# Patient Record
Sex: FEMALE | Race: WHITE | NOT HISPANIC OR LATINO | Employment: OTHER | URBAN - METROPOLITAN AREA
[De-identification: names, ages, dates, MRNs, and addresses within clinical notes are randomized per-mention and may not be internally consistent; named-entity substitution may affect disease eponyms.]

---

## 2020-01-22 ENCOUNTER — TELEPHONE (OUTPATIENT)
Dept: SCHEDULING | Facility: IMAGING CENTER | Age: 60
End: 2020-01-22

## 2020-01-27 ENCOUNTER — TELEPHONE (OUTPATIENT)
Dept: MEDICAL GROUP | Facility: PHYSICIAN GROUP | Age: 60
End: 2020-01-27

## 2020-01-27 NOTE — TELEPHONE ENCOUNTER
Pt called asking about ostomy supplies being sent to [facility]. Pt has appointment to establish w/ Dr Escobar 2/3/2020...     Please advise

## 2020-02-03 ENCOUNTER — OFFICE VISIT (OUTPATIENT)
Dept: MEDICAL GROUP | Facility: PHYSICIAN GROUP | Age: 60
End: 2020-02-03
Payer: OTHER GOVERNMENT

## 2020-02-03 VITALS
WEIGHT: 89 LBS | DIASTOLIC BLOOD PRESSURE: 50 MMHG | SYSTOLIC BLOOD PRESSURE: 108 MMHG | OXYGEN SATURATION: 100 % | HEIGHT: 65 IN | HEART RATE: 60 BPM | TEMPERATURE: 97.7 F | BODY MASS INDEX: 14.83 KG/M2

## 2020-02-03 DIAGNOSIS — F43.10 PTSD (POST-TRAUMATIC STRESS DISORDER): ICD-10-CM

## 2020-02-03 DIAGNOSIS — G93.32 CFS (CHRONIC FATIGUE SYNDROME): ICD-10-CM

## 2020-02-03 DIAGNOSIS — K94.19 INTESTINAL STOMA PROLAPSE (HCC): ICD-10-CM

## 2020-02-03 DIAGNOSIS — D83.9 CVID (COMMON VARIABLE IMMUNODEFICIENCY) (HCC): ICD-10-CM

## 2020-02-03 DIAGNOSIS — K59.00 CONSTIPATION, UNSPECIFIED CONSTIPATION TYPE: ICD-10-CM

## 2020-02-03 DIAGNOSIS — E03.9 HYPOTHYROIDISM, UNSPECIFIED TYPE: ICD-10-CM

## 2020-02-03 DIAGNOSIS — E46 MALNUTRITION, UNSPECIFIED TYPE (HCC): ICD-10-CM

## 2020-02-03 DIAGNOSIS — Z43.3 COLOSTOMY CARE (HCC): ICD-10-CM

## 2020-02-03 PROBLEM — E16.1 REACTIVE HYPOGLYCEMIA: Status: ACTIVE | Noted: 2020-02-03

## 2020-02-03 PROCEDURE — 99204 OFFICE O/P NEW MOD 45 MIN: CPT | Performed by: FAMILY MEDICINE

## 2020-02-03 RX ORDER — LIOTHYRONINE SODIUM 5 UG/1
10 TABLET ORAL DAILY
COMMUNITY
End: 2020-06-05 | Stop reason: SDUPTHER

## 2020-02-03 RX ORDER — CYANOCOBALAMIN 1000 UG/ML
1000 INJECTION, SOLUTION INTRAMUSCULAR; SUBCUTANEOUS
COMMUNITY
End: 2020-07-10 | Stop reason: CLARIF

## 2020-02-03 ASSESSMENT — PATIENT HEALTH QUESTIONNAIRE - PHQ9: CLINICAL INTERPRETATION OF PHQ2 SCORE: 0

## 2020-02-03 NOTE — PROGRESS NOTES
CC: Colostomy care    HISTORY OF THE PRESENT ILLNESS: Patient is a 59 y.o. female. This pleasant patient is here today to establish care and discuss health problems below.    Patient is here today as a new patient.  She has a variety of very serious health conditions and is on permanent disability.  She wants to get her disability paperwork filled out soon.    She is a very long complicated history of medical illness.  She reports that she had CVID and was on infusions per immunologist.  She was also diagnosed with chronic fatigue syndrome.  Apparently somewhere on the line she developed a rectal prolapse.  She had this repaired and the surgery unfortunately went poorly.  She ended up hospitalized for a very prolonged period of time with sepsis.  She now has a permanent colostomy.  Unfortunately she now has intestinal prolapse with her colostomy as well.  Apparently now following with new surgeon here in [place] who has recommended trying to repair her colostomy.  She reports being very skeptical of this, as does her friend who is present during today's appointment.  She continues to follow with her surgeon in [place] as well.     She reports very significant constipation since getting colostomy.  She does follow with gastroenterology and has established with a gastroenterologist here in [place].  She takes Linzess for this.  She also notes that she takes about 25 laxatives per day.    She is malnourished.  She reports she has absorption problems.  She also reports that when she goes to the bathroom, it all comes out at once as a result of her laxative use.  As a result, she reports that she does not eat hardly anything.  BMI is 14.8.  She has very poor wound healing and easily bruises.    She also has history of hypothyroidism.  She is on Synthroid.  Unknown when she had her last labs checked.    As a result of all her health issues, she reports being on permanent disability in [place], but needing that renewed for the  "[facility].    Allergies: Patient has no known allergies.    Current Outpatient Medications Ordered in Epic   Medication Sig Dispense Refill   • liothyronine (CYTOMEL) 5 MCG Tab Take 10 mcg by mouth every day.     • linaCLOtide (LINZESS) 290 MCG Cap Take 290 mcg by mouth every day.     • cyanocobalamin (VITAMIN B-12) 1000 MCG/ML Solution 1,000 mcg by Intramuscular route every 30 days.       No current [facility]-ordered facility-administered medications on file.        History reviewed. No pertinent past medical history.    History reviewed. No pertinent surgical history.    Social History     Tobacco Use   • Smoking status: Never Smoker   • Smokeless tobacco: Never Used   Substance Use Topics   • Alcohol use: Not Currently   • Drug use: Not on file       Social History     Patient does not qualify to have social determinant information on file (likely too young).   Social History Narrative   • Not on file       History reviewed. No pertinent family history.    ROS:     - Constitutional: Positive for chronic fatigue, and severely low BMI.      - Respiratory: Negative for cough, sputum production, chest congestion, dyspnea, wheezing, and crackles.      - Cardiovascular: Negative for chest pain, palpitations, orthopnea, PND, and bilateral lower extremity edema.     - Gastrointestinal:See HPI       - NOTE: All other systems reviewed and are negative, except as in HPI.    Exam: /50 (BP Location: Left arm, Patient Position: Sitting, BP Cuff Size: Adult)   Pulse 60   Temp 36.5 °C (97.7 °F) (Temporal)   Ht 1.651 m (5' 5\")   Wt 40.4 kg (89 lb)   SpO2 100%  Body mass index is 14.81 kg/m².    General: Chronically ill-appearing, severely cachectic  HEENT: Normocephalic. Conjunctiva clear, lids without ptosis, pupils equal and reactive to light accommodation, ears normal shape and contour, canals are clear bilaterally, tympanic membranes without bulging or erythema and normal cone of light, oropharynx is without " erythema, edema or exudates.   Neck: Supple without JVD. No thyromegaly or nodules  Pulmonary: Clear to ausculation.  Normal effort. No rales, ronchi, or wheezing.  Cardiovascular: Regular rate and rhythm without murmur, rubs or gallop.   Abdomen: Soft, nontender, nondistended. Normal bowel sounds. Liver and spleen are not palpable. No rebound or guarding.  Stoma noted.  Neurologic: normal gait  Lymph: No cervical, supraclavicular lymph nodes are palpable  Skin: Multiple ecchymosis noted on body, multiple poor healing wounds noted as well  Musculoskeletal:  No extremity cyanosis, clubbing, or edema.  Psych: Normal mood and affect. Alert and oriented. Judgment and insight is normal.    Please note that this dictation was created using voice recognition software. I have made every reasonable attempt to correct obvious errors, but I expect that there are errors of grammar and possibly content that I did not discover before finalizing the note.      Assessment/Plan  Pita was seen today for establish care and paperwork.    Diagnoses and all orders for this visit:    CVID (common variable immunodeficiency) (HCC)  Chronic issue for the patient.  She has not yet established with an immunologist here in [place].  Referral has been placed today.  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  And requesting records from her various specialist in [place] and will need to review these prior to her next appointment where she will be asking that I fill out disability paperwork.    CFS (chronic fatigue syndrome)  Chronic issue, again requesting records from previous providers.    Malnutrition, unspecified type ([facility])  Chronic issue, severe with BMI of 14.8.  Will check labs as below.  Will likely recommend medical nutrition therapy at next visit.  She reports that she has been to multiple nutritionist and that nobody has been able to help her.  -      Comp Metabolic Panel; Future  -     CBC WITH DIFFERENTIAL; Future  -     VITAMIN B12; Future  -     VITAMIN D,25 HYDROXY; Future  -     FERRITIN; Future  -     Lipid Profile; Future    Hypothyroidism, unspecified type  Chronic issue, on Cytomel.  Checking labs as below.  -     TSH WITH REFLEX TO FT4; Future    PTSD (post-traumatic stress disorder)  Reports this diagnosis as a result of everything that happened as per HPI.  Requesting records.    Constipation, unspecified constipation type  Chronic issue, following with gastroenterology.  She is definitely overusing stimulant laxatives and having severe diarrhea as a result.  As a result of this diarrhea, she is severely limiting her food intake.  This seems to be quite a bad cycle for her.  She is following with gastroenterology and on Linzess.  We will need to request records.    Follow-up in 1 month for disability paperwork and lab review.    Zuleika Escobar,   [facility]

## 2020-02-03 NOTE — LETTER
[facility]  Zuleika Escobar D.O.  [address]  [place] [address]  Fax: [phone]   Authorization for Release/Disclosure of   Protected Health Information   Name: SUSAN WILCOX : 1960 SSN: [id number]   Address: [facility] [place] [address] Phone:    [phone] (home)    I authorize the entity listed below to release/disclose the PHI below to:   [facility]/Zuleika Escobar D.O. and Zuleika Escobar D.O.   Provider or Entity Name:  [facility]   Address   City, State, [place], [place] Phone:      Fax:  [phone]   Reason for request: continuity of care   Information to be released:    [  ] LAST COLONOSCOPY,  including any PATH REPORT and follow-up  [  ] LAST FIT/COLOGUARD RESULT [  ] LAST DEXA  [  ] LAST MAMMOGRAM  [  ] LAST PAP  [  ] LAST LABS [  ] RETINA EXAM REPORT  [  ] IMMUNIZATION RECORDS  [XX] Release all info      [  ] Check here and initial the line next to each item to release ALL health information INCLUDING  _____ Care and treatment for drug and / or alcohol abuse  _____ HIV testing, infection status, or AIDS  _____ Genetic Testing    DATES OF SERVICE OR TIME PERIOD TO BE DISCLOSED: _____________  I understand and acknowledge that:  * This Authorization may be revoked at any time by you in writing, except if your health information has already been used or disclosed.  * Your health information that will be used or disclosed as a result of you signing this authorization could be re-disclosed by the recipient. If this occurs, your re-disclosed health information may no longer be protected by State or Federal laws.  * You may refuse to sign this Authorization. Your refusal will not affect your ability to obtain treatment.  * This Authorization becomes effective upon signing and will  on (date) __________.      If no date is indicated, this Authorization will  one (1) year from the signature date.    Name: Susan Wilcox    Signature:  Continuity of Care   Date:     2/3/2020       PLEASE FAX REQUESTED RECORDS BACK TO: [phone]

## 2020-02-03 NOTE — LETTER
[facility]  Zuleika Escobar D.O.  [address]  [place] [address]  Fax: [phone]   Authorization for Release/Disclosure of   Protected Health Information   Name: SUSAN WILCOX : 1960 SSN: [id number]   Address: [address] [place], [place] [address]e [facility] [address] Phone:    [phone] (home)    I authorize the entity listed below to release/disclose the PHI below to:   [facility]/Zuleika Escobar D.O. and Zuleika Escobar D.O.   Provider or Entity Name:  [facility]   Address   City, [facility], [place], [place] Phone:      Fax:  [phone]   Reason for request: continuity of care   Information to be released:    [  ] LAST COLONOSCOPY,  including any PATH REPORT and follow-up  [  ] LAST FIT/COLOGUARD RESULT [  ] LAST DEXA  [  ] LAST MAMMOGRAM  [  ] LAST PAP  [  ] LAST LABS [  ] RETINA EXAM REPORT  [  ] IMMUNIZATION RECORDS  [X] Release all info      [  ] Check here and initial the line next to each item to release ALL health information INCLUDING  _____ Care and treatment for drug and / or alcohol abuse  _____ HIV testing, infection status, or AIDS  _____ Genetic Testing    DATES OF SERVICE OR TIME PERIOD TO BE DISCLOSED: _____________  I understand and acknowledge that:  * This Authorization may be revoked at any time by you in writing, except if your health information has already been used or disclosed.  * Your health information that will be used or disclosed as a result of you signing this authorization could be re-disclosed by the recipient. If this occurs, your re-disclosed health information may no longer be protected by State or Federal laws.  * You may refuse to sign this Authorization. Your refusal will not affect your ability to obtain treatment.  * This Authorization becomes effective upon signing and will  on (date) __________.      If no date is indicated, this Authorization will  one (1) year from the signature date.    Name: Susan  Myles    Signature: Continuity of Care   Date:     2/3/2020       PLEASE FAX REQUESTED RECORDS BACK TO: [phone]

## 2020-02-03 NOTE — LETTER
[facility]  Zuleika Escobar D.O.  [address]  [place] [address]  Fax: [phone]   Authorization for Release/Disclosure of   Protected Health Information   Name: SUSAN WILCOX : 1960 SSN: [id number]   Address: [facility] [address] [facility]  [place] [address] Phone:    [phone] (home)    I authorize the entity listed below to release/disclose the PHI below to:   [facility]/Zuleika Escobar D.O. and Zuleika Escobar D.O.   Provider or Entity Name:  Dr Quigley   Address   City, [facility]  Col[facility] Phone:      Fax:  [phone]   Reason for request: continuity of care   Information to be released:    [  ] LAST COLONOSCOPY,  including any PATH REPORT and follow-up  [  ] LAST FIT/COLOGUARD RESULT [  ] LAST DEXA  [  ] LAST MAMMOGRAM  [  ] LAST PAP  [  ] LAST LABS [  ] RETINA EXAM REPORT  [  ] IMMUNIZATION RECORDS  [XX] Release all info      [  ] Check here and initial the line next to each item to release ALL health information INCLUDING  _____ Care and treatment for drug and / or alcohol abuse  _____ HIV testing, infection status, or AIDS  _____ Genetic Testing    DATES OF SERVICE OR TIME PERIOD TO BE DISCLOSED: _____________  I understand and acknowledge that:  * This Authorization may be revoked at any time by you in writing, except if your health information has already been used or disclosed.  * Your health information that will be used or disclosed as a result of you signing this authorization could be re-disclosed by the recipient. If this occurs, your re-disclosed health information may no longer be protected by State or Federal laws.  * You may refuse to sign this Authorization. Your refusal will not affect your ability to obtain treatment.  * This Authorization becomes effective upon signing and will  on (date) __________.      If no date is indicated, this Authorization will  one (1) year from the signature date.    Name: Susan  Myles    Signature: Continuity of Care Date:     2/3/2020       PLEASE FAX REQUESTED RECORDS BACK TO: [phone]

## 2020-02-05 ENCOUNTER — HOSPITAL ENCOUNTER (OUTPATIENT)
Dept: LAB | Facility: MEDICAL CENTER | Age: 60
End: 2020-02-05
Attending: FAMILY MEDICINE
Payer: OTHER GOVERNMENT

## 2020-02-05 DIAGNOSIS — E03.9 HYPOTHYROIDISM, UNSPECIFIED TYPE: ICD-10-CM

## 2020-02-05 DIAGNOSIS — E46 MALNUTRITION, UNSPECIFIED TYPE (HCC): ICD-10-CM

## 2020-02-05 LAB
25(OH)D3 SERPL-MCNC: 8 NG/ML (ref 30–100)
ALBUMIN SERPL BCP-MCNC: 4.3 G/DL (ref 3.2–4.9)
ALBUMIN/GLOB SERPL: 2 G/DL
ALP SERPL-CCNC: 70 U/L (ref 30–99)
ALT SERPL-CCNC: 27 U/L (ref 2–50)
ANION GAP SERPL CALC-SCNC: 7 MMOL/L (ref 0–11.9)
ANISOCYTOSIS BLD QL SMEAR: ABNORMAL
AST SERPL-CCNC: 23 U/L (ref 12–45)
BASOPHILS # BLD AUTO: 0.6 % (ref 0–1.8)
BASOPHILS # BLD: 0.02 K/UL (ref 0–0.12)
BILIRUB SERPL-MCNC: 0.3 MG/DL (ref 0.1–1.5)
BUN SERPL-MCNC: 12 MG/DL (ref 8–22)
CALCIUM SERPL-MCNC: 8.7 MG/DL (ref 8.5–10.5)
CHLORIDE SERPL-SCNC: 109 MMOL/L (ref 96–112)
CHOLEST SERPL-MCNC: 235 MG/DL (ref 100–199)
CO2 SERPL-SCNC: 21 MMOL/L (ref 20–33)
COMMENT 1642: NORMAL
CREAT SERPL-MCNC: 0.62 MG/DL (ref 0.5–1.4)
EOSINOPHIL # BLD AUTO: 0.06 K/UL (ref 0–0.51)
EOSINOPHIL NFR BLD: 1.7 % (ref 0–6.9)
ERYTHROCYTE [DISTWIDTH] IN BLOOD BY AUTOMATED COUNT: 51.7 FL (ref 35.9–50)
FASTING STATUS PATIENT QL REPORTED: NORMAL
GLOBULIN SER CALC-MCNC: 2.1 G/DL (ref 1.9–3.5)
GLUCOSE SERPL-MCNC: 77 MG/DL (ref 65–99)
HCT VFR BLD AUTO: 40.9 % (ref 37–47)
HDLC SERPL-MCNC: 79 MG/DL
HGB BLD-MCNC: 12.2 G/DL (ref 12–16)
IMM GRANULOCYTES # BLD AUTO: 0.01 K/UL (ref 0–0.11)
IMM GRANULOCYTES NFR BLD AUTO: 0.3 % (ref 0–0.9)
LDLC SERPL CALC-MCNC: 131 MG/DL
LYMPHOCYTES # BLD AUTO: 0.84 K/UL (ref 1–4.8)
LYMPHOCYTES NFR BLD: 24.3 % (ref 22–41)
MACROCYTES BLD QL SMEAR: ABNORMAL
MCH RBC QN AUTO: 31 PG (ref 27–33)
MCHC RBC AUTO-ENTMCNC: 29.8 G/DL (ref 33.6–35)
MCV RBC AUTO: 104.1 FL (ref 81.4–97.8)
MONOCYTES # BLD AUTO: 0.39 K/UL (ref 0–0.85)
MONOCYTES NFR BLD AUTO: 11.3 % (ref 0–13.4)
MORPHOLOGY BLD-IMP: NORMAL
NEUTROPHILS # BLD AUTO: 2.14 K/UL (ref 2–7.15)
NEUTROPHILS NFR BLD: 61.8 % (ref 44–72)
NRBC # BLD AUTO: 0 K/UL
NRBC BLD-RTO: 0 /100 WBC
PLATELET # BLD AUTO: 125 K/UL (ref 164–446)
PLATELET BLD QL SMEAR: NORMAL
PMV BLD AUTO: 13 FL (ref 9–12.9)
POTASSIUM SERPL-SCNC: 3.9 MMOL/L (ref 3.6–5.5)
PROT SERPL-MCNC: 6.4 G/DL (ref 6–8.2)
RBC # BLD AUTO: 3.93 M/UL (ref 4.2–5.4)
RBC BLD AUTO: PRESENT
SODIUM SERPL-SCNC: 137 MMOL/L (ref 135–145)
TRIGL SERPL-MCNC: 124 MG/DL (ref 0–149)
WBC # BLD AUTO: 3.5 K/UL (ref 4.8–10.8)

## 2020-02-05 PROCEDURE — 82607 VITAMIN B-12: CPT

## 2020-02-05 PROCEDURE — 80061 LIPID PANEL: CPT

## 2020-02-05 PROCEDURE — 36415 COLL VENOUS BLD VENIPUNCTURE: CPT

## 2020-02-05 PROCEDURE — 82306 VITAMIN D 25 HYDROXY: CPT

## 2020-02-05 PROCEDURE — 84443 ASSAY THYROID STIM HORMONE: CPT

## 2020-02-05 PROCEDURE — 82728 ASSAY OF FERRITIN: CPT

## 2020-02-05 PROCEDURE — 80053 COMPREHEN METABOLIC PANEL: CPT

## 2020-02-05 PROCEDURE — 85025 COMPLETE CBC W/AUTO DIFF WBC: CPT

## 2020-02-06 LAB
FERRITIN SERPL-MCNC: 15.6 NG/ML (ref 10–291)
TSH SERPL DL<=0.005 MIU/L-ACNC: 2.96 UIU/ML (ref 0.38–5.33)
VIT B12 SERPL-MCNC: 451 PG/ML (ref 211–911)

## 2020-02-07 DIAGNOSIS — E78.00 PURE HYPERCHOLESTEROLEMIA: ICD-10-CM

## 2020-02-07 DIAGNOSIS — E55.9 VITAMIN D DEFICIENCY: ICD-10-CM

## 2020-02-28 ENCOUNTER — OFFICE VISIT (OUTPATIENT)
Dept: MEDICAL GROUP | Facility: PHYSICIAN GROUP | Age: 60
End: 2020-02-28
Payer: OTHER GOVERNMENT

## 2020-02-28 VITALS
OXYGEN SATURATION: 98 % | BODY MASS INDEX: 14.99 KG/M2 | HEIGHT: 65 IN | TEMPERATURE: 97.1 F | SYSTOLIC BLOOD PRESSURE: 114 MMHG | WEIGHT: 90 LBS | HEART RATE: 60 BPM | DIASTOLIC BLOOD PRESSURE: 54 MMHG

## 2020-02-28 DIAGNOSIS — G93.32 CFS (CHRONIC FATIGUE SYNDROME): ICD-10-CM

## 2020-02-28 DIAGNOSIS — D83.9 CVID (COMMON VARIABLE IMMUNODEFICIENCY) (HCC): ICD-10-CM

## 2020-02-28 DIAGNOSIS — K94.19 INTESTINAL STOMA PROLAPSE (HCC): ICD-10-CM

## 2020-02-28 DIAGNOSIS — E46 MALNUTRITION, UNSPECIFIED TYPE (HCC): ICD-10-CM

## 2020-02-28 DIAGNOSIS — Z02.89 ENCOUNTER FOR COMPLETION OF FORM WITH PATIENT: Primary | ICD-10-CM

## 2020-02-28 DIAGNOSIS — K59.00 CONSTIPATION, UNSPECIFIED CONSTIPATION TYPE: ICD-10-CM

## 2020-02-28 PROCEDURE — 99214 OFFICE O/P EST MOD 30 MIN: CPT | Performed by: FAMILY MEDICINE

## 2020-02-28 ASSESSMENT — FIBROSIS 4 INDEX: FIB4 SCORE: 2.05

## 2020-02-29 NOTE — PROGRESS NOTES
CC:   Disability paperwork    HISTORY OF THE PRESENT ILLNESS: Patient is a 59 y.o. female. This pleasant patient is here today mostly for completion of disability paperwork today.    Patient is here today for completion of her disability paperwork.  She was previously on disability permanently in [place].  She has a history of CVID, intestinal stoma prolapse with chronic constipation/diarrhea, malnutrition, chronic fatigue.  She recently moved here to [place].    She does note that she recently got in with Dr. Nguyen a colorectal surgeon here in [place].  She reports that she was not happy with the outcome of the visit as he wanted to do surgery, but she feels that she is not ready for that from a health standpoint.    She is going to see gastroenterology Dr. Starr for the second time next week to follow-up on her chronic constipation.  She has somewhat of a difficult/unclear history regarding this.  She apparently reports suffering significant constipation since getting colostomy.  She takes Linzess.  She also notes she takes about 10 stimulant laxatives per day.  She has been told in the past that she needs to get off of these, but she feels that she gets constipated and bloated when she does not take them.  She also reports that she does not keep food down.    She does have severe malnutrition as BMI is 14.9.  She has cracks in her skin and bruising all over her body.  She wants to know if there is any lotions that she can use for her skin cracks.  Nutrition is very poor.    Allergies: Patient has no known allergies.    Current Outpatient Medications Ordered in Epic   Medication Sig Dispense Refill   • Cholecalciferol (VITAMIN D3) 1.25 MG (74301 UT) Tab Take 1 Tab by mouth every 7 days. 12 Tab 2   • liothyronine (CYTOMEL) 5 MCG Tab Take 10 mcg by mouth every day.     • linaCLOtide (LINZESS) 290 MCG Cap Take 290 mcg by mouth every day.     • cyanocobalamin (VITAMIN B-12) 1000 MCG/ML Solution 1,000 mcg by  "Intramuscular route every 30 days.       No current Epic-ordered facility-administered medications on file.        History reviewed. No pertinent past medical history.    History reviewed. No pertinent surgical history.    Social History     Tobacco Use   • Smoking status: Never Smoker   • Smokeless tobacco: Never Used   Substance Use Topics   • Alcohol use: Not Currently   • Drug use: Not on file       Social History     Social History Narrative   • Not on file       History reviewed. No pertinent family history.    ROS:   See HPI    Exam: /54 (BP Location: Left arm, Patient Position: Sitting, BP Cuff Size: Adult)   Pulse 60   Temp 36.2 °C (97.1 °F) (Temporal)   Ht 1.651 m (5' 5\")   Wt 40.8 kg (90 lb)   SpO2 98%  Body mass index is 14.98 kg/m².    General: Chronically ill-appearing, cachectic  Pulmonary: Clear to ausculation.  Normal effort. No rales, ronchi, or wheezing.  Cardiovascular: Regular rate and rhythm without murmur, rubs or gallop.   Skin: Warm and dry.  Cracks and unhealing wounds noted on hands and skin.  Significant ecchymosis noted on arms and legs.  Musculoskeletal:  No extremity cyanosis, clubbing, or edema.  Psych: Normal mood and affect. Alert and oriented. Judgment and insight is normal.    Please note that this dictation was created using voice recognition software. I have made every reasonable attempt to correct obvious errors, but I expect that there are errors of grammar and possibly content that I did not discover before finalizing the note.      Assessment/Plan  Pita was seen today for paperwork.    Diagnoses and all orders for this visit:    Encounter for completion of form with patient  Malnutrition, unspecified type (HCC)  Constipation, unspecified constipation type  Intestinal stoma prolapse (HCC)  CVID   Chronic fatigue syndrome  All chronic issues for the patient.  I am still pending receiving some medical records from her specialist in [place].  She has established " care with colorectal surgery and GI.  She is pending establishing appointment with immunology.  Disability paperwork was filled out today.    Follow up in 3-4 months or sooner if needed.     Counseling: Patient was seen for a total of 25 minutes face-to-face by myself, with more than half of the time spent discussion of recent patient experiences with specialists, coordination of care, and completion of paperwork.    Zuleika Escobar DO  [facility]

## 2020-03-03 ENCOUNTER — TELEPHONE (OUTPATIENT)
Dept: MEDICAL GROUP | Facility: PHYSICIAN GROUP | Age: 60
End: 2020-03-03

## 2020-03-03 DIAGNOSIS — D83.9 CVID (COMMON VARIABLE IMMUNODEFICIENCY) (HCC): ICD-10-CM

## 2020-03-04 NOTE — TELEPHONE ENCOUNTER
Season from [facility] ([phone] option 4) called and stated that Dr Shook wants to speak to you regarding this patient before accepting the referral. I told them tomorrow during lunch would probably work if that works for you. Thank you.

## 2020-05-05 ENCOUNTER — HOSPITAL ENCOUNTER (OUTPATIENT)
Dept: LAB | Facility: MEDICAL CENTER | Age: 60
End: 2020-05-05
Attending: FAMILY MEDICINE
Payer: OTHER GOVERNMENT

## 2020-05-05 DIAGNOSIS — E46 MALNUTRITION, UNSPECIFIED TYPE (HCC): ICD-10-CM

## 2020-05-05 DIAGNOSIS — E53.8 VITAMIN B12 DEFICIENCY: ICD-10-CM

## 2020-05-05 LAB
BASOPHILS # BLD AUTO: 0.8 % (ref 0–1.8)
BASOPHILS # BLD: 0.03 K/UL (ref 0–0.12)
EOSINOPHIL # BLD AUTO: 0.09 K/UL (ref 0–0.51)
EOSINOPHIL NFR BLD: 2.4 % (ref 0–6.9)
ERYTHROCYTE [DISTWIDTH] IN BLOOD BY AUTOMATED COUNT: 52.4 FL (ref 35.9–50)
HCT VFR BLD AUTO: 35.6 % (ref 37–47)
HGB BLD-MCNC: 11.4 G/DL (ref 12–16)
IMM GRANULOCYTES # BLD AUTO: 0 K/UL (ref 0–0.11)
IMM GRANULOCYTES NFR BLD AUTO: 0 % (ref 0–0.9)
LYMPHOCYTES # BLD AUTO: 0.83 K/UL (ref 1–4.8)
LYMPHOCYTES NFR BLD: 22 % (ref 22–41)
MCH RBC QN AUTO: 32.8 PG (ref 27–33)
MCHC RBC AUTO-ENTMCNC: 32 G/DL (ref 33.6–35)
MCV RBC AUTO: 102.3 FL (ref 81.4–97.8)
MONOCYTES # BLD AUTO: 0.43 K/UL (ref 0–0.85)
MONOCYTES NFR BLD AUTO: 11.4 % (ref 0–13.4)
NEUTROPHILS # BLD AUTO: 2.39 K/UL (ref 2–7.15)
NEUTROPHILS NFR BLD: 63.4 % (ref 44–72)
NRBC # BLD AUTO: 0 K/UL
NRBC BLD-RTO: 0 /100 WBC
PLATELET # BLD AUTO: 131 K/UL (ref 164–446)
PMV BLD AUTO: 13 FL (ref 9–12.9)
RBC # BLD AUTO: 3.48 M/UL (ref 4.2–5.4)
VIT B12 SERPL-MCNC: 427 PG/ML (ref 211–911)
WBC # BLD AUTO: 3.8 K/UL (ref 4.8–10.8)

## 2020-05-05 PROCEDURE — 85025 COMPLETE CBC W/AUTO DIFF WBC: CPT

## 2020-05-05 PROCEDURE — 82607 VITAMIN B-12: CPT

## 2020-05-05 PROCEDURE — 36415 COLL VENOUS BLD VENIPUNCTURE: CPT

## 2020-05-20 ENCOUNTER — OFFICE VISIT (OUTPATIENT)
Dept: URGENT CARE | Facility: CLINIC | Age: 60
End: 2020-05-20
Payer: OTHER GOVERNMENT

## 2020-05-20 VITALS
TEMPERATURE: 97.8 F | RESPIRATION RATE: 16 BRPM | HEART RATE: 60 BPM | OXYGEN SATURATION: 98 % | DIASTOLIC BLOOD PRESSURE: 64 MMHG | SYSTOLIC BLOOD PRESSURE: 102 MMHG

## 2020-05-20 DIAGNOSIS — J22 LRTI (LOWER RESPIRATORY TRACT INFECTION): ICD-10-CM

## 2020-05-20 PROCEDURE — 99214 OFFICE O/P EST MOD 30 MIN: CPT | Performed by: NURSE PRACTITIONER

## 2020-05-20 RX ORDER — DOXYCYCLINE HYCLATE 100 MG/1
100 CAPSULE ORAL 2 TIMES DAILY
Qty: 10 CAP | Refills: 0 | Status: SHIPPED | OUTPATIENT
Start: 2020-05-20 | End: 2020-05-25

## 2020-05-20 ASSESSMENT — ENCOUNTER SYMPTOMS
FEVER: 0
HEMOPTYSIS: 0
NAUSEA: 0
DIARRHEA: 0
COUGH: 1
HEADACHES: 1
CHILLS: 0
SHORTNESS OF BREATH: 1
WHEEZING: 0

## 2020-05-20 NOTE — PROGRESS NOTES
Subjective:      Pita Bueno is a 60 y.o. female who presents with Shortness of Breath (hurts to breathe x 1 week and states she has been having a cough and states there has been mulitiple ocasions where she felt like she was drowning in the midle of the night)            Cough   This is a new problem. The current episode started in the past 7 days. The problem has been unchanged. The cough is non-productive. Associated symptoms include headaches, nasal congestion, postnasal drip and shortness of breath. Pertinent negatives include no chills, fever, hemoptysis or wheezing. Treatments tried: pain reliever. she has albuterol but has not taken any.     Sulfa drugs    Current Outpatient Medications on File Prior to Visit   Medication Sig Dispense Refill   • linaCLOtide (LINZESS) 290 MCG Cap Take 290 mcg by mouth every day.     • Cholecalciferol (VITAMIN D3) 1.25 MG (08801 UT) Tab Take 1 Tab by mouth every 7 days. 12 Tab 2   • liothyronine (CYTOMEL) 5 MCG Tab Take 10 mcg by mouth every day.     • cyanocobalamin (VITAMIN B-12) 1000 MCG/ML Solution 1,000 mcg by Intramuscular route every 30 days.       No current facility-administered medications on file prior to visit.      Social History     Socioeconomic History   • Marital status:      Spouse name: Not on file   • Number of children: Not on file   • Years of education: Not on file   • Highest education level: Not on file   Occupational History   • Not on file   Social Needs   • Financial resource strain: Not on file   • Food insecurity     Worry: Not on file     Inability: Not on file   • Transportation needs     Medical: Not on file     Non-medical: Not on file   Tobacco Use   • Smoking status: Never Smoker   • Smokeless tobacco: Never Used   Substance and Sexual Activity   • Alcohol use: Not Currently   • Drug use: Not on file   • Sexual activity: Not on file   Lifestyle   • Physical activity     Days per week: Not on file     Minutes per session: Not on  file   • Stress: Not on file   Relationships   • Social connections     Talks on phone: Not on file     Gets together: Not on file     Attends Mu-ism service: Not on file     Active member of club or organization: Not on file     Attends meetings of clubs or organizations: Not on file     Relationship status: Not on file   • Intimate partner violence     Fear of current or ex partner: Not on file     Emotionally abused: Not on file     Physically abused: Not on file     Forced sexual activity: Not on file   Other Topics Concern   • Not on file   Social History Narrative   • Not on file     Breast Cancer-related family history is not on file.    Review of Systems   Constitutional: Positive for malaise/fatigue. Negative for chills and fever.   HENT: Positive for congestion and postnasal drip.    Respiratory: Positive for cough and shortness of breath. Negative for hemoptysis and wheezing.    Gastrointestinal: Negative for diarrhea and nausea.   Neurological: Positive for headaches.          Objective:     /64 (BP Location: Left arm, Patient Position: Sitting, BP Cuff Size: Adult)   Pulse 60   Temp 36.6 °C (97.8 °F) (Temporal)   Resp 16   SpO2 98%      Physical Exam  Vitals signs and nursing note reviewed.   Constitutional:       General: She is not in acute distress.     Appearance: She is well-developed.   HENT:      Head: Normocephalic.      Right Ear: Tympanic membrane and external ear normal.      Left Ear: Tympanic membrane and external ear normal.      Nose: Mucosal edema and rhinorrhea present.      Mouth/Throat:      Pharynx: No posterior oropharyngeal erythema.   Eyes:      General:         Right eye: No discharge.         Left eye: No discharge.      Conjunctiva/sclera: Conjunctivae normal.   Neck:      Musculoskeletal: Normal range of motion and neck supple.   Cardiovascular:      Rate and Rhythm: Normal rate and regular rhythm.      Heart sounds: Normal heart sounds.   Pulmonary:      Effort:  Pulmonary effort is normal. No respiratory distress.      Breath sounds: Normal breath sounds. No wheezing or rales.   Musculoskeletal: Normal range of motion.   Lymphadenopathy:      Cervical: No cervical adenopathy.   Skin:     General: Skin is warm and dry.   Neurological:      Mental Status: She is alert and oriented to person, place, and time.   Psychiatric:         Behavior: Behavior normal.         Thought Content: Thought content normal.                 Assessment/Plan:       1. LRTI (lower respiratory tract infection)  doxycycline (VIBRAMYCIN) 100 MG Cap     She is instructed to try albuterol for her shortness of breath.  She has an inhaler at home.  Doxy.  Saturation stable at 98%.  Differential diagnosis, natural history, supportive care, and indications for immediate follow-up discussed at length.

## 2020-05-31 ENCOUNTER — OFFICE VISIT (OUTPATIENT)
Dept: URGENT CARE | Facility: PHYSICIAN GROUP | Age: 60
End: 2020-05-31
Payer: OTHER GOVERNMENT

## 2020-05-31 ENCOUNTER — TELEPHONE (OUTPATIENT)
Dept: URGENT CARE | Facility: PHYSICIAN GROUP | Age: 60
End: 2020-05-31

## 2020-05-31 ENCOUNTER — HOSPITAL ENCOUNTER (OUTPATIENT)
Facility: MEDICAL CENTER | Age: 60
End: 2020-05-31
Attending: PHYSICIAN ASSISTANT
Payer: OTHER GOVERNMENT

## 2020-05-31 VITALS
WEIGHT: 82 LBS | BODY MASS INDEX: 13.65 KG/M2 | HEART RATE: 68 BPM | TEMPERATURE: 98.2 F | SYSTOLIC BLOOD PRESSURE: 90 MMHG | RESPIRATION RATE: 14 BRPM | DIASTOLIC BLOOD PRESSURE: 62 MMHG | OXYGEN SATURATION: 100 %

## 2020-05-31 DIAGNOSIS — D83.9 COMMON VARIABLE IMMUNODEFICIENCY (HCC): ICD-10-CM

## 2020-05-31 DIAGNOSIS — R20.2 PARESTHESIA: ICD-10-CM

## 2020-05-31 DIAGNOSIS — M62.831 MUSCLE SPASM OF LEFT CALF: ICD-10-CM

## 2020-05-31 DIAGNOSIS — E46 MALNUTRITION, UNSPECIFIED TYPE (HCC): ICD-10-CM

## 2020-05-31 LAB
ALBUMIN SERPL BCP-MCNC: 4.6 G/DL (ref 3.2–4.9)
ALBUMIN/GLOB SERPL: 2.4 G/DL
ALP SERPL-CCNC: 78 U/L (ref 30–99)
ALT SERPL-CCNC: 29 U/L (ref 2–50)
ANION GAP SERPL CALC-SCNC: 14 MMOL/L (ref 7–16)
APPEARANCE UR: CLEAR
AST SERPL-CCNC: 21 U/L (ref 12–45)
BILIRUB SERPL-MCNC: 0.2 MG/DL (ref 0.1–1.5)
BILIRUB UR STRIP-MCNC: NEGATIVE MG/DL
BUN SERPL-MCNC: 14 MG/DL (ref 8–22)
CALCIUM SERPL-MCNC: 9 MG/DL (ref 8.5–10.5)
CHLORIDE SERPL-SCNC: 106 MMOL/L (ref 96–112)
CK SERPL-CCNC: 65 U/L (ref 0–154)
CO2 SERPL-SCNC: 16 MMOL/L (ref 20–33)
COLOR UR AUTO: YELLOW
CREAT SERPL-MCNC: 0.59 MG/DL (ref 0.5–1.4)
GLOBULIN SER CALC-MCNC: 1.9 G/DL (ref 1.9–3.5)
GLUCOSE SERPL-MCNC: 91 MG/DL (ref 65–99)
GLUCOSE UR STRIP.AUTO-MCNC: NEGATIVE MG/DL
KETONES UR STRIP.AUTO-MCNC: NEGATIVE MG/DL
LEUKOCYTE ESTERASE UR QL STRIP.AUTO: NEGATIVE
NITRITE UR QL STRIP.AUTO: NEGATIVE
PH UR STRIP.AUTO: 6 [PH] (ref 5–8)
POTASSIUM SERPL-SCNC: 4.2 MMOL/L (ref 3.6–5.5)
PROT SERPL-MCNC: 6.5 G/DL (ref 6–8.2)
PROT UR QL STRIP: NEGATIVE MG/DL
RBC UR QL AUTO: NEGATIVE
SODIUM SERPL-SCNC: 136 MMOL/L (ref 135–145)
SP GR UR STRIP.AUTO: 1.02
UROBILINOGEN UR STRIP-MCNC: NORMAL MG/DL

## 2020-05-31 PROCEDURE — 99214 OFFICE O/P EST MOD 30 MIN: CPT | Performed by: PHYSICIAN ASSISTANT

## 2020-05-31 PROCEDURE — 80053 COMPREHEN METABOLIC PANEL: CPT

## 2020-05-31 PROCEDURE — 81002 URINALYSIS NONAUTO W/O SCOPE: CPT | Performed by: PHYSICIAN ASSISTANT

## 2020-05-31 PROCEDURE — 82550 ASSAY OF CK (CPK): CPT

## 2020-05-31 RX ORDER — LUBIPROSTONE 24 UG/1
24 CAPSULE ORAL
COMMUNITY
End: 2020-06-05

## 2020-05-31 ASSESSMENT — ENCOUNTER SYMPTOMS
FEVER: 0
DIARRHEA: 0
SHORTNESS OF BREATH: 0
ABDOMINAL PAIN: 0
COUGH: 0
VOMITING: 0
MYALGIAS: 1
FOCAL WEAKNESS: 0
SENSORY CHANGE: 1
NAUSEA: 1
CHILLS: 0
TINGLING: 1

## 2020-05-31 ASSESSMENT — FIBROSIS 4 INDEX: FIB4 SCORE: 1.99

## 2020-05-31 NOTE — PROGRESS NOTES
Subjective:   Pita Bueno  is a 60 y.o. female who presents for Muscle Spasms (severe cramps in L leg x3 days)  This is a new problem.  Patient presents to urgent care with 3-day history of severe cramping in the left lower extremity along the soleus muscle with persistent pain following spasms.  Patient also reports 3-day history of numbness and tingling of bilateral arms.  Patient has a history of chronic malnutrition and immune compromise.  She is concerned about the possibility of abnormal lab values as a source for her symptoms.  Patient denies any injury    The patient also mentions an increase in nausea. She does have a colostomy in place and does struggle with intermittent issues with nausea and diarrhea.  However, she does admit to an increase in nausea over the past 3 to 4 days as well.  .    HPI  Review of Systems   Constitutional: Negative for chills and fever.   Respiratory: Negative for cough and shortness of breath.    Gastrointestinal: Positive for nausea. Negative for abdominal pain, diarrhea and vomiting.   Musculoskeletal: Positive for myalgias.   Neurological: Positive for tingling and sensory change. Negative for focal weakness.   All other systems reviewed and are negative.    Allergies   Allergen Reactions   • Sulfa Drugs      Reviewed past medical, surgical , social and family history.  Reviewed prescription and over-the-counter medications with patient and electronic health record today.     Objective:   BP (!) 90/62 (BP Location: Right arm, Patient Position: Sitting, BP Cuff Size: Small adult)   Pulse 68   Temp 36.8 °C (98.2 °F) (Temporal)   Resp 14   Wt 37.2 kg (82 lb)   SpO2 100%   BMI 13.65 kg/m²   Physical Exam  Vitals signs reviewed.   Constitutional:       General: She is not in acute distress.     Appearance: She is well-developed. She is not ill-appearing or toxic-appearing.   HENT:      Head: Normocephalic and atraumatic.      Right Ear: External ear normal.      Left  Ear: External ear normal.      Nose: Nose normal.      Mouth/Throat:      Lips: Pink. No lesions.      Mouth: Mucous membranes are moist.      Pharynx: Oropharynx is clear. Uvula midline. No oropharyngeal exudate.   Eyes:      General: Lids are normal.      Extraocular Movements: Extraocular movements intact.      Conjunctiva/sclera: Conjunctivae normal.      Pupils: Pupils are equal, round, and reactive to light.   Neck:      Musculoskeletal: Normal range of motion and neck supple.   Cardiovascular:      Rate and Rhythm: Normal rate and regular rhythm.      Pulses:           Radial pulses are 2+ on the right side and 2+ on the left side.        Dorsalis pedis pulses are 2+ on the right side and 2+ on the left side.        Posterior tibial pulses are 2+ on the right side and 2+ on the left side.      Heart sounds: Normal heart sounds. No murmur. No friction rub. No gallop.    Pulmonary:      Effort: Pulmonary effort is normal. No respiratory distress.      Breath sounds: Normal breath sounds.   Abdominal:      General: Bowel sounds are normal. There is no distension.      Palpations: Abdomen is soft. There is no mass.      Tenderness: There is no abdominal tenderness. There is no guarding or rebound.   Musculoskeletal: Normal range of motion.         General: No deformity.      Right lower leg: No edema.      Left lower leg: She exhibits tenderness. She exhibits no bony tenderness, no swelling and no deformity. No edema.        Legs:    Lymphadenopathy:      Head:      Right side of head: No submental, submandibular or tonsillar adenopathy.      Left side of head: No submental, submandibular or tonsillar adenopathy.      Cervical: No cervical adenopathy.      Upper Body:      Right upper body: No supraclavicular adenopathy.      Left upper body: No supraclavicular adenopathy.   Skin:     General: Skin is warm and dry.      Findings: No rash.   Neurological:      Mental Status: She is alert and oriented to person,  place, and time.      Cranial Nerves: Cranial nerves are intact. No cranial nerve deficit.      Sensory: Sensation is intact. No sensory deficit.      Motor: Motor function is intact.      Coordination: Coordination is intact. Coordination normal.      Gait: Gait is intact.   Psychiatric:         Attention and Perception: Attention normal.         Mood and Affect: Mood and affect normal.         Speech: Speech normal.         Behavior: Behavior normal. Behavior is cooperative.         Thought Content: Thought content normal.         Judgment: Judgment normal.     Negative Chvostek's sign   Negative Trousseau sign        Assessment/Plan:   1. Muscle spasm of left calf  - POCT Urinalysis  - Comp Metabolic Panel; Future  - CREATINE KINASE; Future    2. Paresthesia  - POCT Urinalysis  - Comp Metabolic Panel; Future  - CREATINE KINASE; Future    3. CVID (common variable immunodeficiency) ([facility])    4. Malnutrition, unspecified type ([facility])    Results for orders placed or performed in visit on 05/31/20   POCT Urinalysis   Result Value Ref Range    POC Color yellow Negative    POC Appearance clear Negative    POC Leukocyte Esterase negative Negative    POC Nitrites negative Negative    POC Urobiligen 0.2eu/dl Negative (0.2) mg/dL    POC Protein negative Negative mg/dL    POC Urine PH 6.0 5.0 - 8.0    POC Blood negative Negative    POC Specific Gravity 1.025 <1.005 - >1.030    POC Ketones negative Negative mg/dL    POC Bilirubin negative Negative mg/dL    POC Glucose negative Negative mg/dL       Given the patient's underlying malnutrition and immunodeficiency we will go ahead and check CMP and CK.  I did review with the patient that if these are abnormal she will require follow-up with primary care for further evaluation and management as this is somewhat outside the scope of urgent care.  The patient reports that she does have an appointment with her primary care on Friday which she is encouraged to keep.    Differential  diagnosis, natural history, supportive care, and indications for immediate follow-up discussed.     Red flag warning symptoms and strict ER/follow-up precautions given.  The patient demonstrated a good understanding and agreed with the treatment plan.  Please note that this note was created using voice recognition speech to text software. Every effort has been made to correct obvious errors.  However, I expect there are errors of grammar and possibly context that were not discovered prior to finalizing the note  GEOVANNY Nicholas PA-C

## 2020-05-31 NOTE — TELEPHONE ENCOUNTER
Contacted patient with test results.  Electrolytes within normal limits, CK not elevated.  Calcium is not low.  I do not have a clear explanation for the patient's symptoms.  I suspect she is had a muscle spasm that is going to take some time to feel better.  Recommend follow-up with primary care if persists.  GEOVANNY Nicholas PA-C

## 2020-06-01 ENCOUNTER — TELEPHONE (OUTPATIENT)
Dept: HEALTH INFORMATION MANAGEMENT | Facility: OTHER | Age: 60
End: 2020-06-01

## 2020-06-01 ENCOUNTER — HOSPITAL ENCOUNTER (EMERGENCY)
Facility: MEDICAL CENTER | Age: 60
End: 2020-06-01
Attending: EMERGENCY MEDICINE
Payer: OTHER GOVERNMENT

## 2020-06-01 ENCOUNTER — APPOINTMENT (OUTPATIENT)
Dept: RADIOLOGY | Facility: MEDICAL CENTER | Age: 60
End: 2020-06-01
Attending: EMERGENCY MEDICINE
Payer: OTHER GOVERNMENT

## 2020-06-01 VITALS
DIASTOLIC BLOOD PRESSURE: 48 MMHG | BODY MASS INDEX: 13.59 KG/M2 | SYSTOLIC BLOOD PRESSURE: 82 MMHG | HEART RATE: 49 BPM | WEIGHT: 81.57 LBS | RESPIRATION RATE: 29 BRPM | HEIGHT: 65 IN | OXYGEN SATURATION: 100 % | TEMPERATURE: 97.3 F

## 2020-06-01 DIAGNOSIS — R25.2 MUSCLE CRAMPS: ICD-10-CM

## 2020-06-01 DIAGNOSIS — R06.00 DYSPNEA, UNSPECIFIED TYPE: ICD-10-CM

## 2020-06-01 DIAGNOSIS — R53.1 WEAKNESS: ICD-10-CM

## 2020-06-01 DIAGNOSIS — R07.9 CHEST PAIN, UNSPECIFIED TYPE: ICD-10-CM

## 2020-06-01 DIAGNOSIS — R20.2 PARESTHESIA: ICD-10-CM

## 2020-06-01 LAB
ALBUMIN SERPL BCP-MCNC: 4.3 G/DL (ref 3.2–4.9)
ALBUMIN/GLOB SERPL: 2.4 G/DL
ALP SERPL-CCNC: 69 U/L (ref 30–99)
ALT SERPL-CCNC: 23 U/L (ref 2–50)
ANION GAP SERPL CALC-SCNC: 12 MMOL/L (ref 7–16)
APPEARANCE UR: CLEAR
AST SERPL-CCNC: 21 U/L (ref 12–45)
BASOPHILS # BLD AUTO: 0.8 % (ref 0–1.8)
BASOPHILS # BLD: 0.03 K/UL (ref 0–0.12)
BILIRUB SERPL-MCNC: 0.2 MG/DL (ref 0.1–1.5)
BILIRUB UR QL STRIP.AUTO: NEGATIVE
BUN SERPL-MCNC: 14 MG/DL (ref 8–22)
CALCIUM SERPL-MCNC: 9.1 MG/DL (ref 8.4–10.2)
CHLORIDE SERPL-SCNC: 110 MMOL/L (ref 96–112)
CO2 SERPL-SCNC: 17 MMOL/L (ref 20–33)
COLOR UR: YELLOW
CREAT SERPL-MCNC: 0.64 MG/DL (ref 0.5–1.4)
D DIMER PPP IA.FEU-MCNC: <0.27 UG/ML (FEU) (ref 0–0.5)
EKG IMPRESSION: NORMAL
EOSINOPHIL # BLD AUTO: 0.04 K/UL (ref 0–0.51)
EOSINOPHIL NFR BLD: 1.1 % (ref 0–6.9)
ERYTHROCYTE [DISTWIDTH] IN BLOOD BY AUTOMATED COUNT: 48.9 FL (ref 35.9–50)
GLOBULIN SER CALC-MCNC: 1.8 G/DL (ref 1.9–3.5)
GLUCOSE SERPL-MCNC: 88 MG/DL (ref 65–99)
GLUCOSE UR STRIP.AUTO-MCNC: NEGATIVE MG/DL
HCT VFR BLD AUTO: 38.8 % (ref 37–47)
HGB BLD-MCNC: 12.6 G/DL (ref 12–16)
IMM GRANULOCYTES # BLD AUTO: 0.01 K/UL (ref 0–0.11)
IMM GRANULOCYTES NFR BLD AUTO: 0.3 % (ref 0–0.9)
KETONES UR STRIP.AUTO-MCNC: NEGATIVE MG/DL
LEUKOCYTE ESTERASE UR QL STRIP.AUTO: NEGATIVE
LYMPHOCYTES # BLD AUTO: 0.95 K/UL (ref 1–4.8)
LYMPHOCYTES NFR BLD: 26.7 % (ref 22–41)
MAGNESIUM SERPL-MCNC: 2.2 MG/DL (ref 1.5–2.5)
MCH RBC QN AUTO: 32.3 PG (ref 27–33)
MCHC RBC AUTO-ENTMCNC: 32.5 G/DL (ref 33.6–35)
MCV RBC AUTO: 99.5 FL (ref 81.4–97.8)
MICRO URNS: NORMAL
MONOCYTES # BLD AUTO: 0.42 K/UL (ref 0–0.85)
MONOCYTES NFR BLD AUTO: 11.8 % (ref 0–13.4)
NEUTROPHILS # BLD AUTO: 2.11 K/UL (ref 2–7.15)
NEUTROPHILS NFR BLD: 59.3 % (ref 44–72)
NITRITE UR QL STRIP.AUTO: NEGATIVE
NRBC # BLD AUTO: 0 K/UL
NRBC BLD-RTO: 0 /100 WBC
PH UR STRIP.AUTO: 6 [PH] (ref 5–8)
PLATELET # BLD AUTO: 144 K/UL (ref 164–446)
PMV BLD AUTO: 11.9 FL (ref 9–12.9)
POTASSIUM SERPL-SCNC: 4.2 MMOL/L (ref 3.6–5.5)
PROT SERPL-MCNC: 6.1 G/DL (ref 6–8.2)
PROT UR QL STRIP: NEGATIVE MG/DL
RBC # BLD AUTO: 3.9 M/UL (ref 4.2–5.4)
RBC UR QL AUTO: NEGATIVE
SODIUM SERPL-SCNC: 139 MMOL/L (ref 135–145)
SP GR UR STRIP.AUTO: 1.02
T4 FREE SERPL-MCNC: 0.53 NG/DL (ref 0.93–1.7)
TROPONIN T SERPL-MCNC: 7 NG/L (ref 6–19)
TSH SERPL DL<=0.005 MIU/L-ACNC: 2.6 UIU/ML (ref 0.38–5.33)
WBC # BLD AUTO: 3.6 K/UL (ref 4.8–10.8)

## 2020-06-01 PROCEDURE — 99284 EMERGENCY DEPT VISIT MOD MDM: CPT

## 2020-06-01 PROCEDURE — 80053 COMPREHEN METABOLIC PANEL: CPT

## 2020-06-01 PROCEDURE — 81003 URINALYSIS AUTO W/O SCOPE: CPT

## 2020-06-01 PROCEDURE — 84439 ASSAY OF FREE THYROXINE: CPT

## 2020-06-01 PROCEDURE — 83735 ASSAY OF MAGNESIUM: CPT

## 2020-06-01 PROCEDURE — 84484 ASSAY OF TROPONIN QUANT: CPT

## 2020-06-01 PROCEDURE — 93005 ELECTROCARDIOGRAM TRACING: CPT

## 2020-06-01 PROCEDURE — 700105 HCHG RX REV CODE 258: Performed by: EMERGENCY MEDICINE

## 2020-06-01 PROCEDURE — 85379 FIBRIN DEGRADATION QUANT: CPT

## 2020-06-01 PROCEDURE — 36415 COLL VENOUS BLD VENIPUNCTURE: CPT

## 2020-06-01 PROCEDURE — 71045 X-RAY EXAM CHEST 1 VIEW: CPT

## 2020-06-01 PROCEDURE — 93005 ELECTROCARDIOGRAM TRACING: CPT | Performed by: EMERGENCY MEDICINE

## 2020-06-01 PROCEDURE — 84443 ASSAY THYROID STIM HORMONE: CPT

## 2020-06-01 PROCEDURE — 85025 COMPLETE CBC W/AUTO DIFF WBC: CPT

## 2020-06-01 RX ORDER — SODIUM CHLORIDE 9 MG/ML
1000 INJECTION, SOLUTION INTRAVENOUS ONCE
Status: COMPLETED | OUTPATIENT
Start: 2020-06-01 | End: 2020-06-01

## 2020-06-01 RX ADMIN — SODIUM CHLORIDE 1000 ML: 9 INJECTION, SOLUTION INTRAVENOUS at 14:49

## 2020-06-01 ASSESSMENT — HEART SCORE
RISK FACTORS: NO KNOWN RISK FACTORS
AGE: 45-64
HEART SCORE: 1
TROPONIN: LESS THAN OR EQUAL TO NORMAL LIMIT
ECG: NORMAL
HISTORY: SLIGHTLY SUSPICIOUS

## 2020-06-01 ASSESSMENT — FIBROSIS 4 INDEX: FIB4 SCORE: 1.76

## 2020-06-01 NOTE — ED PROVIDER NOTES
ED Provider Note    CHIEF COMPLAINT  Chief Complaint   Patient presents with   • Shortness of Breath   • Extremity Weakness   • Cramping       HPI  Pita Bueno is a 60 y.o. female who presents with complaint of shortness of breath onset 2 weeks ago.  It is worse with exertion better with rest.  Patient states she works out frequently, has been having difficulty.  Today when attempting to walk developed chest pain lasting approximately 10 minutes, better with rest.  She states is the first time this is happened.  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  No fever, no cough.  She denies leg swelling.  Neurologic complaints include numbness and weakness to right and left arm and left leg with some tingling.  She states her right leg is spared.  No associated neck pain.  She has been having muscular cramping in the left leg, denies this in her arms.  She states she has fatigue.  She was seen in urgent care yesterday, states she was told her electrolytes were normal.  No sore throat, no vomiting.  She has nausea.  No diarrhea.    REVIEW OF SYSTEMS    Constitutional: Fatigue, no fever  Respiratory: Tautness of breath with exertion  Cardiac: Exertional chest pain today  Gastrointestinal: No abdominal pain  Musculoskeletal: Denies acute neck or back pain  Neurologic: Numbness and tingling upper arms and left leg       All other systems are negative.       PAST MEDICAL HISTORY  No past medical history on file.    FAMILY HISTORY  No family history on file.    SOCIAL HISTORY  Social History     Socioeconomic History   • Marital status:      Spouse name: Not on file   • Number of children: Not on file   • Years of education: Not on file   • Highest education level: Not on file   Occupational History   • Not on file   Social Needs   • Financial resource strain: Not on file   • Food insecurity     Worry: Not on file     Inability: Not  "on file   • Transportation needs     Medical: Not on file     Non-medical: Not on file   Tobacco Use   • Smoking status: Never Smoker   • Smokeless tobacco: Never Used   Substance and Sexual Activity   • Alcohol use: Not Currently   • Drug use: Not on file   • Sexual activity: Not on file   Lifestyle   • Physical activity     Days per week: Not on file     Minutes per session: Not on file   • Stress: Not on file   Relationships   • Social connections     Talks on phone: Not on file     Gets together: Not on file     Attends [employer] service: Not on file     Active member of club or organization: Not on file     Attends meetings of clubs or organizations: Not on file     Relationship status: Not on file   • Intimate partner violence     Fear of current or ex partner: Not on file     Emotionally abused: Not on file     Physically abused: Not on file     Forced sexual activity: Not on file   Other Topics Concern   • Not on file   Social History Narrative   • Not on file       SURGICAL HISTORY  No past surgical history on file.    CURRENT MEDICATIONS  Home Medications    **Home medications have not yet been reviewed for this encounter**         ALLERGIES  Allergies   Allergen Reactions   • Sulfa Drugs        PHYSICAL EXAM  VITAL SIGNS: BP (S) (!) 90/52 Comment: \"Normal for me.\"  Pulse 60   Temp 36.3 °C (97.3 °F) (Temporal)   Resp 16   Ht 1.651 m (5' 5\")   Wt 37 kg (81 lb 9.1 oz)   SpO2 100%   BMI 13.57 kg/m²   Constitutional:  Non-toxic appearance.  Thin and cachectic.  HENT: No facial swelling  Eyes: Anicteric, no conjunctivitis.     Cardiovascular: Normal heart rate, Normal rhythm  Pulmonary:  No wheezing, No rales.  No tachypnea during exam or while talking to patient.  Gastrointestinal: Soft, No tenderness, No distention  Skin: Warm, Dry, No cyanosis.   Neurologic: Speech is clear, follows commands, facial expression is symmetrical.  Subjective weakness in her arms and legs.  On exam strength is symmetric " and grossly intact.  Subjective diminished sensation both arms and left leg compared to the right leg.  Psychiatric: Affect normal,  Mood normal.  Patient is calm and cooperative  Musculoskeletal: No chest wall tenderness.  Neck nontender, range of motion without discomfort.  Spine nontender.    EKG/Labs  Results for orders placed or performed during the hospital encounter of 06/01/20   URINALYSIS CULTURE, IF INDICATED    Specimen: Urine   Result Value Ref Range    Color Yellow     Character Clear     Specific Gravity 1.025 <1.035    Ph 6.0 5.0 - 8.0    Glucose Negative Negative mg/dL    Ketones Negative Negative mg/dL    Protein Negative Negative mg/dL    Bilirubin Negative Negative    Nitrite Negative Negative    Leukocyte Esterase Negative Negative    Occult Blood Negative Negative    Micro Urine Req see below    TROPONIN   Result Value Ref Range    Troponin T 7 6 - 19 ng/L   MAGNESIUM   Result Value Ref Range    Magnesium 2.2 1.5 - 2.5 mg/dL   TSH   Result Value Ref Range    TSH 2.600 0.380 - 5.330 uIU/mL   FREE THYROXINE   Result Value Ref Range    Free T-4 0.53 (L) 0.93 - 1.70 ng/dL   D-DIMER   Result Value Ref Range    D-Dimer Screen <0.27 0.00 - 0.50 ug/mL (FEU)   CBC WITH DIFFERENTIAL   Result Value Ref Range    WBC 3.6 (L) 4.8 - 10.8 K/uL    RBC 3.90 (L) 4.20 - 5.40 M/uL    Hemoglobin 12.6 12.0 - 16.0 g/dL    Hematocrit 38.8 37.0 - 47.0 %    MCV 99.5 (H) 81.4 - 97.8 fL    MCH 32.3 27.0 - 33.0 pg    MCHC 32.5 (L) 33.6 - 35.0 g/dL    RDW 48.9 35.9 - 50.0 fL    Platelet Count 144 (L) 164 - 446 K/uL    MPV 11.9 9.0 - 12.9 fL    Neutrophils-Polys 59.30 44.00 - 72.00 %    Lymphocytes 26.70 22.00 - 41.00 %    Monocytes 11.80 0.00 - 13.40 %    Eosinophils 1.10 0.00 - 6.90 %    Basophils 0.80 0.00 - 1.80 %    Immature Granulocytes 0.30 0.00 - 0.90 %    Nucleated RBC 0.00 /100 WBC    Neutrophils (Absolute) 2.11 2.00 - 7.15 K/uL    Lymphs (Absolute) 0.95 (L) 1.00 - 4.80 K/uL    Monos (Absolute) 0.42 0.00 - 0.85 K/uL     Eos (Absolute) 0.04 0.00 - 0.51 K/uL    Baso (Absolute) 0.03 0.00 - 0.12 K/uL    Immature Granulocytes (abs) 0.01 0.00 - 0.11 K/uL    NRBC (Absolute) 0.00 K/uL   Comp Metabolic Panel   Result Value Ref Range    Sodium 139 135 - 145 mmol/L    Potassium 4.2 3.6 - 5.5 mmol/L    Chloride 110 96 - 112 mmol/L    Co2 17 (L) 20 - 33 mmol/L    Anion Gap 12.0 7.0 - 16.0    Glucose 88 65 - 99 mg/dL    Bun 14 8 - 22 mg/dL    Creatinine 0.64 0.50 - 1.40 mg/dL    Calcium 9.1 8.4 - 10.2 mg/dL    AST(SGOT) 21 12 - 45 U/L    ALT(SGPT) 23 2 - 50 U/L    Alkaline Phosphatase 69 30 - 99 U/L    Total Bilirubin 0.2 0.1 - 1.5 mg/dL    Albumin 4.3 3.2 - 4.9 g/dL    Total Protein 6.1 6.0 - 8.2 g/dL    Globulin 1.8 (L) 1.9 - 3.5 g/dL    A-G Ratio 2.4 g/dL   ESTIMATED GFR   Result Value Ref Range    GFR If African American >60 >60 mL/min/1.73 m 2    GFR If Non African American >60 >60 mL/min/1.73 m 2   EKG   Result Value Ref Range    Report       [facility]    Test Date:  2020  Pt Name:    SUSAN WILCOX             Department: [facility]  MRN:        [id number]                      Room:  Gender:     Female                       Technician: HRR  :        [id number]                   Requested By:ER TRIAGE PROTOCOL  Order #:    [id number]                    Reading MD: ETELVINA ISABEL MD    Measurements  Intervals                                Axis  Rate:       57                           P:          73  CA:         153                          QRS:        82  QRSD:       91                           T:          74  QT:         428  QTc:        417    Interpretive Statements  Sinus rhythm  No previous ECG available for comparison  No evidence of acute ischemia or arrhythmia.  Electronically Signed On 2020 21:27:53 PDT by ETELVINA ISABEL MD           RADIOLOGY/PROCEDURES  DX-CHEST-PORTABLE (1 VIEW)   Final Result      Negative single view of the chest.            COURSE & MEDICAL DECISION  MAKING  Pertinent Labs & Imaging studies reviewed. (See chart for details)  Distribution of her numbness to the right arm left arm left leg as well as weakness to these areas is not anatomically consistent with stroke.  Etiology is unknown.  She for some reason has spared her right leg and feels cramping in the left.  Patient had single episode of chest discomfort, 8 minutes in duration, atypical.  She is currently chest pain-free with negative troponin.  She has some bradycardia intermittently here.  Patient was breathing comfortably with me in the room, the reported respiratory rate of 48 and 34 and 29 appear to be spurious.  I discussed this with the nursing staff.  Patient claims blood pressure of 90/52 is normal for her.  Blood pressure of 82/48 was not reported to me by nursing staff prior to the discharge, she however stated she felt well and wished to be discharged.  I have provided referral to neurology, also cardiology.  Patient states her low weight and low blood pressure are due to malabsorption.  Patient's heart score is 1    FINAL IMPRESSION     1. Muscle cramps      Left leg   2. Weakness      Both arms, left leg   3. Dyspnea, unspecified type     4. Chest pain, unspecified type     5. Paresthesia                     Electronically signed by: Patrick Sweet M.D., 6/1/2020 2:41 PM

## 2020-06-01 NOTE — ED TRIAGE NOTES
"Presents complaining of dyspnea at rest, upper extremities weakness, and left leg cramping recurrent for the past 2 weeks transitorily.  She was seen at [facility] yesterday, and evaluated for same symptomatology.  Pt denies any domestic high risk areas, or international travel within the past 14 days.  He has been encouraged to keep the cell phone readily available since a pharmacy tech is likely to call to reconcile home medications.  The pharmacy of choice has been updated.  No fever, nor cough are observed.  Chief Complaint   Patient presents with   • Shortness of Breath   • Extremity Weakness   • Cramping     BP (!) 90/52   Pulse 60   Temp 36.3 °C (97.3 °F) (Temporal)   Resp 16   Ht 1.651 m (5' 5\")   Wt 37 kg (81 lb 9.1 oz)   SpO2 100%   BMI 13.57 kg/m²     "

## 2020-06-01 NOTE — ED NOTES
1450 Iv placed , labs drawn and taken to lab. poc update given to pt, results pending at this time  1500 Pt medicated as directed by ELISSA wang, poc update given to pt. Further orders and dispo pending at this time. No further questions from pt at this time  1655 Urine collected  And taken to lab

## 2020-06-01 NOTE — TELEPHONE ENCOUNTER
"1. Caller Name: Pita Bueno                 Call Back Number: [phone]  [facility] PCP or Specialty Provider: Yes Dr. Escobar        2.  Does patient have any new or worsening symptoms of respiratory illness? Yes, the patient reports the following respiratory symptoms: shortness of breath or difficulty breathing, muscle pain and light headed, dizziness, cramping in left lower leg, Numbness in both arms. Dull chest pain when she is walking or with activity.    3.  Does patient have any comoribidities? Immunosuppressed, colostomy with mal absorption.      4.  Has the patient traveled in the last 14 days OR had any known contact with someone who is suspected or confirmed to have COVID-19?  No.    5. Disposition: Advised to go to ED or call 9-1-1 Chest pain and SOB with dizziness that increases with minimal activity (walking) and \"numbness\" bilateral arms.     Note routed to [facility] Provider: [facility] only.   "

## 2020-06-02 ENCOUNTER — PATIENT MESSAGE (OUTPATIENT)
Dept: HEALTH INFORMATION MANAGEMENT | Facility: OTHER | Age: 60
End: 2020-06-02

## 2020-06-02 NOTE — DISCHARGE INSTRUCTIONS
A message has been left to schedule you outpatient follow-up with both neurology regarding your weakness and numbness in your extremities and with cardiology regarding her shortness of breath and episode of chest pain today.  Please return to the emergency department for worsening symptoms or for any other concerns.

## 2020-06-02 NOTE — ED NOTES
D/c pt home, no rx given . Pt aware of f/u instructions with neuro and cardiology  , aware to return for any changes or concerns. No further questions upon d/c home from ed

## 2020-06-05 ENCOUNTER — OFFICE VISIT (OUTPATIENT)
Dept: MEDICAL GROUP | Facility: PHYSICIAN GROUP | Age: 60
End: 2020-06-05
Payer: OTHER GOVERNMENT

## 2020-06-05 VITALS
DIASTOLIC BLOOD PRESSURE: 54 MMHG | SYSTOLIC BLOOD PRESSURE: 108 MMHG | HEART RATE: 79 BPM | WEIGHT: 77 LBS | BODY MASS INDEX: 12.83 KG/M2 | OXYGEN SATURATION: 98 % | HEIGHT: 65 IN | TEMPERATURE: 99.1 F

## 2020-06-05 DIAGNOSIS — R11.0 NAUSEA: ICD-10-CM

## 2020-06-05 DIAGNOSIS — E46 MALNUTRITION, UNSPECIFIED TYPE (HCC): ICD-10-CM

## 2020-06-05 DIAGNOSIS — R53.83 OTHER FATIGUE: ICD-10-CM

## 2020-06-05 DIAGNOSIS — E03.9 HYPOTHYROIDISM, UNSPECIFIED TYPE: ICD-10-CM

## 2020-06-05 DIAGNOSIS — R07.9 CHEST PAIN, UNSPECIFIED TYPE: ICD-10-CM

## 2020-06-05 DIAGNOSIS — E55.9 VITAMIN D DEFICIENCY: ICD-10-CM

## 2020-06-05 PROCEDURE — 99214 OFFICE O/P EST MOD 30 MIN: CPT | Performed by: FAMILY MEDICINE

## 2020-06-05 RX ORDER — LIOTHYRONINE SODIUM 5 UG/1
15 TABLET ORAL DAILY
Qty: 90 TAB | Refills: 5 | Status: SHIPPED | OUTPATIENT
Start: 2020-06-05 | End: 2020-06-12 | Stop reason: SDUPTHER

## 2020-06-05 RX ORDER — LIOTHYRONINE SODIUM 5 UG/1
10 TABLET ORAL DAILY
Qty: 90 TAB | Refills: 5 | Status: SHIPPED | OUTPATIENT
Start: 2020-06-05 | End: 2020-06-05

## 2020-06-05 ASSESSMENT — FIBROSIS 4 INDEX: FIB4 SCORE: 1.79

## 2020-06-05 NOTE — PROGRESS NOTES
"CC: Chest pain, hospital follow-up    HISTORY OF THE PRESENT ILLNESS: Patient is a 60 y.o. female. This pleasant patient is here today for follow-up.    Patient is here today for follow-up.  She was recently seen in the emergency department for shortness of breath, chest pain, muscle cramps, numbness and weakness of both arms and legs.  Also concerned with fatigue.    Patient obviously deals with a lot of issues.  She has a history of CVI D, intestinal stoma prolapse with chronic constipation/diarrhea, malnutrition, chronic fatigue syndrome.  She is on chronic disability for this.    I do not have access to all of her previous records from [place].  She reports her chronic malnutrition, severely low weight to be due to malabsorption.  She has an unclear history, but apparently at some point developed a rectal prolapse and was in the hospital for a prolonged period of time with sepsis.  She ended up with a permanent colostomy after this.      Unclear cause from a anatomical standpoint as to why she has chronic malnutrition.  Her BMI is 12.8.  She is reporting to me that she has chronically liquid stools in her colostomy bag.  She takes 10 laxatives per day.  States that if she does not take these laxatives she will end up with a \"impaction\".  Previously reports to me that she does not hardly eat anything at all.  At that time her BMI was 12.8.  Today she states that she eats at least 2500 michael/day, but in spite of that she is now down to a BMI of 12.8.  She also reports that she has a history of excessive exercise and that she still trying to exercise on a regular basis in spite of her low weight.    She has a multitude of complaints today including feeling short of breath, fatigue, chronically nauseated, intermittent chest pain.  She is concerned that this is due to \"the acid in her body\".  Also concerned that her labs chronically show low CO2.  Recently when she was in the hospital for chest pain, her troponin " "and EKG were normal.    Allergies: Sulfa drugs    Current Outpatient Medications Ordered in Epic   Medication Sig Dispense Refill   • liothyronine (CYTOMEL) 5 MCG Tab Take 2 Tabs by mouth every day. 90 Tab 5   • Cholecalciferol (VITAMIN D3) 1.25 MG (06866 UT) Tab Take 1 Tab by mouth every 7 days. 12 Tab 2   • linaCLOtide (LINZESS) 290 MCG Cap Take 290 mcg by mouth every day.     • cyanocobalamin (VITAMIN B-12) 1000 MCG/ML Solution 1,000 mcg by Intramuscular route every 30 days.       No current [facility]-ordered facility-administered medications on file.        History reviewed. No pertinent past medical history.    History reviewed. No pertinent surgical history.    Social History     Tobacco Use   • Smoking status: Never Smoker   • Smokeless tobacco: Never Used   Substance Use Topics   • Alcohol use: Not Currently   • Drug use: Not on file       Social History     Social History Narrative   • Not on file       History reviewed. No pertinent family history.    ROS:     - Constitutional: Positive for severe fatigue and weight loss.      - HEENT positive for dizziness.    - Respiratory: Positive for shortness of breath.     - Cardiovascular: Positive for chest pain and palpitations.      - Gastrointestinal: Positive for nausea, diarrhea, constipation, chronic abdominal pain.      - Musculoskeletal: Positive for chronic joint musculoskeletal pain.      - Skin: Positive for poor wound healing and dry cracked skin.    - Neurological: Positive for numbness and tingling, dizziness, headache.     - Endo/Heme/Allergies: Positive for easy bruising.      Labs: Labs reviewed from June 1, 2020.    Exam: /54 (BP Location: Right arm, Patient Position: Sitting, BP Cuff Size: Child)   Pulse 79   Temp 37.3 °C (99.1 °F) (Temporal)   Ht 1.651 m (5' 5\")   Wt 34.9 kg (77 lb)   SpO2 98%  Body mass index is 12.81 kg/m².    General: Emaciated, NAD  Pulmonary: Clear to ausculation.  Normal effort. No rales, ronchi, or " "wheezing.  Cardiovascular: Regular rate and rhythm without murmur, rubs or gallop.   Skin: Warm and dry.  No obvious lesions.  Musculoskeletal:  No extremity cyanosis, clubbing, or edema.  Psych: Normal mood and affect. Alert and oriented.     Please note that this dictation was created using voice recognition software. I have made every reasonable attempt to correct obvious errors, but I expect that there are errors of grammar and possibly content that I did not discover before finalizing the note.      Assessment/Plan  Pita was seen today for nausea, fatigue and referral needed.    Diagnoses and all orders for this visit:    Nausea  Malnutrition, unspecified type (HCC)  Other fatigue  These are ongoing issues for the patient in the setting of severe malnutrition and a BMI of 12.8.  She continues to lose weight.  I do not have records from her previous physicians.  This is all per patient due to malnutrition.  I am somewhat skeptical of this and concern for severe eating disorder.  She reports she still trying to exercise daily and states she has a history of \"obsessive exercise\".  She is very resistant to get off laxatives and is still using at least 10 laxatives per day, down from 25 laxatives per day.  In the past she is told me that she barely eats anything due to nausea, but today she told me that she eats large amounts of food all day long, so her story is fairly inconsistent.  I am not sure what to do for her from this standpoint.  I feel like a lot of her problems are due to her severe malnutrition.  I have of course advised her to again continue to wean off the laxatives.  I have also had a very extended discussion with her regarding my feelings about many of her symptoms related to her malnutrition.  She has follow-up with gastroenterology and the colorectal surgeon for her ostomy and upcoming CT scan to ensure no leakage.    Vitamin D deficiency  Patient requesting that her vitamin D levels get " checked due to her fatigue.  -     VITAMIN D,25 HYDROXY; Future    Hypothyroidism, unspecified type  Chronic issue, free T4 was low with lab work recently.  We will go ahead and increase Cytomel to 15 mcg to see if this helps with her fatigue.  However, as above I feel most of her symptoms related to malnutrition.  -     liothyronine (CYTOMEL) 5 MCG Tab; Take 2 Tabs by mouth every day.  -     TSH+FREE T4    Chest pain, unspecified type  New complaint for the patient.  Stating she is getting chest pressure and shortness of breath.  In the emergency department, EKG was normal and troponins were negative.  This does not necessarily rule out cardiomyopathy, particularly in the setting of severe malnutrition.  She has been referred to cardiology for further evaluation.  -     REFERRAL TO CARDIOLOGY      Follow-up in 3 to 4 months or sooner if needed.    Zuleika Escobar,   [facility]

## 2020-06-10 ENCOUNTER — HOSPITAL ENCOUNTER (OUTPATIENT)
Dept: RADIOLOGY | Facility: MEDICAL CENTER | Age: 60
End: 2020-06-10
Attending: SURGERY
Payer: OTHER GOVERNMENT

## 2020-06-10 DIAGNOSIS — K94.03 COLOSTOMY MALFUNCTION (HCC): ICD-10-CM

## 2020-06-10 PROCEDURE — 74177 CT ABD & PELVIS W/CONTRAST: CPT

## 2020-06-10 PROCEDURE — 700117 HCHG RX CONTRAST REV CODE 255: Performed by: SURGERY

## 2020-06-10 RX ADMIN — IOHEXOL 80 ML: 350 INJECTION, SOLUTION INTRAVENOUS at 16:28

## 2020-06-10 RX ADMIN — IOHEXOL 25 ML: 240 INJECTION, SOLUTION INTRATHECAL; INTRAVASCULAR; INTRAVENOUS; ORAL at 16:29

## 2020-06-10 RX ADMIN — IOHEXOL 50 ML: 240 INJECTION, SOLUTION INTRATHECAL; INTRAVASCULAR; INTRAVENOUS; ORAL at 16:28

## 2020-06-12 DIAGNOSIS — E03.9 HYPOTHYROIDISM, UNSPECIFIED TYPE: ICD-10-CM

## 2020-06-12 RX ORDER — LIOTHYRONINE SODIUM 5 UG/1
15 TABLET ORAL DAILY
Qty: 90 TAB | Refills: 5 | Status: SHIPPED | OUTPATIENT
Start: 2020-06-12 | End: 2020-09-01

## 2020-06-13 NOTE — TELEPHONE ENCOUNTER
----- Message from Pita Bueno sent at 6/12/2020  3:57 PM PDT -----  Regarding: RE: Prescription Question  Contact: [phone]  just waiting for confirmation that we [facility] the [facility] thank you

## 2020-06-30 ENCOUNTER — HOSPITAL ENCOUNTER (OUTPATIENT)
Dept: LAB | Facility: MEDICAL CENTER | Age: 60
End: 2020-06-30
Attending: FAMILY MEDICINE
Payer: OTHER GOVERNMENT

## 2020-06-30 ENCOUNTER — TELEPHONE (OUTPATIENT)
Dept: MEDICAL GROUP | Facility: PHYSICIAN GROUP | Age: 60
End: 2020-06-30

## 2020-06-30 ENCOUNTER — HOSPITAL ENCOUNTER (OUTPATIENT)
Facility: MEDICAL CENTER | Age: 60
End: 2020-06-30
Attending: FAMILY MEDICINE
Payer: OTHER GOVERNMENT

## 2020-06-30 DIAGNOSIS — D83.9 COMMON VARIABLE IMMUNODEFICIENCY (HCC): ICD-10-CM

## 2020-06-30 DIAGNOSIS — E55.9 VITAMIN D DEFICIENCY: ICD-10-CM

## 2020-06-30 LAB
25(OH)D3 SERPL-MCNC: 22 NG/ML (ref 30–100)
T4 FREE SERPL-MCNC: 0.46 NG/DL (ref 0.93–1.7)
TSH SERPL DL<=0.005 MIU/L-ACNC: 3.08 UIU/ML (ref 0.38–5.33)

## 2020-06-30 PROCEDURE — 82306 VITAMIN D 25 HYDROXY: CPT

## 2020-06-30 PROCEDURE — 84439 ASSAY OF FREE THYROXINE: CPT

## 2020-06-30 PROCEDURE — 85025 COMPLETE CBC W/AUTO DIFF WBC: CPT

## 2020-06-30 PROCEDURE — 84443 ASSAY THYROID STIM HORMONE: CPT

## 2020-06-30 PROCEDURE — 36415 COLL VENOUS BLD VENIPUNCTURE: CPT

## 2020-06-30 NOTE — TELEPHONE ENCOUNTER
Nataliia from [facility] called (ex: [phone]) saying that patient was expecting more labwork, including a CBC though she had a CBC at the beginning of the month. They did draw for a CBC just in case though. Please advise. Thank you.

## 2020-06-30 NOTE — TELEPHONE ENCOUNTER
Spoke to patient, she would like a CBC. She states that previously in [place] they were doing a CBC every month. Thank you.

## 2020-07-01 DIAGNOSIS — D83.9 COMMON VARIABLE IMMUNODEFICIENCY (HCC): ICD-10-CM

## 2020-07-01 LAB
BASOPHILS # BLD AUTO: 1.2 % (ref 0–1.8)
BASOPHILS # BLD: 0.04 K/UL (ref 0–0.12)
EOSINOPHIL # BLD AUTO: 0.06 K/UL (ref 0–0.51)
EOSINOPHIL NFR BLD: 1.9 % (ref 0–6.9)
ERYTHROCYTE [DISTWIDTH] IN BLOOD BY AUTOMATED COUNT: 52 FL (ref 35.9–50)
HCT VFR BLD AUTO: 38.1 % (ref 37–47)
HGB BLD-MCNC: 11.9 G/DL (ref 12–16)
IMM GRANULOCYTES # BLD AUTO: 0.01 K/UL (ref 0–0.11)
IMM GRANULOCYTES NFR BLD AUTO: 0.3 % (ref 0–0.9)
LYMPHOCYTES # BLD AUTO: 0.75 K/UL (ref 1–4.8)
LYMPHOCYTES NFR BLD: 23.3 % (ref 22–41)
MCH RBC QN AUTO: 33 PG (ref 27–33)
MCHC RBC AUTO-ENTMCNC: 31.2 G/DL (ref 33.6–35)
MCV RBC AUTO: 105.5 FL (ref 81.4–97.8)
MONOCYTES # BLD AUTO: 0.36 K/UL (ref 0–0.85)
MONOCYTES NFR BLD AUTO: 11.2 % (ref 0–13.4)
NEUTROPHILS # BLD AUTO: 2 K/UL (ref 2–7.15)
NEUTROPHILS NFR BLD: 62.1 % (ref 44–72)
NRBC # BLD AUTO: 0 K/UL
NRBC BLD-RTO: 0 /100 WBC
PLATELET # BLD AUTO: 139 K/UL (ref 164–446)
PMV BLD AUTO: 12.7 FL (ref 9–12.9)
RBC # BLD AUTO: 3.61 M/UL (ref 4.2–5.4)
WBC # BLD AUTO: 3.2 K/UL (ref 4.8–10.8)

## 2020-07-02 DIAGNOSIS — E03.9 HYPOTHYROIDISM, UNSPECIFIED TYPE: ICD-10-CM

## 2020-07-10 ENCOUNTER — OFFICE VISIT (OUTPATIENT)
Dept: MEDICAL GROUP | Facility: PHYSICIAN GROUP | Age: 60
End: 2020-07-10
Payer: OTHER GOVERNMENT

## 2020-07-10 VITALS
HEIGHT: 65 IN | TEMPERATURE: 98 F | DIASTOLIC BLOOD PRESSURE: 62 MMHG | SYSTOLIC BLOOD PRESSURE: 96 MMHG | BODY MASS INDEX: 13.59 KG/M2 | WEIGHT: 81.6 LBS | RESPIRATION RATE: 12 BRPM | OXYGEN SATURATION: 98 % | HEART RATE: 71 BPM

## 2020-07-10 DIAGNOSIS — E03.9 HYPOTHYROIDISM, UNSPECIFIED TYPE: ICD-10-CM

## 2020-07-10 DIAGNOSIS — D61.818 PANCYTOPENIA (HCC): ICD-10-CM

## 2020-07-10 DIAGNOSIS — E55.9 VITAMIN D DEFICIENCY: Chronic | ICD-10-CM

## 2020-07-10 DIAGNOSIS — D83.9 COMMON VARIABLE IMMUNODEFICIENCY (HCC): ICD-10-CM

## 2020-07-10 DIAGNOSIS — E46 MALNUTRITION, UNSPECIFIED TYPE (HCC): ICD-10-CM

## 2020-07-10 DIAGNOSIS — Z43.3 ATTENTION TO COLOSTOMY (HCC): ICD-10-CM

## 2020-07-10 PROBLEM — K94.19 INTESTINAL STOMA PROLAPSE (HCC): Status: RESOLVED | Noted: 2020-02-03 | Resolved: 2020-07-10

## 2020-07-10 PROCEDURE — 99204 OFFICE O/P NEW MOD 45 MIN: CPT | Performed by: INTERNAL MEDICINE

## 2020-07-10 ASSESSMENT — FIBROSIS 4 INDEX: FIB4 SCORE: 1.86

## 2020-07-10 NOTE — PROGRESS NOTES
CC: Establish care  Follow-up vitamin deficiency    HPI: Pt presents today to establish care.   Pt's medical history is notable for:    Colostomy care (HCC)  Patient is new to me.  Patient reported history of rectal prolapse and 2013.  She underwent surgery which was complicated by recurrent infection requiring 2 additional surgeries.  The patient is now with colostomy bag.  She is managing this condition reasonably well on her own.      CVID (common variable immunodeficiency) (HCC)  Has been a chronic condition diagnosed in 2007 at [facility].  The patient was seen previously by hematologist there at the patient request referral to see hematologist here in [place].  Patient reports that she used to get recurrent infection requiring IVIG infusion previously.    Hypothyroidism  This has been a chronic condition.  The patient is currently taking Cytomel 5 mg daily.  Patient had lab work done approximately 10 days ago however the patient stated that she wishes to redo the lab work as she has not had a dose adjustment.    Vitamin D deficiency  Chronic condition.  The patient is presently taking vitamin D once a week.  She is due for lab work.              REVIEW OF SYSTEMS:     Constitutional:  no fever / chills   Neurologic: no headaches, no numbness/tingling  Eyes: no changes in vision  ENT: no sore throat, no hearing loss  CV:  no chest pain, no palpitations  Pulmonary: no SOB, no cough    GI: no nausea / vomiting, no diarrhea, no constipation  Patient has colostomy bag   :  no dysuria, no hematuria   Hematologic: no bleeding      Allergies: Sulfa drugs    Current Outpatient Medications Ordered in Epic   Medication Sig Dispense Refill   • liothyronine (CYTOMEL) 5 MCG Tab Take 3 Tabs by mouth every day. 90 Tab 5   • Cholecalciferol (VITAMIN D3) 1.25 MG (32223 UT) Tab Take 1 Tab by mouth every 7 days. 12 Tab 2   • linaCLOtide (LINZESS) 290 MCG Cap Take 290 mcg by mouth every day.       No current  [facility]pic-ordered facility-administered medications on file.        History reviewed. No pertinent past medical history.     History reviewed. No pertinent surgical history.     Family History   Problem Relation Age of Onset   • Thyroid Mother    • Alcohol abuse Father    • Heart Disease Father    • Hypertension Father    • Stroke Father         Social History     Tobacco Use   Smoking Status Never Smoker   Smokeless Tobacco Never Used          Social History     Substance and Sexual Activity   Alcohol Use Not Currently        ---------------------------------------------------------------------    PHYSICAL EXAM:   Vitals:    07/10/20 1457   BP: (!) 96/62   Pulse: 71   Resp: 12   Temp: 36.7 °C (98 °F)   SpO2: 98%     Body mass index is 13.58 kg/m².     Constitutional: no acute distress  Eyes: PERRL, EOMI  Ears/nose/mouth: OP no exudates  Neck: supple, no JVD  CV: heart RRR  Resp: normal effort, no wheezing or rales.  GI: abdomen soft, no obvious mass, no tenderness  Colostomy bag noted.  Neuro: CN 2-12 grossly intact  Skin: no obvious rash noted  Psych: normal mood and affect  ---------------------------------------------------------------------    ASSESSMENT and PLAN:   1. Colostomy care (HCC)  Chronic stable condition.  The patient will continue with self-care patient reported that her colostomy supply is adequate at home at this time.    2. CVID (common variable immunodeficiency) (HCC)  This is a chronic condition.  Current control is unclear.  Refer the patient to hematology for further evaluation.  - REFERRAL TO HEMATOLOGY ONCOLOGY Referral to? [facility]  - Basic Metabolic Panel; Future    3. Pancytopenia (HCC)  Chronic condition.  Recent lab test result discussed with the patient  No obvious signs of infection noted on exam.  - REFERRAL TO HEMATOLOGY ONCOLOGY Referral to? [facility]  - CBC WITH DIFFERENTIAL; Future    4. Hypothyroidism, unspecified type  Chronic condition.  Lab tests  ordered per patient request.  - TSH; Future  - TRIIDOTHYRONINE; Future  - FREE THYROXINE; Future      5. Vitamin D deficiency  Chronic condition.  Lab tests ordered for follow-up trends.  - VITAMIN B12; Future  - VITAMIN 1,25 DIHYDROXY; Future  ture    6. Malnutrition, unspecified type (HCC)  Recommend the use of Ensure 1 can 3 times a day with meals.  - REFERRAL TO [facility] (HIP) Services Requested: Registered Dietitian for Medical Nutrition Therapy; Reason for Visit: Medical Condition Requiring Nutrition Counseling          Return in about 6 months (around 1/10/2021) for routine followup.     PATIENT EDUCATION:  -If any problems should arise, patient was advised to contact our office or go to ER to be evaluated.  -Advised pt to follow a healthy diet and regular aerobic exercise regimen. Advised pt to avoid alcohol and tobacco use.    Please note that this dictation was created using voice recognition software. I have made every reasonable attempt to correct obvious errors, but it is possible there are errors of grammar and possibly content that I did not discover before finalizing the note.

## 2020-07-10 NOTE — ASSESSMENT & PLAN NOTE
Per patient report this has been a chronic condition noted for several years.  She was seen previously by hematologist at [place].  Recent labs showed  Results for SUSAN WILCOX (MRN [id number]) as of 7/10/2020 15:49   Ref. Range 6/30/2020 15:32   WBC Latest Ref Range: 4.8 - 10.8 K/uL 3.2 (L)   RBC Latest Ref Range: 4.20 - 5.40 M/uL 3.61 (L)   Hemoglobin Latest Ref Range: 12.0 - 16.0 g/dL 11.9 (L)   Hematocrit Latest Ref Range: 37.0 - 47.0 % 38.1   MCV Latest Ref Range: 81.4 - 97.8 fL 105.5 (H)   MCH Latest Ref Range: 27.0 - 33.0 pg 33.0   MCHC Latest Ref Range: 33.6 - 35.0 g/dL 31.2 (L)   RDW Latest Ref Range: 35.9 - 50.0 fL 52.0 (H)   Platelet Count Latest Ref Range: 164 - 446 K/uL 139 (L)   Patient denies fever chills, cough shortness of breath chest pain abdominal pain.  Patient requests referral to establish with hematologist here in [place].  Referral submitted.

## 2020-07-10 NOTE — ASSESSMENT & PLAN NOTE
Has been a chronic condition.  Her weight has remained essentially unchanged at approximately 82 pounds.  Since she had the surgery for the rectal prolapse. /Ostomy     patient stated that no significant change in her appetite.  Strongly recommend for the patient to see dietitian  Recommend the patient to try Ensure 1 can 3 times a day with meals.

## 2020-07-10 NOTE — ASSESSMENT & PLAN NOTE
Patient is new to me.  Patient reported history of rectal prolapse and 2013.  She underwent surgery which was complicated by recurrent infection requiring 2 additional surgeries.  The patient is now with colostomy bag.  She is managing this condition reasonably well on her own.

## 2020-07-10 NOTE — ASSESSMENT & PLAN NOTE
Has been a chronic condition diagnosed in 2007 at [facility].  The patient was seen previously by hematologist there at the patient request referral to see hematologist here in [place].  Patient reports that she used to get recurrent infection requiring IVIG infusion previously.

## 2020-07-10 NOTE — ASSESSMENT & PLAN NOTE
Chronic condition.  The patient is presently taking vitamin D once a week.  She is due for lab work.

## 2020-07-15 ENCOUNTER — NURSE TRIAGE (OUTPATIENT)
Dept: HEALTH INFORMATION MANAGEMENT | Facility: OTHER | Age: 60
End: 2020-07-15

## 2020-07-15 NOTE — TELEPHONE ENCOUNTER
"    Reason for Disposition  • Patient sounds very sick or weak to the triager    Additional Information  • Negative: Breathing stopped and hasn't returned  • Negative: Choking on something  • Negative: SEVERE difficulty breathing (e.g., struggling for each breath, speaks in single words, pulse > 120)  • Negative: Bluish (or gray) lips or face  • Negative: Difficult to awaken or acting confused (e.g., disoriented, slurred speech)  • Negative: Passed out (i.e., fainted, collapsed and was not responding)  • Negative: Wheezing started suddenly after medicine, an allergic food, or bee sting  • Negative: Stridor  • Negative: Slow, shallow and weak breathing  • Negative: Sounds like a life-threatening emergency to the triager  • Negative: Chest pain  • Negative: Wheezing (high pitched whistling sound) and previous asthma attacks or use of asthma medicines  • Negative: Difficulty breathing and only present when coughing  • Negative: Difficulty breathing and only from stuffy or runny nose  • Negative: MODERATE difficulty breathing (e.g., speaks in phrases, SOB even at rest, pulse 100-120) of new onset or worse than normal  • Negative: Wheezing can be heard across the room  • Negative: Drooling or spitting out saliva (because can't swallow)  • Negative: Any history of prior \"blood clot\" in leg or lungs  • Negative: Recent illness requiring prolonged bedrest (i.e., immobilization)  • Negative: Hip or leg fracture in past 2 months (e.g., or had cast on leg or ankle)  • Negative: Major surgery in the past month  • Negative: Recent long-distance travel with prolonged time in car, bus, plane, or train (i.e., within past 2 weeks; 6 or  more hours duration)  • Negative: Extra heart beats OR irregular heart beating   (i.e., \"palpitations\")  • Negative: Fever > 103 F (39.4 C)  • Negative: Fever > 101 F (38.3 C) and over 60 years of age  • Negative: Fever > 100.0 F (37.8 C) and bedridden (e.g., nursing home patient, stroke, chronic " "illness, recovering from surgery)  • Negative: Fever > 100.0 F (37.8 C) and diabetes mellitus or weak immune system (e.g., HIV positive, cancer chemo, splenectomy, organ transplant, chronic steroids)  • Negative: Periods where breathing stops and then resumes normally and bedridden (e.g., nursing home patient, CVA)  • Negative: Pregnant or postpartum (from 0 to 6 weeks after delivery)    Answer Assessment - Initial Assessment Questions  1. RESPIRATORY STATUS: \"Describe your breathing?\" (e.g., wheezing, shortness of breath, unable to speak, severe coughing)       Feels like hard to take a deep breath, yesterday was worse.  Speaking in full sentences but states she feels like previous bronchitis  2. ONSET: \"When did this breathing problem begin?\"       7 days ago  3. PATTERN \"Does the difficult breathing come and go, or has it been constant since it started?\"       Constant but worse on exertion  4. SEVERITY: \"How bad is your breathing?\" (e.g., mild, moderate, severe)     - MILD: No SOB at rest, mild SOB with walking, speaks normally in sentences, can lay down, no retractions, pulse < 100.     - MODERATE: SOB at rest, SOB with minimal exertion and prefers to sit, cannot lie down flat, speaks in phrases, mild retractions, audible wheezing, pulse 100-120.     - SEVERE: Very SOB at rest, speaks in single words, struggling to breathe, sitting hunched forward, retractions, pulse > 120       Mild at this time  5. RECURRENT SYMPTOM: \"Have you had difficulty breathing before?\" If so, ask: \"When was the last time?\" and \"What happened that time?\"       Not exactly like now  6. CARDIAC HISTORY: \"Do you have any history of heart disease?\" (e.g., heart attack, angina, bypass surgery, angioplasty)       no  7. LUNG HISTORY: \"Do you have any history of lung disease?\"  (e.g., pulmonary embolus, asthma, emphysema)      no  8. CAUSE: \"What do you think is causing the breathing problem?\"       Congested and runny nose  9. OTHER " "SYMPTOMS: \"Do you have any other symptoms? (e.g., dizziness, runny nose, cough, chest pain, fever)      Nausea, diarrhea, runny nose, chest burning, body aches, fatigue, chills,, sore throat, muscle aches, loss of taste, HA  10. PREGNANCY: \"Is there any chance you are pregnant?\" \"When was your last menstrual period?\"        no  11. TRAVEL: \"Have you traveled out of the country in the last month?\" (e.g., travel history, exposures)        no    Protocols used: BREATHING DIFFICULTY-A-OH  Patient experiencing SOB (speaking in full sentences), chest discomfort on inspiration, sore throat, fatigue, HA, chills, runny nose, muscle aches, loss of taste x 7 days.  Directed to .     Patient   "

## 2020-07-16 ENCOUNTER — OFFICE VISIT (OUTPATIENT)
Dept: URGENT CARE | Facility: PHYSICIAN GROUP | Age: 60
End: 2020-07-16
Payer: OTHER GOVERNMENT

## 2020-07-16 ENCOUNTER — HOSPITAL ENCOUNTER (OUTPATIENT)
Facility: MEDICAL CENTER | Age: 60
End: 2020-07-16
Attending: NURSE PRACTITIONER
Payer: OTHER GOVERNMENT

## 2020-07-16 ENCOUNTER — HOSPITAL ENCOUNTER (OUTPATIENT)
Dept: RADIOLOGY | Facility: MEDICAL CENTER | Age: 60
End: 2020-07-16
Attending: NURSE PRACTITIONER
Payer: OTHER GOVERNMENT

## 2020-07-16 VITALS
DIASTOLIC BLOOD PRESSURE: 58 MMHG | OXYGEN SATURATION: 99 % | HEART RATE: 78 BPM | SYSTOLIC BLOOD PRESSURE: 88 MMHG | RESPIRATION RATE: 12 BRPM | TEMPERATURE: 97.8 F

## 2020-07-16 DIAGNOSIS — J01.00 ACUTE NON-RECURRENT MAXILLARY SINUSITIS: ICD-10-CM

## 2020-07-16 DIAGNOSIS — R06.02 SHORTNESS OF BREATH: ICD-10-CM

## 2020-07-16 DIAGNOSIS — J98.8 RTI (RESPIRATORY TRACT INFECTION): ICD-10-CM

## 2020-07-16 PROBLEM — K59.01 CONSTIPATION BY DELAYED COLONIC TRANSIT: Status: ACTIVE | Noted: 2020-07-13

## 2020-07-16 PROBLEM — K59.1 FUNCTIONAL DIARRHEA: Status: ACTIVE | Noted: 2020-07-13

## 2020-07-16 PROBLEM — K59.9 BOWEL DYSFUNCTION: Status: ACTIVE | Noted: 2020-07-13

## 2020-07-16 LAB — COVID ORDER STATUS COVID19: NORMAL

## 2020-07-16 PROCEDURE — 99214 OFFICE O/P EST MOD 30 MIN: CPT | Performed by: NURSE PRACTITIONER

## 2020-07-16 PROCEDURE — 71046 X-RAY EXAM CHEST 2 VIEWS: CPT

## 2020-07-16 PROCEDURE — U0003 INFECTIOUS AGENT DETECTION BY NUCLEIC ACID (DNA OR RNA); SEVERE ACUTE RESPIRATORY SYNDROME CORONAVIRUS 2 (SARS-COV-2) (CORONAVIRUS DISEASE [COVID-19]), AMPLIFIED PROBE TECHNIQUE, MAKING USE OF HIGH THROUGHPUT TECHNOLOGIES AS DESCRIBED BY CMS-2020-01-R: HCPCS

## 2020-07-16 RX ORDER — DOXYCYCLINE HYCLATE 100 MG
100 TABLET ORAL 2 TIMES DAILY
Qty: 14 TAB | Refills: 0 | Status: SHIPPED | OUTPATIENT
Start: 2020-07-16 | End: 2020-07-23

## 2020-07-16 NOTE — PROGRESS NOTES
Chief Complaint   Patient presents with   • Cough     cough, sore throat, lungs feel full, fatigue x1 wk       HISTORY OF PRESENT ILLNESS: Patient is a 60 y.o. female with a history of common variable immunodeficiency, hypothyroidism, malnutrition, chronic fatigue, and colostomy who presents today due to symptoms which started 1 week ago. Pt reports nasal congestion, sinus pressure (left-sided), headache, tactile fever, malaise, fatigue, sore throat, mild cough, decreased taste, nausea, and shortness of breath.  Denies any chest pain, ear pain, abdominal pain.  She lives with her  who is without symptoms.  She denies any known exposure to COVID.  She is prone to sinus infections, last treated 2 months ago with doxycycline, reports improvement from those symptoms.      Patient Active Problem List    Diagnosis Date Noted   • Bowel dysfunction 07/13/2020   • Functional diarrhea 07/13/2020   • Constipation by delayed colonic transit 07/13/2020   • Pancytopenia ([facility]) 07/10/2020   • Pure hypercholesterolemia 02/07/2020   • Vitamin D deficiency 02/07/2020   • CVID (common variable immunodeficiency) ([facility]) 02/03/2020   • CFS (chronic fatigue syndrome) 02/03/2020   • Reactive hypoglycemia 02/03/2020   • Malnutrition ([facility]) 02/03/2020   • Hypothyroidism 02/03/2020   • PTSD (post-traumatic stress disorder) 02/03/2020   • Constipation 02/03/2020   • Colostomy care ([facility]) 04/02/2015       Allergies:Sulfa drugs    Current Outpatient Medications Ordered in Epic   Medication Sig Dispense Refill   • liothyronine (CYTOMEL) 5 MCG Tab Take 3 Tabs by mouth every day. 90 Tab 5   • Cholecalciferol (VITAMIN D3) 1.25 MG (10649 UT) Tab Take 1 Tab by mouth every 7 days. 12 Tab 2   • linaCLOtide (LINZESS) 290 MCG Cap Take 290 mcg by mouth every day.       No current [facility]-ordered facility-administered medications on file.        History reviewed. No pertinent past medical history.    Social History     Tobacco Use   • Smoking status:  Never Smoker   • Smokeless tobacco: Never Used   Substance Use Topics   • Alcohol use: Not Currently   • Drug use: Not Currently       Family Status   Relation Name Status   • Mo     • Fa       Family History   Problem Relation Age of Onset   • Thyroid Mother    • Alcohol abuse Father    • Heart Disease Father    • Hypertension Father    • Stroke Father        ROS:  Review of Systems   Constitutional: Positive for subjective fever, chills, fatigue, malaise. Negative for weight loss.  HENT: Positive for congestion, sinus pressure, and sore throat. Negative for ear pain, nosebleeds, and neck pain.    Eyes: Negative for vision changes.   Cardiovascular: Negative for chest pain, palpitations, orthopnea and leg swelling.   Respiratory: Positive for cough and shortness of breath.   Gastrointestinal: Positive for nausea.  Negative for abdominal pain, vomiting or changes in diarrhea.   Skin: Negative for rash, diaphoresis.     Exam:  BP (!) 88/58 (BP Location: Left arm, Patient Position: Sitting, BP Cuff Size: Small adult)   Pulse 78   Temp 36.6 °C (97.8 °F) (Temporal)   Resp 12   SpO2 99%   General: Cachectic female in NAD  Head: normocephalic, atraumatic  Eyes: PERRLA, EOM within normal limits, no conjunctival injection, no scleral icterus, visual fields and acuity grossly intact.  Ears: normal shape and symmetry, no tenderness, no discharge. External canals are without any significant edema or erythema. Tympanic membranes are without any inflammation, no effusion. Gross auditory acuity is intact.  Nose: symmetrical without tenderness, mild discharge, erythema present bilateral nares.  Left maxillary sinus tenderness.  Mouth/Throat: reasonable hygiene, no exudates or tonsillar enlargement. Mild erythema present.   Neck: no masses, range of motion within normal limits, no tracheal deviation.  Lymph: mild cervical adenopathy. No supraclavicular adenopathy.   Neuro: alert and oriented. Cranial nerves 1-12  "grossly intact.   Cardiovascular: regular rate and rhythm without murmurs, rubs, or gallops. No edema.   Pulmonary: no distress. Chest is symmetrical with respiration. No wheezes, crackles, or rhonchi.   Musculoskeletal: appropriate muscle tone, gait is stable.  Skin: warm, dry, intact, no clubbing, no cyanosis.   Psych: appropriate mood, affect, judgement.       DX chest radiology reading \" Unremarkable two view chest.\"      Assessment/Plan:  1. Acute non-recurrent maxillary sinusitis  doxycycline (VIBRAMYCIN) 100 MG Tab   2. RTI (respiratory tract infection)     3. Shortness of breath  DX-CHEST-2 VIEWS    COVID/SARS COV-2 PCR             The patient is a pleasant 60-year-old female presents the clinic today with 1 week of respiratory symptoms.  Chest x-ray is negative for any acute process.  Will treat for suspected sinus infection, doxycycline as directed.  Rest, increase fluids, hand and respiratory hygiene.  COVID testing obtained, home quarantine advised.  All Strict ER precautions advised.  Supportive care, differential diagnoses, and indications for immediate follow-up discussed with patient.   Pathogenesis of diagnosis discussed including typical length and natural progression.  Instructed to return to clinic or nearest emergency department for any change in condition, further concerns, or worsening of symptoms.  Patient states understanding of the plan of care and discharge instructions.  Instructed to make an appointment with her primary care provider in the next 3-5 days if not significantly improving and for further care.         Please note that this dictation was created using voice recognition software. I have made every reasonable attempt to correct obvious errors, but I expect that there are errors of grammar and possibly content that I did not discover before finalizing the note.  N95 and safety glasses worn for the entire visit with the patient.     NIALL Burnette.               "

## 2020-07-17 LAB
SARS-COV-2 RNA RESP QL NAA+PROBE: NOTDETECTED
SPECIMEN SOURCE: NORMAL

## 2020-07-20 ENCOUNTER — HOSPITAL ENCOUNTER (OUTPATIENT)
Dept: LAB | Facility: MEDICAL CENTER | Age: 60
End: 2020-07-20
Attending: INTERNAL MEDICINE
Payer: OTHER GOVERNMENT

## 2020-07-20 DIAGNOSIS — E03.9 HYPOTHYROIDISM, UNSPECIFIED TYPE: ICD-10-CM

## 2020-07-20 DIAGNOSIS — D61.818 PANCYTOPENIA (HCC): ICD-10-CM

## 2020-07-20 DIAGNOSIS — E55.9 VITAMIN D DEFICIENCY: Chronic | ICD-10-CM

## 2020-07-20 DIAGNOSIS — D83.9 COMMON VARIABLE IMMUNODEFICIENCY (HCC): ICD-10-CM

## 2020-07-20 LAB
ANION GAP SERPL CALC-SCNC: 10 MMOL/L (ref 7–16)
BASOPHILS # BLD AUTO: 0.8 % (ref 0–1.8)
BASOPHILS # BLD: 0.03 K/UL (ref 0–0.12)
BUN SERPL-MCNC: 17 MG/DL (ref 8–22)
CALCIUM SERPL-MCNC: 9.3 MG/DL (ref 8.5–10.5)
CHLORIDE SERPL-SCNC: 103 MMOL/L (ref 96–112)
CO2 SERPL-SCNC: 19 MMOL/L (ref 20–33)
CREAT SERPL-MCNC: 0.53 MG/DL (ref 0.5–1.4)
EOSINOPHIL # BLD AUTO: 0.03 K/UL (ref 0–0.51)
EOSINOPHIL NFR BLD: 0.8 % (ref 0–6.9)
ERYTHROCYTE [DISTWIDTH] IN BLOOD BY AUTOMATED COUNT: 51 FL (ref 35.9–50)
FASTING STATUS PATIENT QL REPORTED: NORMAL
GLUCOSE SERPL-MCNC: 96 MG/DL (ref 65–99)
HCT VFR BLD AUTO: 38.2 % (ref 37–47)
HGB BLD-MCNC: 12.2 G/DL (ref 12–16)
IMM GRANULOCYTES # BLD AUTO: 0.01 K/UL (ref 0–0.11)
IMM GRANULOCYTES NFR BLD AUTO: 0.3 % (ref 0–0.9)
LYMPHOCYTES # BLD AUTO: 0.66 K/UL (ref 1–4.8)
LYMPHOCYTES NFR BLD: 17.1 % (ref 22–41)
MCH RBC QN AUTO: 32.8 PG (ref 27–33)
MCHC RBC AUTO-ENTMCNC: 31.9 G/DL (ref 33.6–35)
MCV RBC AUTO: 102.7 FL (ref 81.4–97.8)
MONOCYTES # BLD AUTO: 0.38 K/UL (ref 0–0.85)
MONOCYTES NFR BLD AUTO: 9.8 % (ref 0–13.4)
NEUTROPHILS # BLD AUTO: 2.75 K/UL (ref 2–7.15)
NEUTROPHILS NFR BLD: 71.2 % (ref 44–72)
NRBC # BLD AUTO: 0 K/UL
NRBC BLD-RTO: 0 /100 WBC
PLATELET # BLD AUTO: 159 K/UL (ref 164–446)
PMV BLD AUTO: 12 FL (ref 9–12.9)
POTASSIUM SERPL-SCNC: 3.6 MMOL/L (ref 3.6–5.5)
RBC # BLD AUTO: 3.72 M/UL (ref 4.2–5.4)
SODIUM SERPL-SCNC: 132 MMOL/L (ref 135–145)
T3 SERPL-MCNC: 110 NG/DL (ref 60–181)
T4 FREE SERPL-MCNC: 0.48 NG/DL (ref 0.93–1.7)
TSH SERPL DL<=0.005 MIU/L-ACNC: 1.26 UIU/ML (ref 0.38–5.33)
VIT B12 SERPL-MCNC: 854 PG/ML (ref 211–911)
WBC # BLD AUTO: 3.9 K/UL (ref 4.8–10.8)

## 2020-07-20 PROCEDURE — 84439 ASSAY OF FREE THYROXINE: CPT

## 2020-07-20 PROCEDURE — 36415 COLL VENOUS BLD VENIPUNCTURE: CPT

## 2020-07-20 PROCEDURE — 84480 ASSAY TRIIODOTHYRONINE (T3): CPT

## 2020-07-20 PROCEDURE — 82607 VITAMIN B-12: CPT

## 2020-07-20 PROCEDURE — 85025 COMPLETE CBC W/AUTO DIFF WBC: CPT

## 2020-07-20 PROCEDURE — 80048 BASIC METABOLIC PNL TOTAL CA: CPT

## 2020-07-20 PROCEDURE — 84443 ASSAY THYROID STIM HORMONE: CPT

## 2020-07-20 PROCEDURE — 82652 VIT D 1 25-DIHYDROXY: CPT

## 2020-07-21 ENCOUNTER — TELEPHONE (OUTPATIENT)
Dept: MEDICAL GROUP | Facility: PHYSICIAN GROUP | Age: 60
End: 2020-07-21

## 2020-07-21 DIAGNOSIS — E03.9 HYPOTHYROIDISM, UNSPECIFIED TYPE: Chronic | ICD-10-CM

## 2020-07-21 RX ORDER — LEVOTHYROXINE SODIUM 0.03 MG/1
25 TABLET ORAL
Qty: 90 TAB | Refills: 0 | Status: SHIPPED | OUTPATIENT
Start: 2020-07-21 | End: 2020-08-27

## 2020-07-21 RX ORDER — LEVOTHYROXINE SODIUM 0.03 MG/1
25 TABLET ORAL
Qty: 30 TAB | Refills: 5 | Status: SHIPPED | OUTPATIENT
Start: 2020-07-21 | End: 2020-07-21 | Stop reason: SDUPTHER

## 2020-07-21 NOTE — TELEPHONE ENCOUNTER
----- Message from Clifton Harvey M.D. sent at 7/21/2020  8:55 AM PDT -----    Please call patient : labs back    -one of thyroid tests Free T4 is low. pls start taking levothyroxine 25mcg po qd  T3 level is normal. pls cont to take Cytomel  Pt to repeat Thyroid blood tests again in approx 8-10 wks.

## 2020-07-21 NOTE — TELEPHONE ENCOUNTER
Received request via: Patient    Was the patient seen in the last year in this department? Yes    Does the patient have an active prescription (recently filled or refills available) for medication(s) requested? Yes. Pharmacy change

## 2020-07-23 LAB — 1,25(OH)2D3 SERPL-MCNC: 41.3 PG/ML (ref 19.9–79.3)

## 2020-07-31 ENCOUNTER — PATIENT MESSAGE (OUTPATIENT)
Dept: MEDICAL GROUP | Facility: PHYSICIAN GROUP | Age: 60
End: 2020-07-31

## 2020-08-03 DIAGNOSIS — Z12.11 SCREENING FOR COLON CANCER: ICD-10-CM

## 2020-08-03 DIAGNOSIS — Z43.3 ATTENTION TO COLOSTOMY (HCC): Chronic | ICD-10-CM

## 2020-08-14 ENCOUNTER — OFFICE VISIT (OUTPATIENT)
Dept: URGENT CARE | Facility: PHYSICIAN GROUP | Age: 60
End: 2020-08-14
Payer: OTHER GOVERNMENT

## 2020-08-14 ENCOUNTER — HOSPITAL ENCOUNTER (OUTPATIENT)
Dept: RADIOLOGY | Facility: MEDICAL CENTER | Age: 60
End: 2020-08-14
Attending: NURSE PRACTITIONER
Payer: OTHER GOVERNMENT

## 2020-08-14 VITALS
OXYGEN SATURATION: 97 % | HEART RATE: 74 BPM | RESPIRATION RATE: 16 BRPM | BODY MASS INDEX: 13.66 KG/M2 | HEIGHT: 65 IN | WEIGHT: 82 LBS | TEMPERATURE: 97.4 F

## 2020-08-14 DIAGNOSIS — S81.812A SKIN TEAR OF LEFT LOWER LEG WITHOUT COMPLICATION, INITIAL ENCOUNTER: ICD-10-CM

## 2020-08-14 DIAGNOSIS — M25.572 ACUTE LEFT ANKLE PAIN: ICD-10-CM

## 2020-08-14 DIAGNOSIS — S80.12XA CONTUSION OF LEFT LOWER EXTREMITY, INITIAL ENCOUNTER: ICD-10-CM

## 2020-08-14 PROCEDURE — 99214 OFFICE O/P EST MOD 30 MIN: CPT | Performed by: NURSE PRACTITIONER

## 2020-08-14 ASSESSMENT — ENCOUNTER SYMPTOMS
ORTHOPNEA: 0
HEADACHES: 0
FEVER: 0
PALPITATIONS: 0
COUGH: 0
CHILLS: 0
BRUISES/BLEEDS EASILY: 1
DIZZINESS: 0
TINGLING: 0

## 2020-08-14 ASSESSMENT — FIBROSIS 4 INDEX: FIB4 SCORE: 1.62

## 2020-08-14 ASSESSMENT — LIFESTYLE VARIABLES: SUBSTANCE_ABUSE: 0

## 2020-08-15 NOTE — PROGRESS NOTES
"Subjective:      Pita Bueno is a 60 y.o. female who presents with Leg Injury (skin tear, a carton of lemonade from a shelf fell on left leg. pt stated she had a tetnus inj 2019)    Reviewed past medical, surgical and family history. Reviewed prescription and OTC medications with patient in electronic health record today  Allergies: Sulfa drugs          HPI Pita is a 60-year-old female presents with a leg injury.  She sustained a skin tear to her left lower leg when a carton of lemonade fell off the shelf while she was shopping at [employer].  It bled a lot.  She reported it to [employer].  They put some ointment on it and held pressure.  She filled out \"a lot of forms\" and then came to urgent care.  She reports her last tetanus was in 2018.  Past medical history: \"I have a significant autoimmune disease\".    Review of Systems   Constitutional: Negative for chills and fever.   Respiratory: Negative for cough.    Cardiovascular: Negative for chest pain, palpitations, orthopnea and leg swelling.   Neurological: Negative for dizziness, tingling and headaches.   Endo/Heme/Allergies: Negative for environmental allergies. Bruises/bleeds easily.   Psychiatric/Behavioral: Negative for substance abuse.          Objective:     Vitals:    08/14/20 1918   Pulse: 74   Resp:    Temp:    SpO2:          Physical Exam  Vitals signs and nursing note reviewed.   Constitutional:       Appearance: Normal appearance. She is normal weight.   HENT:      Mouth/Throat:      Mouth: Mucous membranes are moist.   Cardiovascular:      Rate and Rhythm: Normal rate.   Skin:     General: Skin is warm.      Capillary Refill: Capillary refill takes less than 2 seconds.          Neurological:      Mental Status: She is alert and oriented to person, place, and time.   Psychiatric:         Mood and Affect: Mood normal.         Behavior: Behavior normal.         Thought Content: Thought content normal.                 Assessment/Plan:         1. Skin " tear of left lower leg without complication, initial encounter     2. Contusion of left lower extremity, initial encounter          The patient is alerted to watch for any signs of infection (redness, pus, pain, increased swelling or fever) and call if such occurs. Home wound care instructions are provided.     OTC  analgesic of choice. Follow manufactures dosing and safety precautions.     Ice/ elevation     Return to urgent care clinic or PCP 2-3 days if current symptoms are not resolving in a satisfactory manner or sooner if new or worsening symptoms occur.     Differential diagnosis, natural history, supportive care, and indications for immediate follow-up. Advised of signs and symptoms which would warrant further evaluation and /or emergent evaluation in ER.      Verbalized agreement with this treatment plan and seemed to understand without barriers. Questions were encouraged and answered to satisfaction.

## 2020-08-17 ENCOUNTER — OFFICE VISIT (OUTPATIENT)
Dept: URGENT CARE | Facility: PHYSICIAN GROUP | Age: 60
End: 2020-08-17
Payer: OTHER GOVERNMENT

## 2020-08-17 VITALS
HEART RATE: 60 BPM | OXYGEN SATURATION: 100 % | DIASTOLIC BLOOD PRESSURE: 60 MMHG | SYSTOLIC BLOOD PRESSURE: 100 MMHG | RESPIRATION RATE: 14 BRPM | TEMPERATURE: 98.1 F

## 2020-08-17 DIAGNOSIS — S80.12XD CONTUSION OF LEFT LOWER LEG, SUBSEQUENT ENCOUNTER: ICD-10-CM

## 2020-08-17 DIAGNOSIS — S81.812D NONINFECTED SKIN TEAR OF LEFT LOWER EXTREMITY, SUBSEQUENT ENCOUNTER: ICD-10-CM

## 2020-08-17 PROCEDURE — 99214 OFFICE O/P EST MOD 30 MIN: CPT | Performed by: PHYSICIAN ASSISTANT

## 2020-08-17 ASSESSMENT — ENCOUNTER SYMPTOMS
TINGLING: 0
NUMBNESS: 0
INABILITY TO BEAR WEIGHT: 0
LOSS OF MOTION: 0

## 2020-08-17 NOTE — PROGRESS NOTES
Subjective:      Pita Bueno is a 60 y.o. female who presents with Leg Injury (leg pain, still bleeding)            Pt is a 61 y/o female who presents with concern for left lower leg skin tear and bruising after a lemonade carton fell on her leg 3 days ago. She has been keeping the area clean and covered- however she has been bleeding from her bandage. She reports pain radiating down her leg as well. She inquires at the worsening bruising- and slight swelling. Tetanus was UTD.   Denies any blood thinner usage.       Leg Injury   Incident onset: 8/14. Incident location: At [facility]. The injury mechanism was a direct blow. The pain is present in the left leg. The quality of the pain is described as aching. The pain is moderate. Pertinent negatives include no inability to bear weight, loss of motion, numbness or tingling. She reports no foreign bodies present. The symptoms are aggravated by movement, palpation and weight bearing. Treatments tried: Bandage.  The treatment provided mild relief.       Review of Systems   Musculoskeletal:        Left lower leg pain.      Neurological: Negative for tingling and numbness.   All other systems reviewed and are negative.         Objective:     /60 (BP Location: Left arm, Patient Position: Sitting, BP Cuff Size: Small adult)   Pulse 60   Temp 36.7 °C (98.1 °F) (Temporal)   Resp 14   SpO2 100%    PMH:  has no past medical history on file.  MEDS: Reviewed .   ALLERGIES:   Allergies   Allergen Reactions   • Sulfa Drugs      SURGHX: No past surgical history on file.  SOCHX:  reports that she has never smoked. She has never used smokeless tobacco. She reports previous alcohol use. She reports previous drug use.  FH: Family history was reviewed, no pertinent findings to report    Physical Exam  Vitals signs reviewed.   Constitutional:       General: She is not in acute distress.     Appearance: She is well-developed.   HENT:      Head: Normocephalic and atraumatic.      Eyes:      Conjunctiva/sclera: Conjunctivae normal.      Pupils: Pupils are equal, round, and reactive to light.   Neck:      Musculoskeletal: Normal range of motion and neck supple.      Trachea: No tracheal deviation.   Cardiovascular:      Rate and Rhythm: Normal rate.   Pulmonary:      Effort: Pulmonary effort is normal.   Musculoskeletal:         General: Signs of injury present.        Legs:       Comments: 3 cm skin tear- very superficial with serosanguinous drainage- without active bleeding- diffuse ecchymosis to the region with pulling down the leg. Minimal edema. Without discrete bony tenderness noted.   Wound irrigated with sterile saline- without surrounding erythema, edema or noted purulent discharge.  Xeroform, nonstick and Coban were applied to the area.   Skin:     General: Skin is warm.   Neurological:      Mental Status: She is alert and oriented to person, place, and time.   Psychiatric:         Behavior: Behavior normal.         Thought Content: Thought content normal.         Judgment: Judgment normal.                 Assessment/Plan:        1. Contusion of left lower leg, subsequent encounter  2. Noninfected skin tear of left lower extremity, subsequent encounter    Discussed the sequela of the contusion along with the slow healing process of the skin tear.  No evidence of secondary bacterial infection at this time.  She is to keep the area clean and dry, utilize soapy water.  She is to change the dressing once a day.  Patient and/or guardian given precautionary s/sx that mandate immediate follow up and evaluation in the ED. Advised of risks of not doing so.    DDX, Supportive care, and indications for immediate follow-up discussed with patient.    Instructed to return to clinic or nearest emergency department if we are not available for any change in condition, further concerns, or worsening of symptoms.    The patient and/or guardian demonstrated a good understanding and agreed with the  treatment plan.    Please note that this dictation was created using voice recognition software. I have made every reasonable attempt to correct obvious errors, but I expect that there are errors of grammar and possibly content that I did not discover before finalizing the note.

## 2020-08-25 ENCOUNTER — TELEPHONE (OUTPATIENT)
Dept: CARDIOLOGY | Facility: MEDICAL CENTER | Age: 60
End: 2020-08-25

## 2020-08-25 NOTE — TELEPHONE ENCOUNTER
Faxed STAT records request to [facility] in [place], [place] for patient prior cardiac records (F: [phone], P: [phone]). Fax confirmation received.

## 2020-08-26 ENCOUNTER — TELEPHONE (OUTPATIENT)
Dept: MEDICAL GROUP | Facility: PHYSICIAN GROUP | Age: 60
End: 2020-08-26

## 2020-08-26 NOTE — TELEPHONE ENCOUNTER
----- Message from Clifton Harvey M.D. sent at 8/26/2020  8:16 AM PDT -----  Regarding: FW: Non-Urgent Medical Question  Contact: [phone]  Pt needs to be evaluated in person .However I am not in the office today. Pls go to Urgent care today   ----- Message -----  From: Sahil Jang Ass't  Sent: 8/26/2020   7:13 AM PDT  To: Clifton Harvey M.D.  Subject: FW: Non-Urgent Medical Question                    ----- Message -----  From: Pita Bueno  Sent: 8/26/2020   2:17 AM PDT  To: [place] [facility]  Subject: Non-Urgent Medical Question                      I have an infected ingrown toe nail that needs to be treated I need a referral to a podiatrist do I need to see you or can I just get the referral. Thanks

## 2020-08-27 ENCOUNTER — OFFICE VISIT (OUTPATIENT)
Dept: CARDIOLOGY | Facility: MEDICAL CENTER | Age: 60
End: 2020-08-27
Payer: OTHER GOVERNMENT

## 2020-08-27 VITALS
SYSTOLIC BLOOD PRESSURE: 90 MMHG | DIASTOLIC BLOOD PRESSURE: 60 MMHG | WEIGHT: 81 LBS | RESPIRATION RATE: 16 BRPM | OXYGEN SATURATION: 97 % | HEIGHT: 65 IN | BODY MASS INDEX: 13.49 KG/M2 | HEART RATE: 70 BPM

## 2020-08-27 DIAGNOSIS — R06.09 DOE (DYSPNEA ON EXERTION): ICD-10-CM

## 2020-08-27 DIAGNOSIS — R63.6 LOW BODY WEIGHT DUE TO INADEQUATE CALORIC INTAKE: ICD-10-CM

## 2020-08-27 DIAGNOSIS — R00.2 PALPITATIONS: ICD-10-CM

## 2020-08-27 PROCEDURE — 99204 OFFICE O/P NEW MOD 45 MIN: CPT | Performed by: INTERNAL MEDICINE

## 2020-08-27 ASSESSMENT — FIBROSIS 4 INDEX: FIB4 SCORE: 1.62

## 2020-08-27 NOTE — PROGRESS NOTES
CARDIOLOGY NEW PATIENT CONSULTATION    PCP: Clifton Harvey M.D.    1. Palpitations  I suspect non-arrhythmic but given her low body weight she is at risk for heart disease.  -ZIO Patch    2. JOSÉ (dyspnea on exertion)  I suspect due to low availability of energy sources to support the demands of the body.  I recommended eating simple sugars prior to activity.  I also recommended obtaining an echocardiogram and a stress ECG.    3. Low body weight due to inadequate caloric intake  I am concerned about her low body weight as well as food avoidance, and strong drive to exercise which could indicate eating disorder.  This condition may be further aggravated by use of thyroid supplementation.  Fortunately, she states a desire to increase her body weight and is even contemplated TPN.  I am happy that she has been evaluated by GI and may also be seeing an endocrinologist.  I shared with her my concerns about her low body weight and highlighted weight increase as an important health goal      Follow up with myself to be determined after testing is complete      Chief Complaint   Patient presents with   • Shortness of Breath       History: Pita Bueno is a 60 y.o. female with a past medical history of Colostomy, low body weight/malnutrition, common variable immunodeficiency presenting for consultation regarding shortness of breath and fatigue.  For the past year she has experienced these symptoms which have been progressively more severe.  She feels profound shortness of breath when exerting herself and weakness.  Additionally at nighttime when laying flat she feels short of breath and the need to sit upright; which alleviates the symptoms.  She finds it difficult to do any activities whereas throughout much of her life she was able to exercise for several hours daily.  She now finds it difficult to do more than a few minutes of exertion at a time.    Her health is been most marked by complications following a abdominal  "surgery in 2013 which left her with an ostomy.  She now finds it difficult to maintain her nutrition and healthy body weight.  Throughout much of her adult life she relate just over 100 pounds but recently has been weighing around 80 pounds.  She feels that her condition gets worse when the weight drops below 80 pounds.  Despite this she still feels a drive to exercise.  She is also makes effort to avoid eating due to increased ostomy output as well as GI upset.  She has been seeing a GI doctor and is even contemplated TPN.  Additionally, she is borderline hypothyroid and takes low-dose thyroid supplements    ROS:  All other systems reviewed and negative except as per the HPI    PE:  BP (!) 90/60 (BP Location: Right arm, Patient Position: Sitting, BP Cuff Size: Adult)   Pulse 70   Resp 16   Ht 1.651 m (5' 5\")   Wt 36.7 kg (81 lb)   SpO2 97%   BMI 13.48 kg/m²   GEN: Well appearing  HEENT: Symmetric face. Anicteric sclerae. Moist mucus membranes  NECK: No JVD. No lymphadenopathy  CARDIAC: Normal PMI, regular, normal S1, S2.  VASCULATURE: Normal carotid amplitude without bruit.   RESP: Clear to auscultation bilaterally  ABD: Soft, non-tender, non-distended  EXT: No edema, no clubbing or cyanosis  SKIN: Warm and dry  NEURO: No gross deficits  PSYCH: Appropriate affect, participates in conversation    History reviewed. No pertinent past medical history.  History reviewed. No pertinent surgical history.  Allergies   Allergen Reactions   • Sulfa Drugs      Outpatient Encounter Medications as of 8/27/2020   Medication Sig Dispense Refill   • liothyronine (CYTOMEL) 5 MCG Tab Take 3 Tabs by mouth every day. (Patient taking differently: Take 25 mcg by mouth every day.) 90 Tab 5   • linaCLOtide (LINZESS) 290 MCG Cap Take 290 mcg by mouth every day.     • [DISCONTINUED] levothyroxine (SYNTHROID) 25 MCG Tab Take 1 Tab by mouth Every morning on an empty stomach. (Patient not taking: Reported on 8/27/2020) 90 Tab 0   • " Cholecalciferol (VITAMIN D3) 1.25 MG (90069 UT) Tab Take 1 Tab by mouth every 7 days. (Patient not taking: Reported on 8/27/2020) 12 Tab 2     No facility-administered encounter medications on file as of 8/27/2020.      Social History     Socioeconomic History   • Marital status:      Spouse name: Not on file   • Number of children: Not on file   • Years of education: Not on file   • Highest education level: Not on file   Occupational History   • Not on file   Social Needs   • Financial resource strain: Not on file   • Food insecurity     Worry: Not on file     Inability: Not on file   • Transportation needs     Medical: Not on file     Non-medical: Not on file   Tobacco Use   • Smoking status: Never Smoker   • Smokeless tobacco: Never Used   Substance and Sexual Activity   • Alcohol use: Not Currently   • Drug use: Not Currently   • Sexual activity: Not Currently   Lifestyle   • Physical activity     Days per week: Not on file     Minutes per session: Not on file   • Stress: Not on file   Relationships   • Social connections     Talks on phone: Not on file     Gets together: Not on file     Attends Jainism service: Not on file     Active member of club or organization: Not on file     Attends meetings of clubs or organizations: Not on file     Relationship status: Not on file   • Intimate partner violence     Fear of current or ex partner: Not on file     Emotionally abused: Not on file     Physically abused: Not on file     Forced sexual activity: Not on file   Other Topics Concern   • Not on file   Social History Narrative   • Not on file         Family History   Problem Relation Age of Onset   • Thyroid Mother    • Alcohol abuse Father    • Heart Disease Father    • Hypertension Father    • Stroke Father          Studies  Lab Results   Component Value Date/Time    CHOLSTRLTOT 235 (H) 02/05/2020 02:04 PM     (H) 02/05/2020 02:04 PM    HDL 79 02/05/2020 02:04 PM    TRIGLYCERIDE 124 02/05/2020 02:04  PM       Lab Results   Component Value Date/Time    SODIUM 132 (L) 07/20/2020 03:32 PM    POTASSIUM 3.6 07/20/2020 03:32 PM    CHLORIDE 103 07/20/2020 03:32 PM    CO2 19 (L) 07/20/2020 03:32 PM    GLUCOSE 96 07/20/2020 03:32 PM    BUN 17 07/20/2020 03:32 PM    CREATININE 0.53 07/20/2020 03:32 PM     Lab Results   Component Value Date/Time    ALKPHOSPHAT 69 06/01/2020 02:40 PM    ASTSGOT 21 06/01/2020 02:40 PM    ALTSGPT 23 06/01/2020 02:40 PM    TBILIRUBIN 0.2 06/01/2020 02:40 PM        For this encounter I directly reviewed ECG tracings I agree with the interpretations in the electronic health record

## 2020-08-31 ENCOUNTER — OFFICE VISIT (OUTPATIENT)
Dept: URGENT CARE | Facility: PHYSICIAN GROUP | Age: 60
End: 2020-08-31
Payer: OTHER GOVERNMENT

## 2020-08-31 VITALS
WEIGHT: 73 LBS | HEART RATE: 88 BPM | DIASTOLIC BLOOD PRESSURE: 70 MMHG | TEMPERATURE: 98.6 F | OXYGEN SATURATION: 97 % | RESPIRATION RATE: 18 BRPM | BODY MASS INDEX: 12.15 KG/M2 | SYSTOLIC BLOOD PRESSURE: 96 MMHG

## 2020-08-31 DIAGNOSIS — E86.0 DEHYDRATION: ICD-10-CM

## 2020-08-31 DIAGNOSIS — R55 PRE-SYNCOPE: ICD-10-CM

## 2020-08-31 DIAGNOSIS — I95.9 HYPOTENSION, UNSPECIFIED HYPOTENSION TYPE: ICD-10-CM

## 2020-08-31 PROCEDURE — 99213 OFFICE O/P EST LOW 20 MIN: CPT | Performed by: PHYSICIAN ASSISTANT

## 2020-08-31 ASSESSMENT — ENCOUNTER SYMPTOMS
VISUAL CHANGE: 1
COUGH: 1
CHANGE IN BOWEL HABIT: 1
SORE THROAT: 1
SHORTNESS OF BREATH: 1
HEADACHES: 1
ABDOMINAL PAIN: 0
NUMBNESS: 1
BACK PAIN: 1
FEVER: 0
DIZZINESS: 1
EYE REDNESS: 0
FATIGUE: 1
EYE DISCHARGE: 0
VOMITING: 0
NAUSEA: 1
WEAKNESS: 0

## 2020-08-31 ASSESSMENT — FIBROSIS 4 INDEX: FIB4 SCORE: 1.62

## 2020-08-31 NOTE — PROGRESS NOTES
Subjective:      Pita Bueno is a 60 y.o. female who presents with Fatigue (fatigue, dizziness, sore throat, cough, upper back pain)            Fatigue  This is a new problem. Episode onset: x 3 days ago. The problem occurs constantly. The problem has been gradually worsening. Associated symptoms include a change in bowel habit, coughing (The patient states she developed a dry cough yesterday.), fatigue, headaches, nausea, numbness (The patient reports associated intermittent numbness to her bilateral upper extremities, worse with use of the extremities.), a sore throat and a visual change (The patient reports intermittent blurred vision.). Pertinent negatives include no abdominal pain, chest pain, congestion, fever, rash, urinary symptoms, vomiting or weakness. Nothing aggravates the symptoms. She has tried nothing for the symptoms.     The patient presents to clinic complaining of increased fatigue with associated dizziness x3 days.  The patient states that she is experiencing dizziness with intermittent blurred vision.  The patient states her symptoms are worse upon sitting up or standing.  The patient states she feels like she may pass out with standing.  The patient states she is also experiencing upper back pain.  The patient notes intermittent numbness of her bilateral upper extremities that only occurs with use of the upper extremities.  The patient reports no associated fever.  No abdominal pain.  No chest pain.  The patient states she developed a dry cough yesterday.  She reports intermittent shortness of breath.  The patient states she is also experiencing intermittent palpitations.  The patient states she feels like her heart is racing.  The patient also reports associated nausea.  No vomiting.  The patient states her colostomy bag has been filling up with a yellow-like liquid.  The patient states she is having to change her colostomy bag multiple times daily.  The patient reports no associated  urinary symptoms, but notes decreased urinary output.  The patient has not taken any medications for her current symptoms.    PMH:  has no past medical history on file.  MEDS:   Current Outpatient Medications:   •  liothyronine (CYTOMEL) 5 MCG Tab, Take 3 Tabs by mouth every day. (Patient taking differently: Take 25 mcg by mouth every day.), Disp: 90 Tab, Rfl: 5  •  Cholecalciferol (VITAMIN D3) 1.25 MG (91001 UT) Tab, Take 1 Tab by mouth every 7 days. (Patient not taking: Reported on 8/27/2020), Disp: 12 Tab, Rfl: 2  •  linaCLOtide (LINZESS) 290 MCG Cap, Take 290 mcg by mouth every day., Disp: , Rfl:   ALLERGIES:   Allergies   Allergen Reactions   • Sulfa Drugs      SURGHX: No past surgical history on file.  SOCHX:  reports that she has never smoked. She has never used smokeless tobacco. She reports previous alcohol use. She reports previous drug use.  FH: Family history was reviewed, no pertinent findings to report      Review of Systems   Constitutional: Positive for fatigue. Negative for fever.   HENT: Positive for sore throat. Negative for congestion and ear pain.    Eyes: Negative for discharge and redness.   Respiratory: Positive for cough (The patient states she developed a dry cough yesterday.) and shortness of breath.    Cardiovascular: Negative for chest pain and leg swelling.   Gastrointestinal: Positive for change in bowel habit and nausea. Negative for abdominal pain and vomiting.   Musculoskeletal: Positive for back pain.   Skin: Negative for rash.   Neurological: Positive for dizziness, numbness (The patient reports associated intermittent numbness to her bilateral upper extremities, worse with use of the extremities.) and headaches. Negative for weakness.          Objective:     BP (!) 96/70 (BP Location: Left arm, Patient Position: Sitting, BP Cuff Size: Small adult)   Pulse 88   Temp 37 °C (98.6 °F) (Temporal)   Resp 18   Wt 33.1 kg (73 lb)   SpO2 97%   BMI 12.15 kg/m²      Physical  Exam  Constitutional:       Appearance: She is cachectic. She is ill-appearing.   HENT:      Head: Normocephalic and atraumatic.      Right Ear: External ear normal.      Left Ear: External ear normal.      Nose: Nose normal.      Mouth/Throat:      Mouth: Mucous membranes are dry.      Pharynx: Oropharynx is clear. No posterior oropharyngeal erythema.   Eyes:      Extraocular Movements: Extraocular movements intact.      Conjunctiva/sclera: Conjunctivae normal.      Pupils: Pupils are equal, round, and reactive to light.   Neck:      Musculoskeletal: Normal range of motion and neck supple.   Cardiovascular:      Rate and Rhythm: Normal rate and regular rhythm.      Heart sounds: Normal heart sounds.   Pulmonary:      Effort: Pulmonary effort is normal. No respiratory distress.      Breath sounds: Normal breath sounds. No wheezing.   Abdominal:      General: Bowel sounds are normal.      Palpations: Abdomen is soft.      Tenderness: There is no abdominal tenderness.          Comments: Colostomy bag present to the left lower abdomen   Musculoskeletal: Normal range of motion.      Comments:   Upper Back:  Diffuse tenderness to the bilateral paraspinal regions of the upper back.  No midline/bony tenderness.  No palpable step-off.  ROM intact -the patient demonstrates full active range of motion  Neurovascular intact   Skin:     General: Skin is warm and dry.   Neurological:      Mental Status: She is alert and oriented to person, place, and time.                 Assessment/Plan:        1. Pre-syncope    2. Hypotension, unspecified hypotension type    3. Dehydration    The patient's presenting symptoms and physical exam findings are consistent with pre-syncope likely secondary to dehydration and/or hypotension.  On physical exam, the patient is cachectic and ill-appearing.  The patient's mucous membranes are dry.  The patient's abdomen is soft and nontender with a colostomy bag present to the left lower abdomen.  The  patient had diffuse paraspinal tenderness of the upper back without midline/bony tenderness.  The patient's lungs were clear to auscultation without wheezing.  Her heart was regular rate and rhythm without murmurs, gallops, or rubs.  No focal neurological deficits were noted.  The patient's vital signs are stable and within normal limits with the exception of hypotension.  The patient's blood pressure today in clinic is comparable to her previous blood pressures.  Based on the patient's presenting symptoms and physical exam findings, I believe the patient would benefit from a higher level of care.  Therefore, I recommend the patient be transferred to the [facility] ED via EMS for further evaluation and treatment.  The patient agrees to this plan, however the patient declined transfer via EMS at this time.  The patient states a family member will pick her up and take her to the ED.  The patient is alert and oriented, not intoxicated, and demonstrates appropriate decision-making capacity, and despite knowing the associated risks, is declining transfer via EMS at this time.  Advised the patient of the associated risks of not seeking further care for her current symptoms, and she verbalized understanding.    Plan:  Transfer patient to the [facility] ED for further evaluation and treatment.    Please note that this dictation was created using voice recognition software. I have made every reasonable attempt to correct obvious errors, but I expect that there may be errors of grammar and possibly content that I did not discover before finalizing the note.

## 2020-09-01 ENCOUNTER — APPOINTMENT (OUTPATIENT)
Dept: RADIOLOGY | Facility: MEDICAL CENTER | Age: 60
End: 2020-09-01
Attending: EMERGENCY MEDICINE
Payer: OTHER GOVERNMENT

## 2020-09-01 ENCOUNTER — HOSPITAL ENCOUNTER (EMERGENCY)
Facility: MEDICAL CENTER | Age: 60
End: 2020-09-01
Attending: EMERGENCY MEDICINE
Payer: OTHER GOVERNMENT

## 2020-09-01 VITALS
HEIGHT: 65 IN | DIASTOLIC BLOOD PRESSURE: 54 MMHG | TEMPERATURE: 98.3 F | HEART RATE: 67 BPM | OXYGEN SATURATION: 100 % | SYSTOLIC BLOOD PRESSURE: 85 MMHG | WEIGHT: 73.85 LBS | BODY MASS INDEX: 12.3 KG/M2 | RESPIRATION RATE: 26 BRPM

## 2020-09-01 DIAGNOSIS — E86.0 DEHYDRATION: ICD-10-CM

## 2020-09-01 DIAGNOSIS — K94.00 COLOSTOMY COMPLICATION (HCC): ICD-10-CM

## 2020-09-01 LAB
ALBUMIN SERPL BCP-MCNC: 4.6 G/DL (ref 3.2–4.9)
ALBUMIN/GLOB SERPL: 2.1 G/DL
ALP SERPL-CCNC: 83 U/L (ref 30–99)
ALT SERPL-CCNC: 22 U/L (ref 2–50)
ANION GAP SERPL CALC-SCNC: 11 MMOL/L (ref 7–16)
APPEARANCE UR: CLEAR
AST SERPL-CCNC: 19 U/L (ref 12–45)
BASOPHILS # BLD AUTO: 1 % (ref 0–1.8)
BASOPHILS # BLD: 0.04 K/UL (ref 0–0.12)
BILIRUB SERPL-MCNC: 0.4 MG/DL (ref 0.1–1.5)
BILIRUB UR QL STRIP.AUTO: NEGATIVE
BUN SERPL-MCNC: 24 MG/DL (ref 8–22)
CALCIUM SERPL-MCNC: 9.5 MG/DL (ref 8.4–10.2)
CHLORIDE SERPL-SCNC: 111 MMOL/L (ref 96–112)
CO2 SERPL-SCNC: 12 MMOL/L (ref 20–33)
COLOR UR: YELLOW
CREAT SERPL-MCNC: 0.67 MG/DL (ref 0.5–1.4)
EKG IMPRESSION: NORMAL
EOSINOPHIL # BLD AUTO: 0.05 K/UL (ref 0–0.51)
EOSINOPHIL NFR BLD: 1.2 % (ref 0–6.9)
EPI CELLS #/AREA URNS HPF: NORMAL /HPF
ERYTHROCYTE [DISTWIDTH] IN BLOOD BY AUTOMATED COUNT: 49.5 FL (ref 35.9–50)
GLOBULIN SER CALC-MCNC: 2.2 G/DL (ref 1.9–3.5)
GLUCOSE SERPL-MCNC: 91 MG/DL (ref 65–99)
GLUCOSE UR STRIP.AUTO-MCNC: NEGATIVE MG/DL
HCT VFR BLD AUTO: 43.7 % (ref 37–47)
HGB BLD-MCNC: 14.4 G/DL (ref 12–16)
IMM GRANULOCYTES # BLD AUTO: 0.06 K/UL (ref 0–0.11)
IMM GRANULOCYTES NFR BLD AUTO: 1.5 % (ref 0–0.9)
KETONES UR STRIP.AUTO-MCNC: NEGATIVE MG/DL
LACTATE BLD-SCNC: 1.1 MMOL/L (ref 0.5–2)
LEUKOCYTE ESTERASE UR QL STRIP.AUTO: NEGATIVE
LYMPHOCYTES # BLD AUTO: 0.8 K/UL (ref 1–4.8)
LYMPHOCYTES NFR BLD: 19.6 % (ref 22–41)
MCH RBC QN AUTO: 32.7 PG (ref 27–33)
MCHC RBC AUTO-ENTMCNC: 33 G/DL (ref 33.6–35)
MCV RBC AUTO: 99.3 FL (ref 81.4–97.8)
MICRO URNS: ABNORMAL
MONOCYTES # BLD AUTO: 0.42 K/UL (ref 0–0.85)
MONOCYTES NFR BLD AUTO: 10.3 % (ref 0–13.4)
MUCOUS THREADS #/AREA URNS HPF: NORMAL /HPF
NEUTROPHILS # BLD AUTO: 2.72 K/UL (ref 2–7.15)
NEUTROPHILS NFR BLD: 66.4 % (ref 44–72)
NITRITE UR QL STRIP.AUTO: NEGATIVE
NRBC # BLD AUTO: 0 K/UL
NRBC BLD-RTO: 0 /100 WBC
PH UR STRIP.AUTO: 6.5 [PH] (ref 5–8)
PLATELET # BLD AUTO: 191 K/UL (ref 164–446)
PMV BLD AUTO: 11.7 FL (ref 9–12.9)
POTASSIUM SERPL-SCNC: 3.4 MMOL/L (ref 3.6–5.5)
PROT SERPL-MCNC: 6.8 G/DL (ref 6–8.2)
PROT UR QL STRIP: 30 MG/DL
RBC # BLD AUTO: 4.4 M/UL (ref 4.2–5.4)
RBC # URNS HPF: NORMAL /HPF
RBC UR QL AUTO: NEGATIVE
SODIUM SERPL-SCNC: 134 MMOL/L (ref 135–145)
SP GR UR STRIP.AUTO: 1.02
UNIDENT CRYS URNS QL MICRO: NORMAL /HPF
WBC # BLD AUTO: 4.1 K/UL (ref 4.8–10.8)
WBC #/AREA URNS HPF: NORMAL /HPF

## 2020-09-01 PROCEDURE — 99285 EMERGENCY DEPT VISIT HI MDM: CPT

## 2020-09-01 PROCEDURE — 700105 HCHG RX REV CODE 258: Performed by: EMERGENCY MEDICINE

## 2020-09-01 PROCEDURE — 87086 URINE CULTURE/COLONY COUNT: CPT

## 2020-09-01 PROCEDURE — 87040 BLOOD CULTURE FOR BACTERIA: CPT | Mod: 91

## 2020-09-01 PROCEDURE — 83605 ASSAY OF LACTIC ACID: CPT

## 2020-09-01 PROCEDURE — 93005 ELECTROCARDIOGRAM TRACING: CPT | Mod: XE | Performed by: EMERGENCY MEDICINE

## 2020-09-01 PROCEDURE — 85025 COMPLETE CBC W/AUTO DIFF WBC: CPT

## 2020-09-01 PROCEDURE — 71045 X-RAY EXAM CHEST 1 VIEW: CPT

## 2020-09-01 PROCEDURE — 93005 ELECTROCARDIOGRAM TRACING: CPT

## 2020-09-01 PROCEDURE — 81001 URINALYSIS AUTO W/SCOPE: CPT

## 2020-09-01 PROCEDURE — 80053 COMPREHEN METABOLIC PANEL: CPT

## 2020-09-01 RX ORDER — LIOTHYRONINE SODIUM 5 UG/1
15 TABLET ORAL EVERY EVENING
Status: SHIPPED | COMMUNITY
End: 2020-09-10

## 2020-09-01 RX ORDER — LINACLOTIDE 290 UG/1
290 CAPSULE, GELATIN COATED ORAL EVERY EVENING
Status: SHIPPED | COMMUNITY
End: 2020-10-09 | Stop reason: SDUPTHER

## 2020-09-01 RX ORDER — LOPERAMIDE HYDROCHLORIDE 2 MG/1
TABLET ORAL
Qty: 20 TAB | Refills: 0 | Status: SHIPPED | OUTPATIENT
Start: 2020-09-01 | End: 2020-09-07

## 2020-09-01 RX ORDER — SODIUM CHLORIDE 9 MG/ML
1000 INJECTION, SOLUTION INTRAVENOUS ONCE
Status: COMPLETED | OUTPATIENT
Start: 2020-09-01 | End: 2020-09-01

## 2020-09-01 RX ORDER — AMOXICILLIN 250 MG
4 CAPSULE ORAL EVERY EVENING
Status: SHIPPED | COMMUNITY
End: 2020-09-07

## 2020-09-01 RX ADMIN — SODIUM CHLORIDE 1000 ML: 9 INJECTION, SOLUTION INTRAVENOUS at 16:11

## 2020-09-01 RX ADMIN — SODIUM CHLORIDE 1000 ML: 9 INJECTION, SOLUTION INTRAVENOUS at 17:57

## 2020-09-01 ASSESSMENT — FIBROSIS 4 INDEX: FIB4 SCORE: 1.62

## 2020-09-01 NOTE — ED PROVIDER NOTES
ED Provider Note    CHIEF COMPLAINT  Chief Complaint   Patient presents with   • N/V     Since Fri  Can't kristin po Liquid only in ostomy   • Dizziness     when standing Had to crawl back in her house Near syncope   • Shortness of Breath     x 3 mons Seen cardiologist Thrhonda OPO workup pending       HPI  Pita Bueno is a 60 y.o. female who presents with high volume output from her ostomy since Friday associated with nausea no vomiting dizziness with standing and a couple episodes of near syncope.  She was changing her bag 40 times a day until today when it dropped to 4 times a day.  She has had high output issues before.  No leg swelling, DVT or PE, bleeding, fever, dysuria, urgency or frequency.  No sudden severe headache or chest pain.  She has noted an increase in heart rate.  No actual loss of consciousness.  She has an ostomy after complications from a rectal prolapse surgery.  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  Denies asthma, COPD, heart failure, tobacco use.  Does have mild cough.  Has had shortness of breath intermittent even at rest for the last 3 months.  Saw cardiologist last week about this.  She reports some orthopnea.  No ill contacts food poisoning travel    REVIEW OF SYSTEMS  Pertinent positives include: High output ostomy, dizziness, near syncope.  Pertinent negatives include: Headache, focal weakness, chest pain, palpitation.  10+ systems reviewed and negative.      PAST MEDICAL HISTORY  Perforated viscus and sepsis  Ostomy    FAMILY HISTORY  Family History   Problem Relation Age of Onset   • Thyroid Mother    • Alcohol abuse Father    • Heart Disease Father    • Hypertension Father    • Stroke Father        SOCIAL HISTORY  Social History     Tobacco Use   • Smoking status: Never Smoker   • Smokeless tobacco: Never Used   Substance Use Topics   • Alcohol use: Not Currently   • Drug use: Not Currently     Social History     Substance and Sexual Activity   Drug Use Not Currently  "      SURGICAL HISTORY  Past Surgical History:   Procedure Laterality Date   • OTHER ABDOMINAL SURGERY      rectal prolapse repair, anastomy leak/abcess,colostony       CURRENT MEDICATIONS    Current Outpatient Medications   Medication Sig Dispense Refill   • liothyronine (CYTOMEL) 5 MCG Tab Take 3 Tabs by mouth every day. (Patient taking differently: Take 25 mcg by mouth every day.) 90 Tab 5   • Cholecalciferol (VITAMIN D3) 1.25 MG (14890 UT) Tab Take 1 Tab by mouth every 7 days. (Patient not taking: Reported on 8/27/2020) 12 Tab 2   • linaCLOtide (LINZESS) 290 MCG Cap Take 290 mcg by mouth every day.         ALLERGIES  Allergies   Allergen Reactions   • Sulfa Drugs        PHYSICAL EXAM  VITAL SIGNS: BP (!) 75/60   Pulse (!) 106   Temp 36.8 °C (98.3 °F) (Temporal)   Resp 16   Ht 1.651 m (5' 5\")   Wt 33.5 kg (73 lb 13.7 oz)   SpO2 100%   BMI 12.29 kg/m²   Reviewed and hypotensive, tachycardic, afebrile, no hypoxia room air, no tachypnea  Constitutional: Well developed, cachectic otherwise well-appearing  HENT: Normocephalic, atraumatic, bilateral external ears normal, Wearing mask.   Eyes: PERRLA, conjunctiva pink, no scleral icterus.   Cardiovascular: No longer tachycardic, regular S1-S2 without murmur, rub, gallop.  No dependent edema or calf tenderness.  No JVD or bruit.  Respiratory: No rales, rhonchi, wheeze or cough.  Gastrointestinal: Soft, nontender, nondistended, no organomegaly.  Skin: No erythema, no rash.   Genitourinary:  No costovertebral angle tenderness.   Neurologic: Alert & oriented x 3, cranial nerves 2-12 intact by passive exam.  No focal deficit noted.  Psychiatric: Affect normal, Judgment normal, Mood normal.     DIFFERENTIAL DIAGNOSIS:  Dehydration, viral syndrome, food poisoning, sepsis.    EKG  EKG Interpretation 8:20 PM    Interpreted by me.  Indication: Hypotension    Rhythm: normal sinus   Rate: Normal at 67  Axis: 103  Ectopy: none  Conduction: normal  ST/T Waves: no acute " change  Q Waves: none  R Wave progression: normal    Clinical Impression: This rhythm with right axis deviation  .    RADIOLOGY/PROCEDURES  DX-CHEST-PORTABLE (1 VIEW)   Final Result      No radiographic evidence of acute cardiopulmonary process.          LABORATORY:  Results for orders placed or performed during the hospital encounter of 09/01/20   CBC WITH DIFFERENTIAL   Result Value Ref Range    WBC 4.1 (L) 4.8 - 10.8 K/uL    RBC 4.40 4.20 - 5.40 M/uL    Hemoglobin 14.4 12.0 - 16.0 g/dL    Hematocrit 43.7 37.0 - 47.0 %    MCV 99.3 (H) 81.4 - 97.8 fL    MCH 32.7 27.0 - 33.0 pg    MCHC 33.0 (L) 33.6 - 35.0 g/dL    RDW 49.5 35.9 - 50.0 fL    Platelet Count 191 164 - 446 K/uL    MPV 11.7 9.0 - 12.9 fL    Neutrophils-Polys 66.40 44.00 - 72.00 %    Lymphocytes 19.60 (L) 22.00 - 41.00 %    Monocytes 10.30 0.00 - 13.40 %    Eosinophils 1.20 0.00 - 6.90 %    Basophils 1.00 0.00 - 1.80 %    Immature Granulocytes 1.50 (H) 0.00 - 0.90 %    Nucleated RBC 0.00 /100 WBC    Neutrophils (Absolute) 2.72 2.00 - 7.15 K/uL    Lymphs (Absolute) 0.80 (L) 1.00 - 4.80 K/uL    Monos (Absolute) 0.42 0.00 - 0.85 K/uL    Eos (Absolute) 0.05 0.00 - 0.51 K/uL    Baso (Absolute) 0.04 0.00 - 0.12 K/uL    Immature Granulocytes (abs) 0.06 0.00 - 0.11 K/uL    NRBC (Absolute) 0.00 K/uL   COMP METABOLIC PANEL   Result Value Ref Range    Sodium 134 (L) 135 - 145 mmol/L    Potassium 3.4 (L) 3.6 - 5.5 mmol/L    Chloride 111 96 - 112 mmol/L    Co2 12 (L) 20 - 33 mmol/L    Anion Gap 11.0 7.0 - 16.0    Glucose 91 65 - 99 mg/dL    Bun 24 (H) 8 - 22 mg/dL    Creatinine 0.67 0.50 - 1.40 mg/dL    Calcium 9.5 8.4 - 10.2 mg/dL    AST(SGOT) 19 12 - 45 U/L    ALT(SGPT) 22 2 - 50 U/L    Alkaline Phosphatase 83 30 - 99 U/L    Total Bilirubin 0.4 0.1 - 1.5 mg/dL    Albumin 4.6 3.2 - 4.9 g/dL    Total Protein 6.8 6.0 - 8.2 g/dL    Globulin 2.2 1.9 - 3.5 g/dL    A-G Ratio 2.1 g/dL   URINALYSIS    Specimen: Urine, Clean Catch   Result Value Ref Range    Color Yellow      Character Clear     Specific Gravity 1.020 <1.035    Ph 6.5 5.0 - 8.0    Glucose Negative Negative mg/dL    Ketones Negative Negative mg/dL    Protein 30 (A) Negative mg/dL    Bilirubin Negative Negative    Nitrite Negative Negative    Leukocyte Esterase Negative Negative    Occult Blood Negative Negative    Micro Urine Req Microscopic    LACTIC ACID   Result Value Ref Range    Lactic Acid 1.1 0.5 - 2.0 mmol/L   URINE MICROSCOPIC (W/UA)   Result Value Ref Range    WBC 0-2 /hpf    RBC 0-2 /hpf    Epithelial Cells Rare Few /hpf    Mucous Threads Few /hpf    Urine Crystals Mod Ca Oxalate /hpf       INTERVENTIONS: Indication IV fluids shock and dehydration with a CO2 of 12.  P.o. hydration would have been inadequate.  Medications   NS infusion 1,000 mL (0 mL Intravenous Stopped 9/1/20 1759)   NS infusion 1,000 mL (0 mL Intravenous Stopped 9/1/20 1859)     Response: Improved hydration, absence of dizziness on standing.    COURSE & MEDICAL DECISION MAKING  This patient presents with a few days of very high output from her colostomy and has dehydration with near syncope.  There is no evidence of UTI or other source of sepsis.  Patient states her baseline blood pressure is in the 85-90 range systolic so she requests no more IV fluids now that her pressure with standing is 85 systolic and she is asymptomatic.  She had a CO2 of 12 which is really quite severe dehydration and elevated BUN to creatinine ratio.  I told her she was on the fence for admission but she can tolerate fluids and wishes to hydrate at home.  Since her output has declined drastically today it is likely she will be successful at home.  The patient may have food poisoning or a viral syndrome but she has no fever nausea or vomiting making these possibilities less likely.  At this point there is no advantage to abdominal imaging.    PLAN:  Discharge Medication List as of 9/1/2020  7:27 PM      START taking these medications    Details   loperamide (IMODIUM  A-D) 2 MG tablet Take 2 tablets for diarrhea and one tablet after each loose stool to max 8 tablets daily, Disp-20 Tab,R-0, Print Rx Paper         Start Metamucil    Increase fluid intake to 2 to 3 L a day  Dehydration handout given  Return for dizziness, syncope, ill appearance    Zhao Starr M.D.  [address] [address]  [phone]    Schedule an appointment as soon as possible for a visit   As needed if high output continues      CONDITION: Stable.    FINAL IMPRESSION  1. Dehydration    2. Colostomy complication (HCC)      Electronically signed by: Sandor Smith M.D., 9/1/2020 3:47 PM

## 2020-09-01 NOTE — ED NOTES
Med rec updated and complete  Allergies reviewed  Called pt on cell phone @ [phone] to verify medications.  Pt reports no vitamins   Pt reports no antibiotics in the last 2 weeks

## 2020-09-02 NOTE — DISCHARGE INSTRUCTIONS
Increase your fluid intake to 2 to 3 quarts per day until the ostomy output normalizes.  Adding Metamucil 2-4 times a day and taking Imodium can help decrease ostomy output.  Return for severe dizziness, fainting or other signs of dehydration.  Call Dr. Starr if high output continues.

## 2020-09-03 LAB — EKG IMPRESSION: NORMAL

## 2020-09-04 ENCOUNTER — TELEPHONE (OUTPATIENT)
Dept: MEDICAL GROUP | Facility: PHYSICIAN GROUP | Age: 60
End: 2020-09-04

## 2020-09-04 LAB
BACTERIA UR CULT: NORMAL
SIGNIFICANT IND 70042: NORMAL
SITE SITE: NORMAL
SOURCE SOURCE: NORMAL

## 2020-09-04 NOTE — TELEPHONE ENCOUNTER
----- Message from Clifton Harvey M.D. sent at 9/4/2020 10:28 AM PDT -----  Regarding: FW: Non-Urgent Medical Question  Contact: [phone]  Pls advise pt to schedule an appt to see pcp    If symptoms worsen /change : pls go back to ER  ----- Message -----  From: Sahil Jang Ass't  Sent: 9/4/2020   8:54 AM PDT  To: Clifton Harvey M.D.  Subject: FW: Non-Urgent Medical Question                    ----- Message -----  From: Pita Bueno  Sent: 9/3/2020  10:47 PM PDT  To: [place] [facility]  Subject: Non-Urgent Medical Question                      I know we discussed I cannot see you but every 6 months but I was just in the hospital for severe dehydration because of my colostomy and I think I need to be monitored monthly for this maybe I could do a check in with the nurse for my vitals and then labs.I can't let my blood pressure drop to70 / 51 and my pulse go up to 125 and lose 8 lbs in 4 days.i need f/ u with someone or i won't make it at 73lbs and going through this then to ER URGENT CARE SENT ME THERE. Any suggestions???

## 2020-09-05 ENCOUNTER — OFFICE VISIT (OUTPATIENT)
Dept: URGENT CARE | Facility: PHYSICIAN GROUP | Age: 60
End: 2020-09-05
Payer: OTHER GOVERNMENT

## 2020-09-05 VITALS
TEMPERATURE: 97.8 F | RESPIRATION RATE: 16 BRPM | HEART RATE: 60 BPM | OXYGEN SATURATION: 100 % | HEIGHT: 65 IN | SYSTOLIC BLOOD PRESSURE: 98 MMHG | BODY MASS INDEX: 12.69 KG/M2 | WEIGHT: 76.2 LBS | DIASTOLIC BLOOD PRESSURE: 64 MMHG

## 2020-09-05 DIAGNOSIS — R42 LIGHTHEADEDNESS: ICD-10-CM

## 2020-09-05 DIAGNOSIS — R53.83 OTHER FATIGUE: ICD-10-CM

## 2020-09-05 DIAGNOSIS — R11.2 NAUSEA AND VOMITING, INTRACTABILITY OF VOMITING NOT SPECIFIED, UNSPECIFIED VOMITING TYPE: ICD-10-CM

## 2020-09-05 PROCEDURE — 99214 OFFICE O/P EST MOD 30 MIN: CPT | Performed by: NURSE PRACTITIONER

## 2020-09-05 ASSESSMENT — FIBROSIS 4 INDEX: FIB4 SCORE: 1.25

## 2020-09-06 LAB
BACTERIA BLD CULT: NORMAL
BACTERIA BLD CULT: NORMAL
SIGNIFICANT IND 70042: NORMAL
SIGNIFICANT IND 70042: NORMAL
SITE SITE: NORMAL
SITE SITE: NORMAL
SOURCE SOURCE: NORMAL
SOURCE SOURCE: NORMAL

## 2020-09-06 ASSESSMENT — ENCOUNTER SYMPTOMS
DIZZINESS: 1
SORE THROAT: 1
VOMITING: 1
NAUSEA: 1

## 2020-09-06 NOTE — PROGRESS NOTES
Subjective:      Pita Bueno is a 60 y.o. female who presents with Dizziness (white spots on throat x3 days )    History reviewed. No pertinent past medical history.  Social History     Socioeconomic History   • Marital status:      Spouse name: Not on file   • Number of children: Not on file   • Years of education: Not on file   • Highest education level: Not on file   Occupational History   • Not on file   Social Needs   • Financial resource strain: Not on file   • Food insecurity     Worry: Not on file     Inability: Not on file   • Transportation needs     Medical: Not on file     Non-medical: Not on file   Tobacco Use   • Smoking status: Never Smoker   • Smokeless tobacco: Never Used   Substance and Sexual Activity   • Alcohol use: Not Currently   • Drug use: Not Currently   • Sexual activity: Not Currently   Lifestyle   • Physical activity     Days per week: Not on file     Minutes per session: Not on file   • Stress: Not on file   Relationships   • Social connections     Talks on phone: Not on file     Gets together: Not on file     Attends Worship service: Not on file     Active member of club or organization: Not on file     Attends meetings of clubs or organizations: Not on file     Relationship status: Not on file   • Intimate partner violence     Fear of current or ex partner: Not on file     Emotionally abused: Not on file     Physically abused: Not on file     Forced sexual activity: Not on file   Other Topics Concern   • Not on file   Social History Narrative   • Not on file     Family History   Problem Relation Age of Onset   • Thyroid Mother    • Alcohol abuse Father    • Heart Disease Father    • Hypertension Father    • Stroke Father        Allergies: Sulfa drugs    Patient is a 60-year-old female who presents today with complaint of ongoing vomiting and inability to tolerate fluids.  Patient had been seen for this previously and reports she does not believe her symptoms completely  "resolved.  She reports that she continues to have nausea and vomiting.        Dizziness  This is a new problem. The current episode started in the past 7 days. The problem occurs constantly. The problem has been unchanged. Associated symptoms include nausea, a sore throat and vomiting. Associated symptoms comments: Sore throat, mild  Dehydration  fatigue  . Nothing aggravates the symptoms. She has tried nothing for the symptoms. The treatment provided no relief.       Review of Systems   Constitutional: Positive for malaise/fatigue.   HENT: Positive for sore throat.    Gastrointestinal: Positive for nausea and vomiting.   Neurological: Positive for dizziness.   All other systems reviewed and are negative.         Objective:     BP (!) 98/64 (BP Location: Left arm, Patient Position: Sitting, BP Cuff Size: Adult)   Pulse 60   Temp 36.6 °C (97.8 °F) (Temporal)   Resp 16   Ht 1.651 m (5' 5\")   Wt 34.6 kg (76 lb 3.2 oz)   SpO2 100%   BMI 12.68 kg/m²      Physical Exam  Vitals signs reviewed.   Constitutional:       Comments: Patient appears to be cachectic, weight is noted to be 76 pounds.   HENT:      Head: Normocephalic.      Right Ear: Tympanic membrane, ear canal and external ear normal.      Left Ear: Tympanic membrane, ear canal and external ear normal.      Nose: Nose normal.      Mouth/Throat:      Mouth: Mucous membranes are dry.      Comments: Oral mucous membranes are slightly dry.  Eyes:      Extraocular Movements: Extraocular movements intact.      Conjunctiva/sclera: Conjunctivae normal.      Pupils: Pupils are equal, round, and reactive to light.   Neck:      Musculoskeletal: Normal range of motion and neck supple.   Cardiovascular:      Rate and Rhythm: Regular rhythm.      Pulses: Normal pulses.      Heart sounds: Normal heart sounds.   Pulmonary:      Effort: Pulmonary effort is normal.      Breath sounds: Normal breath sounds.   Abdominal:      General: Abdomen is flat.   Musculoskeletal: Normal " range of motion.   Skin:     General: Skin is warm and dry.   Neurological:      Mental Status: She is alert and oriented to person, place, and time.   Psychiatric:         Mood and Affect: Mood normal.         Behavior: Behavior normal.         Thought Content: Thought content normal.         Judgment: Judgment normal.     At this time, due to patient's clinical appearance, low body weight, and low blood pressure, I believe she needs a higher level of care than I can reasonably and safely provide on an outpatient basis.  I did advise her strongly that I do think she needs to go back to the emergency room.  Patient verbalized understanding and agreement with plan of care.  States she will go back to ER for further evaluation at this time.            Assessment/Plan:        1. Nausea and vomiting, intractability of vomiting not specified, unspecified vomiting type  2. Lightheadedness  3. Other fatigue    See note above  Referred to ER for higher level of care; patient states she will go.

## 2020-09-07 ENCOUNTER — HOSPITAL ENCOUNTER (EMERGENCY)
Facility: MEDICAL CENTER | Age: 60
End: 2020-09-07
Attending: EMERGENCY MEDICINE
Payer: OTHER GOVERNMENT

## 2020-09-07 ENCOUNTER — APPOINTMENT (OUTPATIENT)
Dept: RADIOLOGY | Facility: MEDICAL CENTER | Age: 60
End: 2020-09-07
Attending: EMERGENCY MEDICINE
Payer: OTHER GOVERNMENT

## 2020-09-07 VITALS
BODY MASS INDEX: 12.38 KG/M2 | WEIGHT: 74.3 LBS | SYSTOLIC BLOOD PRESSURE: 87 MMHG | HEART RATE: 63 BPM | OXYGEN SATURATION: 99 % | RESPIRATION RATE: 34 BRPM | HEIGHT: 65 IN | DIASTOLIC BLOOD PRESSURE: 60 MMHG | TEMPERATURE: 98.1 F

## 2020-09-07 DIAGNOSIS — R64 CACHEXIA (HCC): ICD-10-CM

## 2020-09-07 DIAGNOSIS — E03.9 HYPOTHYROIDISM, UNSPECIFIED TYPE: ICD-10-CM

## 2020-09-07 DIAGNOSIS — I95.1 ORTHOSTATIC HYPOTENSION: ICD-10-CM

## 2020-09-07 LAB
ALBUMIN SERPL BCP-MCNC: 4.6 G/DL (ref 3.2–4.9)
ALBUMIN/GLOB SERPL: 2.1 G/DL
ALP SERPL-CCNC: 77 U/L (ref 30–99)
ALT SERPL-CCNC: 26 U/L (ref 2–50)
ANION GAP SERPL CALC-SCNC: 13 MMOL/L (ref 7–16)
ANISOCYTOSIS BLD QL SMEAR: ABNORMAL
AST SERPL-CCNC: 24 U/L (ref 12–45)
BASOPHILS # BLD AUTO: 0 % (ref 0–1.8)
BASOPHILS # BLD: 0 K/UL (ref 0–0.12)
BILIRUB SERPL-MCNC: 0.2 MG/DL (ref 0.1–1.5)
BUN SERPL-MCNC: 18 MG/DL (ref 8–22)
CALCIUM SERPL-MCNC: 9.3 MG/DL (ref 8.4–10.2)
CHLORIDE SERPL-SCNC: 112 MMOL/L (ref 96–112)
CO2 SERPL-SCNC: 12 MMOL/L (ref 20–33)
CREAT SERPL-MCNC: 0.73 MG/DL (ref 0.5–1.4)
EKG IMPRESSION: NORMAL
EOSINOPHIL # BLD AUTO: 0.09 K/UL (ref 0–0.51)
EOSINOPHIL NFR BLD: 2 % (ref 0–6.9)
ERYTHROCYTE [DISTWIDTH] IN BLOOD BY AUTOMATED COUNT: 52.3 FL (ref 35.9–50)
GLOBULIN SER CALC-MCNC: 2.2 G/DL (ref 1.9–3.5)
GLUCOSE SERPL-MCNC: 89 MG/DL (ref 65–99)
HCT VFR BLD AUTO: 44.4 % (ref 37–47)
HGB BLD-MCNC: 14.4 G/DL (ref 12–16)
HYPOCHROMIA BLD QL SMEAR: ABNORMAL
LYMPHOCYTES # BLD AUTO: 0.94 K/UL (ref 1–4.8)
LYMPHOCYTES NFR BLD: 20 % (ref 22–41)
MACROCYTES BLD QL SMEAR: ABNORMAL
MAGNESIUM SERPL-MCNC: 2.2 MG/DL (ref 1.5–2.5)
MANUAL DIFF BLD: NORMAL
MCH RBC QN AUTO: 33 PG (ref 27–33)
MCHC RBC AUTO-ENTMCNC: 32.4 G/DL (ref 33.6–35)
MCV RBC AUTO: 101.8 FL (ref 81.4–97.8)
MONOCYTES # BLD AUTO: 0.56 K/UL (ref 0–0.85)
MONOCYTES NFR BLD AUTO: 12 % (ref 0–13.4)
NEUTROPHILS # BLD AUTO: 3.1 K/UL (ref 2–7.15)
NEUTROPHILS NFR BLD: 66 % (ref 44–72)
NRBC # BLD AUTO: 0 K/UL
NRBC BLD-RTO: 0 /100 WBC
PHOSPHATE SERPL-MCNC: 4.7 MG/DL (ref 2.5–4.5)
PLATELET # BLD AUTO: 113 K/UL (ref 164–446)
PLATELET BLD QL SMEAR: NORMAL
PMV BLD AUTO: 12.7 FL (ref 9–12.9)
POTASSIUM SERPL-SCNC: 4 MMOL/L (ref 3.6–5.5)
PROT SERPL-MCNC: 6.8 G/DL (ref 6–8.2)
RBC # BLD AUTO: 4.36 M/UL (ref 4.2–5.4)
RBC BLD AUTO: PRESENT
SODIUM SERPL-SCNC: 137 MMOL/L (ref 135–145)
T4 FREE SERPL-MCNC: 0.59 NG/DL (ref 0.93–1.7)
TROPONIN T SERPL-MCNC: 7 NG/L (ref 6–19)
TSH SERPL DL<=0.005 MIU/L-ACNC: 2.92 UIU/ML (ref 0.38–5.33)
WBC # BLD AUTO: 4.7 K/UL (ref 4.8–10.8)

## 2020-09-07 PROCEDURE — 700105 HCHG RX REV CODE 258: Performed by: EMERGENCY MEDICINE

## 2020-09-07 PROCEDURE — 85007 BL SMEAR W/DIFF WBC COUNT: CPT

## 2020-09-07 PROCEDURE — 85027 COMPLETE CBC AUTOMATED: CPT

## 2020-09-07 PROCEDURE — A9270 NON-COVERED ITEM OR SERVICE: HCPCS | Performed by: EMERGENCY MEDICINE

## 2020-09-07 PROCEDURE — 84100 ASSAY OF PHOSPHORUS: CPT

## 2020-09-07 PROCEDURE — 99285 EMERGENCY DEPT VISIT HI MDM: CPT

## 2020-09-07 PROCEDURE — 83735 ASSAY OF MAGNESIUM: CPT

## 2020-09-07 PROCEDURE — 36415 COLL VENOUS BLD VENIPUNCTURE: CPT

## 2020-09-07 PROCEDURE — 700117 HCHG RX CONTRAST REV CODE 255: Performed by: EMERGENCY MEDICINE

## 2020-09-07 PROCEDURE — 84439 ASSAY OF FREE THYROXINE: CPT

## 2020-09-07 PROCEDURE — 84484 ASSAY OF TROPONIN QUANT: CPT

## 2020-09-07 PROCEDURE — 71260 CT THORAX DX C+: CPT

## 2020-09-07 PROCEDURE — 80053 COMPREHEN METABOLIC PANEL: CPT

## 2020-09-07 PROCEDURE — 93005 ELECTROCARDIOGRAM TRACING: CPT | Performed by: EMERGENCY MEDICINE

## 2020-09-07 PROCEDURE — 700102 HCHG RX REV CODE 250 W/ 637 OVERRIDE(OP): Performed by: EMERGENCY MEDICINE

## 2020-09-07 PROCEDURE — 84443 ASSAY THYROID STIM HORMONE: CPT

## 2020-09-07 RX ORDER — LEVOTHYROXINE SODIUM 0.05 MG/1
50 TABLET ORAL ONCE
Status: COMPLETED | OUTPATIENT
Start: 2020-09-07 | End: 2020-09-07

## 2020-09-07 RX ORDER — LEVOTHYROXINE SODIUM 0.03 MG/1
25 TABLET ORAL
Status: SHIPPED | COMMUNITY
End: 2020-09-10

## 2020-09-07 RX ORDER — SODIUM CHLORIDE, SODIUM LACTATE, POTASSIUM CHLORIDE, CALCIUM CHLORIDE 600; 310; 30; 20 MG/100ML; MG/100ML; MG/100ML; MG/100ML
1000 INJECTION, SOLUTION INTRAVENOUS ONCE
Status: COMPLETED | OUTPATIENT
Start: 2020-09-07 | End: 2020-09-07

## 2020-09-07 RX ORDER — LEVOTHYROXINE SODIUM 0.05 MG/1
50 TABLET ORAL
Qty: 30 TAB | Refills: 0 | Status: SHIPPED | OUTPATIENT
Start: 2020-09-07 | End: 2020-10-12 | Stop reason: SDUPTHER

## 2020-09-07 RX ADMIN — IOHEXOL 50 ML: 350 INJECTION, SOLUTION INTRAVENOUS at 16:02

## 2020-09-07 RX ADMIN — LEVOTHYROXINE SODIUM 50 MCG: 0.05 TABLET ORAL at 15:44

## 2020-09-07 RX ADMIN — SODIUM CHLORIDE, POTASSIUM CHLORIDE, SODIUM LACTATE AND CALCIUM CHLORIDE 1000 ML: 600; 310; 30; 20 INJECTION, SOLUTION INTRAVENOUS at 14:23

## 2020-09-07 ASSESSMENT — FIBROSIS 4 INDEX: FIB4 SCORE: 1.25

## 2020-09-07 NOTE — DISCHARGE INSTRUCTIONS
Please follow-up with endocrinology as scheduled in 2 weeks  Drink plenty of electrolyte-containing fluids  Increase Synthroid dose to 50 mcg/day

## 2020-09-07 NOTE — ED TRIAGE NOTES
Pt reports was seen 09/01/2020 for c/o same, is unable to follow up w/ PCP until later this week.

## 2020-09-07 NOTE — ED TRIAGE NOTES
"Chief Complaint   Patient presents with   • Shortness of Breath     x several months   • Dizziness   • Sore Throat     since previous visit     BP (!) 81/51   Pulse 64   Temp 36.7 °C (98.1 °F) (Temporal)   Resp 20   Ht 1.651 m (5' 5\")   Wt 33.7 kg (74 lb 4.7 oz)   SpO2 94%   BMI 12.36 kg/m²       "

## 2020-09-08 NOTE — ED PROVIDER NOTES
ED Provider Note    CHIEF COMPLAINT  Chief Complaint   Patient presents with   • Shortness of Breath     x several months   • Dizziness   • Sore Throat     since previous visit       HPI  Pita Bueno is a 60 y.o. female who presents stating that she has had chronic shortness of breath for several months but today she comes in because of persistent dizziness and lightheadedness that has been especially bad over the last couple of weeks.  She was recently in the emergency department on September 1 for evaluation by Dr. Marinelli.  The patient has had low free T4 and she has been on Synthroid 25 mcg daily.  She still feels overall weak and has lost 8 pounds with a very small frame of only 85 pounds to begin with.  She is down to 74 pounds today.  Has had imaging of the abdomen but not the chest and does not have any cancer that is known but does have colostomy in place and plans on seeing endocrinology and 2 weeks time.  She states that she still has frequent ostomy output     REVIEW OF SYSTEMS  See HPI for further details. All other systems are negative.     PAST MEDICAL HISTORY  Past Medical History:   Diagnosis Date   • Colostomy in place (HCC)    • Eating disorder    • Hypotension    • Hypothyroid    • Palpitations        FAMILY HISTORY  Family History   Problem Relation Age of Onset   • Thyroid Mother    • Alcohol abuse Father    • Heart Disease Father    • Hypertension Father    • Stroke Father        SOCIAL HISTORY   reports that she has never smoked. She has never used smokeless tobacco. She reports previous alcohol use. She reports previous drug use.    SURGICAL HISTORY  Past Surgical History:   Procedure Laterality Date   • OTHER ABDOMINAL SURGERY      rectal prolapse repair, anastomy leak/abcess,colostony       CURRENT MEDICATIONS  Home Medications     Reviewed by Staci Pfeiffer (Pharmacy Tech) on 09/07/20 at 1518  Med List Status: Unable to Obtain   Medication Last Dose Status   linaCLOtide  "(LINZESS) 290 MCG Cap  Active   liothyronine (CYTOMEL) 5 MCG Tab  Active   loperamide (IMODIUM A-D) 2 MG tablet  Active                ALLERGIES  Allergies   Allergen Reactions   • Sulfa Drugs Hives       PHYSICAL EXAM  VITAL SIGNS: BP (!) 87/60   Pulse 63   Temp 36.7 °C (98.1 °F) (Temporal)   Resp (!) 34   Ht 1.651 m (5' 5\")   Wt 33.7 kg (74 lb 4.7 oz)   SpO2 99%   BMI 12.36 kg/m²    Constitutional: Cachectic with temporal wasting and significant notching above the clavicles, No acute distress, Non-toxic appearance.   HENT: Normocephalic, Atraumatic, Bilateral external ears normal, Oropharynx is clear mucous membranes are dry. No oral exudates or nasal discharge.   Eyes: Pupils are equal round and reactive, EOMI, Conjunctiva normal, No discharge.   Neck: Normal range of motion, No tenderness, Supple, No stridor. No meningismus.  Lymphatic: No lymphadenopathy noted.   Cardiovascular: Bradycardic rate and rhythm without murmur rub or gallop.  Thorax & Lungs: Clear breath sounds bilaterally without wheezes, rhonchi or rales. There is no chest wall tenderness.   Abdomen: Soft non-tender non-distended. There is no rebound or guarding. No organomegaly is appreciated. Bowel sounds are normal.  Skin: Normal without rash.   Back: No CVA or spinal tenderness.   Extremities: Intact distal pulses, No edema, No tenderness, No cyanosis, No clubbing. Capillary refill is less than 2 seconds.  Musculoskeletal: Good range of motion in all major joints. No tenderness to palpation or major deformities noted.   Neurologic: Alert & oriented x 3, Normal motor function, Normal sensory function, No focal deficits noted. Reflexes are normal.  Psychiatric: Affect normal, Judgment normal, Mood normal. There is no suicidal ideation or patient reported hallucinations.       RADIOLOGY/PROCEDURES  CT-CHEST (THORAX) WITH   Final Result      1.  No CT evidence for pulmonary emboli.   2.  No pneumonia or pneumothorax.                  COURSE " & MEDICAL DECISION MAKING  Pertinent Labs & Imaging studies reviewed. (See chart for details)  The patient was clinically orthostatic and had a relatively low blood pressure on check-in but states that her normal blood pressure is around 90 systolic to begin with.  She says that she is just been feeling increasingly weak in spite of her compliance with therapy and nobody can seem to figure out what is going on    I did extensive chart review including her recent visits.  She seems to have some malabsorption and I rechecked her thyroid and she still is hypothyroid at 0.59 on a free T4 indicating she may not be absorbing her Synthroid very well.  She may need to go up on dose and I am suggesting 50 mcg daily until she sees her endocrinologist    I administered lactated Ringer's x1 L and reassessed her after this fluid bolus given to her secondary to orthostasis and hypotension and her blood pressure did improve and she felt better on her feet thereafter    Laboratory evaluation otherwise shows normal troponin, no evidence of acidosis, slightly low platelet count 113,000 with slight leukopenia at 4700.    CAT scan of the chest shows no evidence of mass.  No evidence of groundglass appearance suggestive of COVID and no evidence of pulmonary emboli    Patient is feeling better on her feet but I am still quite concerned and am happy that she is following up with endocrinology and I am placing her on a higher dose of Synthroid and she will push fluids at home.    She will return if any significant change in symptoms and is discharged in stable condition understands and appreciates her plan of care and I have given her alternative potential follow-up with Dr. Mercado    FINAL IMPRESSION  1. Orthostatic hypotension    2. Hypothyroidism, unspecified type    3. Cachexia (HCC)             Electronically signed by: Gama Mosher M.D., 9/7/2020 5:22 PM

## 2020-09-08 NOTE — ED NOTES
Discharged to home with instructions and prescriptions to pharmacy. Patient will follow up with endocrinology and primary doctor.

## 2020-09-09 ENCOUNTER — NURSE TRIAGE (OUTPATIENT)
Dept: HEALTH INFORMATION MANAGEMENT | Facility: OTHER | Age: 60
End: 2020-09-09

## 2020-09-09 NOTE — TELEPHONE ENCOUNTER
"IN ED on 09/01 and 09/07 Yesterday patient stated she was \"gasping for air\".  She reports feeling weak.  She feels like things are going numb and tingly in her arms and starts seeing stars.  Unable to eat yesterday due to nausea.   Patient reports no intake of food today.       Patient reports she feels her heart racing (tachycardia) at times  Patient reports losing 8 pounds in 4 days.     Reason for Disposition  • SEVERE weakness (i.e., unable to walk or barely able to walk, requires support) and new onset or worsening    Additional Information  • Negative: SEVERE difficulty breathing (e.g., struggling for each breath, speaks in single words)  • Negative: Shock suspected (e.g., cold/pale/clammy skin, too weak to stand, low BP, rapid pulse)  • Negative: Difficult to awaken or acting confused (e.g., disoriented, slurred speech)  • Negative: Fainted > 15 minutes ago and still feels too weak or dizzy to stand  • Negative: Sounds like a life-threatening emergency to the triager    Answer Assessment - Initial Assessment Questions  1. DESCRIPTION: \"Describe how you are feeling.\"      Dizzy and feeling weak  2. SEVERITY: \"How bad is it?\"  \"Can you stand and walk?\"    - MILD - Feels weak or tired, but does not interfere with work, school or normal activities    - MODERATE - Able to stand and walk; weakness interferes with work, school, or normal activities    - SEVERE - Unable to stand or walk      Yes, patient is staying in bed  3. ONSET:  \"When did the weakness begin?\"      08/30/2020  4. CAUSE: \"What do you think is causing the weakness?\"      When patient gets her weight down to the 70's she feels like her body is shutting down  5. MEDICINES: \"Have you recently started a new medicine or had a change in the amount of a medicine?\"      Increased thyroid medication  6. OTHER SYMPTOMS: \"Do you have any other symptoms?\" (e.g., chest pain, fever, cough, SOB, vomiting, diarrhea, bleeding, other areas of pain)      Nausea, " "constant diarrhea with colostomy  7. PREGNANCY: \"Is there any chance you are pregnant?\" \"When was your last menstrual period?\"      no    Protocols used: WEAKNESS (GENERALIZED) AND FATIGUE-A-OH      "

## 2020-09-10 ENCOUNTER — HOSPITAL ENCOUNTER (EMERGENCY)
Facility: MEDICAL CENTER | Age: 60
End: 2020-09-10
Attending: EMERGENCY MEDICINE
Payer: OTHER GOVERNMENT

## 2020-09-10 ENCOUNTER — APPOINTMENT (OUTPATIENT)
Dept: RADIOLOGY | Facility: MEDICAL CENTER | Age: 60
End: 2020-09-10
Attending: EMERGENCY MEDICINE
Payer: OTHER GOVERNMENT

## 2020-09-10 VITALS
HEIGHT: 65 IN | HEART RATE: 47 BPM | BODY MASS INDEX: 12.01 KG/M2 | DIASTOLIC BLOOD PRESSURE: 63 MMHG | WEIGHT: 72.09 LBS | OXYGEN SATURATION: 100 % | SYSTOLIC BLOOD PRESSURE: 95 MMHG | RESPIRATION RATE: 20 BRPM | TEMPERATURE: 99.3 F

## 2020-09-10 DIAGNOSIS — R11.2 NON-INTRACTABLE VOMITING WITH NAUSEA, UNSPECIFIED VOMITING TYPE: ICD-10-CM

## 2020-09-10 DIAGNOSIS — E86.0 DEHYDRATION: ICD-10-CM

## 2020-09-10 LAB
ALBUMIN SERPL BCP-MCNC: 4.7 G/DL (ref 3.2–4.9)
ALBUMIN/GLOB SERPL: 2 G/DL
ALP SERPL-CCNC: 79 U/L (ref 30–99)
ALT SERPL-CCNC: 23 U/L (ref 2–50)
ANION GAP SERPL CALC-SCNC: 17 MMOL/L (ref 7–16)
APTT PPP: 35 SEC (ref 24.7–36)
AST SERPL-CCNC: 21 U/L (ref 12–45)
BASOPHILS # BLD AUTO: 0.8 % (ref 0–1.8)
BASOPHILS # BLD: 0.03 K/UL (ref 0–0.12)
BILIRUB SERPL-MCNC: 0.2 MG/DL (ref 0.1–1.5)
BUN SERPL-MCNC: 23 MG/DL (ref 8–22)
CALCIUM SERPL-MCNC: 9.3 MG/DL (ref 8.4–10.2)
CHLORIDE SERPL-SCNC: 106 MMOL/L (ref 96–112)
CO2 SERPL-SCNC: 15 MMOL/L (ref 20–33)
COVID ORDER STATUS COVID19: NORMAL
CREAT SERPL-MCNC: 0.75 MG/DL (ref 0.5–1.4)
EOSINOPHIL # BLD AUTO: 0.08 K/UL (ref 0–0.51)
EOSINOPHIL NFR BLD: 2 % (ref 0–6.9)
ERYTHROCYTE [DISTWIDTH] IN BLOOD BY AUTOMATED COUNT: 49.2 FL (ref 35.9–50)
GLOBULIN SER CALC-MCNC: 2.3 G/DL (ref 1.9–3.5)
GLUCOSE SERPL-MCNC: 100 MG/DL (ref 65–99)
HCT VFR BLD AUTO: 44.1 % (ref 37–47)
HGB BLD-MCNC: 14.8 G/DL (ref 12–16)
IMM GRANULOCYTES # BLD AUTO: 0.02 K/UL (ref 0–0.11)
IMM GRANULOCYTES NFR BLD AUTO: 0.5 % (ref 0–0.9)
INR PPP: 1 (ref 0.87–1.13)
LACTATE BLD-SCNC: 1.3 MMOL/L (ref 0.5–2)
LYMPHOCYTES # BLD AUTO: 0.84 K/UL (ref 1–4.8)
LYMPHOCYTES NFR BLD: 21.4 % (ref 22–41)
MCH RBC QN AUTO: 32.7 PG (ref 27–33)
MCHC RBC AUTO-ENTMCNC: 33.6 G/DL (ref 33.6–35)
MCV RBC AUTO: 97.6 FL (ref 81.4–97.8)
MONOCYTES # BLD AUTO: 0.39 K/UL (ref 0–0.85)
MONOCYTES NFR BLD AUTO: 9.9 % (ref 0–13.4)
NEUTROPHILS # BLD AUTO: 2.57 K/UL (ref 2–7.15)
NEUTROPHILS NFR BLD: 65.4 % (ref 44–72)
NRBC # BLD AUTO: 0 K/UL
NRBC BLD-RTO: 0 /100 WBC
NT-PROBNP SERPL IA-MCNC: 61 PG/ML (ref 0–125)
PLATELET # BLD AUTO: 181 K/UL (ref 164–446)
PMV BLD AUTO: 12.7 FL (ref 9–12.9)
POTASSIUM SERPL-SCNC: 3 MMOL/L (ref 3.6–5.5)
PROT SERPL-MCNC: 7 G/DL (ref 6–8.2)
PROTHROMBIN TIME: 12.9 SEC (ref 12–14.6)
RBC # BLD AUTO: 4.52 M/UL (ref 4.2–5.4)
SARS-COV-2 RNA RESP QL NAA+PROBE: NOTDETECTED
SODIUM SERPL-SCNC: 138 MMOL/L (ref 135–145)
SPECIMEN SOURCE: NORMAL
TROPONIN T SERPL-MCNC: 8 NG/L (ref 6–19)
WBC # BLD AUTO: 3.9 K/UL (ref 4.8–10.8)

## 2020-09-10 PROCEDURE — 80053 COMPREHEN METABOLIC PANEL: CPT

## 2020-09-10 PROCEDURE — C9803 HOPD COVID-19 SPEC COLLECT: HCPCS | Performed by: EMERGENCY MEDICINE

## 2020-09-10 PROCEDURE — 93005 ELECTROCARDIOGRAM TRACING: CPT

## 2020-09-10 PROCEDURE — 85025 COMPLETE CBC W/AUTO DIFF WBC: CPT

## 2020-09-10 PROCEDURE — 96374 THER/PROPH/DIAG INJ IV PUSH: CPT

## 2020-09-10 PROCEDURE — 85610 PROTHROMBIN TIME: CPT

## 2020-09-10 PROCEDURE — 85730 THROMBOPLASTIN TIME PARTIAL: CPT

## 2020-09-10 PROCEDURE — 83520 IMMUNOASSAY QUANT NOS NONAB: CPT

## 2020-09-10 PROCEDURE — 700105 HCHG RX REV CODE 258: Performed by: EMERGENCY MEDICINE

## 2020-09-10 PROCEDURE — 93005 ELECTROCARDIOGRAM TRACING: CPT | Performed by: EMERGENCY MEDICINE

## 2020-09-10 PROCEDURE — 83605 ASSAY OF LACTIC ACID: CPT

## 2020-09-10 PROCEDURE — 83880 ASSAY OF NATRIURETIC PEPTIDE: CPT

## 2020-09-10 PROCEDURE — 700111 HCHG RX REV CODE 636 W/ 250 OVERRIDE (IP): Performed by: EMERGENCY MEDICINE

## 2020-09-10 PROCEDURE — 71045 X-RAY EXAM CHEST 1 VIEW: CPT

## 2020-09-10 PROCEDURE — 99285 EMERGENCY DEPT VISIT HI MDM: CPT

## 2020-09-10 PROCEDURE — 94760 N-INVAS EAR/PLS OXIMETRY 1: CPT

## 2020-09-10 PROCEDURE — 36415 COLL VENOUS BLD VENIPUNCTURE: CPT

## 2020-09-10 PROCEDURE — 84484 ASSAY OF TROPONIN QUANT: CPT

## 2020-09-10 RX ORDER — SODIUM CHLORIDE, SODIUM LACTATE, POTASSIUM CHLORIDE, CALCIUM CHLORIDE 600; 310; 30; 20 MG/100ML; MG/100ML; MG/100ML; MG/100ML
1000 INJECTION, SOLUTION INTRAVENOUS ONCE
Status: COMPLETED | OUTPATIENT
Start: 2020-09-10 | End: 2020-09-10

## 2020-09-10 RX ORDER — ONDANSETRON 4 MG/1
4 TABLET, ORALLY DISINTEGRATING ORAL EVERY 8 HOURS PRN
Qty: 20 TAB | Refills: 0 | Status: SHIPPED | OUTPATIENT
Start: 2020-09-10 | End: 2020-09-21

## 2020-09-10 RX ORDER — SENNOSIDES A AND B 8.6 MG/1
8.6 TABLET, FILM COATED ORAL
Status: SHIPPED | COMMUNITY
End: 2021-11-17

## 2020-09-10 RX ORDER — ONDANSETRON 2 MG/ML
4 INJECTION INTRAMUSCULAR; INTRAVENOUS ONCE
Status: COMPLETED | OUTPATIENT
Start: 2020-09-10 | End: 2020-09-10

## 2020-09-10 RX ADMIN — SODIUM CHLORIDE, POTASSIUM CHLORIDE, SODIUM LACTATE AND CALCIUM CHLORIDE 1000 ML: 600; 310; 30; 20 INJECTION, SOLUTION INTRAVENOUS at 13:34

## 2020-09-10 RX ADMIN — ONDANSETRON 4 MG: 2 INJECTION INTRAMUSCULAR; INTRAVENOUS at 15:11

## 2020-09-10 ASSESSMENT — FIBROSIS 4 INDEX: FIB4 SCORE: 2.46

## 2020-09-10 NOTE — ED PROVIDER NOTES
"ED Provider Note    CHIEF COMPLAINT  Chief Complaint   Patient presents with   • Shortness of Breath     worse w/ ambulation, pt feels \"like im constently having to cath my breath\", pt does not wear home O2   • Hypotension     sent over from PCP, SBP in the 60s per pt   • N/V     started last night       HPI  Pita Bueno is a 60 y.o. female who presents with a history of colostomy after rectal prolapse surgery, hypotension, hypothyroidism, eating disorder.  The patient presented to her GI specialist today and was found to have a weight loss of 9 pounds in the last week.  She has been having vomiting since last night, she reports that in her colostomy there is been liquid stool.  She denies any pain.  She denies any fever.  She does report that she has extreme shortness of breath and was found to have a blood pressure of 60 while standing at the GI specialist office.  That is Dr. Starr.  She was sent to the ER for further evaluation.    REVIEW OF SYSTEMS  See HPI for further details. All other systems are negative.     PAST MEDICAL HISTORY   has a past medical history of Colostomy in place (HCC), Eating disorder, Hypotension, Hypothyroid, and Palpitations.    SOCIAL HISTORY  Social History     Tobacco Use   • Smoking status: Never Smoker   • Smokeless tobacco: Never Used   Substance and Sexual Activity   • Alcohol use: Not Currently   • Drug use: Not Currently   • Sexual activity: Not Currently       SURGICAL HISTORY   has a past surgical history that includes other abdominal surgery.    CURRENT MEDICATIONS  Home Medications     Reviewed by Staci Rogel (Pharmacy Tech) on 09/10/20 at 1151  Med List Status: Complete   Medication Last Dose Status   levothyroxine (SYNTHROID) 50 MCG Tab 9/10/2020 Active   linaCLOtide (LINZESS) 290 MCG Cap 9/9/2020 Active   sennosides (SENOKOT) 8.6 MG Tab 9/10/2020 Active                ALLERGIES  Allergies   Allergen Reactions   • Sulfa Drugs Hives       PHYSICAL " "EXAM  VITAL SIGNS: BP (!) 96/64   Pulse (!) 47   Temp 37.4 °C (99.3 °F) (Temporal)   Resp 20   Ht 1.651 m (5' 5\")   Wt 32.7 kg (72 lb 1.5 oz)   SpO2 100%   BMI 12.00 kg/m²  @DEO[[id number]::@   Pulse ox interpretation: I interpret this pulse ox as normal.  Constitutional: Alert, the patient is cachectic, she appears malnourished.  She has dry mucous membranes.  She appears dehydrated.  HENT: No signs of trauma, Bilateral external ears normal, Nose normal.   Eyes: Pupils are equal and reactive, Conjunctiva normal, Non-icteric.   Neck: Normal range of motion, No tenderness, Supple, No stridor.   Lymphatic: No lymphadenopathy noted.   Cardiovascular: Regular rate and rhythm, no murmurs.   Thorax & Lungs: Normal breath sounds, No respiratory distress, No wheezing, No chest tenderness.   Abdomen: Bowel sounds normal, Soft, No tenderness, No masses, No pulsatile masses. No peritoneal signs.  Colostomy bag with liquid stool.  Skin: Warm, Dry, No erythema, No rash.   Back: No bony tenderness, No CVA tenderness.   Extremities: Intact distal pulses, No edema, No tenderness, No cyanosis.  Musculoskeletal: Good range of motion in all major joints. No tenderness to palpation or major deformities noted.   Neurologic: Alert , Normal motor function, Normal sensory function, No focal deficits noted.   Psychiatric: Affect normal, Judgment normal, Mood normal.       DIAGNOSTIC STUDIES / PROCEDURES    EKG  This is a 12-lead EKG interpretation by myself.  It sinus bradycardia at a rate of 56.  The axis is normal.  The intervals are normal.  There is nonspecific ST-T wave changes, there is no old for comparison.  My impression of this EKG, it does not meet STEMI criteria at this time.    LABS  Labs Reviewed   CBC WITH DIFFERENTIAL - Abnormal; Notable for the following components:       Result Value    WBC 3.9 (*)     Lymphocytes 21.40 (*)     Lymphs (Absolute) 0.84 (*)     All other components within normal limits    Narrative:     " Indicate which anticoagulants the patient is on:->UNKNOWN   COMP METABOLIC PANEL - Abnormal; Notable for the following components:    Potassium 3.0 (*)     Co2 15 (*)     Anion Gap 17.0 (*)     Glucose 100 (*)     Bun 23 (*)     All other components within normal limits    Narrative:     Indicate which anticoagulants the patient is on:->UNKNOWN   TROPONIN    Narrative:     Indicate which anticoagulants the patient is on:->UNKNOWN   PROBRAIN NATRIURETIC PEPTIDE, NT    Narrative:     Indicate which anticoagulants the patient is on:->UNKNOWN   PROTHROMBIN TIME    Narrative:     Indicate which anticoagulants the patient is on:->UNKNOWN   APTT    Narrative:     Indicate which anticoagulants the patient is on:->UNKNOWN   COVID/SARS COV-2    Narrative:     Collected By:TREVIN PRESLEY  Is patient being admitted?->Yes  Does this patient meet criteria for Rush/Cepheid per [facility]  Inpatient Workflow? (See workflow link below)->Yes  Expected turn around time?->Rush (Cepheid 2-4 hours)  Is this the patients First SARS CoV-2 test?->Yes  Is this patient employed in healthcare?->No  Is the patient symptomatic as defined by the CDC?->Yes  Date of symptom onset?->9/8/20  Is the patient hospitalized?->Yes  Is the patient in the ICU?->Unknown  Is the patient a resident in a congregate care setting?->No  Is the patient pregnant?->No   PANCREATIC ELASTASE, FECAL    Narrative:     Request of Dr. Starr  What is the name of the test you are ordering?->stool  pancreatic elastase  Will the results of this test change the management of the  patient's condition during his/her current  hospitalization?->No   LACTIC ACID    Narrative:     Indicate which anticoagulants the patient is on:->UNKNOWN   ESTIMATED GFR    Narrative:     Indicate which anticoagulants the patient is on:->UNKNOWN   SARS-COV-2, PCR (IN-HOUSE)    Narrative:     Collected By:TREVIN PRESLEY  Is patient being admitted?->Yes  Does this patient meet criteria for  Rush/Cepheid per [facility]  Inpatient Workflow? (See workflow link below)->Yes  Expected turn around time?->Rush (Cepheid 2-4 hours)  Is this the patients First SARS CoV-2 test?->Yes  Is this patient employed in healthcare?->No  Is the patient symptomatic as defined by the CDC?->Yes  Date of symptom onset?->9/8/20  Is the patient hospitalized?->Yes  Is the patient in the ICU?->Unknown  Is the patient a resident in a congregate care setting?->No  Is the patient pregnant?->No         RADIOLOGY  DX-CHEST-PORTABLE (1 VIEW)   Final Result      No evidence of acute cardiopulmonary process.              COURSE & MEDICAL DECISION MAKING  Pertinent Labs & Imaging studies reviewed. (See chart for details)    Differential diagnosis: Anemia, pulmonary embolism, electrolyte abnormality    IV was established, she was given 1 L lactated Ringer's IV for hypotension, and the patient appears clinically dehydrated with dry mucous membranes.  The patient is unable to take p.o. fluids.  The patient was given 4 mg IV Zofran.    The patient's labs confirm mild dehydration.    The patient feels improved after IV fluids.  Her hypotension has improved.  She is discharged to follow-up with Dr. Muniz as scheduled.      The patient will return for new or worsening symptoms and is stable at the time of discharge.    The patient is referred to a primary physician for blood pressure management, diabetic screening, and for all other preventative health concerns.        DISPOSITION:  Patient will be discharged home in stable condition.    FOLLOW UP:  [facility] Emergency Dept  [address]  [facility] [address] [phone]    If symptoms worsen    Clifton Harvey M.D.  [address]  [facility] [address] [phone]      As needed    Maxwell Muniz M.D.  [address] [facility]  [address]  [facility] [address] [phone]      as scheduled      OUTPATIENT MEDICATIONS:  New Prescriptions    ONDANSETRON (ZOFRAN ODT) 4 MG  TABLET DISPERSIBLE    Take 1 Tab by mouth every 8 hours as needed.         The patient will return for worsening symptoms and is stable at the time of discharge. The patient verbalizes understanding and will comply.    FINAL IMPRESSION  1. Non-intractable vomiting with nausea, unspecified vomiting type     2. Dehydration                Electronically signed by: Ernesto Killian M.D., 9/10/2020 11:53 AM

## 2020-09-10 NOTE — ED TRIAGE NOTES
"Chief Complaint   Patient presents with   • Shortness of Breath     worse w/ ambulation, pt feels \"like im constently having to cath my breath\", pt does not wear home O2   • Hypotension     sent over from PCP, SBP in the 60s per pt   • N/V     started last night     BP (!) 84/54 Comment: adult small cuff used  Pulse 68   Temp 37.4 °C (99.3 °F) (Temporal)   Resp 18   Ht 1.651 m (5' 5\")   Wt 32.7 kg (72 lb 1.5 oz)   SpO2 97%   BMI 12.00 kg/m²     Pt arrived w/ above concerns, pt states she has been to this facility multiple ties over the last week.    COVID screen negative. EKG done in triage.   "

## 2020-09-10 NOTE — ED NOTES
Ambulated to the bathroom, steady gait. Pt asking to leave also reports she has a stool specimen in her purse if we want it. Report to Janneth

## 2020-09-10 NOTE — ED NOTES
ERP at bedside. Pt agrees with plan of care discussed by ERP. AIDET acknowledged with patient. Marlyn in low position, side rail up for pt safety. Call light within reach. Will continue to monitor.

## 2020-09-11 NOTE — DISCHARGE INSTRUCTIONS
Vomiting, Adult  Vomiting occurs when stomach contents are thrown up and out of the mouth. Many people notice nausea before vomiting. Vomiting can make you feel weak and cause you to become dehydrated. Dehydration can make you feel tired and thirsty, cause you to have a dry mouth, and decrease how often you urinate. Older adults and people who have other diseases or a weak body defense system (immune system) are at higher risk for dehydration. It is important to treat vomiting as told by your health care provider.  Follow these instructions at home:    Eating and drinking         Follow these recommendations as told by your health care provider:  · Take an oral rehydration solution (ORS). This is a drink that is sold at pharmacies and retail stores.  · Eat bland, easy-to-digest foods in small amounts as you are able. These foods include bananas, applesauce, rice, lean meats, toast, and crackers.  · Drink clear fluids slowly and in small amounts as you are able. Clear fluids include water, ice chips, low-calorie sports drinks, and fruit juice that has water added (diluted fruit juice).  · Avoid drinking fluids that contain a lot of sugar or caffeine, such as energy drinks, sports drinks, and soda.  · Avoid alcohol.  · Avoid spicy or fatty foods.    General instructions  · Wash your hands often using soap and water. If soap and water are not available, use hand . Make sure that everyone in your household washes their hands frequently.  · Take over-the-counter and prescription medicines only as told by your health care provider.  · Rest at home while you recover.  · Watch your condition for any changes.  · Keep all follow-up visits as told by your health care provider. This is important.  Contact a health care provider if:  · Your vomiting gets worse.  · You have new symptoms.  · You have a fever.  · You cannot drink fluids without vomiting.  · You feel light-headed or dizzy.  · You have a headache.  · You  have muscle cramps.  · You have a rash.  · You have pain while urinating.  Get help right away if:  · You have pain in your chest, neck, arm, or jaw.  · You feel extremely weak or you faint.  · You have persistent vomiting.  · You have vomit that is bright red or looks like black coffee grounds.  · You have stools that are bloody or black, or stools that look like tar.  · You have a severe headache, a stiff neck, or both.  · You have severe pain, cramping, or bloating in your abdomen.  · You have trouble breathing or you are breathing very quickly.  · Your heart is beating very quickly.  · Your skin feels cold and clammy.  · You feel confused.  · You have signs of dehydration, such as:  ? Dark urine, very little urine, or no urine.  ? Cracked lips.  ? Dry mouth.  ? Sunken eyes.  ? Sleepiness.  ? Weakness.  These symptoms may represent a serious problem that is an emergency. Do not wait to see if the symptoms will go away. Get medical help right away. Call your local emergency services (911 in the [place] Do not drive yourself to the hospital.  Summary  · Vomiting occurs when stomach contents are thrown up and out of the mouth. Vomiting can cause you to become dehydrated. Older adults and people who have other diseases or a weak immune system are at higher risk for dehydration.  · It is important to treat vomiting as told by your health care provider. Follow your health care provider's instructions about eating and drinking.  · Wash your hands often using soap and water. If soap and water are not available, use hand . Make sure that everyone in your household washes their hands frequently.  · Watch your condition for any changes and for signs of dehydration.  · Keep all follow-up visits as told by your health care provider. This is important.  This information is not intended to replace advice given to you by your health care provider. Make sure you discuss any questions you have with your health care  provider.  Document Released: 01/13/2017 Document Revised: 05/28/2019 Document Reviewed: 05/28/2019  Elsevier Patient Education © 2020 [employer]

## 2020-09-11 NOTE — ED NOTES
Discharge instructions provided.  Pt verbalized the understanding of discharge instructions to follow up with PCP and to return to ER if condition worsens.  Pt ambulated out of ER without difficulty.   RX 1

## 2020-09-14 ENCOUNTER — OFFICE VISIT (OUTPATIENT)
Dept: MEDICAL GROUP | Facility: PHYSICIAN GROUP | Age: 60
End: 2020-09-14
Payer: OTHER GOVERNMENT

## 2020-09-14 ENCOUNTER — HOSPITAL ENCOUNTER (OUTPATIENT)
Dept: LAB | Facility: MEDICAL CENTER | Age: 60
End: 2020-09-14
Attending: INTERNAL MEDICINE
Payer: OTHER GOVERNMENT

## 2020-09-14 ENCOUNTER — HOSPITAL ENCOUNTER (OUTPATIENT)
Dept: RADIOLOGY | Facility: MEDICAL CENTER | Age: 60
End: 2020-09-14
Attending: INTERNAL MEDICINE
Payer: OTHER GOVERNMENT

## 2020-09-14 VITALS
TEMPERATURE: 97.8 F | HEIGHT: 65 IN | HEART RATE: 60 BPM | DIASTOLIC BLOOD PRESSURE: 64 MMHG | BODY MASS INDEX: 11.83 KG/M2 | OXYGEN SATURATION: 98 % | RESPIRATION RATE: 14 BRPM | SYSTOLIC BLOOD PRESSURE: 98 MMHG | WEIGHT: 71 LBS

## 2020-09-14 DIAGNOSIS — M54.50 ACUTE LOW BACK PAIN WITHOUT SCIATICA, UNSPECIFIED BACK PAIN LATERALITY: ICD-10-CM

## 2020-09-14 DIAGNOSIS — E03.9 HYPOTHYROIDISM, UNSPECIFIED TYPE: Chronic | ICD-10-CM

## 2020-09-14 DIAGNOSIS — Z43.3 ATTENTION TO COLOSTOMY (HCC): Chronic | ICD-10-CM

## 2020-09-14 DIAGNOSIS — E87.6 HYPOKALEMIA: ICD-10-CM

## 2020-09-14 DIAGNOSIS — R11.2 NAUSEA AND VOMITING, INTRACTABILITY OF VOMITING NOT SPECIFIED, UNSPECIFIED VOMITING TYPE: ICD-10-CM

## 2020-09-14 LAB
ELASTASE PANC STL-MCNT: 24 UG/G
POTASSIUM SERPL-SCNC: 3 MMOL/L (ref 3.6–5.5)

## 2020-09-14 PROCEDURE — 72100 X-RAY EXAM L-S SPINE 2/3 VWS: CPT

## 2020-09-14 PROCEDURE — 99214 OFFICE O/P EST MOD 30 MIN: CPT | Performed by: INTERNAL MEDICINE

## 2020-09-14 PROCEDURE — 84132 ASSAY OF SERUM POTASSIUM: CPT

## 2020-09-14 PROCEDURE — 36415 COLL VENOUS BLD VENIPUNCTURE: CPT

## 2020-09-14 RX ORDER — PROMETHAZINE HYDROCHLORIDE 25 MG/1
25 TABLET ORAL EVERY 6 HOURS PRN
Qty: 30 TAB | Refills: 0 | Status: SHIPPED | OUTPATIENT
Start: 2020-09-14 | End: 2020-09-21

## 2020-09-14 ASSESSMENT — FIBROSIS 4 INDEX: FIB4 SCORE: 1.43

## 2020-09-14 NOTE — ASSESSMENT & PLAN NOTE
Chronic condition patient currently taking levothyroxine.  Recent lab test showed  Results for SUSAN WILCOX (MRN [id number]) as of 9/14/2020 15:09   Ref. Range 9/7/2020 14:13   TSH Latest Ref Range: 0.380 - 5.330 uIU/mL 2.920

## 2020-09-14 NOTE — ASSESSMENT & PLAN NOTE
When the patient was in emergency room on September 10, 2020 potassium level was noted to be low 3.0.  Requesting a follow-up blood test for follow-up trends.  Patient asymptomatic

## 2020-09-14 NOTE — ASSESSMENT & PLAN NOTE
Patient reported that she lost her balance and fell on her back last night.  Patient complaining of moderate moderate pain in the low back.  She denies bowel bladder dysfunction.  No fever or chills.  She denied motor weakness or paresthesia in the lower extremities.

## 2020-09-14 NOTE — PROGRESS NOTES
CC: Recurrent nausea and vomiting  Unable to keep food or drinks down  Low back pain.  Status post fall last night      HPI: 60 y.o. Patient presents to discuss the following:     Colostomy care (HCC)  Patient with history of colostomy after rectal prolapse surgery  Patient was recently seen by her GI specialist and was noted with 9 pound weight loss.  This is associated with nausea vomiting and fluid in the colostomy bag.  Patient was diagnosed with dehydration.  She was given IV fluid and was subsequently discharged home.    Hypothyroidism  Chronic condition patient currently taking levothyroxine.  Recent lab test showed  Results for SUSAN WILCOX (MRN [id number]) as of 9/14/2020 15:09   Ref. Range 9/7/2020 14:13   TSH Latest Ref Range: 0.380 - 5.330 uIU/mL 2.920       Hypokalemia  When the patient was in emergency room on September 10, 2020 potassium level was noted to be low 3.0.  Requesting a follow-up blood test for follow-up trends.  Patient asymptomatic    Nausea & vomiting  Since coming home the patient stated that the nausea and vomiting symptoms recur.  Patient has been taking oral Zofran but the medication does not seem to be effective.  Patient stated that the only medication that works is IV Zofran.  She has difficulty keeping her fluids down.  Today the patient weighs in a 71 pound.  Patient reported poor appetite due to nausea and vomiting.  The patient denies any history ofbleeding    Patient was seen by GI specialist Dr. Starr on September 10, 2020.  It was felt that the patient may have possible ostomy stenosis and she has an appointment to see surgeon on September 16, 2020.    Low back pain  Patient reported that she lost her balance and fell on her back last night.  Patient complaining of moderate moderate pain in the low back.  She denies bowel bladder dysfunction.  No fever or chills.  She denied motor weakness or paresthesia in the lower extremities.          REVIEW OF SYSTEMS:      Constitutional:  no fever / chills   Neurologic: no headaches, no numbness/tingling  Eyes: no changes in vision  ENT: no sore throat, no hearing loss  CV:  no chest pain, no palpitations  Pulmonary: no SOB, no cough    :  no dysuria, no hematuria   Skin: no rash  Hematologic: no bleeding      Allergies: Sulfa drugs    Current Outpatient Medications Ordered in Epic   Medication Sig Dispense Refill   • promethazine (PHENERGAN) 25 MG Tab Take 1 Tab by mouth every 6 hours as needed for Nausea/Vomiting. 30 Tab 0   • sennosides (SENOKOT) 8.6 MG Tab Take 8.6 mg by mouth 1 time daily as needed.     • ondansetron (ZOFRAN ODT) 4 MG TABLET DISPERSIBLE Take 1 Tab by mouth every 8 hours as needed. 20 Tab 0   • levothyroxine (SYNTHROID) 50 MCG Tab Take 1 Tab by mouth Every morning on an empty stomach. 30 Tab 0   • linaCLOtide (LINZESS) 290 MCG Cap Take 290 mcg by mouth every evening.       No current Epic-ordered facility-administered medications on file.        Past Medical History:   Diagnosis Date   • Colostomy in place (HCC)    • Eating disorder    • Hypotension    • Hypothyroid    • Palpitations         Past Surgical History:   Procedure Laterality Date   • OTHER ABDOMINAL SURGERY      rectal prolapse repair, anastomy leak/abcess,colostony        Family History   Problem Relation Age of Onset   • Thyroid Mother    • Alcohol abuse Father    • Heart Disease Father    • Hypertension Father    • Stroke Father         Social History     Tobacco Use   Smoking Status Never Smoker   Smokeless Tobacco Never Used          Social History     Substance and Sexual Activity   Alcohol Use Not Currently        ---------------------------------------------------------------------     PHYSICAL EXAM:   Vitals:    09/14/20 1509   BP: (!) 98/64   Pulse: 60   Resp: 14   Temp: 36.6 °C (97.8 °F)   SpO2: 98%      Body mass index is 11.82 kg/m².        Constitutional: no acute distress  Eyes: PERRL, EOMI  Ears/nose/mouth: OP no exudates  Neck:  supple, no JVD  CV: heart RRR  Resp: normal effort, no wheezing or rales.  GI: abdomen soft, no obvious mass, no tenderness.  Colostomy bag in place there is some fluid noted.  Neuro: CN 2-12 grossly intact  Low back: No significant spinal deformity noted.   Pain noted w palpation of the lumbar region.  ROM : flexion, extension, rotation, lateral bend of back limited due to pain  Moderate pain noted palpation in the mid spine lumbar region            ---------------------------------------------------------------------     ASSESSMENT and PLAN:  1. Hypokalemia  Repeat lab tests ordered.  - POTASSIUM SERUM (K); Future    2. Colostomy care (HCC)  Continue self-care    3. Hypothyroidism, unspecified type  Continue with levothyroxine.    4. Nausea and vomiting, intractability of vomiting not specified, unspecified vomiting type  Patient with persistent nausea and vomiting and unable to keep food or drink down  As above the patient was seen by GI specialist and with concern to have possible ostomy stenosis.  She has an appointment to see be seen by surgeon on September 16, 2020.    At this time  Strongly advised the patient to go to the emergency room now for evaluation and treatment.  The patient however refused.  Patient wishes to try a different antinausea/vomiting medication until Wednesday thus when she will be seen by a surgeon to evaluate for possible ostomy stenosis  Prescribed Phenergan for nausea and vomiting.  Advised the patient if the medication does not work by tomorrow the patient should go to ER.  The patient voiced understanding.  Advised increase fluid intake.  Recommend the use of Ensure about 1 to 2 cans with meals    5. Acute low back pain without sciatica, unspecified back pain laterality  Status post fall.  X-ray ordered today.  - DX-LUMBAR SPINE-2 OR 3 VIEWS; Future                  PATIENT EDUCATION:  -If any problems should arise, patient was advised to contact our office or go to ER to be  evaluated.  -Advised pt to follow a healthy diet and regular aerobic exercise regimen. Advised pt to avoid alcohol and tobacco use.    Please note that this dictation was created using voice recognition software. I have made every reasonable attempt to correct obvious errors, but it is possible there are errors of grammar and possibly content that I did not discover before finalizing the note.

## 2020-09-14 NOTE — ASSESSMENT & PLAN NOTE
Since coming home the patient stated that the nausea and vomiting symptoms recur.  Patient has been taking oral Zofran but the medication does not seem to be effective.  Patient stated that the only medication that works is IV Zofran.  She has difficulty keeping her fluids down.  Today the patient weighs in a 71 pound.  Patient reported poor appetite due to nausea and vomiting.  The patient denies any history ofbleeding    Patient was seen by GI specialist Dr. Starr on September 10, 2020.  It was felt that the patient may have possible ostomy stenosis and she has an appointment to see surgeon on September 16, 2020.

## 2020-09-14 NOTE — ASSESSMENT & PLAN NOTE
Patient with history of colostomy after rectal prolapse surgery  Patient was recently seen by her GI specialist and was noted with 9 pound weight loss.  This is associated with nausea vomiting and fluid in the colostomy bag.  Patient was diagnosed with dehydration.  She was given IV fluid and was subsequently discharged home.

## 2020-09-15 ENCOUNTER — PATIENT MESSAGE (OUTPATIENT)
Dept: MEDICAL GROUP | Facility: PHYSICIAN GROUP | Age: 60
End: 2020-09-15

## 2020-09-15 ENCOUNTER — TELEPHONE (OUTPATIENT)
Dept: MEDICAL GROUP | Facility: PHYSICIAN GROUP | Age: 60
End: 2020-09-15

## 2020-09-15 DIAGNOSIS — E87.6 HYPOKALEMIA: Chronic | ICD-10-CM

## 2020-09-15 RX ORDER — POTASSIUM CHLORIDE 20 MEQ/1
20 TABLET, EXTENDED RELEASE ORAL 2 TIMES DAILY
Qty: 14 TAB | Refills: 0 | Status: ON HOLD | OUTPATIENT
Start: 2020-09-15 | End: 2020-09-25 | Stop reason: SDUPTHER

## 2020-09-15 NOTE — TELEPHONE ENCOUNTER
1st Attempt:    Left voicemail message for patient to call the office back at [phone]    Sent pt my chart message.

## 2020-09-15 NOTE — TELEPHONE ENCOUNTER
----- Message from Clifton Harvey M.D. sent at 9/15/2020  9:05 AM PDT -----    Please notify patient :potassium level low  pls start potassium chloride 20meq bid x 7d  Pt to repeat nonfasting potassium blood test day 8

## 2020-09-16 ENCOUNTER — HOSPITAL ENCOUNTER (INPATIENT)
Facility: MEDICAL CENTER | Age: 60
LOS: 1 days | DRG: 640 | End: 2020-09-18
Attending: EMERGENCY MEDICINE | Admitting: INTERNAL MEDICINE
Payer: OTHER GOVERNMENT

## 2020-09-16 PROCEDURE — 85025 COMPLETE CBC W/AUTO DIFF WBC: CPT

## 2020-09-16 PROCEDURE — 99291 CRITICAL CARE FIRST HOUR: CPT

## 2020-09-16 PROCEDURE — 83735 ASSAY OF MAGNESIUM: CPT

## 2020-09-16 PROCEDURE — 83605 ASSAY OF LACTIC ACID: CPT

## 2020-09-16 PROCEDURE — 700105 HCHG RX REV CODE 258: Performed by: EMERGENCY MEDICINE

## 2020-09-16 PROCEDURE — 83690 ASSAY OF LIPASE: CPT

## 2020-09-16 PROCEDURE — 80053 COMPREHEN METABOLIC PANEL: CPT

## 2020-09-16 RX ORDER — SODIUM CHLORIDE, SODIUM LACTATE, POTASSIUM CHLORIDE, CALCIUM CHLORIDE 600; 310; 30; 20 MG/100ML; MG/100ML; MG/100ML; MG/100ML
1000 INJECTION, SOLUTION INTRAVENOUS ONCE
Status: COMPLETED | OUTPATIENT
Start: 2020-09-17 | End: 2020-09-16

## 2020-09-16 RX ADMIN — SODIUM CHLORIDE, POTASSIUM CHLORIDE, SODIUM LACTATE AND CALCIUM CHLORIDE 1000 ML: 600; 310; 30; 20 INJECTION, SOLUTION INTRAVENOUS at 23:58

## 2020-09-16 ASSESSMENT — FIBROSIS 4 INDEX: FIB4 SCORE: 1.43

## 2020-09-17 PROBLEM — E43 SEVERE PROTEIN-CALORIE MALNUTRITION (HCC): Status: ACTIVE | Noted: 2020-02-03

## 2020-09-17 PROBLEM — E87.1 HYPONATREMIA: Status: ACTIVE | Noted: 2020-09-17

## 2020-09-17 PROBLEM — T73.0XXA STARVATION KETOACIDOSIS: Status: ACTIVE | Noted: 2020-09-17

## 2020-09-17 PROBLEM — E87.29 STARVATION KETOACIDOSIS: Status: ACTIVE | Noted: 2020-09-17

## 2020-09-17 LAB
ALBUMIN SERPL BCP-MCNC: 4.1 G/DL (ref 3.2–4.9)
ALBUMIN/GLOB SERPL: 1.7 G/DL
ALP SERPL-CCNC: 67 U/L (ref 30–99)
ALT SERPL-CCNC: 24 U/L (ref 2–50)
ANION GAP SERPL CALC-SCNC: 14 MMOL/L (ref 7–16)
ANION GAP SERPL CALC-SCNC: 18 MMOL/L (ref 7–16)
AST SERPL-CCNC: 24 U/L (ref 12–45)
BASOPHILS # BLD AUTO: 0.5 % (ref 0–1.8)
BASOPHILS # BLD: 0.02 K/UL (ref 0–0.12)
BILIRUB SERPL-MCNC: 0.3 MG/DL (ref 0.1–1.5)
BUN SERPL-MCNC: 13 MG/DL (ref 8–22)
BUN SERPL-MCNC: 18 MG/DL (ref 8–22)
CALCIUM SERPL-MCNC: 8 MG/DL (ref 8.4–10.2)
CALCIUM SERPL-MCNC: 8.8 MG/DL (ref 8.4–10.2)
CHLORIDE SERPL-SCNC: 103 MMOL/L (ref 96–112)
CHLORIDE SERPL-SCNC: 108 MMOL/L (ref 96–112)
CO2 SERPL-SCNC: 11 MMOL/L (ref 20–33)
CO2 SERPL-SCNC: 15 MMOL/L (ref 20–33)
CREAT SERPL-MCNC: 0.51 MG/DL (ref 0.5–1.4)
CREAT SERPL-MCNC: 0.65 MG/DL (ref 0.5–1.4)
EKG IMPRESSION: NORMAL
EOSINOPHIL # BLD AUTO: 0.05 K/UL (ref 0–0.51)
EOSINOPHIL NFR BLD: 1.3 % (ref 0–6.9)
ERYTHROCYTE [DISTWIDTH] IN BLOOD BY AUTOMATED COUNT: 47.7 FL (ref 35.9–50)
ERYTHROCYTE [DISTWIDTH] IN BLOOD BY AUTOMATED COUNT: 48.8 FL (ref 35.9–50)
GLOBULIN SER CALC-MCNC: 2.4 G/DL (ref 1.9–3.5)
GLUCOSE SERPL-MCNC: 110 MG/DL (ref 65–99)
GLUCOSE SERPL-MCNC: 68 MG/DL (ref 65–99)
GLUCOSE SERPL-MCNC: 76 MG/DL (ref 65–99)
HCT VFR BLD AUTO: 31.2 % (ref 37–47)
HCT VFR BLD AUTO: 38.9 % (ref 37–47)
HGB BLD-MCNC: 10.6 G/DL (ref 12–16)
HGB BLD-MCNC: 13.2 G/DL (ref 12–16)
IMM GRANULOCYTES # BLD AUTO: 0.02 K/UL (ref 0–0.11)
IMM GRANULOCYTES NFR BLD AUTO: 0.5 % (ref 0–0.9)
LACTATE BLD-SCNC: 1.3 MMOL/L (ref 0.5–2)
LACTATE BLD-SCNC: 2 MMOL/L (ref 0.5–2)
LIPASE SERPL-CCNC: 48 U/L (ref 7–58)
LYMPHOCYTES # BLD AUTO: 1.09 K/UL (ref 1–4.8)
LYMPHOCYTES NFR BLD: 28.5 % (ref 22–41)
MAGNESIUM SERPL-MCNC: 2.1 MG/DL (ref 1.5–2.5)
MAGNESIUM SERPL-MCNC: 2.5 MG/DL (ref 1.5–2.5)
MCH RBC QN AUTO: 32.7 PG (ref 27–33)
MCH RBC QN AUTO: 32.8 PG (ref 27–33)
MCHC RBC AUTO-ENTMCNC: 33.9 G/DL (ref 33.6–35)
MCHC RBC AUTO-ENTMCNC: 34 G/DL (ref 33.6–35)
MCV RBC AUTO: 96.3 FL (ref 81.4–97.8)
MCV RBC AUTO: 96.6 FL (ref 81.4–97.8)
MONOCYTES # BLD AUTO: 0.41 K/UL (ref 0–0.85)
MONOCYTES NFR BLD AUTO: 10.7 % (ref 0–13.4)
NEUTROPHILS # BLD AUTO: 2.23 K/UL (ref 2–7.15)
NEUTROPHILS NFR BLD: 58.5 % (ref 44–72)
NRBC # BLD AUTO: 0 K/UL
NRBC BLD-RTO: 0 /100 WBC
PHOSPHATE SERPL-MCNC: 3.3 MG/DL (ref 2.5–4.5)
PLATELET # BLD AUTO: 122 K/UL (ref 164–446)
PLATELET # BLD AUTO: 137 K/UL (ref 164–446)
PMV BLD AUTO: 12.9 FL (ref 9–12.9)
PMV BLD AUTO: 13.2 FL (ref 9–12.9)
POTASSIUM SERPL-SCNC: 2.6 MMOL/L (ref 3.6–5.5)
POTASSIUM SERPL-SCNC: 3.5 MMOL/L (ref 3.6–5.5)
POTASSIUM SERPL-SCNC: 4.1 MMOL/L (ref 3.6–5.5)
POTASSIUM SERPL-SCNC: 5 MMOL/L (ref 3.6–5.5)
PROT SERPL-MCNC: 6.5 G/DL (ref 6–8.2)
RBC # BLD AUTO: 3.23 M/UL (ref 4.2–5.4)
RBC # BLD AUTO: 4.04 M/UL (ref 4.2–5.4)
SODIUM SERPL-SCNC: 132 MMOL/L (ref 135–145)
SODIUM SERPL-SCNC: 137 MMOL/L (ref 135–145)
WBC # BLD AUTO: 3.3 K/UL (ref 4.8–10.8)
WBC # BLD AUTO: 3.8 K/UL (ref 4.8–10.8)

## 2020-09-17 PROCEDURE — 85027 COMPLETE CBC AUTOMATED: CPT

## 2020-09-17 PROCEDURE — 99223 1ST HOSP IP/OBS HIGH 75: CPT | Performed by: INTERNAL MEDICINE

## 2020-09-17 PROCEDURE — 83735 ASSAY OF MAGNESIUM: CPT

## 2020-09-17 PROCEDURE — 80048 BASIC METABOLIC PNL TOTAL CA: CPT

## 2020-09-17 PROCEDURE — 700105 HCHG RX REV CODE 258: Performed by: HOSPITALIST

## 2020-09-17 PROCEDURE — A9270 NON-COVERED ITEM OR SERVICE: HCPCS | Performed by: INTERNAL MEDICINE

## 2020-09-17 PROCEDURE — 82947 ASSAY GLUCOSE BLOOD QUANT: CPT

## 2020-09-17 PROCEDURE — 84100 ASSAY OF PHOSPHORUS: CPT

## 2020-09-17 PROCEDURE — 93005 ELECTROCARDIOGRAM TRACING: CPT | Performed by: EMERGENCY MEDICINE

## 2020-09-17 PROCEDURE — 700102 HCHG RX REV CODE 250 W/ 637 OVERRIDE(OP): Performed by: INTERNAL MEDICINE

## 2020-09-17 PROCEDURE — 84132 ASSAY OF SERUM POTASSIUM: CPT | Mod: 91

## 2020-09-17 PROCEDURE — 700111 HCHG RX REV CODE 636 W/ 250 OVERRIDE (IP): Performed by: HOSPITALIST

## 2020-09-17 PROCEDURE — 83605 ASSAY OF LACTIC ACID: CPT

## 2020-09-17 PROCEDURE — 96376 TX/PRO/DX INJ SAME DRUG ADON: CPT

## 2020-09-17 PROCEDURE — 700111 HCHG RX REV CODE 636 W/ 250 OVERRIDE (IP): Performed by: EMERGENCY MEDICINE

## 2020-09-17 PROCEDURE — A9270 NON-COVERED ITEM OR SERVICE: HCPCS | Performed by: HOSPITALIST

## 2020-09-17 PROCEDURE — 770020 HCHG ROOM/CARE - TELE (206)

## 2020-09-17 PROCEDURE — 700101 HCHG RX REV CODE 250: Performed by: INTERNAL MEDICINE

## 2020-09-17 PROCEDURE — 700111 HCHG RX REV CODE 636 W/ 250 OVERRIDE (IP): Performed by: INTERNAL MEDICINE

## 2020-09-17 PROCEDURE — 700102 HCHG RX REV CODE 250 W/ 637 OVERRIDE(OP): Performed by: HOSPITALIST

## 2020-09-17 PROCEDURE — 96374 THER/PROPH/DIAG INJ IV PUSH: CPT

## 2020-09-17 RX ORDER — OXYCODONE HYDROCHLORIDE 5 MG/1
5 TABLET ORAL
Status: DISCONTINUED | OUTPATIENT
Start: 2020-09-17 | End: 2020-09-18

## 2020-09-17 RX ORDER — BISACODYL 10 MG
10 SUPPOSITORY, RECTAL RECTAL
Status: DISCONTINUED | OUTPATIENT
Start: 2020-09-17 | End: 2020-09-18 | Stop reason: HOSPADM

## 2020-09-17 RX ORDER — HYDROMORPHONE HYDROCHLORIDE 1 MG/ML
0.25 INJECTION, SOLUTION INTRAMUSCULAR; INTRAVENOUS; SUBCUTANEOUS
Status: DISCONTINUED | OUTPATIENT
Start: 2020-09-17 | End: 2020-09-18

## 2020-09-17 RX ORDER — ENALAPRILAT 1.25 MG/ML
1.25 INJECTION INTRAVENOUS EVERY 6 HOURS PRN
Status: DISCONTINUED | OUTPATIENT
Start: 2020-09-17 | End: 2020-09-18 | Stop reason: HOSPADM

## 2020-09-17 RX ORDER — DIPHENHYDRAMINE HCL 25 MG
25 TABLET ORAL NIGHTLY PRN
Status: DISCONTINUED | OUTPATIENT
Start: 2020-09-17 | End: 2020-09-18 | Stop reason: HOSPADM

## 2020-09-17 RX ORDER — METOCLOPRAMIDE 10 MG/1
10 TABLET ORAL
Status: DISCONTINUED | OUTPATIENT
Start: 2020-09-17 | End: 2020-09-18 | Stop reason: HOSPADM

## 2020-09-17 RX ORDER — OXYCODONE HYDROCHLORIDE 5 MG/1
2.5 TABLET ORAL
Status: DISCONTINUED | OUTPATIENT
Start: 2020-09-17 | End: 2020-09-18

## 2020-09-17 RX ORDER — SODIUM CHLORIDE AND POTASSIUM CHLORIDE 150; 900 MG/100ML; MG/100ML
INJECTION, SOLUTION INTRAVENOUS CONTINUOUS
Status: DISCONTINUED | OUTPATIENT
Start: 2020-09-17 | End: 2020-09-17

## 2020-09-17 RX ORDER — POTASSIUM CHLORIDE 20 MEQ/1
40 TABLET, EXTENDED RELEASE ORAL DAILY
Status: DISCONTINUED | OUTPATIENT
Start: 2020-09-17 | End: 2020-09-18 | Stop reason: HOSPADM

## 2020-09-17 RX ORDER — ONDANSETRON 2 MG/ML
4 INJECTION INTRAMUSCULAR; INTRAVENOUS EVERY 4 HOURS PRN
Status: DISCONTINUED | OUTPATIENT
Start: 2020-09-17 | End: 2020-09-18 | Stop reason: HOSPADM

## 2020-09-17 RX ORDER — ONDANSETRON 4 MG/1
4 TABLET, ORALLY DISINTEGRATING ORAL EVERY 4 HOURS PRN
Status: DISCONTINUED | OUTPATIENT
Start: 2020-09-17 | End: 2020-09-18 | Stop reason: HOSPADM

## 2020-09-17 RX ORDER — SODIUM CHLORIDE 9 MG/ML
INJECTION, SOLUTION INTRAVENOUS CONTINUOUS
Status: DISCONTINUED | OUTPATIENT
Start: 2020-09-17 | End: 2020-09-18 | Stop reason: HOSPADM

## 2020-09-17 RX ORDER — THIAMINE MONONITRATE (VIT B1) 100 MG
100 TABLET ORAL DAILY
Status: DISCONTINUED | OUTPATIENT
Start: 2020-09-17 | End: 2020-09-18 | Stop reason: HOSPADM

## 2020-09-17 RX ORDER — POLYETHYLENE GLYCOL 3350 17 G/17G
1 POWDER, FOR SOLUTION ORAL
Status: DISCONTINUED | OUTPATIENT
Start: 2020-09-17 | End: 2020-09-18 | Stop reason: HOSPADM

## 2020-09-17 RX ORDER — ONDANSETRON 2 MG/ML
4 INJECTION INTRAMUSCULAR; INTRAVENOUS ONCE
Status: COMPLETED | OUTPATIENT
Start: 2020-09-17 | End: 2020-09-17

## 2020-09-17 RX ORDER — PROCHLORPERAZINE MALEATE 10 MG
10 TABLET ORAL EVERY 6 HOURS PRN
Status: DISCONTINUED | OUTPATIENT
Start: 2020-09-17 | End: 2020-09-18 | Stop reason: HOSPADM

## 2020-09-17 RX ORDER — AMOXICILLIN 250 MG
2 CAPSULE ORAL 2 TIMES DAILY
Status: DISCONTINUED | OUTPATIENT
Start: 2020-09-17 | End: 2020-09-18 | Stop reason: HOSPADM

## 2020-09-17 RX ORDER — LEVOTHYROXINE SODIUM 0.05 MG/1
50 TABLET ORAL
Status: DISCONTINUED | OUTPATIENT
Start: 2020-09-17 | End: 2020-09-18 | Stop reason: HOSPADM

## 2020-09-17 RX ORDER — ACETAMINOPHEN 325 MG/1
650 TABLET ORAL EVERY 6 HOURS PRN
Status: DISCONTINUED | OUTPATIENT
Start: 2020-09-17 | End: 2020-09-18 | Stop reason: HOSPADM

## 2020-09-17 RX ORDER — POTASSIUM CHLORIDE 7.45 MG/ML
10 INJECTION INTRAVENOUS
Status: DISPENSED | OUTPATIENT
Start: 2020-09-17 | End: 2020-09-17

## 2020-09-17 RX ADMIN — METOCLOPRAMIDE 10 MG: 10 TABLET ORAL at 11:55

## 2020-09-17 RX ADMIN — POTASSIUM CHLORIDE 10 MEQ: 7.46 INJECTION, SOLUTION INTRAVENOUS at 13:17

## 2020-09-17 RX ADMIN — DIPHENHYDRAMINE HCL 25 MG: 25 TABLET ORAL at 19:37

## 2020-09-17 RX ADMIN — POTASSIUM CHLORIDE 10 MEQ: 7.46 INJECTION, SOLUTION INTRAVENOUS at 11:57

## 2020-09-17 RX ADMIN — DIPHENHYDRAMINE HCL 25 MG: 25 TABLET ORAL at 02:51

## 2020-09-17 RX ADMIN — POTASSIUM CHLORIDE 40 MEQ: 1500 TABLET, EXTENDED RELEASE ORAL at 09:36

## 2020-09-17 RX ADMIN — LEVOTHYROXINE SODIUM 50 MCG: 0.05 TABLET ORAL at 06:31

## 2020-09-17 RX ADMIN — POTASSIUM CHLORIDE 10 MEQ: 7.46 INJECTION, SOLUTION INTRAVENOUS at 11:08

## 2020-09-17 RX ADMIN — ONDANSETRON HYDROCHLORIDE 4 MG: 2 SOLUTION INTRAMUSCULAR; INTRAVENOUS at 07:46

## 2020-09-17 RX ADMIN — POTASSIUM CHLORIDE AND SODIUM CHLORIDE: 900; 150 INJECTION, SOLUTION INTRAVENOUS at 10:48

## 2020-09-17 RX ADMIN — Medication 100 MG: at 13:16

## 2020-09-17 RX ADMIN — SODIUM CHLORIDE: 9 INJECTION, SOLUTION INTRAVENOUS at 17:18

## 2020-09-17 RX ADMIN — METOCLOPRAMIDE 10 MG: 10 TABLET ORAL at 19:37

## 2020-09-17 RX ADMIN — ONDANSETRON HYDROCHLORIDE 4 MG: 2 SOLUTION INTRAMUSCULAR; INTRAVENOUS at 00:49

## 2020-09-17 RX ADMIN — POTASSIUM CHLORIDE AND SODIUM CHLORIDE: 900; 150 INJECTION, SOLUTION INTRAVENOUS at 02:38

## 2020-09-17 ASSESSMENT — ENCOUNTER SYMPTOMS
TINGLING: 0
NAUSEA: 1
DIZZINESS: 0
WEIGHT LOSS: 1
FEVER: 0
ABDOMINAL PAIN: 1
FOCAL WEAKNESS: 0
LOSS OF CONSCIOUSNESS: 0
NECK PAIN: 0
MYALGIAS: 0
SPUTUM PRODUCTION: 0
HEADACHES: 0
VOMITING: 1
BLURRED VISION: 0
DEPRESSION: 0
EYE REDNESS: 0
SHORTNESS OF BREATH: 0
DIARRHEA: 0
PALPITATIONS: 0
COUGH: 0
STRIDOR: 0
WEAKNESS: 0
CONSTIPATION: 0
SEIZURES: 0
FALLS: 0
BACK PAIN: 0
SORE THROAT: 0
CHILLS: 0

## 2020-09-17 ASSESSMENT — COGNITIVE AND FUNCTIONAL STATUS - GENERAL
SUGGESTED CMS G CODE MODIFIER MOBILITY: CK
DRESSING REGULAR LOWER BODY CLOTHING: A LITTLE
MOBILITY SCORE: 17
PERSONAL GROOMING: A LITTLE
CLIMB 3 TO 5 STEPS WITH RAILING: TOTAL
HELP NEEDED FOR BATHING: A LITTLE
STANDING UP FROM CHAIR USING ARMS: A LITTLE
TOILETING: A LITTLE
WALKING IN HOSPITAL ROOM: A LITTLE
MOVING FROM LYING ON BACK TO SITTING ON SIDE OF FLAT BED: A LOT
DRESSING REGULAR UPPER BODY CLOTHING: A LITTLE
DAILY ACTIVITIY SCORE: 19
SUGGESTED CMS G CODE MODIFIER DAILY ACTIVITY: CK

## 2020-09-17 ASSESSMENT — PATIENT HEALTH QUESTIONNAIRE - PHQ9
2. FEELING DOWN, DEPRESSED, IRRITABLE, OR HOPELESS: SEVERAL DAYS
8. MOVING OR SPEAKING SO SLOWLY THAT OTHER PEOPLE COULD HAVE NOTICED. OR THE OPPOSITE, BEING SO FIGETY OR RESTLESS THAT YOU HAVE BEEN MOVING AROUND A LOT MORE THAN USUAL: NOT AT ALL
5. POOR APPETITE OR OVEREATING: NOT AT ALL
9. THOUGHTS THAT YOU WOULD BE BETTER OFF DEAD, OR OF HURTING YOURSELF: NOT AT ALL
3. TROUBLE FALLING OR STAYING ASLEEP OR SLEEPING TOO MUCH: SEVERAL DAYS
1. LITTLE INTEREST OR PLEASURE IN DOING THINGS: SEVERAL DAYS
4. FEELING TIRED OR HAVING LITTLE ENERGY: SEVERAL DAYS
6. FEELING BAD ABOUT YOURSELF - OR THAT YOU ARE A FAILURE OR HAVE LET YOURSELF OR YOUR FAMILY DOWN: NOT AL ALL
7. TROUBLE CONCENTRATING ON THINGS, SUCH AS READING THE NEWSPAPER OR WATCHING TELEVISION: NOT AT ALL
SUM OF ALL RESPONSES TO PHQ9 QUESTIONS 1 AND 2: 2
SUM OF ALL RESPONSES TO PHQ QUESTIONS 1-9: 4

## 2020-09-17 ASSESSMENT — LIFESTYLE VARIABLES
TOTAL SCORE: 0
TOTAL SCORE: 0
HAVE YOU EVER FELT YOU SHOULD CUT DOWN ON YOUR DRINKING: NO
TOTAL SCORE: 0
ON A TYPICAL DAY WHEN YOU DRINK ALCOHOL HOW MANY DRINKS DO YOU HAVE: 0
HOW MANY TIMES IN THE PAST YEAR HAVE YOU HAD 5 OR MORE DRINKS IN A DAY: 0
AVERAGE NUMBER OF DAYS PER WEEK YOU HAVE A DRINK CONTAINING ALCOHOL: 0
EVER FELT BAD OR GUILTY ABOUT YOUR DRINKING: NO
HAVE PEOPLE ANNOYED YOU BY CRITICIZING YOUR DRINKING: NO
ALCOHOL_USE: NO
EVER HAD A DRINK FIRST THING IN THE MORNING TO STEADY YOUR NERVES TO GET RID OF A HANGOVER: NO
CONSUMPTION TOTAL: NEGATIVE

## 2020-09-17 ASSESSMENT — FIBROSIS 4 INDEX: FIB4 SCORE: 2.11

## 2020-09-17 NOTE — PROGRESS NOTES
Please refer to full H&P by Dr. Edwards    Patient seen and examined this morning states Continues to be nauseous at this point.  She did have some cereal this morning however states she still hungry and would like more food.  Patient is refusing TPN at this point and surgery will not perform any surgeries due to her nutritional status    No output through ostomy as of yet    Advised to take oral anti-emetics before IV meds

## 2020-09-17 NOTE — ASSESSMENT & PLAN NOTE
-Continue home Synthroid at 50 mcg daily  -Most recent TSH was approximately a week ago was normal at 2.92

## 2020-09-17 NOTE — ED NOTES
ERP at bedside.   Rhomboid Transposition Flap Text: The defect edges were debeveled with a #15 scalpel blade.  Given the location of the defect and the proximity to free margins a rhomboid transposition flap was deemed most appropriate.  Using a sterile surgical marker, an appropriate rhomboid flap was drawn incorporating the defect.    The area thus outlined was incised deep to adipose tissue with a #15 scalpel blade.  The skin margins were undermined to an appropriate distance in all directions utilizing iris scissors.

## 2020-09-17 NOTE — ASSESSMENT & PLAN NOTE
-Patient is severely cachectic  -Poor nutrition due to increased ostomy output previously and now nausea and vomiting  -Patient does need a better plan to get her nutrition up, she does feel if nausea can be controlled she will be able to tolerate diet, she is hesitant to do IV nutrition

## 2020-09-17 NOTE — ED PROVIDER NOTES
"ED Provider Note    CHIEF COMPLAINT  Chief Complaint   Patient presents with   • Dehydration     Pt states she needs surgery for an obstruction but needs to become \"nutritionally balanced\" before the Dr will do surgery. comes to ER frequently for fluids and nausea relief, reports pt. Last ate at 6 pm.       HPI  Pitaolegario Bueno is a 60 y.o. female with history of a colostomy after rectal fluoroscopy lab surgery, hypotension, hypothyroidism who presents with dehydration.  The patient has had issues over the last month with increased ostomy output and vomiting as well as weight loss.  This is her fourth ER visit this month for similar complaints.  She is seen by Dr. Starr with GI as well as Dr. Nguyen and Cristy with general surgery.  She is also been followed by her primary care physician.  There is concern for her ongoing weight loss and malabsorption as well as possible stenosis of her ostomy.  She has been seeing all of these physicians in their office however they continue to refer her to the emergency room due to concern for low blood pressure, weight loss and dehydration.  Today, the patient reports ongoing mild abdominal pain which is really unchanged from prior.  She states that she typically has a large amount of output through her ostomy however today it has slowed down some.  She frequently has episodes of nausea and vomiting however she has had none today.  She is taking Zofran and Phenergan at home with little relief of her nausea.  She has not had any recent fevers, coughing or other infectious symptoms.    REVIEW OF SYSTEMS  See HPI for further details.   Review of Systems   Constitutional: Positive for weight loss. Negative for chills and fever.   HENT: Negative for sore throat.    Eyes: Negative for blurred vision and redness.   Respiratory: Negative for cough and shortness of breath.    Cardiovascular: Negative for chest pain and leg swelling.   Gastrointestinal: Positive for abdominal pain, " "nausea and vomiting.   Genitourinary: Negative for dysuria and urgency.   Musculoskeletal: Negative for back pain and neck pain.   Skin: Negative for rash.   Neurological: Negative for focal weakness, seizures and headaches.   Psychiatric/Behavioral: Negative for suicidal ideas.         PAST MEDICAL HISTORY   has a past medical history of Colostomy in place (HCC), Eating disorder, Hypotension, Hypothyroid, and Palpitations.    SOCIAL HISTORY  Social History     Tobacco Use   • Smoking status: Never Smoker   • Smokeless tobacco: Never Used   Substance and Sexual Activity   • Alcohol use: Not Currently   • Drug use: Not Currently   • Sexual activity: Not Currently       SURGICAL HISTORY   has a past surgical history that includes other abdominal surgery.    CURRENT MEDICATIONS  Home Medications     Reviewed by Dario Gamino R.N. (Registered Nurse) on 09/16/20 at 2339  Med List Status: Complete   Medication Last Dose Status   levothyroxine (SYNTHROID) 50 MCG Tab 9/16/2020 Active   linaCLOtide (LINZESS) 290 MCG Cap  Active   ondansetron (ZOFRAN ODT) 4 MG TABLET DISPERSIBLE  Active   potassium chloride SA (KDUR) 20 MEQ Tab CR  Active   promethazine (PHENERGAN) 25 MG Tab 9/16/2020 Active   sennosides (SENOKOT) 8.6 MG Tab  Active                ALLERGIES  Allergies   Allergen Reactions   • Sulfa Drugs Hives       PHYSICAL EXAM   VITAL SIGNS: BP (!) 72/61   Pulse (!) 53   Temp 36.7 °C (98 °F) (Temporal)   Resp 12   Ht 1.651 m (5' 5\")   Wt 32.3 kg (71 lb 3.3 oz)   SpO2 100%   BMI 11.85 kg/m²      Physical Exam   Constitutional: She is oriented to person, place, and time and well-developed, well-nourished, and in no distress. No distress.   Cachectic nontoxic appearing middle-aged female   HENT:   Head: Normocephalic and atraumatic.   Eyes: Pupils are equal, round, and reactive to light. Conjunctivae are normal.   Neck: Normal range of motion. Neck supple.   Cardiovascular: Normal rate, regular rhythm and normal " heart sounds.   Pulmonary/Chest: Effort normal and breath sounds normal. No respiratory distress.   Abdominal: Soft. She exhibits no distension. There is no abdominal tenderness.   Ostomy in place without significant output.  No tenderness in the abdomen.   Musculoskeletal: Normal range of motion.         General: No tenderness or edema.   Neurological: She is alert and oriented to person, place, and time.   Moving all extremities spontaneously   Skin: Skin is warm and dry.   Psychiatric: Affect normal.         DIAGNOSTIC STUDIES    EKG  Results for orders placed or performed during the hospital encounter of 20   EKG (Now)   Result Value Ref Range    Report       [facility] Emergency Dept.    Test Date:  2020  Pt Name:    SUSAN WILCOX             Department: [facility]  MRN:        [id number]                      Room:       -ROOM 7  Gender:     Female                       Technician:   :        [id number]                   Requested By:ELOISE LEW  Order #:    [id number]                    Reading MD: Eloise Lew MD    Measurements  Intervals                                Axis  Rate:       48                           P:          81  VA:         172                          QRS:        88  QRSD:       96                           T:          82  QT:         443  QTc:        396    Interpretive Statements  Sinus bradycardia  Atrial premature complex  No ectopy or hypertrophy  No ST or T wave change  Compared to ECG 09/10/2020 11:20:33  ST (T wave) deviation no longer present  Electronically Signed On 2020 1:51:24 PDT by Eloise Lew MD             LABS  Personally reviewed by me  Labs Reviewed   CBC WITH DIFFERENTIAL - Abnormal; Notable for the following components:       Result Value    WBC 3.8 (*)     RBC 4.04 (*)     Platelet Count 137 (*)     All other components within normal limits   COMP METABOLIC PANEL - Abnormal; Notable for the following components:     "Sodium 132 (*)     Potassium 3.5 (*)     Co2 11 (*)     Anion Gap 18.0 (*)     All other components within normal limits   LIPASE   LACTIC ACID   ESTIMATED GFR   LACTIC ACID   MAGNESIUM       ED COURSE  Vitals:    09/17/20 0138 09/17/20 0209 09/17/20 0217 09/17/20 0300   BP: (!) 80/56 (!) 85/64  (!) 72/61   Pulse: (!) 49 (!) 57 60 (!) 53   Resp:    12   Temp:    36.7 °C (98 °F)   TempSrc:    Temporal   SpO2: 93% 100% 99% 100%   Weight:    32.3 kg (71 lb 3.3 oz)   Height:    1.651 m (5' 5\")         Medications administered:  IVF, zofran    Patient was given IV fluids for dehydration.  IV hydration was used because oral hydration was not adequate alone.  Following fluid administration patient's hydration status and vital signs were improved.      Old records personally reviewed:  Reviewed multiple recent ER visits as well as her visits with her primary care physician.  There has been significant concerns for her ongoing weight loss, low blood pressure and dehydration.      MEDICAL DECISION MAKING  Patient with history of colostomy from prior rectal prolapse in addition to recent significant weight loss and multiple ER visits for dehydration who presents with the same.  She is hypotensive on arrival with otherwise reassuring vital signs.  In review of her records, this is near her baseline wench she is presented to the ER over the past month.  She is nontoxic-appearing but definitely cachectic and appears malnutrition.  Her EKG does not show signs of ischemia or arrhythmia.  Abdominal exam is overall benign without significant tenderness to palpation.  Clinically doubt underlying surgical process such as obstruction, perforation or infectious process.  Labs demonstrate a borderline elevated lactic acidosis.  She does have neutropenia which is slightly worsening from prior.  She is no other focal symptoms concerning for sepsis.  Her labs demonstrate very low CO2 which is likely due to starvation ketosis and dehydration. "  She does not have any other significant electrolyte abnormalities.      Patient was treated with IV fluids and antiemetics here in the emergency department.  After reviewing her records, this is her fourth visit for dehydration in the last few weeks.  She continues to lose weight and is down over 10 pounds just this month.  I am concerned about this significant weight loss.  It seems that she has been bounced around from her primary doctor to gastroenterology and surgery as an outpatient therefore I think it is best if she is admitted for further hydration and coordination of care.  Discussions may need to be made about starting artificial nutrition.  I discussed the plan of care with the patient who is agreeable at this time.    I discussed the case with Dr. Edwards, hospitalist on-call, who accepts the patient for admission.  She is in stable condition at the time of transfer to the floor.        IMPRESSION  Severe protein calorie malnutrition  Starvation ketoacidosis  Hypotension  Dehydration      Disposition: Admit medicine, stable condition  Results, diagnoses, and treatment options were discussed with the patient and/or family. Patient verbalized understanding of plan of care.    Patient referred to primary care provider for monitoring and treatment of blood pressure.      Current Discharge Medication List              Electronically signed by: Eloise Collado M.D., 9/17/2020 1:57 AM

## 2020-09-17 NOTE — H&P
"Hospital Medicine History & Physical Note    Date of Service  9/17/2020    Primary Care Physician  Clifton Harvey M.D.    Consultants  None    Code Status  Full Code    Chief Complaint  Chief Complaint   Patient presents with   • Dehydration     Pt states she needs surgery for an obstruction but needs to become \"nutritionally balanced\" before the Dr will do surgery. comes to ER frequently for fluids and nausea relief, reports pt. Last ate at 6 pm.       History of Presenting Illness  60 y.o. female who presented 9/16/2020 with nausea and vomiting.  Patient does have a complicated history of ostomy post complicated rectal surgery.  She has had increased output from the ostomy causing dehydration in the past.  She states over the last 2-1/2 weeks she is noted profound weight loss due to nausea and vomiting with constant nausea.  Patient states she tries to have a consistent diet but has not been able to keep anything down recently.  She has had recent stays this month and has lost significant weight already this month.  Patient does take a laxative, has been told if she does not she will have an obstruction, she states the last time she took it was around midnight 24 hours ago due to decreased saliva and nausea.  Patient also notices profound fatigue.  She states she normally has significant watery output from her ostomy however today she noted decreased output.  She does follow as an outpatient with PCP, GI and surgery.  Today she did see surgery who felt maybe she had a partial obstruction/stenosis of the ostomy but did not give her dietary restrictions and thought at some point in time she may need surgery but currently her nutrition status is not good enough to tolerate surgery.  I did discuss the case including labs and imaging with the ER physician.    Review of Systems  Review of Systems   Constitutional: Positive for malaise/fatigue. Negative for chills and fever.   HENT: Negative for congestion.  "   Respiratory: Negative for cough, sputum production, shortness of breath and stridor.    Cardiovascular: Negative for chest pain, palpitations and leg swelling.   Gastrointestinal: Positive for abdominal pain, nausea and vomiting. Negative for constipation and diarrhea.   Genitourinary: Negative for dysuria and urgency.   Musculoskeletal: Negative for falls and myalgias.   Neurological: Negative for dizziness, tingling, loss of consciousness, weakness and headaches.   Psychiatric/Behavioral: Negative for depression and suicidal ideas.   All other systems reviewed and are negative.      Past Medical History   has a past medical history of Colostomy in place (HCC), Eating disorder, Hypotension, Hypothyroid, and Palpitations.    Surgical History   has a past surgical history that includes other abdominal surgery.     Family History  family history includes Alcohol abuse in her father; Heart Disease in her father; Hypertension in her father; Stroke in her father; Thyroid in her mother.     Social History   reports that she has never smoked. She has never used smokeless tobacco. She reports previous alcohol use. She reports previous drug use.    Allergies  Allergies   Allergen Reactions   • Sulfa Drugs Hives       Medications  Prior to Admission Medications   Prescriptions Last Dose Informant Patient Reported? Taking?   levothyroxine (SYNTHROID) 50 MCG Tab 9/16/2020 at Unknown time Patient No No   Sig: Take 1 Tab by mouth Every morning on an empty stomach.   linaCLOtide (LINZESS) 290 MCG Cap  Patient Yes No   Sig: Take 290 mcg by mouth every evening.   ondansetron (ZOFRAN ODT) 4 MG TABLET DISPERSIBLE   No No   Sig: Take 1 Tab by mouth every 8 hours as needed.   potassium chloride SA (KDUR) 20 MEQ Tab CR   No No   Sig: Take 1 Tab by mouth 2 times a day.   promethazine (PHENERGAN) 25 MG Tab 9/16/2020 at Unknown time  No No   Sig: Take 1 Tab by mouth every 6 hours as needed for Nausea/Vomiting.   sennosides (SENOKOT) 8.6  MG Tab  Patient Yes No   Sig: Take 8.6 mg by mouth 1 time daily as needed.      Facility-Administered Medications: None       Physical Exam  Temp:  [36.3 °C (97.4 °F)] 36.3 °C (97.4 °F)  Pulse:  [49-62] 60  Resp:  [16-19] 17  BP: (80-91)/(56-69) 85/64  SpO2:  [93 %-100 %] 99 %    Physical Exam  Vitals signs and nursing note reviewed.   Constitutional:       General: She is not in acute distress.     Appearance: She is cachectic. She is not diaphoretic.   HENT:      Head: Normocephalic and atraumatic.      Right Ear: External ear normal.      Left Ear: External ear normal.      Nose: Nose normal. No congestion or rhinorrhea.      Mouth/Throat:      Mouth: Mucous membranes are dry.      Pharynx: No oropharyngeal exudate.   Eyes:      General:         Right eye: No discharge.         Left eye: No discharge.      Extraocular Movements: Extraocular movements intact.   Neck:      Musculoskeletal: Normal range of motion and neck supple.      Trachea: No tracheal deviation.   Cardiovascular:      Rate and Rhythm: Normal rate and regular rhythm.      Heart sounds: No murmur. No friction rub. No gallop.    Pulmonary:      Effort: Pulmonary effort is normal. No respiratory distress.      Breath sounds: Normal breath sounds. No stridor. No wheezing or rales.   Chest:      Chest wall: No tenderness.   Abdominal:      General: Bowel sounds are normal. There is no distension.      Palpations: Abdomen is soft.      Tenderness: There is abdominal tenderness in the epigastric area.   Musculoskeletal: Normal range of motion.         General: No tenderness.      Right lower leg: No edema.      Left lower leg: No edema.   Lymphadenopathy:      Cervical: No cervical adenopathy.   Skin:     General: Skin is warm and dry.      Coloration: Skin is not jaundiced.      Findings: No erythema or rash.   Neurological:      General: No focal deficit present.      Mental Status: She is alert and oriented to person, place, and time.      Cranial  Nerves: No cranial nerve deficit.   Psychiatric:         Mood and Affect: Mood normal.         Behavior: Behavior normal.         Thought Content: Thought content normal.         Judgment: Judgment normal.         Laboratory:  Recent Labs     09/16/20  2350   WBC 3.8*   RBC 4.04*   HEMOGLOBIN 13.2   HEMATOCRIT 38.9   MCV 96.3   MCH 32.7   MCHC 33.9   RDW 48.8   PLATELETCT 137*   MPV 12.9     Recent Labs     09/14/20  1550 09/16/20  2350   SODIUM  --  132*   POTASSIUM 3.0* 3.5*   CHLORIDE  --  103   CO2  --  11*   GLUCOSE  --  76   BUN  --  18   CREATININE  --  0.65   CALCIUM  --  8.8     Recent Labs     09/16/20  2350   ALTSGPT 24   ASTSGOT 24   ALKPHOSPHAT 67   TBILIRUBIN 0.3   LIPASE 48   GLUCOSE 76         No results for input(s): NTPROBNP in the last 72 hours.      No results for input(s): TROPONINT in the last 72 hours.    Imaging:  No orders to display         Assessment/Plan:  I anticipate this patient is appropriate for observation status at this time.    Starvation ketoacidosis- (present on admission)  Assessment & Plan  -Due to ongoing nausea and vomiting  -For her constant nausea, she states oral Zofran does not work neither does Phenergan, will try oral Compazine and oral Reglan, try oral for both so we know if it works when she goes home, continue IV Zofran if needed  -Patient does not want a clear liquid diet, will start regular diet, no restrictions were placed on the patient by surgery  -May need surgery consultation, they did not order a CT scan, I am not going to order one at this time  -She may need TPN but she is hesitant to start this nutrition  -Repeat BMP later today    Hyponatremia- (present on admission)  Assessment & Plan  -Due to dehydration  -Start IV fluids  -Repeat BMP in the morning    Hypokalemia- (present on admission)  Assessment & Plan  -Place IV potassium and with IV fluids  -repeat BMP later this morning  -Obtain magnesium level    Hypothyroidism- (present on  admission)  Assessment & Plan  -Continue home Synthroid at 50 mcg daily  -Most recent TSH was approximately a week ago was normal at 2.92    Severe protein-calorie malnutrition (HCC)- (present on admission)  Assessment & Plan  -Patient is severely cachectic  -Poor nutrition due to increased ostomy output previously and now nausea and vomiting  -Patient does need a better plan to get her nutrition up, she does feel if nausea can be controlled she will be able to tolerate diet, she is hesitant to do IV nutrition    CFS (chronic fatigue syndrome)- (present on admission)  Assessment & Plan  -With acute worsening due to decreased oral intake and acidosis

## 2020-09-17 NOTE — ASSESSMENT & PLAN NOTE
-Due to ongoing nausea and vomiting  -For her constant nausea, she states oral Zofran does not work neither does Phenergan, will try oral Compazine and oral Reglan, try oral for both so we know if it works when she goes home, continue IV Zofran if needed  -Patient does not want a clear liquid diet, will start regular diet, no restrictions were placed on the patient by surgery  -May need surgery consultation, they did not order a CT scan, I am not going to order one at this time  -She may need TPN but she is hesitant to start this nutrition  -Repeat BMP later today

## 2020-09-17 NOTE — CARE PLAN
Problem: Skin Integrity  Goal: Risk for impaired skin integrity will decrease  Outcome: PROGRESSING SLOWER THAN EXPECTED   Educate pt on importance of turning q2hrs to decrease pressure on bony prominences. Pt verbalizes understanding of high risk for skin breakdown due to undernourishment and being in bed. Demonstrates turning frequently.   Problem: Communication  Goal: The ability to communicate needs accurately and effectively will improve  Outcome: PROGRESSING AS EXPECTED   Pt actively participates in POC discussion, asking questions pertaining to course of stay.   Problem: Safety  Goal: Will remain free from injury  Outcome: PROGRESSING AS EXPECTED   Pt demonstrates use of call light for needs and ambulation effectively. Pt verbalizes understanding of high fall risk due to weakness, hypotension, and hx of falls.  Problem: Infection  Goal: Will remain free from infection  Outcome: PROGRESSING AS EXPECTED     Problem: Bowel/Gastric:  Goal: Normal bowel function is maintained or improved  Outcome: PROGRESSING  slower than expect  Pt reports Bowel elimination is slower than normal due to less oral intake. Pt educated that output will increase with return of normal oral intake. Per pt, ostomy looks normal otherwise.      Problem: Knowledge Deficit  Goal: Knowledge of disease process/condition, treatment plan, diagnostic tests, and medications will improve  Outcome: PROGRESSING AS EXPECTED  Educate pt on importance of increase oral intake as well as treating nausea. Pt demonstrates understanding by requesting PRN antinausea medication when feeling nauseas.

## 2020-09-17 NOTE — ED NOTES
Pt complains of nausea, medicated per MAR.   Pt requesting water, ERP states pt is allowed to have water at this time.

## 2020-09-17 NOTE — PROGRESS NOTES
0210: Pt arrived up to unit with ER RN. Pt ambulated from gurney to bed without assistance, tolerated well. Pt not reporting any pain at this time. Pt oriented to room and use of call light, all needs met at this time.

## 2020-09-17 NOTE — PROGRESS NOTES
Received report from Kitty TAMEZ. Pt aox4, denies pain, resting on room care calmly. POC reviewed with pt, questions addressed, pt educated on PRN medication for nausea. Pt reports feeling fatigued, instructed to use call light for needs. Call light within reach.

## 2020-09-17 NOTE — DIETARY
"Nutrition services: Day 0 of admit.  Pita Bueno is a 60 y.o. female with admitting DX of Nausea and Vomiting.  Consult received for high risk diagnosis of severe protein calorie malnutrition. BMI <19. MST score of 1 per nutrition screen for unplanned weight loss of 2-13 lb x 1 month.    Assessment:  Height: 165.1 cm (5' 5\")  Weight: 32.3 kg (71 lb 3.3 oz)  Body mass index is 11.85 kg/m²., BMI classification: Underweight  Diet/Intake: Regular    Evaluation:   1. Per H&P, pt with nausea and vomiting for 2-1/2 weeks with profound weight loss. Noted past medical history includes eating disorder.  2. Per chart review, weight on 8/14/20 = 37.2 kg. Pt with 13.1% weight loss x 1 month which is severe. Pt states her usual weight is  lb. Per chart review, weight on 2/3/20 = 40.4 kg/88.9 lb.   3. Observed pt with severe fat wasting with hollowing at orbitals and severe muscle wasting with scooping at temples. She was cold and preferred to keep the blankets up to her chin.  4. Pt states she was eating some, but progressively less each day due to nausea and vomiting. States she normally eats a lot of food. States she normally eats 2,000 - 3,000 kcal per day and exercises for 3 hours per day.  5. Pt with multiple diet restrictions due to her colostomy. She does not tolerate dairy, red meat, nuts, or beans. She does not eat regular eggs and requests egg whites. States she was able to eat cereal at breakfast this morning and did request more food.  6. States she has to take laxatives daily as she tends toward constipation. She is very concerned about potential obstruction of her colostomy.  7. TPN was offered which pt refused.   8. As pt with severe weight loss and minimal PO intake for at least 2-1/2 weeks, she is at high risk for refeeding syndrome. Discussed with MD and requested monitor refeeding labs and 100 mg thiamine daily x 7 days.  9. Labs 9/17 include K+ 2.6 (L), Glu 110 (H). 9/16: Mg 2.5, Alb " 4.1.  10. Medications include KCl, Thiamine, Senna.  11. No skin breakdown noted.    Malnutrition Risk: Pt with severe malnutrition in context of acute illness related to nausea and vomiting as evidenced by 13.1% weight loss x 1 month, poor PO intake <50% of needs x 2-1/2 weeks, severe muscle loss with scooping at temples, severe fat loss with hollowing at orbitals.    Recommendations/Plan:  1. Monitor for refeeding: Order BMP w/ Mg and Phos x 7 days. Replete K, Phos and Mg prn. Supplement 100 mg Thiamine x 7 days to reduce risk of refeeding.  2. Regular diet as tolerated. Holding off on offer of oral nutrition supplements at this time due to risk of refeeding syndrome.  3. If nutrition support needed, enteral nutrition would be preferred before TPN.  4. Encourage intake of meals.  5. Document intake of all meals as % taken in ADL's to provide interdisciplinary communication across all shifts.   6. Monitor weight.  7. Nutrition rep will continue to see patient for ongoing meal and snack preferences.     RD following.

## 2020-09-17 NOTE — PROGRESS NOTES
Lab called with critical result of K 2.6 at 1045. Critical lab result read back to Dr. Quinones.   Dr. Quinones notified of critical lab result at 1055.  Critical lab result read back by Dr. Escamilla. New orders given and followed.

## 2020-09-17 NOTE — PROGRESS NOTES
"Pt asked to bring in home medication Linaclotide Capsule, per pt she has on her person and already took 1 capsule this morning. Pt educated on safety of having pharmacy identify home medications and label. Pt refused to provide this RN with the home medication from her bag. Pt stated, \"just forget I told you.\" Pharmacy notified, pharmacist at bedside to identify medication.   "

## 2020-09-18 ENCOUNTER — APPOINTMENT (OUTPATIENT)
Dept: RADIOLOGY | Facility: MEDICAL CENTER | Age: 60
DRG: 640 | End: 2020-09-18
Attending: HOSPITALIST
Payer: OTHER GOVERNMENT

## 2020-09-18 VITALS
WEIGHT: 71.21 LBS | OXYGEN SATURATION: 100 % | SYSTOLIC BLOOD PRESSURE: 90 MMHG | RESPIRATION RATE: 18 BRPM | DIASTOLIC BLOOD PRESSURE: 52 MMHG | TEMPERATURE: 97.8 F | HEART RATE: 45 BPM | HEIGHT: 65 IN | BODY MASS INDEX: 11.86 KG/M2

## 2020-09-18 PROBLEM — T73.0XXA STARVATION KETOACIDOSIS: Status: RESOLVED | Noted: 2020-09-17 | Resolved: 2020-09-18

## 2020-09-18 PROBLEM — E87.1 HYPONATREMIA: Status: RESOLVED | Noted: 2020-09-17 | Resolved: 2020-09-18

## 2020-09-18 PROBLEM — E87.29 STARVATION KETOACIDOSIS: Status: RESOLVED | Noted: 2020-09-17 | Resolved: 2020-09-18

## 2020-09-18 PROBLEM — E43 SEVERE PROTEIN-CALORIE MALNUTRITION (HCC): Status: RESOLVED | Noted: 2020-02-03 | Resolved: 2020-09-18

## 2020-09-18 LAB
ANION GAP SERPL CALC-SCNC: 11 MMOL/L (ref 7–16)
BUN SERPL-MCNC: 20 MG/DL (ref 8–22)
CALCIUM SERPL-MCNC: 7.7 MG/DL (ref 8.4–10.2)
CHLORIDE SERPL-SCNC: 112 MMOL/L (ref 96–112)
CO2 SERPL-SCNC: 14 MMOL/L (ref 20–33)
CREAT SERPL-MCNC: 0.6 MG/DL (ref 0.5–1.4)
EKG IMPRESSION: NORMAL
GLUCOSE SERPL-MCNC: 85 MG/DL (ref 65–99)
POTASSIUM SERPL-SCNC: 3.5 MMOL/L (ref 3.6–5.5)
SODIUM SERPL-SCNC: 137 MMOL/L (ref 135–145)
TROPONIN T SERPL-MCNC: 7 NG/L (ref 6–19)

## 2020-09-18 PROCEDURE — 99239 HOSP IP/OBS DSCHRG MGMT >30: CPT | Performed by: HOSPITALIST

## 2020-09-18 PROCEDURE — 700102 HCHG RX REV CODE 250 W/ 637 OVERRIDE(OP): Performed by: HOSPITALIST

## 2020-09-18 PROCEDURE — A9270 NON-COVERED ITEM OR SERVICE: HCPCS | Performed by: HOSPITALIST

## 2020-09-18 PROCEDURE — A9270 NON-COVERED ITEM OR SERVICE: HCPCS | Performed by: INTERNAL MEDICINE

## 2020-09-18 PROCEDURE — 700105 HCHG RX REV CODE 258: Performed by: HOSPITALIST

## 2020-09-18 PROCEDURE — 80048 BASIC METABOLIC PNL TOTAL CA: CPT

## 2020-09-18 PROCEDURE — 93010 ELECTROCARDIOGRAM REPORT: CPT | Performed by: INTERNAL MEDICINE

## 2020-09-18 PROCEDURE — 93005 ELECTROCARDIOGRAM TRACING: CPT | Performed by: HOSPITALIST

## 2020-09-18 PROCEDURE — 700111 HCHG RX REV CODE 636 W/ 250 OVERRIDE (IP): Performed by: HOSPITALIST

## 2020-09-18 PROCEDURE — 84484 ASSAY OF TROPONIN QUANT: CPT

## 2020-09-18 PROCEDURE — 700102 HCHG RX REV CODE 250 W/ 637 OVERRIDE(OP): Performed by: INTERNAL MEDICINE

## 2020-09-18 RX ORDER — METOCLOPRAMIDE 10 MG/1
10 TABLET ORAL
Qty: 120 TAB | Refills: 0 | Status: SHIPPED | OUTPATIENT
Start: 2020-09-18 | End: 2020-10-08

## 2020-09-18 RX ORDER — LANOLIN ALCOHOL/MO/W.PET/CERES
100 CREAM (GRAM) TOPICAL DAILY
Qty: 5 TAB | Refills: 0 | Status: ON HOLD | OUTPATIENT
Start: 2020-09-19 | End: 2020-09-25

## 2020-09-18 RX ADMIN — LEVOTHYROXINE SODIUM 50 MCG: 0.05 TABLET ORAL at 05:34

## 2020-09-18 RX ADMIN — POTASSIUM CHLORIDE 40 MEQ: 1500 TABLET, EXTENDED RELEASE ORAL at 05:35

## 2020-09-18 RX ADMIN — SODIUM BICARBONATE: 84 INJECTION, SOLUTION INTRAVENOUS at 11:46

## 2020-09-18 RX ADMIN — SODIUM CHLORIDE: 9 INJECTION, SOLUTION INTRAVENOUS at 03:44

## 2020-09-18 RX ADMIN — SODIUM BICARBONATE: 84 INJECTION, SOLUTION INTRAVENOUS at 11:47

## 2020-09-18 RX ADMIN — Medication 100 MG: at 05:35

## 2020-09-18 ASSESSMENT — COGNITIVE AND FUNCTIONAL STATUS - GENERAL
SUGGESTED CMS G CODE MODIFIER MOBILITY: CJ
MOBILITY SCORE: 22
CLIMB 3 TO 5 STEPS WITH RAILING: A LITTLE
WALKING IN HOSPITAL ROOM: A LITTLE

## 2020-09-18 ASSESSMENT — ENCOUNTER SYMPTOMS
NERVOUS/ANXIOUS: 0
COUGH: 0
SPUTUM PRODUCTION: 1
NECK PAIN: 0
CONSTIPATION: 0
HEMOPTYSIS: 0
HALLUCINATIONS: 0
MYALGIAS: 0
DIARRHEA: 0
WEAKNESS: 1
TINGLING: 0
BACK PAIN: 0
VOMITING: 0
FEVER: 0
TREMORS: 0
DIAPHORESIS: 0
DEPRESSION: 0
WEIGHT LOSS: 1
ABDOMINAL PAIN: 0

## 2020-09-18 ASSESSMENT — LIFESTYLE VARIABLES: SUBSTANCE_ABUSE: 0

## 2020-09-18 NOTE — PROGRESS NOTES
Discussed pt's low heart rate and blood pressure with pt. Per pt, HR is normally in 40s, and SBP in 90s. Pt asymptomic, denies SOB or palpitations.

## 2020-09-18 NOTE — PROGRESS NOTES
Report received from Elle TAMEZ. Pt laying in bed at the time of report. Plan of care assumed. Safety precautions in place and call light within reach.

## 2020-09-18 NOTE — DISCHARGE SUMMARY
"Discharge Summary    CHIEF COMPLAINT ON ADMISSION  Chief Complaint   Patient presents with   • Dehydration     Pt states she needs surgery for an obstruction but needs to become \"nutritionally balanced\" before the Dr will do surgery. comes to ER frequently for fluids and nausea relief, reports pt. Last ate at 6 pm.       Reason for Admission  Dehydration     Admission Date  9/16/2020    CODE STATUS  Full Code    HPI & HOSPITAL COURSE  60 y.o. female who presented 9/16/2020 with nausea and vomiting.  Patient does have a complicated history of ostomy post complicated rectal surgery.  She has had increased output from the ostomy causing dehydration in the past.  She states over the last 2-1/2 weeks she is noted profound weight loss due to nausea and vomiting with constant nausea. Has not been able to keep anything down due to vomiting. She came in with starvation ketoacidosis, concern for ostomy stenosis but did not give her dietary restrictions and thought at some point in time she may need surgery but currently her nutrition status is not good enough to tolerate surgery    She was admitted, monitored on tele, her electrolytes were replaced, she was given oral antiemetics along with PRN IV meds, she tolerated Reglan well and had improvement in her nausea and vomiting, she was able to start eating again, I discussed with her in detail that she has severe malnutrition and may benefit from TPN however patient refused.  Patient felt better after eating for a day or so and requesting to be discharged, her K has improved and she is ambulating without any problems. At this time patient is medically safe to go home in guarded conditions given her nutritional status but she is eating fine now and vomiting has resolved. I will dc her with Reglan, potassium bicarb and thiamine. Patient encouraged to fu with pcp and GI outpatient  Patient understood and agreed with the above plan  Therefore, she is discharged in good and stable " condition to home with close outpatient follow-up.    The patient recovered much more quickly than anticipated on admission.    Discharge Date  9/18/20    FOLLOW UP ITEMS POST DISCHARGE  pcp  gi    DISCHARGE DIAGNOSES  Active Problems:    CFS (chronic fatigue syndrome) POA: Yes      Overview: IMO load March 2020    Hypothyroidism (Chronic) POA: Yes      Overview: Pt on Cytomel      T3  Level  And tsh : nl. Cont cytomel      T4 low: start levothyroxine 25>. rck lab 8-10 wks.    Hypokalemia (Chronic) POA: Yes      Overview: Results for SUSAN WILCOX (MRN [id number]) as of 9/14/2020 15:09       Ref. Range 9/10/2020 11:45       Potassium Latest Ref Range: 3.6 - 5.5 mmol/L 3.0 (L)         Resolved Problems:    Starvation ketoacidosis POA: Yes    Severe protein-calorie malnutrition (HCC) POA: Yes    Hyponatremia POA: Yes      FOLLOW UP  Future Appointments   Date Time Provider Department Center   9/29/2020  4:15 PM [facility] EXAM 10 ECHO [facility]   9/30/2020  4:00 PM CARDIAC EVENT MONITOR[facility] None   10/19/2020  1:45 PM [facility]LL[facility] None   10/29/2020  3:20 PM Clifton Harvey M.D. [facility]     No follow-up provider specified.    MEDICATIONS ON DISCHARGE     Medication List      START taking these medications      Instructions   metoclopramide 10 MG Tabs  Commonly known as: REGLAN   Take 1 Tab by mouth four times daily - before meals and nightly as needed.  Dose: 10 mg     potassium bicarbonate 25 MEQ tablet  Commonly known as: KLYTE   Take 1 Tab by mouth 2 times a day for 5 days.  Dose: 25 mEq     thiamine 100 MG tablet  Start taking on: September 19, 2020  Commonly known as: THIAMINE   Take 1 Tab by mouth every day for 5 days.  Dose: 100 mg        CONTINUE taking these medications      Instructions   levothyroxine 50 MCG Tabs  Commonly known as: SYNTHROID   Take 1 Tab by mouth Every morning on an empty stomach.  Dose: 50 mcg     Linzess 290 MCG Caps  Generic drug: linaCLOtide   Take 290 mcg by  mouth every evening.  Dose: 290 mcg     ondansetron 4 MG Tbdp  Commonly known as: Zofran ODT   Take 1 Tab by mouth every 8 hours as needed.  Dose: 4 mg     potassium chloride SA 20 MEQ Tbcr  Commonly known as: Kdur   Take 1 Tab by mouth 2 times a day.  Dose: 20 mEq     promethazine 25 MG Tabs  Commonly known as: PHENERGAN   Take 1 Tab by mouth every 6 hours as needed for Nausea/Vomiting.  Dose: 25 mg     sennosides 8.6 MG Tabs  Commonly known as: SENOKOT   Take 8.6 mg by mouth 1 time daily as needed.  Dose: 8.6 mg            Allergies  Allergies   Allergen Reactions   • Sulfa Drugs Hives       DIET  Orders Placed This Encounter   Procedures   • Diet Order Regular     Standing Status:   Standing     Number of Occurrences:   1     Order Specific Question:   Diet:     Answer:   Regular [1]       ACTIVITY  As tolerated.  Weight bearing as tolerated    CONSULTATIONS  none    PROCEDURES  none    LABORATORY  Lab Results   Component Value Date    SODIUM 137 09/18/2020    POTASSIUM 3.5 (L) 09/18/2020    CHLORIDE 112 09/18/2020    CO2 14 (L) 09/18/2020    GLUCOSE 85 09/18/2020    BUN 20 09/18/2020    CREATININE 0.60 09/18/2020        Lab Results   Component Value Date    WBC 3.3 (L) 09/17/2020    HEMOGLOBIN 10.6 (L) 09/17/2020    HEMATOCRIT 31.2 (L) 09/17/2020    PLATELETCT 122 (L) 09/17/2020        Total time of the discharge process exceeds 45 minutes.

## 2020-09-18 NOTE — DISCHARGE INSTRUCTIONS
Discharge Instructions    Discharged to home by car with relative. Discharged via wheelchair, hospital escort: Yes.  Special equipment needed: Not Applicable    Be sure to schedule a follow-up appointment with your primary care doctor or any specialists as instructed.     Discharge Plan:   Diet Plan: Discussed  Activity Level: Discussed  Confirmed Follow up Appointment: Patient to Call and Schedule Appointment  Confirmed Symptoms Management: Discussed  Medication Reconciliation Updated: Yes    I understand that a diet low in cholesterol, fat, and sodium is recommended for good health. Unless I have been given specific instructions below for another diet, I accept this instruction as my diet prescription.   Other diet: Regular    Special Instructions: None    · Is patient discharged on Warfarin / Coumadin?   No     Depression / Suicide Risk    As you are discharged from this [facility] facility, it is important to learn how to keep safe from harming yourself.    Recognize the warning signs:  · Abrupt changes in personality, positive or negative- including increase in energy   · Giving away possessions  · Change in eating patterns- significant weight changes-  positive or negative  · Change in sleeping patterns- unable to sleep or sleeping all the time   · Unwillingness or inability to communicate  · Depression  · Unusual sadness, discouragement and loneliness  · Talk of wanting to die  · Neglect of personal appearance   · Rebelliousness- reckless behavior  · Withdrawal from people/activities they love  · Confusion- inability to concentrate     If you or a loved one observes any of these behaviors or has concerns about self-harm, here's what you can do:  · Talk about it- your feelings and reasons for harming yourself  · Remove any means that you might use to hurt yourself (examples: pills, rope, extension cords, firearm)  · Get professional help from the community (Mental Health, Substance Abuse, psychological  counseling)  · Do not be alone:Call your Safe Contact- someone whom you trust who will be there for you.  · Call your local CRISIS HOTLINE 139-6466 or 916-267-7473  · Call your local Children's Mobile Crisis Response Team [place] (020) 105-9651 or www.[employer]  · Call the toll free National Suicide Prevention Hotlines   · National Suicide Prevention Lifeline 599-531-NLAB (5663)  · [employer] Line Network 800-SUICIDE (573-0732)      Protein-Energy Malnutrition  Protein-energy malnutrition is when a person does not eat enough protein, fat, and calories. When this happens over time, it can lead to severe loss of muscle tissue (muscle wasting). This condition also affects the body's defense system (immune system) and can lead to other health problems.  What are the causes?  This condition may be caused by:  · Not eating enough protein, fat, or calories.  · Having certain chronic medical conditions.  · Eating too little.  What increases the risk?  The following factors may make you more likely to develop this condition:  · Living in poverty.  · Long-term hospitalization.  · Alcohol or drug dependency. Addiction often leads to a lifestyle in which proper diet is ignored. Dependency can also hurt the metabolism and the body's ability to absorb nutrients.  · Eating disorders, such as anorexia nervosa or bulimia.  · Chewing or swallowing problems. People with these disorders may not eat enough.  · Having certain conditions, such as:  ? Inflammatory bowel disease. Inflammation of the intestines makes it difficult for the body to absorb nutrients.  ? Cancer or AIDS. These diseases can cause a loss of appetite.  ? Chronic heart failure. This interferes with how the body uses nutrients.  ? Cystic fibrosis. This disease can make it difficult for the body to absorb nutrients.  · Eating a diet that extremely restricts protein, fat, or calorie intake.  What are the signs or symptoms?  Symptoms of this condition  include:  · Fatigue.  · Weakness.  · Dizziness.  · Fainting.  · Weight loss.  · Loss of muscle tone and muscle mass.  · Poor immune response.  · Lack of menstruation.  · Poor memory.  · Hair loss.  · Skin changes.  How is this diagnosed?  This condition may be diagnosed based on:  · Your medical and dietary history.  · A physical exam. This may include a measurement of your body mass index (BMI).  · Blood tests.  How is this treated?  This condition may be managed with:  · Nutrition therapy. This may include working with a diet and nutrition specialist (dietitian).  · Treatment for underlying conditions.  People with severe protein-energy malnutrition may need to be treated in a hospital. This may involve receiving nutrition and fluids through an IV.  Follow these instructions at home:    · Eat a balanced diet. In each meal, include at least one food that is high in protein. Foods that are high in protein include:  ? Meat.  ? Poultry.  ? Fish.  ? Eggs.  ? Cheese.  ? Milk.  ? Beans.  ? Nuts.  · Eat nutrient-rich foods that are easy to swallow and digest, such as:  ? Fruit and yogurt smoothies.  ? Oatmeal with nut butter.  · Try to eat six small meals each day instead of three large meals.  · Take vitamin and protein supplements as told by your health care provider or dietitian.  · Follow your health care provider's recommendations about exercise and activity.  · Keep all follow-up visits as told by your health care provider. This is important.  Contact a health care provider if you:  · Have increased weakness or fatigue.  · Faint.  · Are a woman and you stop having your period (menstruating).  · Have rapid hair loss.  · Have unexpected weight loss.  · Have diarrhea.  · Have nausea and vomiting.  Get help right away if you have:  · Difficulty breathing.  · Chest pain.  Summary  · Protein-energy malnutrition is when a person does not eat enough protein, fat, and calories.  · Protein-energy malnutrition can lead to  severe loss of muscle tissue (muscle wasting). This condition also affects the body's defense system (immune system) and can lead to other health problems.  · Talk with your health care provider about treatment for this condition. Effective treatment depends on the underlying cause of the malnutrition.  This information is not intended to replace advice given to you by your health care provider. Make sure you discuss any questions you have with your health care provider.  Document Released: 11/03/2006 Document Revised: 01/02/2019 Document Reviewed: 01/02/2019  Elsevier Patient Education © 2020 [employer]      Dehydration, Adult    Dehydration is a condition in which there is not enough fluid or water in the body. This happens when you lose more fluids than you take in. Important organs, such as the kidneys, brain, and heart, cannot function without a proper amount of fluids. Any loss of fluids from the body can lead to dehydration.  Dehydration can range from mild to severe. This condition should be treated right away to prevent it from becoming severe.  What are the causes?  This condition may be caused by:  · Vomiting.  · Diarrhea.  · Excessive sweating, such as from heat exposure or exercise.  · Not drinking enough fluid, especially:  ? When ill.  ? While doing activity that requires a lot of energy.  · Excessive urination.  · Fever.  · Infection.  · Certain medicines, such as medicines that cause the body to lose excess fluid (diuretics).  · Inability to access safe drinking water.  · Reduced physical ability to get adequate water and food.  What increases the risk?  This condition is more likely to develop in people:  · Who have a poorly controlled long-term (chronic) illness, such as diabetes, heart disease, or kidney disease.  · Who are age 65 or older.  · Who are disabled.  · Who live in a place with high altitude.  · Who play endurance sports.  What are the signs or symptoms?  Symptoms of mild  dehydration may include:  · Thirst.  · Dry lips.  · Slightly dry mouth.  · Dry, warm skin.  · Dizziness.  Symptoms of moderate dehydration may include:  · Very dry mouth.  · Muscle cramps.  · Dark urine. Urine may be the color of tea.  · Decreased urine production.  · Decreased tear production.  · Heartbeat that is irregular or faster than normal (palpitations).  · Headache.  · Light-headedness, especially when you stand up from a sitting position.  · Fainting (syncope).  Symptoms of severe dehydration may include:  · Changes in skin, such as:  ? Cold and clammy skin.  ? Blotchy (mottled) or pale skin.  ? Skin that does not quickly return to normal after being lightly pinched and released (poor skin turgor).  · Changes in body fluids, such as:  ? Extreme thirst.  ? No tear production.  ? Inability to sweat when body temperature is high, such as in hot weather.  ? Very little urine production.  · Changes in vital signs, such as:  ? Weak pulse.  ? Pulse that is more than 100 beats a minute when sitting still.  ? Rapid breathing.  ? Low blood pressure.  · Other changes, such as:  ? Sunken eyes.  ? Cold hands and feet.  ? Confusion.  ? Lack of energy (lethargy).  ? Difficulty waking up from sleep.  ? Short-term weight loss.  ? Unconsciousness.  How is this diagnosed?  This condition is diagnosed based on your symptoms and a physical exam. Blood and urine tests may be done to help confirm the diagnosis.  How is this treated?  Treatment for this condition depends on the severity. Mild or moderate dehydration can often be treated at home. Treatment should be started right away. Do not wait until dehydration becomes severe. Severe dehydration is an emergency and it needs to be treated in a hospital.  Treatment for mild dehydration may include:  · Drinking more fluids.  · Replacing salts and minerals in your blood (electrolytes) that you may have lost.  Treatment for moderate dehydration may include:  · Drinking an oral  rehydration solution (ORS). This is a drink that helps you replace fluids and electrolytes (rehydrate). It can be found at pharmacies and retail stores.  Treatment for severe dehydration may include:  · Receiving fluids through an IV tube.  · Receiving an electrolyte solution through a feeding tube that is passed through your nose and into your stomach (nasogastric tube, or NG tube).  · Correcting any abnormalities in electrolytes.  · Treating the underlying cause of dehydration.  Follow these instructions at home:  · If directed by your health care provider, drink an ORS:  ? Make an ORS by following instructions on the package.  ? Start by drinking small amounts, about ½ cup (120 mL) every 5-10 minutes.  ? Slowly increase how much you drink until you have taken the amount recommended by your health care provider.  · Drink enough clear fluid to keep your urine clear or pale yellow. If you were told to drink an ORS, finish the ORS first, then start slowly drinking other clear fluids. Drink fluids such as:  ? Water. Do not drink only water. Doing that can lead to having too little salt (sodium) in the body (hyponatremia).  ? Ice chips.  ? Fruit juice that you have added water to (diluted fruit juice).  ? Low-calorie sports drinks.  · Avoid:  ? Alcohol.  ? Drinks that contain a lot of sugar. These include high-calorie sports drinks, fruit juice that is not diluted, and soda.  ? Caffeine.  ? Foods that are greasy or contain a lot of fat or sugar.  · Take over-the-counter and prescription medicines only as told by your health care provider.  · Do not take sodium tablets. This can lead to having too much sodium in the body (hypernatremia).  · Eat foods that contain a healthy balance of electrolytes, such as bananas, oranges, potatoes, tomatoes, and spinach.  · Keep all follow-up visits as told by your health care provider. This is important.  Contact a health care provider if:  · You have abdominal pain that:  ? Gets  worse.  ? Stays in one area ([facility]).  · You have a rash.  · You have a stiff neck.  · You are more irritable than usual.  · You are sleepier or more difficult to wake up than usual.  · You feel weak or dizzy.  · You feel very thirsty.  · You have urinated only a small amount of very dark urine over 6-8 hours.  Get help right away if:  · You have symptoms of severe dehydration.  · You cannot drink fluids without vomiting.  · Your symptoms get worse with treatment.  · You have a fever.  · You have a severe headache.  · You have vomiting or diarrhea that:  ? Gets worse.  ? Does not go away.  · You have blood or green matter (bile) in your vomit.  · You have blood in your stool. This may cause stool to look black and tarry.  · You have not urinated in 6-8 hours.  · You faint.  · Your heart rate while sitting still is over 100 beats a minute.  · You have trouble breathing.  This information is not intended to replace advice given to you by your health care provider. Make sure you discuss any questions you have with your health care provider.  Document Released: 12/18/2006 Document Revised: 11/30/2018 Document Reviewed: 02/10/2017  [employer] Patient Education © 2020 Elsevier Inc.

## 2020-09-18 NOTE — PROGRESS NOTES
Received report from RN. Pt aox4, denies pain, eupneic. Pt reporting general weakness and fatigue, but improvement from yesterday. POC reviewed including plan for possible discharge. Pt denies nausea, reporting increased appetite. Pt ordered lunch and has own snacks at bedside to maintain oral intake.

## 2020-09-18 NOTE — PROGRESS NOTES
"Hospital Medicine Daily Progress Note    Date of Service  9/18/2020    Chief Complaint  60 y.o. female admitted 9/16/2020 with dehydration    Hospital Course    As per admission HPI  \"60 y.o. female who presented 9/16/2020 with nausea and vomiting.  Patient does have a complicated history of ostomy post complicated rectal surgery.  She has had increased output from the ostomy causing dehydration in the past.  She states over the last 2-1/2 weeks she is noted profound weight loss due to nausea and vomiting with constant nausea.  Patient states she tries to have a consistent diet but has not been able to keep anything down recently.  She has had recent stays this month and has lost significant weight already this month.  Patient does take a laxative, has been told if she does not she will have an obstruction, she states the last time she took it was around midnight 24 hours ago due to decreased saliva and nausea.  Patient also notices profound fatigue.  She states she normally has significant watery output from her ostomy however today she noted decreased output.  She does follow as an outpatient with PCP, GI and surgery.  Today she did see surgery who felt maybe she had a partial obstruction/stenosis of the ostomy but did not give her dietary restrictions and thought at some point in time she may need surgery but currently her nutrition status is not good enough to tolerate surgery.  I did discuss the case including labs and imaging with the ER physician.\"      Interval Problem Update  Seen and examined,patient doing a little better, nausea is controlled with reglan, having some SOB with walking. Hypotensive which could be contributing.  g-tub with little liquid output  k 3.3  Bicarb 14        Consultants/Specialty  none    Code Status  Full Code    Disposition  tbd    Review of Systems  Review of Systems   Constitutional: Positive for malaise/fatigue and weight loss. Negative for diaphoresis and fever. "   Respiratory: Positive for sputum production. Negative for cough and hemoptysis.    Cardiovascular: Negative for chest pain.   Gastrointestinal: Negative for abdominal pain, constipation, diarrhea and vomiting.   Genitourinary: Negative for dysuria, hematuria and urgency.   Musculoskeletal: Negative for back pain, myalgias and neck pain.   Skin: Negative for itching and rash.   Neurological: Positive for weakness. Negative for tingling and tremors.   Psychiatric/Behavioral: Negative for depression, hallucinations and substance abuse. The patient is not nervous/anxious.         Physical Exam  Temp:  [36.4 °C (97.6 °F)-36.9 °C (98.5 °F)] 36.6 °C (97.8 °F)  Pulse:  [44-63] 44  Resp:  [14-18] 18  BP: (70-86)/(38-53) 80/46  SpO2:  [94 %-100 %] 97 %    Physical Exam  Vitals signs and nursing note reviewed.   Constitutional:       General: She is not in acute distress.     Appearance: She is cachectic. She is not diaphoretic.   HENT:      Head: Normocephalic and atraumatic.      Right Ear: External ear normal.      Left Ear: External ear normal.      Nose: Nose normal. No congestion or rhinorrhea.      Mouth/Throat:      Mouth: Mucous membranes are dry.      Pharynx: No oropharyngeal exudate.   Eyes:      General:         Right eye: No discharge.         Left eye: No discharge.      Extraocular Movements: Extraocular movements intact.   Neck:      Musculoskeletal: Normal range of motion and neck supple.      Trachea: No tracheal deviation.   Cardiovascular:      Rate and Rhythm: Normal rate and regular rhythm.      Heart sounds: No murmur. No friction rub. No gallop.    Pulmonary:      Effort: Pulmonary effort is normal. No respiratory distress.      Breath sounds: Normal breath sounds. No stridor. No wheezing or rales.   Chest:      Chest wall: No tenderness.   Abdominal:      General: Bowel sounds are normal. There is no distension.      Palpations: Abdomen is soft.      Tenderness: There is no abdominal tenderness.    Musculoskeletal: Normal range of motion.         General: No tenderness.      Right lower leg: No edema.      Left lower leg: No edema.   Lymphadenopathy:      Cervical: No cervical adenopathy.   Skin:     General: Skin is warm and dry.      Coloration: Skin is not jaundiced.      Findings: No erythema or rash.   Neurological:      General: No focal deficit present.      Mental Status: She is alert and oriented to person, place, and time.      Cranial Nerves: No cranial nerve deficit.   Psychiatric:         Mood and Affect: Mood normal.         Behavior: Behavior normal.         Thought Content: Thought content normal.         Judgment: Judgment normal.         Fluids    Intake/Output Summary (Last 24 hours) at 9/18/2020 0953  Last data filed at 9/18/2020 0830  Gross per 24 hour   Intake 1817.92 ml   Output 300 ml   Net 1517.92 ml       Laboratory  Recent Labs     09/16/20  2350 09/17/20  0945   WBC 3.8* 3.3*   RBC 4.04* 3.23*   HEMOGLOBIN 13.2 10.6*   HEMATOCRIT 38.9 31.2*   MCV 96.3 96.6   MCH 32.7 32.8   MCHC 33.9 34.0   RDW 48.8 47.7   PLATELETCT 137* 122*   MPV 12.9 13.2*     Recent Labs     09/16/20  2350 09/17/20  0945 09/17/20  1300 09/17/20  1542 09/18/20  0414   SODIUM 132* 137  --   --  137   POTASSIUM 3.5* 2.6* 5.0 4.1 3.5*   CHLORIDE 103 108  --   --  112   CO2 11* 15*  --   --  14*   GLUCOSE 76 110* 68  --  85   BUN 18 13  --   --  20   CREATININE 0.65 0.51  --   --  0.60   CALCIUM 8.8 8.0*  --   --  7.7*                   Imaging  No orders to display        Assessment/Plan  Starvation ketoacidosis- (present on admission)  Assessment & Plan  -Due to ongoing nausea and vomiting  -- regular diet  - reglan has been working great  -May need surgery consultation, they did not order a CT scan, I am not going to order one at this time; high risk for surgery  -She may need TPN but she is refusing    Hypotension  2/2 hypovolemia 2/2 dehydration  - continue ivf  Continue encouraging PO intake    Hyponatremia-  (present on admission)  Assessment & Plan  -Due to dehydration  -IV fluids; continue  -daily BMP    Hypokalemia- (present on admission)  Assessment & Plan  -Place PO K      Low Bicarb  Ketoacidosis 2/2 starvation  - bicarb 14  - start drip and monitor      Hypothyroidism- (present on admission)  Assessment & Plan  -Continue home Synthroid at 50 mcg daily  -Most recent TSH was approximately a week ago was normal at 2.92    Severe protein-calorie malnutrition (HCC)- (present on admission)  Assessment & Plan  -Patient is severely cachectic  -Poor nutrition due to increased ostomy output previously and now nausea and vomiting  -Patient does need a better plan to get her nutrition up, her nausea is controlled now.   - she refused TPN    CFS (chronic fatigue syndrome)- (present on admission)  Assessment & Plan  -With acute worsening due to decreased oral intake and acidosis         VTE prophylaxis: scds

## 2020-09-18 NOTE — PROGRESS NOTES
Pt denies nausea, has improved appetite eating full meals and requesting more food. Pt ambulates independently down the pisano (staff standby) without difficulty. Dr. Quinones aware of hypotension, pt asymptomatic, no new orders. Plan for discharge.

## 2020-09-18 NOTE — PROGRESS NOTES
Telemetry Shift Summary    RHYTHM:SB  HR RANGE:48-58  ECTOPY:NONE  MEASUREMENTS:0.18/0.08/0.48    Normal Measurements  Rhythm SR  HR Range:   Measurements: 0.12-0.20/0.06-0.10/0.30-0.52    Per JESSICA Jackson    Tele strips reviewed and placed in chart.

## 2020-09-18 NOTE — PROGRESS NOTES
Report received from Marylou TAMEZ, Pt transferred to Med/Tele room 316-2, Pt oriented to room and call light, IVF infusing, Pt has personal belongings within reach and own snacks and food. WCM

## 2020-09-18 NOTE — CARE PLAN
Problem: Skin Integrity  Goal: Risk for impaired skin integrity will decrease  Outcome: PROGRESSING AS EXPECTED   Educate pt on importance of ambulating and q2 turns to prevent skin breakdown. Pt demonstrates understanding by frequently walking with staff and turning in bed.   Problem: Communication  Goal: The ability to communicate needs accurately and effectively will improve  Outcome: PROGRESSING AS EXPECTED   Pt communicates needs accurately with staff. Asks questions and actively participates in planning of care.   Problem: Safety  Goal: Will remain free from injury  Outcome: PROGRESSING as expected   Educate pt on importance of using call light for needs especially ambulation. Per pt, she forgets to use call light and gets up on own. Bed alarm in place, pt's room next to nurses computer for visualization. Frequent toileting offered. Call light within reach. Reinforced education upon every contact. Pt verbalized and demonstrates understanding after several reminders.    Problem: Venous Thromboembolism (VTW)/Deep Vein Thrombosis (DVT) Prevention:  Goal: Patient will participate in Venous Thrombosis (VTE)/Deep Vein Thrombosis (DVT)Prevention Measures  Outcome: PROGRESSING SLOWER THAN EXPECTED   Pt refusing Lovenox and SCDS. Educated pt on importance and safety of using lovenox for DVT prevention due to inactivity. Pt verbalizes understanding and declines. Pt agrees to walk frequently and do ankle flexion/pointing to improve venous return.   Problem: Bowel/Gastric:  Goal: Normal bowel function is maintained or improved  Outcome: PROGRESSING AS EXPECTED   Pt reports frequent loose stools in colostomy bag. Per pt this is normal and her bowel pattern at home. Pt has own supplies for changing ostomy, declines hospital supplies. Able to change bag on own. Pt instructed to notify RN when she changes so RN can visualize output amount. Pt verbalizes understanding.   Problem: Knowledge Deficit  Goal: Knowledge of disease  process/condition, treatment plan, diagnostic tests, and medications will improve  Outcome: PROGRESSING AS EXPECTED   Pt verbalizes understanding of disease process. Pt verbalizes importance of maintaining oral intake to improve nutritional status. Pt understands that she is malnourished due to lack of oral intake and will need to be followed by a dietician outpatient. Pt acknowledges low blood pressure is likely due to poor intake and malnourished state. Pt walking down pisano without difficulty, reporting general weakness. Pt understands this is due to current body state. Pt reporting improved energy from yesterday and states she will be eating 6099-7421 calories/day once at home to increase weight. Pt educated to maintain 2000 calories/day and not to over feed self.

## 2020-09-18 NOTE — CARE PLAN
Problem: Communication  Goal: The ability to communicate needs accurately and effectively will improve  Outcome: PROGRESSING AS EXPECTED   Pt uses call light to alert staff to her needs.    Problem: Safety  Goal: Will remain free from injury  Outcome: PROGRESSING AS EXPECTED  Goal: Will remain free from falls  Outcome: PROGRESSING AS EXPECTED   Pt verbalized understanding of safety education provided with no further questions at this time. Safety measures in place.

## 2020-09-18 NOTE — PROGRESS NOTES
Spiritual Care Note    Spiritual Care Provider Information:  Name of Spiritual Care Provider: Noreen Robbins  Title of Spiritual Care Provider: Associate   Phone Number: [phone]  E-mail: [email or web address]  Total time : 10 minutes    Spiritual Screen Results:    Gen Nursing  Spiritual Screen  Is your spiritual health or inner well-being important to you as you cope with your medical condition?: Yes  Would you like to receive a visit from our Spiritual Care team or your own Mormon or spiritual leader?: Yes  Was spiritual care education provided to the patient?: Yes  Sources of Hope/Eileen: Family     Palliative Care  PC Scientology/Spiritual Screening  Was spiritual care education provided to the patient?: Yes      Encounter/Request Information  Encounter/Request Type     Visited With: Patient not available    Referral From/ Origin of Request: [facility] nursing    Spiritual Assessment   Spiritual Care Encounters    Interacton/Conversation: Pt was changing her colostomy bag upon 's arrival, asked to come back later.  unable to complete that request later in the afternoon. Please make another spiritual care request order should pt desire spiritual care support.  Thank You.

## 2020-09-18 NOTE — PROGRESS NOTES
Discharge education provided to patient including importance of maintaining PO intake to improve nutritional status. Pt instructed to follow up with nutritionist to monitor nutritional status as well as primary doctor and GI. Pt verbalizes understanding. Pt reports feeling better and ambulating up and down the halls without assistance and asymptomatically.

## 2020-09-19 NOTE — PROGRESS NOTES
Discussed discharge instructions, medications, and follow up appointents and emphasized nutrition education with patient and patient's daughter. Patient verbalized understanding.

## 2020-09-19 NOTE — PROGRESS NOTES
EKG changes observed on tele strip, along with sustained bradycardia 38-40. Pt asymptomatic, denies chest pain, SOB, dizziness. Dr. Quinones notified, EKG ordered.     1400 Dr. Quinones notified via Volt text to read EKG.     1500 Dr. Quinones paged to give updates regarding EKG.     1530 Dr. Quinones responded via Volt text to draw stat troponin. Per Dr. Quinones, pt may discharge if troponin <20.      Troponin <20, per Dr. Quinones, no issues with EKG, may discharge home

## 2020-09-21 ENCOUNTER — APPOINTMENT (OUTPATIENT)
Dept: RADIOLOGY | Facility: MEDICAL CENTER | Age: 60
End: 2020-09-21
Attending: EMERGENCY MEDICINE
Payer: OTHER GOVERNMENT

## 2020-09-21 ENCOUNTER — HOSPITAL ENCOUNTER (OUTPATIENT)
Facility: MEDICAL CENTER | Age: 60
End: 2020-09-25
Attending: EMERGENCY MEDICINE | Admitting: INTERNAL MEDICINE
Payer: OTHER GOVERNMENT

## 2020-09-21 ENCOUNTER — OFFICE VISIT (OUTPATIENT)
Dept: URGENT CARE | Facility: PHYSICIAN GROUP | Age: 60
End: 2020-09-21
Payer: OTHER GOVERNMENT

## 2020-09-21 VITALS
RESPIRATION RATE: 16 BRPM | OXYGEN SATURATION: 87 % | HEART RATE: 77 BPM | HEIGHT: 65 IN | BODY MASS INDEX: 12.66 KG/M2 | TEMPERATURE: 97.4 F | WEIGHT: 76 LBS | SYSTOLIC BLOOD PRESSURE: 100 MMHG | DIASTOLIC BLOOD PRESSURE: 50 MMHG

## 2020-09-21 DIAGNOSIS — R53.81 MALAISE AND FATIGUE: ICD-10-CM

## 2020-09-21 DIAGNOSIS — E87.6 HYPOKALEMIA: ICD-10-CM

## 2020-09-21 DIAGNOSIS — R42 LIGHTHEADEDNESS: ICD-10-CM

## 2020-09-21 DIAGNOSIS — E87.6 HYPOKALEMIA: Chronic | ICD-10-CM

## 2020-09-21 DIAGNOSIS — E43 SEVERE MALNUTRITION (HCC): ICD-10-CM

## 2020-09-21 DIAGNOSIS — R19.7 DIARRHEA, UNSPECIFIED TYPE: ICD-10-CM

## 2020-09-21 DIAGNOSIS — R06.02 SHORTNESS OF BREATH: ICD-10-CM

## 2020-09-21 DIAGNOSIS — R06.02 EXERTIONAL SHORTNESS OF BREATH: ICD-10-CM

## 2020-09-21 DIAGNOSIS — R53.83 MALAISE AND FATIGUE: ICD-10-CM

## 2020-09-21 DIAGNOSIS — R53.1 GENERAL WEAKNESS: ICD-10-CM

## 2020-09-21 PROBLEM — I95.9 HYPOTENSION: Status: ACTIVE | Noted: 2020-09-21

## 2020-09-21 LAB
ALBUMIN SERPL BCP-MCNC: 4.1 G/DL (ref 3.2–4.9)
ALBUMIN/GLOB SERPL: 2.4 G/DL
ALP SERPL-CCNC: 57 U/L (ref 30–99)
ALT SERPL-CCNC: 26 U/L (ref 2–50)
ANION GAP SERPL CALC-SCNC: 13 MMOL/L (ref 7–16)
APPEARANCE UR: CLEAR
AST SERPL-CCNC: 21 U/L (ref 12–45)
BASOPHILS # BLD AUTO: 0.6 % (ref 0–1.8)
BASOPHILS # BLD: 0.02 K/UL (ref 0–0.12)
BILIRUB SERPL-MCNC: 0.2 MG/DL (ref 0.1–1.5)
BILIRUB UR QL STRIP.AUTO: NEGATIVE
BUN SERPL-MCNC: 22 MG/DL (ref 8–22)
CALCIUM SERPL-MCNC: 9 MG/DL (ref 8.5–10.5)
CHLORIDE SERPL-SCNC: 107 MMOL/L (ref 96–112)
CO2 SERPL-SCNC: 16 MMOL/L (ref 20–33)
COLOR UR: YELLOW
COVID ORDER STATUS COVID19: NORMAL
CREAT SERPL-MCNC: 0.57 MG/DL (ref 0.5–1.4)
EKG IMPRESSION: NORMAL
EKG IMPRESSION: NORMAL
EOSINOPHIL # BLD AUTO: 0.03 K/UL (ref 0–0.51)
EOSINOPHIL NFR BLD: 0.9 % (ref 0–6.9)
ERYTHROCYTE [DISTWIDTH] IN BLOOD BY AUTOMATED COUNT: 53.9 FL (ref 35.9–50)
GLOBULIN SER CALC-MCNC: 1.7 G/DL (ref 1.9–3.5)
GLUCOSE BLD-MCNC: 50 MG/DL (ref 65–99)
GLUCOSE SERPL-MCNC: 71 MG/DL (ref 65–99)
GLUCOSE UR STRIP.AUTO-MCNC: NEGATIVE MG/DL
HCT VFR BLD AUTO: 37.5 % (ref 37–47)
HGB BLD-MCNC: 12.1 G/DL (ref 12–16)
IMM GRANULOCYTES # BLD AUTO: 0.01 K/UL (ref 0–0.11)
IMM GRANULOCYTES NFR BLD AUTO: 0.3 % (ref 0–0.9)
KETONES UR STRIP.AUTO-MCNC: NEGATIVE MG/DL
LEUKOCYTE ESTERASE UR QL STRIP.AUTO: NEGATIVE
LYMPHOCYTES # BLD AUTO: 0.62 K/UL (ref 1–4.8)
LYMPHOCYTES NFR BLD: 18.5 % (ref 22–41)
MAGNESIUM SERPL-MCNC: 2.2 MG/DL (ref 1.5–2.5)
MCH RBC QN AUTO: 33.6 PG (ref 27–33)
MCHC RBC AUTO-ENTMCNC: 32.3 G/DL (ref 33.6–35)
MCV RBC AUTO: 104.2 FL (ref 81.4–97.8)
MICRO URNS: NORMAL
MONOCYTES # BLD AUTO: 0.25 K/UL (ref 0–0.85)
MONOCYTES NFR BLD AUTO: 7.4 % (ref 0–13.4)
NEUTROPHILS # BLD AUTO: 2.43 K/UL (ref 2–7.15)
NEUTROPHILS NFR BLD: 72.3 % (ref 44–72)
NITRITE UR QL STRIP.AUTO: NEGATIVE
NRBC # BLD AUTO: 0 K/UL
NRBC BLD-RTO: 0 /100 WBC
NT-PROBNP SERPL IA-MCNC: 205 PG/ML (ref 0–125)
PH UR STRIP.AUTO: 6 [PH] (ref 5–8)
PHOSPHATE SERPL-MCNC: 4.5 MG/DL (ref 2.5–4.5)
PLATELET # BLD AUTO: 90 K/UL (ref 164–446)
PMV BLD AUTO: 13.4 FL (ref 9–12.9)
POTASSIUM SERPL-SCNC: 3.5 MMOL/L (ref 3.6–5.5)
PROT SERPL-MCNC: 5.8 G/DL (ref 6–8.2)
PROT UR QL STRIP: NEGATIVE MG/DL
RBC # BLD AUTO: 3.6 M/UL (ref 4.2–5.4)
RBC UR QL AUTO: NEGATIVE
SODIUM SERPL-SCNC: 136 MMOL/L (ref 135–145)
SP GR UR STRIP.AUTO: 1.01
TROPONIN T SERPL-MCNC: 8 NG/L (ref 6–19)
TSH SERPL DL<=0.005 MIU/L-ACNC: 3.89 UIU/ML (ref 0.38–5.33)
UROBILINOGEN UR STRIP.AUTO-MCNC: 0.2 MG/DL
WBC # BLD AUTO: 3.4 K/UL (ref 4.8–10.8)

## 2020-09-21 PROCEDURE — 700117 HCHG RX CONTRAST REV CODE 255: Performed by: EMERGENCY MEDICINE

## 2020-09-21 PROCEDURE — 700105 HCHG RX REV CODE 258: Performed by: EMERGENCY MEDICINE

## 2020-09-21 PROCEDURE — 84100 ASSAY OF PHOSPHORUS: CPT

## 2020-09-21 PROCEDURE — U0003 INFECTIOUS AGENT DETECTION BY NUCLEIC ACID (DNA OR RNA); SEVERE ACUTE RESPIRATORY SYNDROME CORONAVIRUS 2 (SARS-COV-2) (CORONAVIRUS DISEASE [COVID-19]), AMPLIFIED PROBE TECHNIQUE, MAKING USE OF HIGH THROUGHPUT TECHNOLOGIES AS DESCRIBED BY CMS-2020-01-R: HCPCS

## 2020-09-21 PROCEDURE — 99220 PR INITIAL OBSERVATION CARE,LEVL III: CPT | Performed by: INTERNAL MEDICINE

## 2020-09-21 PROCEDURE — 81003 URINALYSIS AUTO W/O SCOPE: CPT

## 2020-09-21 PROCEDURE — 99285 EMERGENCY DEPT VISIT HI MDM: CPT

## 2020-09-21 PROCEDURE — G0378 HOSPITAL OBSERVATION PER HR: HCPCS

## 2020-09-21 PROCEDURE — 93005 ELECTROCARDIOGRAM TRACING: CPT | Performed by: EMERGENCY MEDICINE

## 2020-09-21 PROCEDURE — 84484 ASSAY OF TROPONIN QUANT: CPT

## 2020-09-21 PROCEDURE — 82962 GLUCOSE BLOOD TEST: CPT

## 2020-09-21 PROCEDURE — 71275 CT ANGIOGRAPHY CHEST: CPT

## 2020-09-21 PROCEDURE — 700105 HCHG RX REV CODE 258: Performed by: INTERNAL MEDICINE

## 2020-09-21 PROCEDURE — 80053 COMPREHEN METABOLIC PANEL: CPT

## 2020-09-21 PROCEDURE — 83880 ASSAY OF NATRIURETIC PEPTIDE: CPT

## 2020-09-21 PROCEDURE — 85025 COMPLETE CBC W/AUTO DIFF WBC: CPT

## 2020-09-21 PROCEDURE — 83735 ASSAY OF MAGNESIUM: CPT

## 2020-09-21 PROCEDURE — C9803 HOPD COVID-19 SPEC COLLECT: HCPCS | Performed by: INTERNAL MEDICINE

## 2020-09-21 PROCEDURE — A9270 NON-COVERED ITEM OR SERVICE: HCPCS | Performed by: INTERNAL MEDICINE

## 2020-09-21 PROCEDURE — 84443 ASSAY THYROID STIM HORMONE: CPT

## 2020-09-21 PROCEDURE — 99215 OFFICE O/P EST HI 40 MIN: CPT | Performed by: NURSE PRACTITIONER

## 2020-09-21 PROCEDURE — 700102 HCHG RX REV CODE 250 W/ 637 OVERRIDE(OP): Performed by: INTERNAL MEDICINE

## 2020-09-21 PROCEDURE — 93000 ELECTROCARDIOGRAM COMPLETE: CPT | Performed by: NURSE PRACTITIONER

## 2020-09-21 RX ORDER — PROMETHAZINE HYDROCHLORIDE 25 MG/1
12.5-25 TABLET ORAL EVERY 4 HOURS PRN
Status: DISCONTINUED | OUTPATIENT
Start: 2020-09-21 | End: 2020-09-25 | Stop reason: HOSPADM

## 2020-09-21 RX ORDER — SODIUM CHLORIDE, SODIUM LACTATE, POTASSIUM CHLORIDE, CALCIUM CHLORIDE 600; 310; 30; 20 MG/100ML; MG/100ML; MG/100ML; MG/100ML
1000 INJECTION, SOLUTION INTRAVENOUS ONCE
Status: COMPLETED | OUTPATIENT
Start: 2020-09-21 | End: 2020-09-21

## 2020-09-21 RX ORDER — SODIUM CHLORIDE 9 MG/ML
1000 INJECTION, SOLUTION INTRAVENOUS ONCE
Status: COMPLETED | OUTPATIENT
Start: 2020-09-21 | End: 2020-09-22

## 2020-09-21 RX ORDER — PROMETHAZINE HYDROCHLORIDE 25 MG/1
12.5-25 SUPPOSITORY RECTAL EVERY 4 HOURS PRN
Status: DISCONTINUED | OUTPATIENT
Start: 2020-09-21 | End: 2020-09-25 | Stop reason: HOSPADM

## 2020-09-21 RX ORDER — LEVOTHYROXINE SODIUM 0.05 MG/1
50 TABLET ORAL
Status: DISCONTINUED | OUTPATIENT
Start: 2020-09-22 | End: 2020-09-25 | Stop reason: HOSPADM

## 2020-09-21 RX ORDER — ONDANSETRON 4 MG/1
4 TABLET, ORALLY DISINTEGRATING ORAL EVERY 4 HOURS PRN
Status: DISCONTINUED | OUTPATIENT
Start: 2020-09-21 | End: 2020-09-25 | Stop reason: HOSPADM

## 2020-09-21 RX ORDER — PROCHLORPERAZINE EDISYLATE 5 MG/ML
5-10 INJECTION INTRAMUSCULAR; INTRAVENOUS EVERY 4 HOURS PRN
Status: DISCONTINUED | OUTPATIENT
Start: 2020-09-21 | End: 2020-09-25 | Stop reason: HOSPADM

## 2020-09-21 RX ORDER — ONDANSETRON 2 MG/ML
4 INJECTION INTRAMUSCULAR; INTRAVENOUS EVERY 4 HOURS PRN
Status: DISCONTINUED | OUTPATIENT
Start: 2020-09-21 | End: 2020-09-25 | Stop reason: HOSPADM

## 2020-09-21 RX ORDER — POTASSIUM CHLORIDE 20 MEQ/1
20 TABLET, EXTENDED RELEASE ORAL 2 TIMES DAILY
Status: DISCONTINUED | OUTPATIENT
Start: 2020-09-21 | End: 2020-09-25 | Stop reason: HOSPADM

## 2020-09-21 RX ORDER — ACETAMINOPHEN 325 MG/1
650 TABLET ORAL EVERY 6 HOURS PRN
Status: DISCONTINUED | OUTPATIENT
Start: 2020-09-21 | End: 2020-09-25 | Stop reason: HOSPADM

## 2020-09-21 RX ORDER — THIAMINE MONONITRATE (VIT B1) 100 MG
100 TABLET ORAL DAILY
Status: DISCONTINUED | OUTPATIENT
Start: 2020-09-22 | End: 2020-09-25 | Stop reason: HOSPADM

## 2020-09-21 RX ORDER — DEXTROSE MONOHYDRATE 25 G/50ML
50 INJECTION, SOLUTION INTRAVENOUS
Status: DISCONTINUED | OUTPATIENT
Start: 2020-09-21 | End: 2020-09-25 | Stop reason: HOSPADM

## 2020-09-21 RX ADMIN — POTASSIUM CHLORIDE 20 MEQ: 1500 TABLET, EXTENDED RELEASE ORAL at 22:31

## 2020-09-21 RX ADMIN — IOHEXOL 55 ML: 350 INJECTION, SOLUTION INTRAVENOUS at 17:34

## 2020-09-21 RX ADMIN — SODIUM CHLORIDE 1000 ML: 9 INJECTION, SOLUTION INTRAVENOUS at 20:45

## 2020-09-21 RX ADMIN — SODIUM CHLORIDE, POTASSIUM CHLORIDE, SODIUM LACTATE AND CALCIUM CHLORIDE 1000 ML: 600; 310; 30; 20 INJECTION, SOLUTION INTRAVENOUS at 16:44

## 2020-09-21 ASSESSMENT — ENCOUNTER SYMPTOMS
PND: 0
CHILLS: 0
EYE PAIN: 0
DOUBLE VISION: 0
SHORTNESS OF BREATH: 1
SHORTNESS OF BREATH: 1
ABDOMINAL PAIN: 0
FOCAL WEAKNESS: 0
NAUSEA: 0
HEADACHES: 0
HALLUCINATIONS: 0
COUGH: 0
SORE THROAT: 0
BACK PAIN: 0
ABDOMINAL PAIN: 0
NECK PAIN: 0
VOMITING: 0
SYNCOPE: 1
TREMORS: 0
CLAUDICATION: 0
CONSTIPATION: 0
ORTHOPNEA: 0
SENSORY CHANGE: 0
VOMITING: 0
FEVER: 0
SPEECH CHANGE: 0
TINGLING: 0
DIZZINESS: 0
MYALGIAS: 0
SWOLLEN GLANDS: 0
LEG PAIN: 0
SPUTUM PRODUCTION: 0
ORTHOPNEA: 0
BLURRED VISION: 0
WEIGHT LOSS: 0
HEADACHES: 0
PHOTOPHOBIA: 0
FEVER: 0
DIARRHEA: 0
PALPITATIONS: 0

## 2020-09-21 ASSESSMENT — FIBROSIS 4 INDEX
FIB4 SCORE: 2.37
FIB4 SCORE: 2.37

## 2020-09-21 ASSESSMENT — LIFESTYLE VARIABLES
DO YOU DRINK ALCOHOL: NO
SUBSTANCE_ABUSE: 0

## 2020-09-21 NOTE — ED PROVIDER NOTES
ED Provider Note    Scribed for Henry Ordñoez M.D. by Ernesto Zamora. 9/21/2020, 3:34 PM.    Primary care provider: Clifton Harvey M.D.  Means of arrival: Walk in  History obtained from: Patient  History limited by: None     CHIEF COMPLAINT  Chief Complaint   Patient presents with   • Shortness of Breath     with exertion. Also complains of bilateral ankle swelling x1 week. Sent from urgent care who report that SpO2 was 86% while ambulating   • Low Blood Sugar     per [facility] FSBS 49 and she was given approx 10g oral glucose, improved to 83.       HPI  Pita Bueno is a 60 y.o. female who presents to the Emergency Department for evaluation of worsening shortness of breath reproduced with exertion onset today. The patient endorses associated lower extremity edema and claims that she has gained approximately 7 lbs the last few days. She notes she was recently discharged from [facility] three days ago after she was admitted for three days. The patient was initially admitted for nausea and vomiting at that time with an associated syncopal episode. She additionally had complained of a 10 lb weight loss during that encounter, but was treated with IV fluids at that time. The patient notes her labs during that admission did reveal she was severely hypokalemic and she was treated with potassium as well. She was eventually discharged after she felt improved, but now the patient is concerned about her increasing shortness of breath. The patient notes she is scheduled to follow up with Dr. Muniz  due to concerns of a partially obstructed bowel and prior history of a rectal prolapse. Upon EMS arrival today, the patient was found to be hypoglycemic at 49 and was treated with oral glucose for alleviation. She has no complaints of fever.     REVIEW OF SYSTEMS  As above otherwise all other systems are negative    PAST MEDICAL HISTORY   has a past medical history of Colostomy in place (HCC), Eating disorder,  "Hypotension, Hypothyroid, and Palpitations.    SURGICAL HISTORY   has a past surgical history that includes other abdominal surgery.    SOCIAL HISTORY  Social History     Tobacco Use   • Smoking status: Never Smoker   • Smokeless tobacco: Never Used   Substance Use Topics   • Alcohol use: Not Currently   • Drug use: Not Currently      Social History     Substance and Sexual Activity   Drug Use Not Currently       FAMILY HISTORY  Family History   Problem Relation Age of Onset   • Thyroid Mother    • Alcohol abuse Father    • Heart Disease Father    • Hypertension Father    • Stroke Father        CURRENT MEDICATIONS  Home Medications     Reviewed by Staci Marroquin (Pharmacy Tech) on 09/21/20 at 1840  Med List Status: Complete   Medication Last Dose Status   levothyroxine (SYNTHROID) 50 MCG Tab 9/21/2020 Active   linaCLOtide (LINZESS) 290 MCG Cap 9/20/2020 Active   metoclopramide (REGLAN) 10 MG Tab 9/21/2020 Active   potassium chloride SA (KDUR) 20 MEQ Tab CR 9/21/2020 Active   sennosides (SENOKOT) 8.6 MG Tab 9/21/2020 Active   thiamine (THIAMINE) 100 MG tablet 9/21/2020 Active                ALLERGIES  Allergies   Allergen Reactions   • Sulfa Drugs Hives       PHYSICAL EXAM  VITAL SIGNS: BP (!) 94/60   Pulse (!) 52   Temp 37.1 °C (98.8 °F) (Temporal)   Resp 20   Ht 1.651 m (5' 5\")   Wt 35.4 kg (78 lb)   SpO2 99%   BMI 12.98 kg/m²     Constitutional: Cachetic. No acute distress.    HENT: Normocephalic, Atraumatic, Bilateral external ears normal, dry mucous membranes, No oral exudates, Nose normal.   Eyes:conjunctiva is normal, there are no signs of exudate.   Neck: Supple, no meningeal signs.  Cardiovascular: Diminished heart tones. Regular rate and rhythm without murmurs gallops or rubs.   Thorax & Lungs: Diminished breath sounds bilaterally, but there are no wheezes no rales. Chest wall is nontender.  Abdomen: Scaffoid, Soft, nontender, nondistended. Bowel sounds are present.  Ostomy site in place  Skin: " Warm, Dry, No erythema,   Back: No tenderness, No CVA tenderness.  Musculoskeletal: Good range of motion in all major joints. No tenderness to palpation or major deformities noted. Intact distal pulses, no clubbing, no cyanosis, no appreciable pitting edema, non tender calves.   Neurologic: Alert & oriented x 3, Moving all extremities. No gross abnormalities.    Psychiatric: Affect normal, Judgment normal, Mood normal.     LABS  Results for orders placed or performed during the hospital encounter of 09/21/20   CBC with Differential   Result Value Ref Range    WBC 3.4 (L) 4.8 - 10.8 K/uL    RBC 3.60 (L) 4.20 - 5.40 M/uL    Hemoglobin 12.1 12.0 - 16.0 g/dL    Hematocrit 37.5 37.0 - 47.0 %    .2 (H) 81.4 - 97.8 fL    MCH 33.6 (H) 27.0 - 33.0 pg    MCHC 32.3 (L) 33.6 - 35.0 g/dL    RDW 53.9 (H) 35.9 - 50.0 fL    Platelet Count 90 (L) 164 - 446 K/uL    MPV 13.4 (H) 9.0 - 12.9 fL    Neutrophils-Polys 72.30 (H) 44.00 - 72.00 %    Lymphocytes 18.50 (L) 22.00 - 41.00 %    Monocytes 7.40 0.00 - 13.40 %    Eosinophils 0.90 0.00 - 6.90 %    Basophils 0.60 0.00 - 1.80 %    Immature Granulocytes 0.30 0.00 - 0.90 %    Nucleated RBC 0.00 /100 WBC    Neutrophils (Absolute) 2.43 2.00 - 7.15 K/uL    Lymphs (Absolute) 0.62 (L) 1.00 - 4.80 K/uL    Monos (Absolute) 0.25 0.00 - 0.85 K/uL    Eos (Absolute) 0.03 0.00 - 0.51 K/uL    Baso (Absolute) 0.02 0.00 - 0.12 K/uL    Immature Granulocytes (abs) 0.01 0.00 - 0.11 K/uL    NRBC (Absolute) 0.00 K/uL   Complete Metabolic Panel (CMP)   Result Value Ref Range    Sodium 136 135 - 145 mmol/L    Potassium 3.5 (L) 3.6 - 5.5 mmol/L    Chloride 107 96 - 112 mmol/L    Co2 16 (L) 20 - 33 mmol/L    Anion Gap 13.0 7.0 - 16.0    Glucose 71 65 - 99 mg/dL    Bun 22 8 - 22 mg/dL    Creatinine 0.57 0.50 - 1.40 mg/dL    Calcium 9.0 8.5 - 10.5 mg/dL    AST(SGOT) 21 12 - 45 U/L    ALT(SGPT) 26 2 - 50 U/L    Alkaline Phosphatase 57 30 - 99 U/L    Total Bilirubin 0.2 0.1 - 1.5 mg/dL    Albumin 4.1 3.2 -  4.9 g/dL    Total Protein 5.8 (L) 6.0 - 8.2 g/dL    Globulin 1.7 (L) 1.9 - 3.5 g/dL    A-G Ratio 2.4 g/dL   Troponin STAT   Result Value Ref Range    Troponin T 8 6 - 19 ng/L   Magnesium   Result Value Ref Range    Magnesium 2.2 1.5 - 2.5 mg/dL   Phosphorus   Result Value Ref Range    Phosphorus 4.5 2.5 - 4.5 mg/dL   TSH (for screening thyroid dysfunction)   Result Value Ref Range    TSH 3.890 0.380 - 5.330 uIU/mL   URINALYSIS,CULTURE IF INDICATED    Specimen: Urine   Result Value Ref Range    Color Yellow     Character Clear     Specific Gravity 1.013 <1.035    Ph 6.0 5.0 - 8.0    Glucose Negative Negative mg/dL    Ketones Negative Negative mg/dL    Protein Negative Negative mg/dL    Bilirubin Negative Negative    Urobilinogen, Urine 0.2 Negative    Nitrite Negative Negative    Leukocyte Esterase Negative Negative    Occult Blood Negative Negative    Micro Urine Req see below    proBrain Natriuretic Peptide, NT   Result Value Ref Range    NT-proBNP 205 (H) 0 - 125 pg/mL   ESTIMATED GFR   Result Value Ref Range    GFR If African American >60 >60 mL/min/1.73 m 2    GFR If Non African American >60 >60 mL/min/1.73 m 2   EKG   Result Value Ref Range    Report       [facility] Emergency Dept.    Test Date:  2020  Pt Name:    SUSAN WILCOX             Department: ER  MRN:        [id number]                      Room:       [facility]  Gender:     Female                       Technician: [id number]  :        1960                   Requested By:ER TRIAGE PROTOCOL  Order #:    [id number]                    Reading MD: EM HARRELL MD    Measurements  Intervals                                Axis  Rate:       51                           P:  MA:                                      QRS:        73  QRSD:       92                           T:          80  QT:         448  QTc:        413    Interpretive Statements  SINUS RHYTHM  MINIMAL ST ELEVATION, INFERIOR LEADS  Compared to ECG 2020  13:49:26  ST (T wave) deviation now present  artifact in multiple leads  No Acute Changes  Electronically Signed On 9- 17:26:12 PDT by EM HARRELL MD       All labs reviewed by me.    EKG  Interpreted by me as shown above    RADIOLOGY  CT-CTA CHEST PULMONARY ARTERY W/ RECONS   Final Result      Negative for pulmonary embolism or other thoracic abnormality              The radiologist's interpretation of all radiological studies have been reviewed by me.    COURSE & MEDICAL DECISION MAKING  Pertinent Labs & Imaging studies reviewed. (See chart for details)    3:34 PM - Patient seen and examined at bedside. Patient will be treated with 1 L IV fluids for dehydration. Ordered CT-CTA Chest Pulmonary Artery, BNP, Magnesium, Phosphorus, TSH, Urinalysis, CBC with differential, CMP, Troponin STAT and EKG to evaluate her symptoms. The differential diagnoses include but are not limited to: CHF, orthostatic, ACS.    4:50 PM - Patient with negative orthostatics. She had continuous pulse ox when ambulating to restroom. She maintained in the upper 90's, but had difficulty keeping adequate waveform. She felt dizzy and short of breath while ambulating, but had SpO2 of 99% with appropriate waveform and HR was at 54. She was able to ambulate with standby assist back to the [facility].     6:06 PM - Patient was reevaluated at bedside. She is resting in bed with stable vital signs. Discussed lab and radiology results with the patient, which are all reassuring. Her Hemoglobin has improved from 10 on her last lab results to 12.1 today. Additionally her potassium is stable at 3.5. Patient was updated with plan for admission and cardiac work up. She is understanding and agreeable.     6:24 PM - Paged Hospitalist.     6:30 PM  I discussed the patient's case and the above findings with(Hospitalist) who agrees to evaluate the patient for hospitalization.       HYDRATION: Based on the patient's presentation of Dehydration the patient  was given IV fluids. IV Hydration was used because oral hydration was not adequate alone. Upon recheck following hydration, the patient was improved.    Decision Making:  Since emergency department for evaluation.  Clinically the patient is rather cachectic in appearance week fragile but in no acute distress.  Laboratory studies were obtained.  The patient's orthostatic blood pressures were marked there is no changes her blood pressures remained in the 90s over 60s and heart rate went from the mid 40s to the mid 50s.  She did feel little dizzy but her blood pressures did not change so I do not feel she has an orthostatic component.  Patient did ambulate as well and there is no overt signs of hypoxemia despite the hypoxemia noted at the urgent care.  Nursing did follow with good waveform and showed oxygen saturations in the mid 90s the whole time.  At this point because of her exertional shortness of breath and dizziness I did have a long discussion with the patient she does not feel comfortable going home we will admit the patient for ri stratification for acute coronary syndrome to include echocardiography and possible stress testing.  Of spoken the hospitalist for this admission.  Possible the patient's syncopal events that she is experiencing at home could be related to her bradycardia.  She will be admitted for observation and evaluation of this.    DISPOSITION:  Patient will be hospitalized by the hospitalist in guarded condition.     FINAL IMPRESSION  1. Exertional shortness of breath    2. General weakness          Ernesto NAIK (Tk), am scribing for, and in the presence of, Henry Ordoñez M.D..    Electronically signed by: Ernesto Barker), 9/21/2020    Henry NAIK M.D. personally performed the services described in this documentation, as scribed by Ernesto Zamora in my presence, and it is both accurate and complete.    C.    The note accurately reflects work and decisions made by me.   Henry Ordoñez M.D.  9/21/2020  7:44 PM

## 2020-09-21 NOTE — ED TRIAGE NOTES
Chief Complaint   Patient presents with   • Shortness of Breath     with exertion. Also complains of bilateral ankle swelling x1 week. Sent from urgent care who report that SpO2 was 86% while ambulating   • Low Blood Sugar     per [facility] FSBS 49 and she was given approx 10g oral glucose, improved to 83.     Recent admission at [facility] for weight loss, n/v and electrolyte abnormalities. Assisted to restroom by wheelchair on arrival. Chart up for ERP.

## 2020-09-21 NOTE — PROGRESS NOTES
Subjective:   Pita Bueno is a 60 y.o. female who presents for Heart Problem (Heart Rate Low, Breathing troubles)       Shortness of Breath  This is a new problem. The current episode started in the past 7 days. The problem occurs constantly (increases with any type of exertion). The problem has been gradually worsening. Associated symptoms include leg swelling and syncope (possible). Pertinent negatives include no abdominal pain, chest pain, claudication, coryza, ear pain, fever, headaches, leg pain, orthopnea, PND, rash, sore throat, swollen glands, vomiting or wheezing. The symptoms are aggravated by any activity. The treatment provided no relief.     Pt presents for evaluation of a new problem, reports 3-day history of increasing fatigue, shortness of breath, and generalized weakness.  Was recently discharged from the hospital for severe PCN, starvation ketoacidosis, and hypokalemia.  States that she has had continued increase of symptoms, today she is unable to ambulate in her house without the need to stop to rest, and catch her breath.  Feels that her heart rate races with periods of short ambulation.  Additionally, she noticed bilateral lower extremity edema that began this morning.  Has a colostomy bag, has had normal output over the past 3 days since her discharge.  Denies any nausea, vomiting, fever, chills.  Denies any known ill contacts.    Review of Systems   Constitutional: Positive for malaise/fatigue and weight loss. Negative for chills and fever.   HENT: Negative for congestion, ear pain and sore throat.    Eyes: Negative for blurred vision.   Respiratory: Positive for shortness of breath. Negative for cough and wheezing.    Cardiovascular: Positive for leg swelling and syncope (possible). Negative for chest pain, orthopnea, claudication and PND.   Gastrointestinal: Positive for diarrhea. Negative for abdominal pain, nausea and vomiting.   Genitourinary: Negative for dysuria.  "  Musculoskeletal: Negative for back pain and myalgias.   Skin: Negative for rash.   Neurological: Positive for dizziness. Negative for weakness and headaches.   All other systems reviewed and are negative.      MEDS:   Current Outpatient Medications:   •  metoclopramide (REGLAN) 10 MG Tab, Take 1 Tab by mouth four times daily - before meals and nightly as needed., Disp: 120 Tab, Rfl: 0  •  thiamine (THIAMINE) 100 MG tablet, Take 1 Tab by mouth every day for 5 days., Disp: 5 Tab, Rfl: 0  •  potassium bicarbonate (KLYTE) 25 MEQ tablet, Take 1 Tab by mouth 2 times a day for 5 days., Disp: 10 Tab, Rfl: 0  •  potassium chloride SA (KDUR) 20 MEQ Tab CR, Take 1 Tab by mouth 2 times a day., Disp: 14 Tab, Rfl: 0  •  promethazine (PHENERGAN) 25 MG Tab, Take 1 Tab by mouth every 6 hours as needed for Nausea/Vomiting., Disp: 30 Tab, Rfl: 0  •  sennosides (SENOKOT) 8.6 MG Tab, Take 8.6 mg by mouth 1 time daily as needed., Disp: , Rfl:   •  ondansetron (ZOFRAN ODT) 4 MG TABLET DISPERSIBLE, Take 1 Tab by mouth every 8 hours as needed., Disp: 20 Tab, Rfl: 0  •  levothyroxine (SYNTHROID) 50 MCG Tab, Take 1 Tab by mouth Every morning on an empty stomach., Disp: 30 Tab, Rfl: 0  •  linaCLOtide (LINZESS) 290 MCG Cap, Take 290 mcg by mouth every evening., Disp: , Rfl:   ALLERGIES:   Allergies   Allergen Reactions   • Sulfa Drugs Hives       Patient's PMH, SocHx, SurgHx, FamHx, Drug allergies and medications were reviewed.     Objective:   /50 (BP Location: Left arm, Patient Position: Sitting, BP Cuff Size: Adult)   Pulse (!) 59   Temp 36.3 °C (97.4 °F) (Temporal)   Resp 16   Ht 1.651 m (5' 5\")   Wt 34.5 kg (76 lb)   SpO2 100%   BMI 12.65 kg/m²     Physical Exam  Vitals signs and nursing note reviewed.   Constitutional:       General: She is awake.      Appearance: Normal appearance. She is well-developed and normal weight.   HENT:      Head: Normocephalic and atraumatic.      Right Ear: Tympanic membrane, ear canal and " external ear normal.      Left Ear: Tympanic membrane, ear canal and external ear normal.      Nose: Nose normal.      Mouth/Throat:      Mouth: Mucous membranes are moist.      Pharynx: Oropharynx is clear.   Eyes:      Extraocular Movements: Extraocular movements intact.      Conjunctiva/sclera: Conjunctivae normal.      Pupils: Pupils are equal, round, and reactive to light.   Neck:      Musculoskeletal: Full passive range of motion without pain, normal range of motion and neck supple.      Thyroid: No thyromegaly.      Trachea: Trachea normal.   Cardiovascular:      Rate and Rhythm: Normal rate and regular rhythm.      Pulses: Normal pulses.      Heart sounds: Normal heart sounds, S1 normal and S2 normal.   Pulmonary:      Effort: Pulmonary effort is normal. No respiratory distress.      Breath sounds: Examination of the right-upper field reveals decreased breath sounds. Examination of the right-middle field reveals decreased breath sounds. Examination of the right-lower field reveals decreased breath sounds. Examination of the left-lower field reveals decreased breath sounds. Decreased breath sounds present. No wheezing, rhonchi or rales.      Comments: Tachypnea noted with short ambulation  Abdominal:      General: Bowel sounds are normal.      Palpations: Abdomen is soft.   Musculoskeletal: Normal range of motion.   Lymphadenopathy:      Cervical: No cervical adenopathy.   Skin:     General: Skin is warm and dry.      Capillary Refill: Capillary refill takes less than 2 seconds.   Neurological:      General: No focal deficit present.      Mental Status: She is alert and oriented to person, place, and time.      Gait: Gait is intact.   Psychiatric:         Attention and Perception: Attention and perception normal.         Mood and Affect: Mood normal.         Speech: Speech normal.         Behavior: Behavior normal. Behavior is cooperative.         Thought Content: Thought content normal.         Judgment:  Judgment normal.       Pulse ox sitting 100%R, dropped to 98% with approx 6 foot ambulation down pisano  HR 59, increased to 79, pt reports increased fatigue and WOB     EKG today: ST elevation  Compared to EKG on 9/11/2020    Assessment/Plan:   Assessment      1. Shortness of breath  - EKG - Clinic Performed    2. Malaise and fatigue  - EKG - Clinic Performed    3. Hypokalemia  - EKG - Clinic Performed    4. Lightheadedness    5. Severe malnutrition (HCC)    6. Diarrhea, unspecified type    Reviewed today's EKG< and due to these results in addition to her new complaints and recent hospital discharge, I discussed with them that she needs a transfer to a higher level of care.  She was put on O2 @ 2L via nasal cannula, given 325mg ASA, and on continuous pulse oximetry. EMS was called, and report given.  All questions answered patient agrees to transfer to higher level of care.    URGENT/ EMERGENT CARE   The  possibilty of a deterioration of this patient's condition required the highest level of my preparedness for a sudden, emergent intervention. I provided emergency care services, which included medication orders, frequent reevaluations of the patient's condition and response to treatment, ordering and reviewing test results, and arranging transport to higher level of care. The emergency care time and coordination of all aspects of this patients care of the patient was 30 minutes. This time is in addition to any other time noted elsewhere in this note.

## 2020-09-22 ENCOUNTER — APPOINTMENT (OUTPATIENT)
Dept: CARDIOLOGY | Facility: MEDICAL CENTER | Age: 60
End: 2020-09-22
Attending: INTERNAL MEDICINE
Payer: OTHER GOVERNMENT

## 2020-09-22 PROBLEM — R19.7 DIARRHEA: Status: ACTIVE | Noted: 2020-09-22

## 2020-09-22 LAB
ALBUMIN SERPL BCP-MCNC: 3 G/DL (ref 3.2–4.9)
ALBUMIN/GLOB SERPL: 2 G/DL
ALP SERPL-CCNC: 48 U/L (ref 30–99)
ALT SERPL-CCNC: 23 U/L (ref 2–50)
ANION GAP SERPL CALC-SCNC: 11 MMOL/L (ref 7–16)
AST SERPL-CCNC: 20 U/L (ref 12–45)
BASOPHILS # BLD AUTO: 0.7 % (ref 0–1.8)
BASOPHILS # BLD: 0.02 K/UL (ref 0–0.12)
BILIRUB SERPL-MCNC: 0.2 MG/DL (ref 0.1–1.5)
BUN SERPL-MCNC: 13 MG/DL (ref 8–22)
CALCIUM SERPL-MCNC: 7.9 MG/DL (ref 8.5–10.5)
CHLORIDE SERPL-SCNC: 109 MMOL/L (ref 96–112)
CO2 SERPL-SCNC: 15 MMOL/L (ref 20–33)
CORTIS SERPL-MCNC: 11.2 UG/DL (ref 0–23)
CREAT SERPL-MCNC: 0.58 MG/DL (ref 0.5–1.4)
EOSINOPHIL # BLD AUTO: 0.07 K/UL (ref 0–0.51)
EOSINOPHIL NFR BLD: 2.4 % (ref 0–6.9)
ERYTHROCYTE [DISTWIDTH] IN BLOOD BY AUTOMATED COUNT: 52.8 FL (ref 35.9–50)
GLOBULIN SER CALC-MCNC: 1.5 G/DL (ref 1.9–3.5)
GLUCOSE BLD-MCNC: 101 MG/DL (ref 65–99)
GLUCOSE BLD-MCNC: 69 MG/DL (ref 65–99)
GLUCOSE BLD-MCNC: 93 MG/DL (ref 65–99)
GLUCOSE BLD-MCNC: 98 MG/DL (ref 65–99)
GLUCOSE SERPL-MCNC: 72 MG/DL (ref 65–99)
HAV IGM SERPL QL IA: NORMAL
HBV CORE IGM SER QL: NORMAL
HBV SURFACE AG SER QL: NORMAL
HCT VFR BLD AUTO: 32.2 % (ref 37–47)
HCV AB SER QL: NORMAL
HGB BLD-MCNC: 10.6 G/DL (ref 12–16)
HIV 1+2 AB+HIV1 P24 AG SERPL QL IA: NORMAL
IMM GRANULOCYTES # BLD AUTO: 0.01 K/UL (ref 0–0.11)
IMM GRANULOCYTES NFR BLD AUTO: 0.3 % (ref 0–0.9)
LV EJECT FRACT  99904: 60
LV EJECT FRACT MOD 2C 99903: 68.97
LV EJECT FRACT MOD 4C 99902: 67.91
LV EJECT FRACT MOD BP 99901: 68.34
LYMPHOCYTES # BLD AUTO: 0.94 K/UL (ref 1–4.8)
LYMPHOCYTES NFR BLD: 32 % (ref 22–41)
MAGNESIUM SERPL-MCNC: 1.9 MG/DL (ref 1.5–2.5)
MCH RBC QN AUTO: 33.8 PG (ref 27–33)
MCHC RBC AUTO-ENTMCNC: 32.9 G/DL (ref 33.6–35)
MCV RBC AUTO: 102.5 FL (ref 81.4–97.8)
MONOCYTES # BLD AUTO: 0.34 K/UL (ref 0–0.85)
MONOCYTES NFR BLD AUTO: 11.6 % (ref 0–13.4)
NEUTROPHILS # BLD AUTO: 1.56 K/UL (ref 2–7.15)
NEUTROPHILS NFR BLD: 53 % (ref 44–72)
NRBC # BLD AUTO: 0 K/UL
NRBC BLD-RTO: 0 /100 WBC
PHOSPHATE SERPL-MCNC: 3.6 MG/DL (ref 2.5–4.5)
PLATELET # BLD AUTO: 96 K/UL (ref 164–446)
PMV BLD AUTO: 13.9 FL (ref 9–12.9)
POTASSIUM SERPL-SCNC: 4.3 MMOL/L (ref 3.6–5.5)
PROT SERPL-MCNC: 4.5 G/DL (ref 6–8.2)
RBC # BLD AUTO: 3.14 M/UL (ref 4.2–5.4)
SARS-COV-2 RNA RESP QL NAA+PROBE: NOTDETECTED
SODIUM SERPL-SCNC: 135 MMOL/L (ref 135–145)
SPECIMEN SOURCE: NORMAL
T3FREE SERPL-MCNC: 0.91 PG/ML (ref 2–4.4)
T4 FREE SERPL-MCNC: 0.97 NG/DL (ref 0.93–1.7)
VIT B12 SERPL-MCNC: 374 PG/ML (ref 211–911)
WBC # BLD AUTO: 2.9 K/UL (ref 4.8–10.8)

## 2020-09-22 PROCEDURE — 96375 TX/PRO/DX INJ NEW DRUG ADDON: CPT | Mod: XU

## 2020-09-22 PROCEDURE — 96365 THER/PROPH/DIAG IV INF INIT: CPT | Mod: XU

## 2020-09-22 PROCEDURE — 85025 COMPLETE CBC W/AUTO DIFF WBC: CPT

## 2020-09-22 PROCEDURE — 86644 CMV ANTIBODY: CPT

## 2020-09-22 PROCEDURE — 700111 HCHG RX REV CODE 636 W/ 250 OVERRIDE (IP): Performed by: INTERNAL MEDICINE

## 2020-09-22 PROCEDURE — 82607 VITAMIN B-12: CPT

## 2020-09-22 PROCEDURE — 87209 SMEAR COMPLEX STAIN: CPT

## 2020-09-22 PROCEDURE — G0378 HOSPITAL OBSERVATION PER HR: HCPCS

## 2020-09-22 PROCEDURE — 700102 HCHG RX REV CODE 250 W/ 637 OVERRIDE(OP): Performed by: INTERNAL MEDICINE

## 2020-09-22 PROCEDURE — 36415 COLL VENOUS BLD VENIPUNCTURE: CPT

## 2020-09-22 PROCEDURE — 80074 ACUTE HEPATITIS PANEL: CPT

## 2020-09-22 PROCEDURE — P9047 ALBUMIN (HUMAN), 25%, 50ML: HCPCS | Performed by: INTERNAL MEDICINE

## 2020-09-22 PROCEDURE — 84481 FREE ASSAY (FT-3): CPT

## 2020-09-22 PROCEDURE — 83735 ASSAY OF MAGNESIUM: CPT

## 2020-09-22 PROCEDURE — 93306 TTE W/DOPPLER COMPLETE: CPT

## 2020-09-22 PROCEDURE — 84100 ASSAY OF PHOSPHORUS: CPT

## 2020-09-22 PROCEDURE — 87177 OVA AND PARASITES SMEARS: CPT

## 2020-09-22 PROCEDURE — A9270 NON-COVERED ITEM OR SERVICE: HCPCS | Performed by: INTERNAL MEDICINE

## 2020-09-22 PROCEDURE — 86645 CMV ANTIBODY IGM: CPT

## 2020-09-22 PROCEDURE — 84439 ASSAY OF FREE THYROXINE: CPT

## 2020-09-22 PROCEDURE — 87040 BLOOD CULTURE FOR BACTERIA: CPT

## 2020-09-22 PROCEDURE — G0475 HIV COMBINATION ASSAY: HCPCS

## 2020-09-22 PROCEDURE — 700105 HCHG RX REV CODE 258: Performed by: NURSE PRACTITIONER

## 2020-09-22 PROCEDURE — 99225 PR SUBSEQUENT OBSERVATION CARE,LEVEL II: CPT | Performed by: INTERNAL MEDICINE

## 2020-09-22 PROCEDURE — 82533 TOTAL CORTISOL: CPT

## 2020-09-22 PROCEDURE — 82962 GLUCOSE BLOOD TEST: CPT | Mod: 91

## 2020-09-22 PROCEDURE — 93306 TTE W/DOPPLER COMPLETE: CPT | Mod: 26 | Performed by: INTERNAL MEDICINE

## 2020-09-22 PROCEDURE — 80053 COMPREHEN METABOLIC PANEL: CPT

## 2020-09-22 RX ORDER — FAMOTIDINE 20 MG/1
20 TABLET, FILM COATED ORAL 2 TIMES DAILY
Status: DISCONTINUED | OUTPATIENT
Start: 2020-09-22 | End: 2020-09-25 | Stop reason: HOSPADM

## 2020-09-22 RX ORDER — SODIUM CHLORIDE 9 MG/ML
500 INJECTION, SOLUTION INTRAVENOUS ONCE
Status: ACTIVE | OUTPATIENT
Start: 2020-09-22 | End: 2020-09-23

## 2020-09-22 RX ORDER — SODIUM CHLORIDE 9 MG/ML
INJECTION, SOLUTION INTRAVENOUS CONTINUOUS
Status: DISCONTINUED | OUTPATIENT
Start: 2020-09-22 | End: 2020-09-25 | Stop reason: HOSPADM

## 2020-09-22 RX ORDER — SUCRALFATE 1 G/1
1 TABLET ORAL EVERY 6 HOURS
Status: DISCONTINUED | OUTPATIENT
Start: 2020-09-22 | End: 2020-09-25 | Stop reason: HOSPADM

## 2020-09-22 RX ORDER — FLUDROCORTISONE ACETATE 0.1 MG/1
0.1 TABLET ORAL EVERY MORNING
Status: DISCONTINUED | OUTPATIENT
Start: 2020-09-22 | End: 2020-09-25 | Stop reason: HOSPADM

## 2020-09-22 RX ORDER — ALBUMIN (HUMAN) 12.5 G/50ML
12.5 SOLUTION INTRAVENOUS ONCE
Status: COMPLETED | OUTPATIENT
Start: 2020-09-22 | End: 2020-09-22

## 2020-09-22 RX ORDER — SODIUM CHLORIDE 9 MG/ML
500 INJECTION, SOLUTION INTRAVENOUS ONCE
Status: COMPLETED | OUTPATIENT
Start: 2020-09-22 | End: 2020-09-22

## 2020-09-22 RX ORDER — MIDODRINE HYDROCHLORIDE 5 MG/1
5 TABLET ORAL
Status: DISCONTINUED | OUTPATIENT
Start: 2020-09-22 | End: 2020-09-23

## 2020-09-22 RX ADMIN — ONDANSETRON 4 MG: 2 INJECTION INTRAMUSCULAR; INTRAVENOUS at 08:48

## 2020-09-22 RX ADMIN — MIDODRINE HYDROCHLORIDE 5 MG: 5 TABLET ORAL at 12:00

## 2020-09-22 RX ADMIN — SODIUM CHLORIDE 500 ML: 9 INJECTION, SOLUTION INTRAVENOUS at 05:40

## 2020-09-22 RX ADMIN — ALBUMIN (HUMAN) 12.5 G: 5 SOLUTION INTRAVENOUS at 11:03

## 2020-09-22 RX ADMIN — LEVOTHYROXINE SODIUM 50 MCG: 0.05 TABLET ORAL at 06:28

## 2020-09-22 RX ADMIN — Medication 100 MG: at 05:32

## 2020-09-22 RX ADMIN — SODIUM CHLORIDE: 9 INJECTION, SOLUTION INTRAVENOUS at 11:59

## 2020-09-22 RX ADMIN — SODIUM CHLORIDE: 9 INJECTION, SOLUTION INTRAVENOUS at 04:26

## 2020-09-22 RX ADMIN — MIDODRINE HYDROCHLORIDE 5 MG: 5 TABLET ORAL at 16:57

## 2020-09-22 RX ADMIN — FLUDROCORTISONE ACETATE 0.1 MG: 0.1 TABLET ORAL at 12:01

## 2020-09-22 RX ADMIN — POTASSIUM CHLORIDE 20 MEQ: 1500 TABLET, EXTENDED RELEASE ORAL at 05:32

## 2020-09-22 ASSESSMENT — ENCOUNTER SYMPTOMS
BACK PAIN: 0
WEIGHT LOSS: 1
HEARTBURN: 0
BLOOD IN STOOL: 0
DIARRHEA: 1
DIZZINESS: 1
TREMORS: 0
FEVER: 0
CHILLS: 0
FOCAL WEAKNESS: 0
WEAKNESS: 0
WHEEZING: 0
BRUISES/BLEEDS EASILY: 0
NAUSEA: 1
ORTHOPNEA: 0
NAUSEA: 0
NERVOUS/ANXIOUS: 0
CHILLS: 0
BLURRED VISION: 0
ABDOMINAL PAIN: 1
HEADACHES: 0
PHOTOPHOBIA: 0
COUGH: 0
CONSTIPATION: 0
POLYDIPSIA: 0
BLURRED VISION: 0
DOUBLE VISION: 0
VOMITING: 0
WEIGHT LOSS: 1
PALPITATIONS: 0
SPEECH CHANGE: 0
COUGH: 0
FLANK PAIN: 0
HALLUCINATIONS: 0
SPUTUM PRODUCTION: 0
NECK PAIN: 0
BACK PAIN: 0
SHORTNESS OF BREATH: 1
HEMOPTYSIS: 0
DIARRHEA: 1
MYALGIAS: 0

## 2020-09-22 ASSESSMENT — LIFESTYLE VARIABLES
TOTAL SCORE: 0
ON A TYPICAL DAY WHEN YOU DRINK ALCOHOL HOW MANY DRINKS DO YOU HAVE: 0
HAVE YOU EVER FELT YOU SHOULD CUT DOWN ON YOUR DRINKING: NO
SUBSTANCE_ABUSE: 0
ALCOHOL_USE: NO
EVER HAD A DRINK FIRST THING IN THE MORNING TO STEADY YOUR NERVES TO GET RID OF A HANGOVER: NO
EVER FELT BAD OR GUILTY ABOUT YOUR DRINKING: NO
TOTAL SCORE: 0
CONSUMPTION TOTAL: NEGATIVE
HAVE PEOPLE ANNOYED YOU BY CRITICIZING YOUR DRINKING: NO
TOTAL SCORE: 0
DOES PATIENT WANT TO STOP DRINKING: NO
AVERAGE NUMBER OF DAYS PER WEEK YOU HAVE A DRINK CONTAINING ALCOHOL: 0
HOW MANY TIMES IN THE PAST YEAR HAVE YOU HAD 5 OR MORE DRINKS IN A DAY: 0

## 2020-09-22 ASSESSMENT — PATIENT HEALTH QUESTIONNAIRE - PHQ9
1. LITTLE INTEREST OR PLEASURE IN DOING THINGS: NOT AT ALL
SUM OF ALL RESPONSES TO PHQ9 QUESTIONS 1 AND 2: 0
2. FEELING DOWN, DEPRESSED, IRRITABLE, OR HOPELESS: NOT AT ALL

## 2020-09-22 ASSESSMENT — FIBROSIS 4 INDEX: FIB4 SCORE: 2.7

## 2020-09-22 NOTE — ASSESSMENT & PLAN NOTE
She presented with symptoms of shortness of breath and she underwent CTA pulmonary and it did not show any pulmonary embolism or any other acute abnormalities   Transthoracic echo showed no significant functional abnormalities or valvular disease.  Patient will benefit from outpatient PFT if transthoracic echo is not diagnostic for her shortness of breath

## 2020-09-22 NOTE — H&P
Hospital Medicine History & Physical Note    Date of Service  9/21/2020    Primary Care Physician  Clifton Harvey M.D.    Consultants  None    Code Status  Full Code    I discussed CODE STATUS with her and she would like to be full code.    Chief Complaint  Chief Complaint   Patient presents with   • Shortness of Breath     with exertion. Also complains of bilateral ankle swelling x1 week. Sent from urgent care who report that SpO2 was 86% while ambulating   • Low Blood Sugar     per [facility] FSBS 49 and she was given approx 10g oral glucose, improved to 83.       History of Presenting Illness  60 y.o. female with past medical history of colostomy and hypertension who presented to the hospital on 9/21/2020 with complaint of shortness of breath that started approximately 1 month ago but in last 1 week it has been progressively getting worse.  She reported her shortness of breath aggravated with slight exertion even talking can make her short of breath.  There is no specific alleviating factors.  She reported sometimes she feels very short period of time of chest pain and the time of evaluation she denies any symptoms of chest pain.  She denies any symptoms of significant cough.  She denies any other acute complaints.  She denies any known COVID-19 exposure, fever, chills or any other acute complaints.  She reported that she is very athletic and her blood pressure runs usually low in 90s.  She reported she was evaluated by cardiology and echocardiogram was ordered and was scheduled to have it next month.  She also reported she has been having excessive amount of output from her ostomy and she has been going to the  bathroom again and again to empty her ostomy bag.  She also reported she has been having bilateral leg swelling.    ER course: She underwent CTA pulmonary and did not show any pulmonary embolism and no signs of infection or any other acute abnormalities.  She found to have elevated .  I ordered  echocardiogram.    Review of Systems  Review of Systems   Constitutional: Negative for chills, fever and weight loss.   HENT: Negative for hearing loss and tinnitus.    Eyes: Negative for blurred vision, double vision, photophobia and pain.   Respiratory: Positive for shortness of breath. Negative for cough and sputum production.    Cardiovascular: Positive for chest pain and leg swelling. Negative for palpitations and orthopnea.   Gastrointestinal: Negative for abdominal pain, constipation, diarrhea, nausea and vomiting.   Genitourinary: Negative for dysuria, frequency and urgency.   Musculoskeletal: Negative for back pain, joint pain, myalgias and neck pain.   Skin: Negative for rash.   Neurological: Negative for dizziness, tingling, tremors, sensory change, speech change, focal weakness and headaches.   Psychiatric/Behavioral: Negative for hallucinations and substance abuse.   All other systems reviewed and are negative.      Past Medical History   has a past medical history of Colostomy in place (HCC), Eating disorder, Hypotension, Hypothyroid, and Palpitations.    Surgical History   has a past surgical history that includes other abdominal surgery.     Family History  family history includes Alcohol abuse in her father; Heart Disease in her father; Hypertension in her father; Stroke in her father; Thyroid in her mother.     Social History   reports that she has never smoked. She has never used smokeless tobacco. She reports previous alcohol use. She reports previous drug use.    Allergies  Allergies   Allergen Reactions   • Sulfa Drugs Hives       Medications  Prior to Admission Medications   Prescriptions Last Dose Informant Patient Reported? Taking?   levothyroxine (SYNTHROID) 50 MCG Tab 9/21/2020 at 0800 Patient No No   Sig: Take 1 Tab by mouth Every morning on an empty stomach.   linaCLOtide (LINZESS) 290 MCG Cap 9/20/2020 at Unknown time Patient Yes No   Sig: Take 290 mcg by mouth every evening.    metoclopramide (REGLAN) 10 MG Tab 9/21/2020 at Unknown time  No No   Sig: Take 1 Tab by mouth four times daily - before meals and nightly as needed.   potassium chloride SA (KDUR) 20 MEQ Tab CR 9/21/2020 at am  No No   Sig: Take 1 Tab by mouth 2 times a day.   sennosides (SENOKOT) 8.6 MG Tab 9/21/2020 at Unknown time Patient Yes No   Sig: Take 8.6 mg by mouth 1 time daily as needed.   thiamine (THIAMINE) 100 MG tablet 9/21/2020 at Unknown time  No No   Sig: Take 1 Tab by mouth every day for 5 days.      Facility-Administered Medications: None       Physical Exam  Temp:  [37.1 °C (98.8 °F)] 37.1 °C (98.8 °F)  Pulse:  [43-53] 53  Resp:  [11-30] 18  BP: ()/(51-65) 81/52  SpO2:  [94 %-100 %] 100 %    Physical Exam  Vitals signs reviewed.   Constitutional:       General: She is not in acute distress.     Appearance: Normal appearance. She is not ill-appearing.   HENT:      Head: Normocephalic and atraumatic.      Nose: No congestion.   Eyes:      General:         Right eye: No discharge.         Left eye: No discharge.      Pupils: Pupils are equal, round, and reactive to light.   Neck:      Musculoskeletal: Normal range of motion. No neck rigidity.   Cardiovascular:      Rate and Rhythm: Regular rhythm. Bradycardia present.      Pulses: Normal pulses.      Heart sounds: Normal heart sounds. No murmur.   Pulmonary:      Effort: Pulmonary effort is normal. No respiratory distress.      Breath sounds: No stridor.      Comments: Tachypnea  Abdominal:      General: Bowel sounds are normal. There is no distension.      Palpations: Abdomen is soft.      Tenderness: There is no abdominal tenderness.   Musculoskeletal: Normal range of motion.         General: No swelling or tenderness.   Skin:     General: Skin is warm.      Capillary Refill: Capillary refill takes less than 2 seconds.      Coloration: Skin is not jaundiced or pale.      Findings: No bruising.   Neurological:      General: No focal deficit present.       Mental Status: She is alert and oriented to person, place, and time.      Cranial Nerves: No cranial nerve deficit.   Psychiatric:         Mood and Affect: Mood normal.         Behavior: Behavior normal.         Laboratory:  Recent Labs     09/21/20  1535   WBC 3.4*   RBC 3.60*   HEMOGLOBIN 12.1   HEMATOCRIT 37.5   .2*   MCH 33.6*   MCHC 32.3*   RDW 53.9*   PLATELETCT 90*   MPV 13.4*     Recent Labs     09/21/20  1535   SODIUM 136   POTASSIUM 3.5*   CHLORIDE 107   CO2 16*   GLUCOSE 71   BUN 22   CREATININE 0.57   CALCIUM 9.0     Recent Labs     09/21/20  1535   ALTSGPT 26   ASTSGOT 21   ALKPHOSPHAT 57   TBILIRUBIN 0.2   GLUCOSE 71         Recent Labs     09/21/20  1535   NTPROBNP 205*         Recent Labs     09/21/20  1535   TROPONINT 8       Imaging:  CT-CTA CHEST PULMONARY ARTERY W/ RECONS   Final Result      Negative for pulmonary embolism or other thoracic abnormality                  Assessment/Plan:  I anticipate this patient is appropriate for observation status at this time.    Shortness of breath  Assessment & Plan  She presented with symptoms of shortness of breath and she underwent CTA pulmonary and it did not show any pulmonary embolism or any other acute abnormalities per  She is a scheduled to have echocardiogram as outpatient next month and I ordered echocardiogram to evaluate for her shortness of breath.  She reported she has bilateral lower extremity edema but on my physical exam I did not find any significant bilateral lower extremity edema.  She found to have elevated pro BNP.  I am holding Lasix administration as she does not have any significant acute abnormalities on CT pulmonary along with that she reported she has significant output from her ostomy and I started her on IV fluid.  Strict input and output monitoring.  I requested nutrition consult.    Hypotension  Assessment & Plan  She reported her blood pressure usually runs in 90s and she also found to have bradycardia.  She reported  she is very athletic and she exercise daily and regularly.  Admitted telemetry for close monitoring.    As she reported that she has significant output from her ostomy I held her stool softener and medication which she takes outpatient for constipation.    Pancytopenia (HCC)- (present on admission)  Assessment & Plan  She has chronic pancytopenia.  She will need outpatient follow-up with hematology.  Continue to monitor.    Hypothyroidism- (present on admission)  Assessment & Plan  Continue outpatient levothyroxine    Reactive hypoglycemia- (present on admission)  Assessment & Plan  Per him so she found to have blood glucose of 49 and she received approximately 10 g of oral glucose.  Hypoglycemia protocol.    DVT prophylaxis: Lovenox her current blood count is 9 0 and hemoglobin remained stable at 12.1 and it was 10.6 on September 17.  If her hemoglobin trending down or her platelet decreased further consider discontinuation of Lovenox.

## 2020-09-22 NOTE — ED NOTES
Patient awake alert and oriented x 4, Glascow 15, bed in low position, call light within reach, on room air, attached to cardiac monitor, unlabored breathing noted, no cough noted, interacts with staff, interactions noted as appropriate, daughter at bedside, frequent rounding performed, will continue to assess patient.

## 2020-09-22 NOTE — ED NOTES
Patient awake alert and oriented x 4, Glascow 15, bed in low position, call light within reach, on room air, attached to cardiac monitor, unlabored breathing noted, no cough noted, interacts with staff, interactions noted as appropriate, speaking with admitting provider.

## 2020-09-22 NOTE — PROGRESS NOTES
59 y/o sending with shortness of breath, dizziness, lower extremity edema, also had a syncopal episode.  Seen in urgent care with O2 sat in the mid 80s.  But has been satting well here on room air.  Blood pressure in the low side but on review of records appears to be baseline.  Also noted to have bradycardia which also appears to be baseline.  Recent admission for nausea and vomiting, high output from ostomy.    Admitting - Dr. Solis

## 2020-09-22 NOTE — ASSESSMENT & PLAN NOTE
May be a side effects of Linzess  History of common variable immunodeficiency.  Ordered stool studies, including C. difficile, ova and parasite, stool culture.  HIV screen negative  Tested positive for C. difficile 9/23, started on p.o. vancomycin.

## 2020-09-22 NOTE — ASSESSMENT & PLAN NOTE
found to have blood glucose of 49 and she received approximately 10 g of oral glucose.  Hypoglycemia protocol.

## 2020-09-22 NOTE — ED NOTES
Patient awake alert and oriented x 4, Glascow 15, bed in low position, call light within reach, on room air, attached to cardiac monitor, unlabored breathing noted, no cough noted, interacts with staff, interactions noted as appropriate, hot pack provided per request, IV fluids infusing.

## 2020-09-22 NOTE — ASSESSMENT & PLAN NOTE
I think this is what causing the patient's symptoms, including shortness of breath, syncope, weakness, hypotension, bradycardia.  I believe severe malnutrition is related to CVID.  Question of the patient will benefit from IVIG or enzymes (creon)  Hematology, GI, dietitian consulted

## 2020-09-22 NOTE — ASSESSMENT & PLAN NOTE
She reported her blood pressure usually runs in 90s and she also found to have bradycardia.  Etiology is likely multifactorial.  Suspected hypovolemia from diarrhea and high volume colostomy output.  Patient received sufficient IV rehydration, but blood pressure remains borderline low  Cortisol level is 11. Ordered fludrocortisone and midodrine.  Continue monitoring blood pressure, IV fluid support

## 2020-09-22 NOTE — PROGRESS NOTES
Pt transferred from ER G38 to T216, via ACLS RN with zoll attached. Pt ambulated from wheelchair to bed with steady gait, stand by assist. Tele monitor attached. Pt's own meal heated up.

## 2020-09-22 NOTE — DIETARY
"Nutrition services: Day 0 of admit.  Pita Bueno is a 60 y.o. female with admitting DX of diet per RD.  Pt also noted with a low BMI (13.9) and severe malnutrition under hospital problem's list.  Attempted to assess pt today however unable to be completed at this time.  Pt recently evaluated by RD 9/17 @ [facility].  Information from this encounter based on previous RD note and visual observation until further follow-up.    Assessment:  Height: 165.1 cm (5' 5\")  Weight: 37.9 kg (83 lb 8.9 oz) - via stand up scale.   Body mass index is 13.9 kg/m²., BMI classification: Underweight.   Diet/Intake: Cardiac.  No PO intake documented @ this time.  Previously with variable PO intake @ [facility] ranging from <25%-75%.     Evaluation:   1. Pt noted with SOB, diarrhea, hypotension, pancytopenia, reactive hypoglycemia, and hypothyroidism.  2. PMHx reviewed - noted w/ hx of eating disorder.    3. Per previous RD note, pt reported n/v 2 - 2.5 weeks prior to [facility] admit (9/17).    4. Pt had reported UBW to be 90 - 100 lbs.  Per chart review, pt weighed 37.2 kg August 2020, which is stable with current admit wt however weighed 32.3 kg 5 days ago @ [facility].  Previous wt loss was 13.1% = severe.  5. Pt had reported to previous RD that she consumes 2000 - 3000 kcal and exercises 3 hours/day.  6. Per visual observation, with prominent zygomatic arch, sunken orbitals, and scooping temples - signs of both severe fat loss and severe muscle loss.    7. Hx of colostomy in place.  Pt had reported she takes laxatives daily as she tends to lean towards constipation.  Now noted with diarrhea.    8. Labs: Albumin: 3.0, Total protein: 4.5.  9. Meds: Pepcid, Florinef, Synthroid, Carafate, Thiamine, NS infusion @ 83 mL/hr.  10. LBM: PTA.     Malnutrition Risk: Pt dx w/ severe malnutrition during RD encounter on 9/17.  At this point malnutrition is ongoing.   Severe malnutrition in the context of acute illness related malnutrition related to previous n/v " and now diarrhea as evidenced by clinical markers of muscle loss (temples) and severe fat loss (zygomatic arch, oribitals).    Suspect potential social/environmental factor given hx of eating disorder and use of laxatives, and question PO intake /exercise regimen.    Recommendations/Plan:  1. Boost Plus TID to meal trays.  2. Encourage intake of meals and supplements.  Continue to document % consumed under ADLs.    3. Consider Psych consult.  4. Continue to monitor weight.  5. Continue Thiamine to reduce the risk of refeeding.     RD follows.

## 2020-09-22 NOTE — PROGRESS NOTES
Pt c/o nausea, and dizziness. Bp checked and noted to be 76/50. Pt placed in reverse trendelenburg. Will recheck Bp. BG noted to be 98. Will monitor.

## 2020-09-22 NOTE — CARE PLAN
Problem: Nutritional:  Goal: Achieve adequate nutritional intake  Description: Patient will consume >50% of meals/supplements  Outcome: NOT MET

## 2020-09-22 NOTE — ED NOTES
Patient awake alert and oriented x 4, Glascow 15, bed in low position, call light within reach, on room air, attached to cardiac monitor, unlabored breathing noted, no cough noted, interacts with staff, interactions noted as appropriate, using cell phone, daughter at bedside.

## 2020-09-22 NOTE — PROGRESS NOTES
Assessment completed.  Pt A&Ox4.  Respirations even, unlabored on room air, pt reports SOB.  Pt denies pain at this time.  Pt medicated for nausea per MAR. Sinus bradycardia noted on telemetry monitor.  POC discussed: awaiting echocardiogram; communication board updated.    Bed in locked, lowest position.  Call light and belongings within reach.  Needs met, will continue to monitor.

## 2020-09-22 NOTE — CARE PLAN
Problem: Communication  Goal: The ability to communicate needs accurately and effectively will improve  Outcome: PROGRESSING AS EXPECTED     Problem: Safety  Goal: Will remain free from injury  Outcome: PROGRESSING AS EXPECTED  Goal: Will remain free from falls  Outcome: PROGRESSING AS EXPECTED     Problem: Pain Management  Goal: Pain level will decrease to patient's comfort goal  Outcome: PROGRESSING AS EXPECTED     Problem: Infection  Goal: Will remain free from infection  Outcome: PROGRESSING AS EXPECTED     Problem: Knowledge Deficit  Goal: Knowledge of disease process/condition, treatment plan, diagnostic tests, and medications will improve  Outcome: PROGRESSING AS EXPECTED  Goal: Knowledge of the prescribed therapeutic regimen will improve  Outcome: PROGRESSING AS EXPECTED

## 2020-09-22 NOTE — PROGRESS NOTES
Hospital Medicine Daily Progress Note    Date of Service  9/22/2020    Chief Complaint/Hospital Course  60 y.o. female with history of common variable immunodeficiency, admitted 9/21/2020 with complaints of shortness of breath on exertion, weakness, orthostatic dizziness      Interval Problem Update  Patient reiterated her complaints of dyspnea on exertion.  Not in respiratory distress, on room air.  There is no lower extremity edema.  Pending transthoracic echo   complaining of watery diarrhea, acute on chronic.  Considering immunodeficiency, ordered stool studies, including C. difficile, stool culture, ova and parasites, blood culture.  HIV and hepatitis screen negative  Blood pressure remains borderline low, endorses orthostatic dizziness.  Cortisol level is 11 at 8 AM.  Ordered normal saline bolus, but patient refused it.  Ordered fludrocortisone and midodrine.    Consultants/Specialty  None  Code Status  Full Code    Disposition  Home when medically cleared    Review of Systems  Review of Systems   Constitutional: Positive for malaise/fatigue and weight loss. Negative for chills and fever.   HENT: Negative for ear pain, hearing loss and tinnitus.    Eyes: Negative for blurred vision, double vision and photophobia.   Respiratory: Positive for shortness of breath. Negative for cough, hemoptysis and sputum production.    Cardiovascular: Positive for leg swelling. Negative for chest pain, palpitations and orthopnea.   Gastrointestinal: Positive for abdominal pain, diarrhea and nausea. Negative for blood in stool, constipation, heartburn, melena and vomiting.   Genitourinary: Negative for dysuria, flank pain, frequency and hematuria.   Musculoskeletal: Negative for back pain, joint pain and neck pain.   Skin: Negative for itching and rash.   Neurological: Negative for tremors, speech change, focal weakness and headaches.   Endo/Heme/Allergies: Negative for environmental allergies and polydipsia. Does not  bruise/bleed easily.   Psychiatric/Behavioral: Negative for hallucinations and substance abuse. The patient is not nervous/anxious.         Physical Exam  Temp:  [36.1 °C (97 °F)-37.1 °C (98.8 °F)] 36.4 °C (97.6 °F)  Pulse:  [42-78] 58  Resp:  [11-30] 16  BP: ()/(50-71) 82/51  SpO2:  [94 %-100 %] 98 %    Physical Exam  Vitals signs and nursing note reviewed.   Constitutional:       General: She is not in acute distress.     Appearance: Normal appearance. She is cachectic.   HENT:      Head: Normocephalic and atraumatic.      Nose: Nose normal.      Mouth/Throat:      Mouth: Mucous membranes are moist.   Eyes:      Extraocular Movements: Extraocular movements intact.      Pupils: Pupils are equal, round, and reactive to light.   Neck:      Musculoskeletal: Normal range of motion and neck supple.   Cardiovascular:      Rate and Rhythm: Normal rate and regular rhythm.   Pulmonary:      Effort: Pulmonary effort is normal.      Breath sounds: Normal breath sounds.   Abdominal:      General: Abdomen is flat. There is no distension.      Tenderness: There is no abdominal tenderness.      Comments: Colostomy in the right lower quadrant, empty   Musculoskeletal: Normal range of motion.         General: No swelling or deformity.   Skin:     General: Skin is warm and dry.   Neurological:      General: No focal deficit present.      Mental Status: She is alert and oriented to person, place, and time.   Psychiatric:         Mood and Affect: Mood is anxious.         Behavior: Behavior normal.         Fluids    Intake/Output Summary (Last 24 hours) at 9/22/2020 1041  Last data filed at 9/21/2020 1918  Gross per 24 hour   Intake 1000 ml   Output --   Net 1000 ml       Laboratory  Recent Labs     09/21/20  1535 09/22/20  0400   WBC 3.4* 2.9*   RBC 3.60* 3.14*   HEMOGLOBIN 12.1 10.6*   HEMATOCRIT 37.5 32.2*   .2* 102.5*   MCH 33.6* 33.8*   MCHC 32.3* 32.9*   RDW 53.9* 52.8*   PLATELETCT 90* 96*   MPV 13.4* 13.9*      Recent Labs     09/21/20  1535 09/22/20  0400   SODIUM 136 135   POTASSIUM 3.5* 4.3   CHLORIDE 107 109   CO2 16* 15*   GLUCOSE 71 72   BUN 22 13   CREATININE 0.57 0.58   CALCIUM 9.0 7.9*                   Imaging  CT-CTA CHEST PULMONARY ARTERY W/ RECONS   Final Result      Negative for pulmonary embolism or other thoracic abnormality            EC-ECHOCARDIOGRAM COMPLETE W/O CONT    (Results Pending)        Assessment/Plan  Shortness of breath  Assessment & Plan  She presented with symptoms of shortness of breath and she underwent CTA pulmonary and it did not show any pulmonary embolism or any other acute abnormalities   Transthoracic echo is pending.  BNP is mildly elevated.  She reported she has bilateral lower extremity edema but on my physical exam I did not find any significant bilateral lower extremity edema.  Patient will benefit from outpatient PFT if transthoracic echo is not diagnostic for her shortness of breath    Diarrhea  Assessment & Plan  History of common variable immunodeficiency.  Ordered stool studies, including C. difficile, ova and parasite, stool culture.  HIV screen negative    Hypotension  Assessment & Plan  She reported her blood pressure usually runs in 90s and she also found to have bradycardia.  Etiology is likely multifactorial.  Suspected hypovolemia from diarrhea and high volume colostomy output.  Patient received sufficient IV rehydration, but blood pressure remains borderline low  Cortisol level is 11. Ordered fludrocortisone and midodrine.    Pancytopenia (HCC)- (present on admission)  Assessment & Plan  She has chronic pancytopenia.  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      Hypothyroidism- (present on admission)  Assessment & Plan  Free T3 0.91,   continue outpatient levothyroxine, follow-up with endocrinology as outpatient    Severe malnutrition (HCC)  Assessment & Plan  Nutritional supplementation  ordered.    Reactive hypoglycemia- (present on admission)  Assessment & Plan  Per him so she found to have blood glucose of 49 and she received approximately 10 g of oral glucose.  Hypoglycemia protocol.       VTE prophylaxis: Heparin

## 2020-09-23 ENCOUNTER — PATIENT MESSAGE (OUTPATIENT)
Dept: MEDICAL GROUP | Facility: PHYSICIAN GROUP | Age: 60
End: 2020-09-23

## 2020-09-23 PROBLEM — A04.72 C. DIFFICILE COLITIS: Status: ACTIVE | Noted: 2020-09-23

## 2020-09-23 LAB
ALBUMIN SERPL BCP-MCNC: 3 G/DL (ref 3.2–4.9)
ALBUMIN/GLOB SERPL: 2.5 G/DL
ALP SERPL-CCNC: 41 U/L (ref 30–99)
ALT SERPL-CCNC: 18 U/L (ref 2–50)
ANION GAP SERPL CALC-SCNC: 11 MMOL/L (ref 7–16)
AST SERPL-CCNC: 15 U/L (ref 12–45)
BASOPHILS # BLD AUTO: 0.6 % (ref 0–1.8)
BASOPHILS # BLD: 0.02 K/UL (ref 0–0.12)
BILIRUB SERPL-MCNC: <0.2 MG/DL (ref 0.1–1.5)
BUN SERPL-MCNC: 19 MG/DL (ref 8–22)
C DIFF DNA SPEC QL NAA+PROBE: NEGATIVE
C DIFF TOX A+B STL QL IA: POSITIVE
C DIFF TOX GENS STL QL NAA+PROBE: NORMAL
CALCIUM SERPL-MCNC: 8 MG/DL (ref 8.5–10.5)
CHLORIDE SERPL-SCNC: 105 MMOL/L (ref 96–112)
CO2 SERPL-SCNC: 21 MMOL/L (ref 20–33)
CREAT SERPL-MCNC: 0.51 MG/DL (ref 0.5–1.4)
EOSINOPHIL # BLD AUTO: 0.06 K/UL (ref 0–0.51)
EOSINOPHIL NFR BLD: 1.8 % (ref 0–6.9)
ERYTHROCYTE [DISTWIDTH] IN BLOOD BY AUTOMATED COUNT: 52.5 FL (ref 35.9–50)
FERRITIN SERPL-MCNC: 41.5 NG/ML (ref 10–291)
GLOBULIN SER CALC-MCNC: 1.2 G/DL (ref 1.9–3.5)
GLUCOSE BLD-MCNC: 70 MG/DL (ref 65–99)
GLUCOSE SERPL-MCNC: 90 MG/DL (ref 65–99)
HCT VFR BLD AUTO: 32.2 % (ref 37–47)
HEMOCCULT STL QL: NEGATIVE
HGB BLD-MCNC: 10.4 G/DL (ref 12–16)
HGB RETIC QN AUTO: 37.9 PG/CELL (ref 29–35)
HIV 1+2 AB+HIV1 P24 AG SERPL QL IA: NORMAL
IMM GRANULOCYTES # BLD AUTO: 0.01 K/UL (ref 0–0.11)
IMM GRANULOCYTES NFR BLD AUTO: 0.3 % (ref 0–0.9)
IMM RETICS NFR: 13.3 % (ref 9.3–17.4)
LDH SERPL L TO P-CCNC: 190 U/L (ref 107–266)
LYMPHOCYTES # BLD AUTO: 0.86 K/UL (ref 1–4.8)
LYMPHOCYTES NFR BLD: 25.3 % (ref 22–41)
MAGNESIUM SERPL-MCNC: 1.9 MG/DL (ref 1.5–2.5)
MCH RBC QN AUTO: 33.2 PG (ref 27–33)
MCHC RBC AUTO-ENTMCNC: 32.3 G/DL (ref 33.6–35)
MCV RBC AUTO: 102.9 FL (ref 81.4–97.8)
MONOCYTES # BLD AUTO: 0.34 K/UL (ref 0–0.85)
MONOCYTES NFR BLD AUTO: 10 % (ref 0–13.4)
NEUTROPHILS # BLD AUTO: 2.11 K/UL (ref 2–7.15)
NEUTROPHILS NFR BLD: 62 % (ref 44–72)
NRBC # BLD AUTO: 0 K/UL
NRBC BLD-RTO: 0 /100 WBC
PHOSPHATE SERPL-MCNC: 2.8 MG/DL (ref 2.5–4.5)
PLATELET # BLD AUTO: 103 K/UL (ref 164–446)
PMV BLD AUTO: 13.9 FL (ref 9–12.9)
POTASSIUM SERPL-SCNC: 3.9 MMOL/L (ref 3.6–5.5)
PROT SERPL-MCNC: 4.2 G/DL (ref 6–8.2)
RBC # BLD AUTO: 3.13 M/UL (ref 4.2–5.4)
RETICS # AUTO: 0.05 M/UL (ref 0.04–0.06)
RETICS/RBC NFR: 1.7 % (ref 0.8–2.1)
SODIUM SERPL-SCNC: 137 MMOL/L (ref 135–145)
WBC # BLD AUTO: 3.4 K/UL (ref 4.8–10.8)

## 2020-09-23 PROCEDURE — 700102 HCHG RX REV CODE 250 W/ 637 OVERRIDE(OP): Performed by: INTERNAL MEDICINE

## 2020-09-23 PROCEDURE — 85046 RETICYTE/HGB CONCENTRATE: CPT

## 2020-09-23 PROCEDURE — 87046 STOOL CULTR AEROBIC BACT EA: CPT

## 2020-09-23 PROCEDURE — 99224 PR SUBSEQUENT OBSERVATION CARE,LEVEL I: CPT | Performed by: INTERNAL MEDICINE

## 2020-09-23 PROCEDURE — 36415 COLL VENOUS BLD VENIPUNCTURE: CPT

## 2020-09-23 PROCEDURE — 84100 ASSAY OF PHOSPHORUS: CPT

## 2020-09-23 PROCEDURE — 83615 LACTATE (LD) (LDH) ENZYME: CPT

## 2020-09-23 PROCEDURE — 83735 ASSAY OF MAGNESIUM: CPT

## 2020-09-23 PROCEDURE — 87899 AGENT NOS ASSAY W/OPTIC: CPT

## 2020-09-23 PROCEDURE — A9270 NON-COVERED ITEM OR SERVICE: HCPCS | Performed by: INTERNAL MEDICINE

## 2020-09-23 PROCEDURE — 87206 SMEAR FLUORESCENT/ACID STAI: CPT

## 2020-09-23 PROCEDURE — 87493 C DIFF AMPLIFIED PROBE: CPT

## 2020-09-23 PROCEDURE — 82272 OCCULT BLD FECES 1-3 TESTS: CPT

## 2020-09-23 PROCEDURE — 87045 FECES CULTURE AEROBIC BACT: CPT

## 2020-09-23 PROCEDURE — 82962 GLUCOSE BLOOD TEST: CPT

## 2020-09-23 PROCEDURE — 86038 ANTINUCLEAR ANTIBODIES: CPT

## 2020-09-23 PROCEDURE — 80053 COMPREHEN METABOLIC PANEL: CPT

## 2020-09-23 PROCEDURE — 87389 HIV-1 AG W/HIV-1&-2 AB AG IA: CPT

## 2020-09-23 PROCEDURE — 82728 ASSAY OF FERRITIN: CPT

## 2020-09-23 PROCEDURE — G0378 HOSPITAL OBSERVATION PER HR: HCPCS

## 2020-09-23 PROCEDURE — 96376 TX/PRO/DX INJ SAME DRUG ADON: CPT

## 2020-09-23 PROCEDURE — 87324 CLOSTRIDIUM AG IA: CPT

## 2020-09-23 PROCEDURE — 83010 ASSAY OF HAPTOGLOBIN QUANT: CPT

## 2020-09-23 PROCEDURE — 85025 COMPLETE CBC W/AUTO DIFF WBC: CPT

## 2020-09-23 PROCEDURE — 700105 HCHG RX REV CODE 258: Performed by: NURSE PRACTITIONER

## 2020-09-23 PROCEDURE — 700111 HCHG RX REV CODE 636 W/ 250 OVERRIDE (IP): Performed by: INTERNAL MEDICINE

## 2020-09-23 PROCEDURE — 82784 ASSAY IGA/IGD/IGG/IGM EACH: CPT

## 2020-09-23 PROCEDURE — 700105 HCHG RX REV CODE 258: Performed by: INTERNAL MEDICINE

## 2020-09-23 RX ORDER — SODIUM CHLORIDE 9 MG/ML
500 INJECTION, SOLUTION INTRAVENOUS ONCE
Status: COMPLETED | OUTPATIENT
Start: 2020-09-23 | End: 2020-09-23

## 2020-09-23 RX ORDER — MIDODRINE HYDROCHLORIDE 5 MG/1
10 TABLET ORAL
Status: DISCONTINUED | OUTPATIENT
Start: 2020-09-23 | End: 2020-09-25 | Stop reason: HOSPADM

## 2020-09-23 RX ADMIN — MIDODRINE HYDROCHLORIDE 5 MG: 5 TABLET ORAL at 08:16

## 2020-09-23 RX ADMIN — SODIUM CHLORIDE: 9 INJECTION, SOLUTION INTRAVENOUS at 17:07

## 2020-09-23 RX ADMIN — MIDODRINE HYDROCHLORIDE 10 MG: 5 TABLET ORAL at 17:06

## 2020-09-23 RX ADMIN — ONDANSETRON 4 MG: 2 INJECTION INTRAMUSCULAR; INTRAVENOUS at 00:57

## 2020-09-23 RX ADMIN — SODIUM CHLORIDE 500 ML: 9 INJECTION, SOLUTION INTRAVENOUS at 17:07

## 2020-09-23 RX ADMIN — VANCOMYCIN HYDROCHLORIDE 125 MG: KIT ORAL at 16:27

## 2020-09-23 RX ADMIN — LEVOTHYROXINE SODIUM 50 MCG: 0.05 TABLET ORAL at 05:17

## 2020-09-23 RX ADMIN — ONDANSETRON 4 MG: 2 INJECTION INTRAMUSCULAR; INTRAVENOUS at 17:04

## 2020-09-23 RX ADMIN — VANCOMYCIN HYDROCHLORIDE 125 MG: KIT ORAL at 22:15

## 2020-09-23 RX ADMIN — ACETAMINOPHEN 650 MG: 325 TABLET, FILM COATED ORAL at 17:05

## 2020-09-23 RX ADMIN — SODIUM CHLORIDE: 9 INJECTION, SOLUTION INTRAVENOUS at 05:36

## 2020-09-23 RX ADMIN — ONDANSETRON 4 MG: 2 INJECTION INTRAMUSCULAR; INTRAVENOUS at 22:25

## 2020-09-23 ASSESSMENT — ENCOUNTER SYMPTOMS
WEAKNESS: 1
VOMITING: 0
BRUISES/BLEEDS EASILY: 0
FOCAL WEAKNESS: 0
ABDOMINAL PAIN: 1
SPEECH CHANGE: 0
COUGH: 0
ORTHOPNEA: 0
NECK PAIN: 0
WEIGHT LOSS: 1
CONSTIPATION: 1
BACK PAIN: 0
EYES NEGATIVE: 1
SHORTNESS OF BREATH: 1
NERVOUS/ANXIOUS: 1
SPUTUM PRODUCTION: 0
NAUSEA: 1
TREMORS: 0
NERVOUS/ANXIOUS: 0
BLOOD IN STOOL: 0
POLYDIPSIA: 0
HEADACHES: 0
CARDIOVASCULAR NEGATIVE: 1
DIARRHEA: 1
HEMOPTYSIS: 0
CHILLS: 0
FEVER: 0
CONSTIPATION: 0
PHOTOPHOBIA: 0
BLURRED VISION: 0
PALPITATIONS: 0
DOUBLE VISION: 0
HALLUCINATIONS: 0
HEARTBURN: 0
FLANK PAIN: 0

## 2020-09-23 ASSESSMENT — LIFESTYLE VARIABLES: SUBSTANCE_ABUSE: 0

## 2020-09-23 ASSESSMENT — PAIN DESCRIPTION - PAIN TYPE: TYPE: ACUTE PAIN

## 2020-09-23 NOTE — PROGRESS NOTES
Assessment completed. Pt A&Ox4. Pt denies pain or nausea at this time. Sinus lara noted on telemetry monitor, pt asymptomatic, physician aware. IVF infusing per MAR. POC discussed. Call light and belongings within reach. Needs met, will continue to monitor.

## 2020-09-23 NOTE — CONSULTS
Gastroenterology Consult Note:    OBDULIA Oro  Date & Time note created:    9/23/2020   3:05 PM     Referring MD:  Dr. Danny Bowden    Patient ID:  Name:             Pita Bueno   YOB: 1960  Age:                 60 y.o.  female   MRN:               [id number]                                                             Reason for Consult:      Diarrhea    History of Present Illness:    She is a 60-year-old female patient seen in consultation for diarrhea.  The patient was admitted recently, after several ER visits, for shortness of breath and low blood sugar.  During admission she was noted to have increased colostomy output.  She has a significant past medical history of failed rectal prolapse repair which resulted in anastomotic leak or abscess and ultimately led to left upper quadrant colostomy.  Since that time and multiple surgeries, she has had issues with what sounds to be colonic transit.  She did have a CT scan back in June which suggested right-sided colonic dilation she takes Linzess for what she describes as constipation.  She also has a his significant history of common variable immunodeficiency.  She presented to the emergency room also with pancytopenia.    Since admission she has had stool studies come back positive for C. difficile with positive toxin a and B.  Her HIV and hepatitis screen has been negative.  Yesterday she started having more what she describes constipation with less frequent bowel movements and lack of output through her ostomy.  A hematologist has been consulted for possible IVIG transfusion.    Review of Systems:      Review of Systems   Constitutional: Positive for malaise/fatigue and weight loss.   HENT: Negative.    Eyes: Negative.    Respiratory: Positive for shortness of breath.    Cardiovascular: Negative.    Gastrointestinal: Positive for constipation and diarrhea.   Genitourinary: Negative.    Musculoskeletal: Positive for joint pain.    Skin: Negative.    Neurological: Positive for weakness.   Psychiatric/Behavioral: The patient is nervous/anxious.              Past Medical History:   Past Medical History:   Diagnosis Date   • Colostomy in place (HCC)    • Eating disorder    • Hypotension    • Hypothyroid    • Palpitations        Past Surgical History:  Past Surgical History:   Procedure Laterality Date   • OTHER ABDOMINAL SURGERY      rectal prolapse repair, anastomy leak/abcess,colostony       Hospital Medications:    Current Facility-Administered Medications:   •  midodrine (PROAMATINE) tablet 10 mg, 10 mg, Oral, TID WITH MEALS, Danny Bowden M.D.  •  vancomycin 50 mg/mL oral soln 125 mg, 125 mg, Oral, Q6HRS, Danny Bowden M.D.  •  NS infusion, , Intravenous, Continuous, Leeanna Clayton, A.P.R.N., Last Rate: 83 mL/hr at 09/23/20 0536  •  fludrocortisone (FLORINEF) tablet 0.1 mg, 0.1 mg, Oral, QAM, Danny Bowden M.D., 0.1 mg at 09/22/20 1201  •  Special Contact Isolation, , , CONTINUOUS **AND** [COMPLETED] C Diff by PCR rflx Toxin, , , Once **AND** Pharmacy Consult Request, 1 Each, Other, PHARMACY TO DOSE, Danny Bowden M.D.  •  sucralfate (CARAFATE) tablet 1 g, 1 g, Oral, Q6HRS, Danny Bowden M.D.  •  famotidine (PEPCID) tablet 20 mg, 20 mg, Oral, BID, Danny Bowden M.D.  •  enoxaparin (LOVENOX) inj 40 mg, 40 mg, Subcutaneous, DAILY, Guera Solis M.D.  •  acetaminophen (TYLENOL) tablet 650 mg, 650 mg, Oral, Q6HRS PRN, Guera Solis M.D.  •  ondansetron (ZOFRAN) syringe/vial injection 4 mg, 4 mg, Intravenous, Q4HRS PRN, Guera Solis M.D., 4 mg at 09/23/20 0057  •  ondansetron (ZOFRAN ODT) dispertab 4 mg, 4 mg, Oral, Q4HRS PRN, Guera Solis M.D.  •  promethazine (PHENERGAN) tablet 12.5-25 mg, 12.5-25 mg, Oral, Q4HRS PRN, Guera Solis M.D.  •  promethazine (PHENERGAN) suppository 12.5-25 mg, 12.5-25 mg, Rectal, Q4HRS PRN, Guera Solis M.D.  •  prochlorperazine  (COMPAZINE) injection 5-10 mg, 5-10 mg, Intravenous, Q4HRS PRN, Guera Solis M.D.  •  levothyroxine (SYNTHROID) tablet 50 mcg, 50 mcg, Oral, AM ES, Guera Solis M.D., 50 mcg at 09/23/20 0517  •  potassium chloride SA (Kdur) tablet 20 mEq, 20 mEq, Oral, BID, Guera Solis M.D., 20 mEq at 09/22/20 0532  •  thiamine tablet 100 mg, 100 mg, Oral, DAILY, Guera Solis M.D., 100 mg at 09/22/20 0532  •  Notify, , , Once **AND** glucose 4 g chewable tablet 16 g, 16 g, Oral, Q15 MIN PRN **AND** dextrose 50% (D50W) injection 50 mL, 50 mL, Intravenous, Q15 MIN PRN, Guera Solis M.D.    Current Outpatient Medications:  Current Facility-Administered Medications   Medication Dose Route Frequency Provider Last Rate Last Dose   • midodrine (PROAMATINE) tablet 10 mg  10 mg Oral TID WITH MEALS Danny Bowden M.D.       • vancomycin 50 mg/mL oral soln 125 mg  125 mg Oral Q6HRS Danny Bowden M.D.       • NS infusion   Intravenous Continuous Leeannajeannie Clayton, A.P.R.N. 83 mL/hr at 09/23/20 0536     • fludrocortisone (FLORINEF) tablet 0.1 mg  0.1 mg Oral QAM Danny Bowden M.D.   0.1 mg at 09/22/20 1201   • Pharmacy Consult Request  1 Each Other PHARMACY TO DOSE Danny Bowden M.D.       • sucralfate (CARAFATE) tablet 1 g  1 g Oral Q6HRS Danny Bowden M.D.       • famotidine (PEPCID) tablet 20 mg  20 mg Oral BID Danny Bowden M.D.       • enoxaparin (LOVENOX) inj 40 mg  40 mg Subcutaneous DAILY Guera Solis M.D.       • acetaminophen (TYLENOL) tablet 650 mg  650 mg Oral Q6HRS PRN Guera Solis M.D.       • ondansetron (ZOFRAN) syringe/vial injection 4 mg  4 mg Intravenous Q4HRS PRN Guera Solis M.D.   4 mg at 09/23/20 0057   • ondansetron (ZOFRAN ODT) dispertab 4 mg  4 mg Oral Q4HRS PRN Guera Solis M.D.       • promethazine (PHENERGAN) tablet 12.5-25 mg  12.5-25 mg Oral Q4HRS PRN Guera Solis M.D.       • promethazine  (PHENERGAN) suppository 12.5-25 mg  12.5-25 mg Rectal Q4HRS PRN Guera Solis M.D.       • prochlorperazine (COMPAZINE) injection 5-10 mg  5-10 mg Intravenous Q4HRS PRN Guera Solis M.D.       • levothyroxine (SYNTHROID) tablet 50 mcg  50 mcg Oral AM ES Guera Solis M.D.   50 mcg at 09/23/20 0517   • potassium chloride SA (Kdur) tablet 20 mEq  20 mEq Oral BID Guera Solis M.D.   20 mEq at 09/22/20 0532   • thiamine tablet 100 mg  100 mg Oral DAILY Guera Solis M.D.   100 mg at 09/22/20 0532   • glucose 4 g chewable tablet 16 g  16 g Oral Q15 MIN PRN Guera Solis M.D.        And   • dextrose 50% (D50W) injection 50 mL  50 mL Intravenous Q15 MIN PRN Guera Solis M.D.           Medication Allergy:  Allergies   Allergen Reactions   • Sulfa Drugs Hives       Family History:  Family History   Problem Relation Age of Onset   • Thyroid Mother    • Alcohol abuse Father    • Heart Disease Father    • Hypertension Father    • Stroke Father        Social History:  Social History     Socioeconomic History   • Marital status:      Spouse name: Not on file   • Number of children: Not on file   • Years of education: Not on file   • Highest education level: Not on file   Occupational History   • Not on file   Social Needs   • Financial resource strain: Not on file   • Food insecurity     Worry: Not on file     Inability: Not on file   • Transportation needs     Medical: Not on file     Non-medical: Not on file   Tobacco Use   • Smoking status: Never Smoker   • Smokeless tobacco: Never Used   Substance and Sexual Activity   • Alcohol use: Not Currently   • Drug use: Not Currently   • Sexual activity: Not Currently   Lifestyle   • Physical activity     Days per week: Not on file     Minutes per session: Not on file   • Stress: Not on file   Relationships   • Social connections     Talks on phone: Not on file     Gets together: Not on file     Attends Yazidism  "service: Not on file     Active member of club or organization: Not on file     Attends meetings of clubs or organizations: Not on file     Relationship status: Not on file   • Intimate partner violence     Fear of current or ex partner: Not on file     Emotionally abused: Not on file     Physically abused: Not on file     Forced sexual activity: Not on file   Other Topics Concern   • Not on file   Social History Narrative   • Not on file         Physical Exam:  Vitals/ General Appearance:   Weight/BMI: Body mass index is 13.9 kg/m².  BP (!) 97/55   Pulse (!) 43   Temp 36.7 °C (98 °F) (Temporal)   Resp 13   Ht 1.651 m (5' 5\")   Wt 37.9 kg (83 lb 8.9 oz)   SpO2 98%   Vitals:    09/23/20 0438 09/23/20 0544 09/23/20 0750 09/23/20 1304   BP: (!) 73/41 (!) 73/50 (!) 74/51 (!) 97/55   Pulse: 65  (!) 44 (!) 43   Resp: 17  16 13   Temp: 36.8 °C (98.2 °F)  36.4 °C (97.6 °F) 36.7 °C (98 °F)   TempSrc: Temporal  Temporal Temporal   SpO2: 97%  97% 98%   Weight:       Height:         Oxygen Therapy:  Pulse Oximetry: 98 %, O2 (LPM): 0, O2 Delivery Device: None - Room Air    Physical Exam   Constitutional: She is oriented to person, place, and time. She appears dehydrated. She has a sickly appearance.   HENT:   Head: Normocephalic and atraumatic.   Right Ear: External ear normal.   Left Ear: External ear normal.   Nose: Nose normal.   Mouth/Throat: Oropharynx is clear and moist.   Eyes: Pupils are equal, round, and reactive to light. Conjunctivae and EOM are normal.   Neck: Neck supple. No JVD present. No tracheal deviation present. No thyromegaly present.   Cardiovascular: Regular rhythm, S1 normal, S2 normal and intact distal pulses. Bradycardia present.   No murmur heard.  Pulmonary/Chest: Effort normal and breath sounds normal. No respiratory distress. She has no wheezes. She has no rales.   Abdominal: Soft. Bowel sounds are normal. She exhibits no distension. There is abdominal tenderness (Mild diffuse).   LUQ ostomy " with appliance intact   Musculoskeletal: Normal range of motion.   Lymphadenopathy:     She has no cervical adenopathy.   Neurological: She is alert and oriented to person, place, and time. GCS score is 15.   Skin: Skin is warm and dry. No rash noted. No erythema. No pallor.   Psychiatric: Memory, affect and judgment normal. Her mood appears anxious.       MDM (Data Review):     Records reviewed and summarized in current documentation    Lab Data Review:  Recent Results (from the past 24 hour(s))   VITAMIN B12    Collection Time: 09/22/20  3:54 PM   Result Value Ref Range    Vitamin B12 -True Cobalamin 374 211 - 911 pg/mL   Blood Culture    Collection Time: 09/22/20  3:54 PM    Specimen: Peripheral; Blood   Result Value Ref Range    Significant Indicator NEG     Source BLD     Site PERIPHERAL     Culture Result       No Growth  Note: Blood cultures are incubated for 5 days and  are monitored continuously.Positive blood cultures  are called to the RN and reported as soon as  they are identified.     C Diff by PCR rflx Toxin    Collection Time: 09/23/20  2:57 AM    Specimen: Stool   Result Value Ref Range    C Diff by PCR See Toxin Negative    [id number] Presumptive Negative Negative   OCCULT BLOOD STOOL    Collection Time: 09/23/20  2:57 AM   Result Value Ref Range    Occult Blood Feces Negative Negative   C Diff Toxin    Collection Time: 09/23/20  2:57 AM   Result Value Ref Range    C.Diff Toxin A&B Positive (A)    CBC WITH DIFFERENTIAL    Collection Time: 09/23/20  3:16 AM   Result Value Ref Range    WBC 3.4 (L) 4.8 - 10.8 K/uL    RBC 3.13 (L) 4.20 - 5.40 M/uL    Hemoglobin 10.4 (L) 12.0 - 16.0 g/dL    Hematocrit 32.2 (L) 37.0 - 47.0 %    .9 (H) 81.4 - 97.8 fL    MCH 33.2 (H) 27.0 - 33.0 pg    MCHC 32.3 (L) 33.6 - 35.0 g/dL    RDW 52.5 (H) 35.9 - 50.0 fL    Platelet Count 103 (L) 164 - 446 K/uL    MPV 13.9 (H) 9.0 - 12.9 fL    Neutrophils-Polys 62.00 44.00 - 72.00 %    Lymphocytes 25.30 22.00 - 41.00 %     Monocytes 10.00 0.00 - 13.40 %    Eosinophils 1.80 0.00 - 6.90 %    Basophils 0.60 0.00 - 1.80 %    Immature Granulocytes 0.30 0.00 - 0.90 %    Nucleated RBC 0.00 /100 WBC    Neutrophils (Absolute) 2.11 2.00 - 7.15 K/uL    Lymphs (Absolute) 0.86 (L) 1.00 - 4.80 K/uL    Monos (Absolute) 0.34 0.00 - 0.85 K/uL    Eos (Absolute) 0.06 0.00 - 0.51 K/uL    Baso (Absolute) 0.02 0.00 - 0.12 K/uL    Immature Granulocytes (abs) 0.01 0.00 - 0.11 K/uL    NRBC (Absolute) 0.00 K/uL   RETICULOCYTES COUNT    Collection Time: 09/23/20  3:16 AM   Result Value Ref Range    Reticulocyte Count 1.7 0.8 - 2.1 %    Retic, Absolute 0.05 0.04 - 0.06 M/uL    Imm. Reticulocyte Fraction 13.3 9.3 - 17.4 %    Retic Hgb Equivalent 37.9 (H) 29.0 - 35.0 pg/cell   Comp Metabolic Panel    Collection Time: 09/23/20  5:24 AM   Result Value Ref Range    Sodium 137 135 - 145 mmol/L    Potassium 3.9 3.6 - 5.5 mmol/L    Chloride 105 96 - 112 mmol/L    Co2 21 20 - 33 mmol/L    Anion Gap 11.0 7.0 - 16.0    Glucose 90 65 - 99 mg/dL    Bun 19 8 - 22 mg/dL    Creatinine 0.51 0.50 - 1.40 mg/dL    Calcium 8.0 (L) 8.5 - 10.5 mg/dL    AST(SGOT) 15 12 - 45 U/L    ALT(SGPT) 18 2 - 50 U/L    Alkaline Phosphatase 41 30 - 99 U/L    Total Bilirubin <0.2 0.1 - 1.5 mg/dL    Albumin 3.0 (L) 3.2 - 4.9 g/dL    Total Protein 4.2 (L) 6.0 - 8.2 g/dL    Globulin 1.2 (L) 1.9 - 3.5 g/dL    A-G Ratio 2.5 g/dL   MAGNESIUM    Collection Time: 09/23/20  5:24 AM   Result Value Ref Range    Magnesium 1.9 1.5 - 2.5 mg/dL   PHOSPHORUS    Collection Time: 09/23/20  5:24 AM   Result Value Ref Range    Phosphorus 2.8 2.5 - 4.5 mg/dL   ESTIMATED GFR    Collection Time: 09/23/20  5:24 AM   Result Value Ref Range    GFR If African American >60 >60 mL/min/1.73 m 2    GFR If Non African American >60 >60 mL/min/1.73 m 2   LDH    Collection Time: 09/23/20  5:24 AM   Result Value Ref Range    LDH Total 190 107 - 266 U/L       Imaging/Procedures Review:    EC-ECHOCARDIOGRAM COMPLETE W/O  CONT  Transthoracic  Echo Report    Echocardiography Laboratory    CONCLUSIONS  No prior study is available for comparison.   Normal left ventricular systolic function.  Left ventricular ejection fraction is visually estimated to be 60%.  Normal regional wall motion.  Normal right ventricular size.  Normal left atrial size.  Trace mitral regurgitation.  Trace aortic insufficiency.  The aortic valve is not well visualized.  Right ventricular systolic pressure is estimated to be 30 mmHg.  Normal pericardium without effusion.    SUSAN WILCOX  Exam Date:         2020                      10:14  Exam Location:     Inpatient  Priority:          Routine    Ordering Physician:        SEAN HERNÁNDEZ  Referring Physician:       [id number] Smith  Sonographer:               Lauren Flores                              RDCS, RVT    Age:    60     Gender:    F  MRN:    [id number]  :    1960  BSA:    1.33   Ht (in):    65     Wt (lb):    78  Exam Type:     Complete    Indications:     Bradycardia, Shortness of breath  ICD Codes:       427.89  786.05    CPT Codes:       22792    BP:   97     /   70     HR:   51  Technical Quality:       Technically difficult study -                            adequate information is obtained    MEASUREMENTS  (Male / Female) Normal Values  2D ECHO  LV Diastolic Diameter PLAX        3.6 cm                4.2 - 5.9 / 3.9 - 5.3   cm  LV Systolic Diameter PLAX         2.2 cm                2.1 - 4.0 cm  IVS Diastolic Thickness           0.66 cm                 LVPW Diastolic Thickness          0.62 cm                 LVOT Diameter                     2.3 cm                  Estimated LV Ejection Fraction    60 %                    LV Ejection Fraction MOD BP       68.3 %                >= 55  %  LV Ejection Fraction MOD 4C       67.9 %                  LV Ejection Fraction MOD 2C       69 %                    IVC Diameter                      1.2 cm                     DOPPLER  AV Peak Velocity                  1.2 m/s                 AV Peak Gradient                  5.5 mmHg                AV Mean Gradient                  2.8 mmHg                AI Pressure Half Time             989 ms                  LVOT Peak Velocity                0.86 m/s                AV Area Cont Eq vti               3.1 cm2                 MV Velocity Time Integral         34.3 cm                 Mitral E Point Velocity           0.76 m/s                Mitral E to A Ratio               1.4                     MV Pressure Half Time             77.2 ms                 MV Area PHT                       2.8 cm2                 MV Deceleration Time              266 ms                  TR Peak Velocity                  238 cm/s                PV Peak Velocity                  0.88 m/s                PV Peak Gradient                  3.1 mmHg                RVOT Peak Velocity                0.67 m/s                  * Indicates values subject to auto-interpretation  LV EF:  60    %    FINDINGS  Left Ventricle  Normal left ventricular chamber size. Normal left ventricular wall   thickness. Normal left ventricular systolic function. Left ventricular   ejection fraction is visually estimated to be 60%. Normal regional wall   motion. Indeterminate diastolic function.    Right Ventricle  Normal right ventricular size. Normal right ventricular systolic   function.    Right Atrium  Normal right atrial size. Normal inferior vena cava size and   inspiratory collapse.    Left Atrium  Normal left atrial size. Left atrial volume index is 22  mL/sq m.    Mitral Valve  Structurally normal mitral valve. No mitral stenosis. Trace mitral   regurgitation.    Aortic Valve  The aortic valve is not well visualized. No aortic stenosis. Trace   aortic insufficiency. Pressure half time is 989    msec.    Tricuspid Valve  Structurally normal tricuspid valve. No tricuspid stenosis. Mild   tricuspid regurgitation.  Right ventricular systolic pressure is   estimated to be 30 mmHg.    Pulmonic Valve  The pulmonic valve is not well visualized. No pulmonic stenosis. Trace   pulmonic insufficiency.    Pericardium  Normal pericardium without effusion.    Aorta  Ascending aorta diameter is  3.2 cm.    Garth Najera M.D.  (Electronically Signed)  Final Date:     22 September 2020                   15:02        MDM (Assessment and Plan):     Patient Active Problem List    Diagnosis Date Noted   • Severe malnutrition ([facility]) 02/03/2020     Priority: High   • Shortness of breath 09/21/2020     Priority: Low   • C. difficile colitis 09/23/2020   • Diarrhea 09/22/2020   • Hypotension 09/21/2020   • Hypokalemia 09/14/2020   • Nausea & vomiting 09/14/2020   • Low back pain 09/14/2020   • Bowel dysfunction 07/13/2020   • Functional diarrhea 07/13/2020   • Constipation by delayed colonic transit 07/13/2020   • Pancytopenia ([facility]) 07/10/2020   • Pure hypercholesterolemia 02/07/2020   • Vitamin D deficiency 02/07/2020   • CVID (common variable immunodeficiency) ([facility]) 02/03/2020   • CFS (chronic fatigue syndrome) 02/03/2020   • Reactive hypoglycemia 02/03/2020   • Hypothyroidism 02/03/2020   • PTSD (post-traumatic stress disorder) 02/03/2020   • Constipation 02/03/2020   • Colostomy care ([facility]) 04/02/2015     Problems:  1.  Increased ostomy output consistent with diarrhea-this is from C. difficile infection confirmed on stool toxin testing.  Patient has been started on 125 mg of vancomycin every 6 hours.  She will need to continue this for 14 days.  She also has a previous pancreatic elastase extremely low.  This can cause diarrhea as well.    2.  Bradycardia and hypotension-possibly from dehydration and C. difficile status.    3.  Malnourishment-likely from her CVID.  Hematology has been consulted.    4.  Status post colostomy and colon dyssynergia.  Dilated colon on CT.    5.  Pancytopenia-etiology unclear.  Hematology to see.      Plan:  1.   Continue vancomycin 125 mg oral 4 times a day for 14 days    2.  Recommend hematology consultation for CVID, malnutrition, shortness of breath ETC    3.  IV fluid rehydration    4.  In terms of diarrhea and now possible constipation with colon dyssynergia.  See how she does with vancomycin first.  If continued diarrhea, could consider starting pancreatic enzymes inpatient, but this can also cause constipation which with findings on CT from June, could cause worsening of her colon dyssynergia.  This could lead to ileus, therefore I would be careful starting any of these types of medications.    5.  Patient may need further evaluation outpatient with colorectal surgery regarding her chronic colonic issues.    6.  Follow-up in GI clinic in 2 to 4 weeks with Dr. Zhao Starr     **  GI WILL SIGN OFF / STAND BY - PLEASE CALL IF ANY CONCERNS  **      Thank your for the opportunity to assist in the care of your patient.  Please call for any questions or concerns.    NIALL Oro.

## 2020-09-23 NOTE — PROGRESS NOTES
Zaida from Lab called with critical result of positive C-diff at 1250. Critical lab result read back to Zaida.   Dr. Bowden notified of critical lab result at 1251.  Critical lab result read back by Dr. Bowden.

## 2020-09-23 NOTE — PROGRESS NOTES
Hospital Medicine Daily Progress Note    Date of Service  9/23/2020    Chief Complaint/Hospital Course  60 y.o. female with history of common variable immunodeficiency, admitted 9/21/2020 with complaints of shortness of breath on exertion, weakness, orthostatic dizziness      Interval Problem Update  9/22 patient reiterated her complaints of dyspnea on exertion.  Not in respiratory distress, on room air.  There is no lower extremity edema.  Pending transthoracic echo   complaining of watery diarrhea, acute on chronic.  Considering immunodeficiency, ordered stool studies, including C. difficile, stool culture, ova and parasites, blood culture.  HIV and hepatitis screen negative  Blood pressure remains borderline low, endorses orthostatic dizziness.  Cortisol level is 11 at 8 AM.  Ordered normal saline bolus, but patient refused it.  Ordered fludrocortisone and midodrine.  9/23  Blood pressure remains low and patient remains mildly bradycardic with heart rate 40-50.  She denies dizziness.  She states that she is constipated since yesterday, missing her home stool softener  Linzess.  I discussed case with gastroenterologist/Dr. Harris, who agreed to see patient for any recommendations.  Discussed patient with hematologist/Dr. Chang, who agreed to look at the case, to see if patient a candidate for IVIG transfusions of hospital.  Of note, the  patient has an appointment with Dr. Brice tomorrow.  I discussed transthoracic echo result with the patient; there is no significant abnormalities that would explain her weakness and shortness of breath.  Update: Stool C. difficile PCR tested positive.  Started on vancomycin p.o.    Consultants/Specialty  Gastroenterology  Hematology    Code Status  Full Code    Disposition  Home when medically cleared    Review of Systems  Review of Systems   Constitutional: Positive for malaise/fatigue and weight loss. Negative for chills and fever.   HENT: Negative for ear pain, hearing loss  and tinnitus.    Eyes: Negative for blurred vision, double vision and photophobia.   Respiratory: Positive for shortness of breath. Negative for cough, hemoptysis and sputum production.    Cardiovascular: Positive for leg swelling. Negative for chest pain, palpitations and orthopnea.   Gastrointestinal: Positive for abdominal pain, diarrhea and nausea. Negative for blood in stool, constipation, heartburn, melena and vomiting.   Genitourinary: Negative for dysuria, flank pain, frequency and hematuria.   Musculoskeletal: Negative for back pain, joint pain and neck pain.   Skin: Negative for itching and rash.   Neurological: Negative for tremors, speech change, focal weakness and headaches.   Endo/Heme/Allergies: Negative for environmental allergies and polydipsia. Does not bruise/bleed easily.   Psychiatric/Behavioral: Negative for hallucinations and substance abuse. The patient is not nervous/anxious.         Physical Exam  Temp:  [36.4 °C (97.5 °F)-37.7 °C (99.8 °F)] 36.4 °C (97.6 °F)  Pulse:  [44-65] 44  Resp:  [12-18] 16  BP: (73-88)/(41-57) 74/51  SpO2:  [97 %-100 %] 97 %    Physical Exam  Vitals signs and nursing note reviewed.   Constitutional:       General: She is not in acute distress.     Appearance: Normal appearance. She is cachectic.   HENT:      Head: Normocephalic and atraumatic.      Nose: Nose normal.      Mouth/Throat:      Mouth: Mucous membranes are moist.   Eyes:      Extraocular Movements: Extraocular movements intact.      Pupils: Pupils are equal, round, and reactive to light.   Neck:      Musculoskeletal: Normal range of motion and neck supple.   Cardiovascular:      Rate and Rhythm: Normal rate and regular rhythm.   Pulmonary:      Effort: Pulmonary effort is normal.      Breath sounds: Normal breath sounds.   Abdominal:      General: Abdomen is flat. There is no distension.      Tenderness: There is no abdominal tenderness.      Comments: Colostomy in the right lower quadrant, empty    Musculoskeletal: Normal range of motion.         General: No swelling or deformity.   Skin:     General: Skin is warm and dry.   Neurological:      General: No focal deficit present.      Mental Status: She is alert and oriented to person, place, and time.   Psychiatric:         Mood and Affect: Mood is anxious.         Behavior: Behavior normal.         Fluids  No intake or output data in the 24 hours ending 09/23/20 1109    Laboratory  Recent Labs     09/21/20  1535 09/22/20  0400 09/23/20  0316   WBC 3.4* 2.9* 3.4*   RBC 3.60* 3.14* 3.13*   HEMOGLOBIN 12.1 10.6* 10.4*   HEMATOCRIT 37.5 32.2* 32.2*   .2* 102.5* 102.9*   MCH 33.6* 33.8* 33.2*   MCHC 32.3* 32.9* 32.3*   RDW 53.9* 52.8* 52.5*   PLATELETCT 90* 96* 103*   MPV 13.4* 13.9* 13.9*     Recent Labs     09/21/20  1535 09/22/20  0400 09/23/20  0524   SODIUM 136 135 137   POTASSIUM 3.5* 4.3 3.9   CHLORIDE 107 109 105   CO2 16* 15* 21   GLUCOSE 71 72 90   BUN 22 13 19   CREATININE 0.57 0.58 0.51   CALCIUM 9.0 7.9* 8.0*                   Imaging  EC-ECHOCARDIOGRAM COMPLETE W/O CONT   Final Result      CT-CTA CHEST PULMONARY ARTERY W/ RECONS   Final Result      Negative for pulmonary embolism or other thoracic abnormality                 Assessment/Plan  Severe malnutrition (HCC)  Assessment & Plan  I think this is what causing the patient's symptoms, including shortness of breath, syncope, weakness, hypotension, bradycardia.  I believe severe malnutrition is related to CVID.  Question of the patient will benefit from IVIG or enzymes (creon)  I discussed case with Dr. Harris/gastroenterology, who agreed to look at the case for any recommendations  Nutritional supplementations    C. difficile colitis  Assessment & Plan  Stool C. difficile PCR tested positive.  Started on vancomycin p.o.    Diarrhea  Assessment & Plan  May be a side effects of Linzess  History of common variable immunodeficiency.  Ordered stool studies, including C. difficile, ova and  parasite, stool culture.  HIV screen negative  Resolved, now she states she is constipated.  No colostomy output since yesterday.    Hypotension  Assessment & Plan  She reported her blood pressure usually runs in 90s and she also found to have bradycardia.  Etiology is likely multifactorial.  Suspected hypovolemia from diarrhea and high volume colostomy output.  Patient received sufficient IV rehydration, but blood pressure remains borderline low  Cortisol level is 11. Ordered fludrocortisone and midodrine.    Pancytopenia (HCC)- (present on admission)  Assessment & Plan  She has chronic pancytopenia.  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      Hypothyroidism- (present on admission)  Assessment & Plan  Free T3 0.91,   continue outpatient levothyroxine, follow-up with endocrinology as outpatient    Reactive hypoglycemia- (present on admission)  Assessment & Plan  found to have blood glucose of 49 and she received approximately 10 g of oral glucose.  Hypoglycemia protocol.    CVID (common variable immunodeficiency) (HCC)- (present on admission)  Assessment & Plan  Diagnosed at [place] in 2007.   She has an appointment with Dr. Brice/hematology this week to establish.  I discussed with Dr. Chang, who will look at the case    Shortness of breath  Assessment & Plan  She presented with symptoms of shortness of breath and she underwent CTA pulmonary and it did not show any pulmonary embolism or any other acute abnormalities   Transthoracic echo showed no significant functional abnormalities or valvular disease.  Patient will benefit from outpatient PFT if transthoracic echo is not diagnostic for her shortness of breath       VTE prophylaxis: Heparin

## 2020-09-23 NOTE — PROGRESS NOTES
Pt denies pain at this time. Pt reports feeling shortness of breath while eating, she states she feels very tired and out of wind after eating.  Sinus bradycardia on telemetry monitor. Daughter at bedside. Pt updated on POC. Bed in locked, lowest position. Call light and belongings within reach. Needs met, will continue to monitor.

## 2020-09-23 NOTE — ASSESSMENT & PLAN NOTE
Diagnosed at [place] in 2007.   She has an appointment with Dr. Brice/hematology this week to establish.  Hematology consulted; pending immunoglobulin level.  Plan for IVIG transfusion

## 2020-09-24 ENCOUNTER — APPOINTMENT (OUTPATIENT)
Dept: RADIOLOGY | Facility: MEDICAL CENTER | Age: 60
End: 2020-09-24
Attending: INTERNAL MEDICINE
Payer: OTHER GOVERNMENT

## 2020-09-24 LAB
ALBUMIN SERPL BCP-MCNC: 2.8 G/DL (ref 3.2–4.9)
ALBUMIN/GLOB SERPL: 2.2 G/DL
ALP SERPL-CCNC: 44 U/L (ref 30–99)
ALT SERPL-CCNC: 22 U/L (ref 2–50)
ANION GAP SERPL CALC-SCNC: 7 MMOL/L (ref 7–16)
AST SERPL-CCNC: 19 U/L (ref 12–45)
BASOPHILS # BLD AUTO: 0.6 % (ref 0–1.8)
BASOPHILS # BLD: 0.02 K/UL (ref 0–0.12)
BILIRUB SERPL-MCNC: <0.2 MG/DL (ref 0.1–1.5)
BUN SERPL-MCNC: 11 MG/DL (ref 8–22)
CALCIUM SERPL-MCNC: 8 MG/DL (ref 8.5–10.5)
CHLORIDE SERPL-SCNC: 106 MMOL/L (ref 96–112)
CO2 SERPL-SCNC: 23 MMOL/L (ref 20–33)
CREAT SERPL-MCNC: 0.57 MG/DL (ref 0.5–1.4)
E COLI SXT1+2 STL IA: NORMAL
EOSINOPHIL # BLD AUTO: 0.13 K/UL (ref 0–0.51)
EOSINOPHIL NFR BLD: 3.8 % (ref 0–6.9)
ERYTHROCYTE [DISTWIDTH] IN BLOOD BY AUTOMATED COUNT: 53.6 FL (ref 35.9–50)
GLOBULIN SER CALC-MCNC: 1.3 G/DL (ref 1.9–3.5)
GLUCOSE BLD-MCNC: 76 MG/DL (ref 65–99)
GLUCOSE BLD-MCNC: 97 MG/DL (ref 65–99)
GLUCOSE SERPL-MCNC: 79 MG/DL (ref 65–99)
HCT VFR BLD AUTO: 30.5 % (ref 37–47)
HGB BLD-MCNC: 9.8 G/DL (ref 12–16)
IMM GRANULOCYTES # BLD AUTO: 0 K/UL (ref 0–0.11)
IMM GRANULOCYTES NFR BLD AUTO: 0 % (ref 0–0.9)
LYMPHOCYTES # BLD AUTO: 1.22 K/UL (ref 1–4.8)
LYMPHOCYTES NFR BLD: 35.5 % (ref 22–41)
MAGNESIUM SERPL-MCNC: 1.9 MG/DL (ref 1.5–2.5)
MCH RBC QN AUTO: 33.7 PG (ref 27–33)
MCHC RBC AUTO-ENTMCNC: 32.1 G/DL (ref 33.6–35)
MCV RBC AUTO: 104.8 FL (ref 81.4–97.8)
MONOCYTES # BLD AUTO: 0.37 K/UL (ref 0–0.85)
MONOCYTES NFR BLD AUTO: 10.8 % (ref 0–13.4)
NEUTROPHILS # BLD AUTO: 1.7 K/UL (ref 2–7.15)
NEUTROPHILS NFR BLD: 49.3 % (ref 44–72)
NRBC # BLD AUTO: 0 K/UL
NRBC BLD-RTO: 0 /100 WBC
PHOSPHATE SERPL-MCNC: 3.2 MG/DL (ref 2.5–4.5)
PLATELET # BLD AUTO: 97 K/UL (ref 164–446)
PMV BLD AUTO: 13.4 FL (ref 9–12.9)
POTASSIUM SERPL-SCNC: 4.2 MMOL/L (ref 3.6–5.5)
PROT SERPL-MCNC: 4.1 G/DL (ref 6–8.2)
RBC # BLD AUTO: 2.91 M/UL (ref 4.2–5.4)
SIGNIFICANT IND 70042: NORMAL
SITE SITE: NORMAL
SODIUM SERPL-SCNC: 136 MMOL/L (ref 135–145)
SOURCE SOURCE: NORMAL
WBC # BLD AUTO: 3.4 K/UL (ref 4.8–10.8)

## 2020-09-24 PROCEDURE — 96375 TX/PRO/DX INJ NEW DRUG ADDON: CPT

## 2020-09-24 PROCEDURE — 99225 PR SUBSEQUENT OBSERVATION CARE,LEVEL II: CPT | Performed by: INTERNAL MEDICINE

## 2020-09-24 PROCEDURE — A9270 NON-COVERED ITEM OR SERVICE: HCPCS | Performed by: INTERNAL MEDICINE

## 2020-09-24 PROCEDURE — 85025 COMPLETE CBC W/AUTO DIFF WBC: CPT

## 2020-09-24 PROCEDURE — 36415 COLL VENOUS BLD VENIPUNCTURE: CPT

## 2020-09-24 PROCEDURE — 84100 ASSAY OF PHOSPHORUS: CPT

## 2020-09-24 PROCEDURE — G0378 HOSPITAL OBSERVATION PER HR: HCPCS

## 2020-09-24 PROCEDURE — 96376 TX/PRO/DX INJ SAME DRUG ADON: CPT

## 2020-09-24 PROCEDURE — 700111 HCHG RX REV CODE 636 W/ 250 OVERRIDE (IP): Performed by: INTERNAL MEDICINE

## 2020-09-24 PROCEDURE — 700117 HCHG RX CONTRAST REV CODE 255: Performed by: INTERNAL MEDICINE

## 2020-09-24 PROCEDURE — 82962 GLUCOSE BLOOD TEST: CPT | Mod: 91

## 2020-09-24 PROCEDURE — 74177 CT ABD & PELVIS W/CONTRAST: CPT

## 2020-09-24 PROCEDURE — 700105 HCHG RX REV CODE 258: Performed by: INTERNAL MEDICINE

## 2020-09-24 PROCEDURE — 76700 US EXAM ABDOM COMPLETE: CPT

## 2020-09-24 PROCEDURE — 700102 HCHG RX REV CODE 250 W/ 637 OVERRIDE(OP): Performed by: INTERNAL MEDICINE

## 2020-09-24 PROCEDURE — 80053 COMPREHEN METABOLIC PANEL: CPT

## 2020-09-24 PROCEDURE — 83735 ASSAY OF MAGNESIUM: CPT

## 2020-09-24 RX ORDER — DIPHENHYDRAMINE HYDROCHLORIDE 50 MG/ML
12.5 INJECTION INTRAMUSCULAR; INTRAVENOUS EVERY 6 HOURS PRN
Status: DISCONTINUED | OUTPATIENT
Start: 2020-09-24 | End: 2020-09-25 | Stop reason: HOSPADM

## 2020-09-24 RX ADMIN — SODIUM CHLORIDE: 9 INJECTION, SOLUTION INTRAVENOUS at 10:30

## 2020-09-24 RX ADMIN — VANCOMYCIN HYDROCHLORIDE 125 MG: KIT ORAL at 04:21

## 2020-09-24 RX ADMIN — SODIUM CHLORIDE: 9 INJECTION, SOLUTION INTRAVENOUS at 02:10

## 2020-09-24 RX ADMIN — MIDODRINE HYDROCHLORIDE 10 MG: 5 TABLET ORAL at 10:30

## 2020-09-24 RX ADMIN — DIPHENHYDRAMINE HYDROCHLORIDE 12.5 MG: 50 INJECTION INTRAMUSCULAR; INTRAVENOUS at 13:01

## 2020-09-24 RX ADMIN — IOHEXOL 80 ML: 350 INJECTION, SOLUTION INTRAVENOUS at 18:14

## 2020-09-24 RX ADMIN — VANCOMYCIN HYDROCHLORIDE 125 MG: KIT ORAL at 22:30

## 2020-09-24 RX ADMIN — VANCOMYCIN HYDROCHLORIDE 125 MG: KIT ORAL at 10:30

## 2020-09-24 RX ADMIN — LEVOTHYROXINE SODIUM 50 MCG: 0.05 TABLET ORAL at 04:54

## 2020-09-24 RX ADMIN — ONDANSETRON 4 MG: 2 INJECTION INTRAMUSCULAR; INTRAVENOUS at 19:48

## 2020-09-24 RX ADMIN — DIPHENHYDRAMINE HYDROCHLORIDE 12.5 MG: 50 INJECTION INTRAMUSCULAR; INTRAVENOUS at 22:31

## 2020-09-24 RX ADMIN — ONDANSETRON 4 MG: 2 INJECTION INTRAMUSCULAR; INTRAVENOUS at 04:53

## 2020-09-24 RX ADMIN — VANCOMYCIN HYDROCHLORIDE 125 MG: KIT ORAL at 17:34

## 2020-09-24 ASSESSMENT — ENCOUNTER SYMPTOMS
NAUSEA: 1
FEVER: 0
DEPRESSION: 0
MYALGIAS: 0
DOUBLE VISION: 0
HEARTBURN: 0
BLURRED VISION: 0
PHOTOPHOBIA: 0
SPUTUM PRODUCTION: 0
NERVOUS/ANXIOUS: 0
VOMITING: 0
BRUISES/BLEEDS EASILY: 0
VOMITING: 1
FLANK PAIN: 0
SORE THROAT: 0
SHORTNESS OF BREATH: 1
TREMORS: 0
COUGH: 0
DIARRHEA: 1
HEMOPTYSIS: 0
ORTHOPNEA: 0
BACK PAIN: 0
BLOOD IN STOOL: 0
NECK PAIN: 0
FOCAL WEAKNESS: 0
HALLUCINATIONS: 0
ABDOMINAL PAIN: 1
WEIGHT LOSS: 1
HEADACHES: 0
NERVOUS/ANXIOUS: 1
CONSTIPATION: 0
POLYDIPSIA: 0
SPEECH CHANGE: 0
CHILLS: 0
PALPITATIONS: 0

## 2020-09-24 ASSESSMENT — PAIN DESCRIPTION - PAIN TYPE
TYPE: ACUTE PAIN
TYPE: ACUTE PAIN

## 2020-09-24 ASSESSMENT — PATIENT HEALTH QUESTIONNAIRE - PHQ9
SUM OF ALL RESPONSES TO PHQ9 QUESTIONS 1 AND 2: 0
1. LITTLE INTEREST OR PLEASURE IN DOING THINGS: NOT AT ALL

## 2020-09-24 ASSESSMENT — LIFESTYLE VARIABLES: SUBSTANCE_ABUSE: 0

## 2020-09-24 ASSESSMENT — PAIN SCALES - WONG BAKER: WONGBAKER_NUMERICALRESPONSE: DOESN'T HURT AT ALL

## 2020-09-24 NOTE — PROGRESS NOTES
Bolus completed. Blood pressure 76/51, physician notified, telephone order received and read back.

## 2020-09-24 NOTE — PROGRESS NOTES
Oncology/Hematology Progress Note               Author: Lester Chang M.D. Date & Time created: 9/24/2020  12:57 PM   Diagnosis-combined variable immune deficiency  Thrombocytopenia  Anemia  Interval History:  Treatment for C. difficile started on 9/24/2020.  Tolerating her treatment well and diarrhea is improving.  Still has abdominal discomfort.  Ferritin level is adequate and HIV test is negative.  IgG level is still pending    Review of Systems:  Review of Systems   Constitutional: Positive for malaise/fatigue and weight loss.   HENT: Negative for hearing loss and sore throat.    Respiratory: Negative for cough, hemoptysis and sputum production.    Cardiovascular: Negative for chest pain and palpitations.   Gastrointestinal: Positive for abdominal pain, diarrhea, nausea and vomiting.   Genitourinary: Negative for dysuria and hematuria.   Musculoskeletal: Negative for back pain, myalgias and neck pain.   Psychiatric/Behavioral: Negative for depression. The patient is nervous/anxious.        Physical Exam:  Physical Exam  Constitutional:       Appearance: Normal appearance. She is not ill-appearing.   HENT:      Head: Normocephalic and atraumatic.      Mouth/Throat:      Mouth: Mucous membranes are moist.      Pharynx: Oropharynx is clear.   Eyes:      Conjunctiva/sclera: Conjunctivae normal.      Pupils: Pupils are equal, round, and reactive to light.   Abdominal:      General: Abdomen is flat. Bowel sounds are normal. There is no distension.      Tenderness: There is no abdominal tenderness. There is no guarding.      Comments: Colostomy in place   Neurological:      General: No focal deficit present.      Mental Status: She is alert and oriented to person, place, and time.   Psychiatric:         Mood and Affect: Mood normal.         Thought Content: Thought content normal.      Comments: Anxious         Labs:          Recent Labs     09/22/20  0400 09/22/20  0842 09/23/20  0524 09/24/20  0212   SODIUM 135  --   137 136   POTASSIUM 4.3  --  3.9 4.2   CHLORIDE 109  --  105 106   CO2 15*  --  21 23   BUN 13  --  19 11   CREATININE 0.58  --  0.51 0.57   MAGNESIUM 1.9  --  1.9 1.9   PHOSPHORUS  --  3.6 2.8 3.2   CALCIUM 7.9*  --  8.0* 8.0*     Recent Labs     20  04020  0520   ALTSGPT 23 18 22   ASTSGOT 20 15 19   ALKPHOSPHAT 48 41 44   TBILIRUBIN 0.2 <0.2 <0.2   GLUCOSE 72 90 79     Recent Labs     20  1605 20   RBC 3.14* 3.13*  --  2.91*   HEMOGLOBIN 10.6* 10.4*  --  9.8*   HEMATOCRIT 32.2* 32.2*  --  30.5*   PLATELETCT 96* 103*  --  97*   FERRITIN  --   --  41.5  --      Recent Labs     20   WBC 2.9* 3.4*  --  3.4*   NEUTSPOLYS 53.00 62.00  --  49.30   LYMPHOCYTES 32.00 25.30  --  35.50   MONOCYTES 11.60 10.00  --  10.80   EOSINOPHILS 2.40 1.80  --  3.80   BASOPHILS 0.70 0.60  --  0.60   ASTSGOT 20  --  15 19   ALTSGPT 23  --  18 22   ALKPHOSPHAT 48  --  41 44   TBILIRUBIN 0.2  --  <0.2 <0.2     Recent Labs     20   SODIUM 135 137 136   POTASSIUM 4.3 3.9 4.2   CHLORIDE 109 105 106   CO2 15* 21 23   GLUCOSE 72 90 79   BUN 13 19 11   CREATININE 0.58 0.51 0.57   CALCIUM 7.9* 8.0* 8.0*     Hemodynamics:  Temp (24hrs), Av.4 °C (97.5 °F), Min:35.8 °C (96.4 °F), Max:36.9 °C (98.4 °F)  Temperature: 36.2 °C (97.2 °F)  Pulse  Av.7  Min: 42  Max: 78   Blood Pressure: (!) 86/56(will notify rn; took twice)     Respiratory:    Respiration: 17, Pulse Oximetry: 99 %           Fluids:    Intake/Output Summary (Last 24 hours) at 2020 1257  Last data filed at 2020 1846  Gross per 24 hour   Intake --   Output 1000 ml   Net -1000 ml        GI/Nutrition:  Orders Placed This Encounter   Procedures   • Diet Order Cardiac     Standing Status:   Standing     Number of Occurrences:   1     Order Specific Question:   Diet:     Answer:   Cardiac [6]     Medical  Decision Making, by Problem:  Active Hospital Problems    Diagnosis   • Severe malnutrition ([facility]) [E43]   • Shortness of breath [R06.02]   • C. difficile colitis [A04.72]   • Diarrhea [R19.7]   • Hypotension [I95.9]   • Pancytopenia ([facility]) [D61.818]   • Reactive hypoglycemia [E16.1]   • Hypothyroidism [E03.9]   • CVID (common variable immunodeficiency) ([facility]) [D83.9]       Plan:  Chronic variable immune deficiency- we will check her IgG level and will give immunoglobulin as needed.  No active infection ongoing other than C. Difficile.  C. difficile-treatment initiated yesterday.  Tolerating it well.  Diarrhea is better  Thrombocytopenia- could be autoimmune related to CVI D.  No acute evidence of any active bleeding  Anemia-stable.  Ferritin is adequate reticulocyte count is normal.  Stool for occult blood is negative.    Quality-Core Measures

## 2020-09-24 NOTE — PROGRESS NOTES
Hospital Medicine Daily Progress Note    Date of Service  9/24/2020    Chief Complaint/Hospital Course  60 y.o. female with history of common variable immunodeficiency, admitted 9/21/2020 with complaints of shortness of breath on exertion, weakness, orthostatic dizziness      Interval Problem Update  9/22 patient reiterated her complaints of dyspnea on exertion.  Not in respiratory distress, on room air.  There is no lower extremity edema.  Pending transthoracic echo   complaining of watery diarrhea, acute on chronic.  Considering immunodeficiency, ordered stool studies, including C. difficile, stool culture, ova and parasites, blood culture.  HIV and hepatitis screen negative  Blood pressure remains borderline low, endorses orthostatic dizziness.  Cortisol level is 11 at 8 AM.  Ordered normal saline bolus, but patient refused it.  Ordered fludrocortisone and midodrine.  9/23  Blood pressure remains low and patient remains mildly bradycardic with heart rate 40-50.  She denies dizziness.  She states that she is constipated since yesterday, missing her home stool softener  Linzess.  I discussed case with gastroenterologist/Dr. Harris, who agreed to see patient for any recommendations.  Discussed patient with hematologist/Dr. Chang, who agreed to look at the case, to see if patient a candidate for IVIG transfusions of hospital.  Of note, the  patient has an appointment with Dr. Brice tomorrow.  I discussed transthoracic echo result with the patient; there is no significant abnormalities that would explain her weakness and shortness of breath.  Update: Stool C. difficile PCR tested positive.  Started on vancomycin p.o.  9/24  Blood pressure improved today with IV fluids.  She had 4 loose bowel movements yesterday.  None today.  Immunoglobulin level is pending.  Dr. Chang is considering IVIG transfusion tomorrow  I ordered wound care consult, as patient is complaining of swelling of the mucosa in the area of  colostomy.  Consultants/Specialty  Gastroenterology  Hematology    Code Status  Full Code    Disposition  Home when medically cleared    Review of Systems  Review of Systems   Constitutional: Positive for malaise/fatigue and weight loss. Negative for chills and fever.   HENT: Negative for ear pain, hearing loss and tinnitus.    Eyes: Negative for blurred vision, double vision and photophobia.   Respiratory: Positive for shortness of breath. Negative for cough, hemoptysis and sputum production.    Cardiovascular: Positive for leg swelling. Negative for chest pain, palpitations and orthopnea.   Gastrointestinal: Positive for abdominal pain, diarrhea and nausea. Negative for blood in stool, constipation, heartburn, melena and vomiting.   Genitourinary: Negative for dysuria, flank pain, frequency and hematuria.   Musculoskeletal: Negative for back pain, joint pain and neck pain.   Skin: Negative for itching and rash.   Neurological: Negative for tremors, speech change, focal weakness and headaches.   Endo/Heme/Allergies: Negative for environmental allergies and polydipsia. Does not bruise/bleed easily.   Psychiatric/Behavioral: Negative for hallucinations and substance abuse. The patient is not nervous/anxious.         Physical Exam  Temp:  [35.8 °C (96.4 °F)-36.9 °C (98.4 °F)] 36.2 °C (97.2 °F)  Pulse:  [43-57] 52  Resp:  [15-17] 17  BP: ()/(48-57) 86/56  SpO2:  [96 %-99 %] 99 %    Physical Exam  Vitals signs and nursing note reviewed.   Constitutional:       General: She is not in acute distress.     Appearance: Normal appearance. She is cachectic.   HENT:      Head: Normocephalic and atraumatic.      Nose: Nose normal.      Mouth/Throat:      Mouth: Mucous membranes are moist.   Eyes:      Extraocular Movements: Extraocular movements intact.      Pupils: Pupils are equal, round, and reactive to light.   Neck:      Musculoskeletal: Normal range of motion and neck supple.   Cardiovascular:      Rate and Rhythm:  Normal rate and regular rhythm.   Pulmonary:      Effort: Pulmonary effort is normal.      Breath sounds: Normal breath sounds.   Abdominal:      General: Abdomen is flat. There is no distension.      Tenderness: There is no abdominal tenderness.      Comments: Colostomy in the right lower quadrant, empty   Musculoskeletal: Normal range of motion.         General: No swelling or deformity.   Skin:     General: Skin is warm and dry.   Neurological:      General: No focal deficit present.      Mental Status: She is alert and oriented to person, place, and time.   Psychiatric:         Mood and Affect: Mood is anxious.         Behavior: Behavior normal.         Fluids    Intake/Output Summary (Last 24 hours) at 9/24/2020 1333  Last data filed at 9/23/2020 1846  Gross per 24 hour   Intake --   Output 1000 ml   Net -1000 ml       Laboratory  Recent Labs     09/22/20  0400 09/23/20  0316 09/24/20  0212   WBC 2.9* 3.4* 3.4*   RBC 3.14* 3.13* 2.91*   HEMOGLOBIN 10.6* 10.4* 9.8*   HEMATOCRIT 32.2* 32.2* 30.5*   .5* 102.9* 104.8*   MCH 33.8* 33.2* 33.7*   MCHC 32.9* 32.3* 32.1*   RDW 52.8* 52.5* 53.6*   PLATELETCT 96* 103* 97*   MPV 13.9* 13.9* 13.4*     Recent Labs     09/22/20  0400 09/23/20  0524 09/24/20  0212   SODIUM 135 137 136   POTASSIUM 4.3 3.9 4.2   CHLORIDE 109 105 106   CO2 15* 21 23   GLUCOSE 72 90 79   BUN 13 19 11   CREATININE 0.58 0.51 0.57   CALCIUM 7.9* 8.0* 8.0*                   Imaging  US-ABDOMEN COMPLETE SURVEY   Final Result      1.  Liver and spleen are normal in size.   2.  Small amount of perihepatic and peripancreatic fluid.   3.  Minimal nonspecific dilatation of the renal pelvis bilaterally. There are bilateral ureteral jets seen. No significant hydronephrosis         EC-ECHOCARDIOGRAM COMPLETE W/O CONT   Final Result      CT-CTA CHEST PULMONARY ARTERY W/ RECONS   Final Result      Negative for pulmonary embolism or other thoracic abnormality                 Assessment/Plan  Severe  malnutrition (HCC)  Assessment & Plan  I think this is what causing the patient's symptoms, including shortness of breath, syncope, weakness, hypotension, bradycardia.  I believe severe malnutrition is related to CVID.  Question of the patient will benefit from IVIG or enzymes (creon)  Hematology, GI, dietitian consulted    C. difficile colitis  Assessment & Plan  Stool C. difficile PCR tested positive.  Started on vancomycin p.o. 9/23 2 weeks course    Diarrhea  Assessment & Plan  May be a side effects of Linzess  History of common variable immunodeficiency.  Ordered stool studies, including C. difficile, ova and parasite, stool culture.  HIV screen negative  Tested positive for C. difficile 9/23, started on p.o. vancomycin.    Hypotension  Assessment & Plan  She reported her blood pressure usually runs in 90s and she also found to have bradycardia.  Etiology is likely multifactorial.  Suspected hypovolemia from diarrhea and high volume colostomy output.  Patient received sufficient IV rehydration, but blood pressure remains borderline low  Cortisol level is 11. Ordered fludrocortisone and midodrine.  Continue monitoring blood pressure, IV fluid support      Pancytopenia (HCC)- (present on admission)  Assessment & Plan  She has chronic pancytopenia.  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      Hypothyroidism- (present on admission)  Assessment & Plan  Free T3 0.91,   continue outpatient levothyroxine, follow-up with endocrinology as outpatient    Reactive hypoglycemia- (present on admission)  Assessment & Plan  found to have blood glucose of 49 and she received approximately 10 g of oral glucose.  Hypoglycemia protocol.    CVID (common variable immunodeficiency) (HCC)- (present on admission)  Assessment & Plan  Diagnosed at [place] in 2007.   She has an appointment with Dr. Brice/hematology this week to establish.  Hematology consulted; pending  immunoglobulin level.  Plan for IVIG transfusion    Shortness of breath  Assessment & Plan  She presented with symptoms of shortness of breath and she underwent CTA pulmonary and it did not show any pulmonary embolism or any other acute abnormalities   Transthoracic echo showed no significant functional abnormalities or valvular disease.  Patient will benefit from outpatient PFT if transthoracic echo is not diagnostic for her shortness of breath       VTE prophylaxis: Heparin

## 2020-09-24 NOTE — PROGRESS NOTES
Pt denies any pain or nausea at this time. Sinus bradycardia on telemetry monitor, asymptomatic. Pt updated on POC. Will keep pt NPO after midnight. Call light and belongings within reach. Will continue to monitor.

## 2020-09-24 NOTE — PROGRESS NOTES
Pt stating she feels swollen from IV fluid. Pt refusing IV fluids at this time. Pt requesting to speak to MD regarding care of plan. Dr. Bowden updated

## 2020-09-24 NOTE — CONSULTS
DATE OF SERVICE:  09/23/2020    CONSULTATION WAS CALLED BY:  Danny Bowden MD    REASON FOR CONSULTATION:  1.  Combined variable immune deficiency diagnosed many years ago at [facility].  2.  Anemia.  3.  Thrombocytopenia.  4.  Abdominal discomfort and diarrhea.    HISTORY OF PRESENT ILLNESS:  The patient is a 60-year-old lady who was   diagnosed with combined variable immune deficiency when she was worked up at   [facility] many years ago and was put on monthly IVIG injections.  The patient   at that time lived in [place].  However, due to insurance issues, she decided   to move [place] in 01/2020 and has not had any IVIG since then, although she does   not complain of any recent respiratory or skin infections.  She also denies   any sinus issues.  The patient developed rectal prolapse many years ago and   underwent corrective surgery, which apparently was complicated by a leak   requiring prolonged hospitalization at the [facility].  Patient has a   colostomy bag since then.  For the past 2 weeks, the patient has been   complaining of fatigue and worsening diarrhea.  She was also complaining of   shortness of breath and a PE study was unremarkable.  Apparently, her   echocardiogram was also within normal limits.  As per the patient, her   shortness of breath is better, but she continues to have diarrhea.  She also   follows up with GI consultants and also with Dr. Muniz regarding her GI   issues.  She denies any fevers or any chills.  She denies any night sweats.    She denies any lumps or bumps on her body.    PAST MEDICAL HISTORY:  Chronic variable immune deficiency, hypothyroidism,   history of palpitations, questionable eating disorder, colostomy in place.    PAST SURGICAL HISTORY:  Intraabdominal surgery for a rectal prolapse.    Colostomy.    FAMILY HISTORY:  Alcohol abuse in her father.  No history of any autoimmune   disorders in the family.  No history of immune deficiency.    FAMILY HISTORY:   Nonsmoker, nondrinker, lives in [place], was a teacher in the   past.    ALLERGIES:  SULFA DRUGS CAUSE HIVES.    REVIEW OF SYSTEMS:  GENERAL AND CONSTITUTIONAL:  Complaining of fatigue and weakness.  No fevers.  HEAD AND NECK, EARS, NOSE AND THROAT:  Denies any headaches or any visual   symptoms.  RESPIRATORY:  Complains of shortness of breath, but denies any cough or any   hemoptysis.  PE study was negative.  CARDIOVASCULAR:  No chest pain or palpitations.  GASTROINTESTINAL:  Has nausea and diarrhea with increased output from her   colostomy.  Occasional abdominal pain.  Sees Dr. Muniz and GI for that.  GENITOURINARY:  Denies any dysuria or hematuria.  NEUROLOGICAL:  Denies any seizures or stroke.  PSYCHIATRIC:  Anxious, but denies any depression.  Denies being suicidal.  HEMATOLOGICAL AND IMMUNOLOGICAL:  See history of present illness.  IMMUNOLOGICAL:  Has history of chronic variable immune deficiency and has been   on IVIG therapy in the past.  No recent infections.  SKIN/INTEGUMENTARY:  No rash or any bruising.  LYMPHATIC:  Denies any lumps or bumps on her body.  Denies any B symptoms.  MUSCULOSKELETAL:  Has chronic joint and back pains.  These have not changed   much.    Rest of her review of systems is negative per CMS/AMA criteria unless as   mentioned in the history of present or past illness.    PHYSICAL EXAMINATION:  GENERAL:  The patient appears cachectic.  VITAL SIGNS:  Temperature 36.7, pulse 43, respiration rate is 13, BP 97/55.  HEENT:  Pupils are equal.  Oral mucosa reveals no thrush.  Teeth show   moderately good dental hygiene.  No erosion of the teeth noted.  NECK:  Reveals no JVD or lymphadenopathy.  LUNGS:  Clear to auscultation with good bilateral air entry.  HEART:  Reveals no S3 or S4.  There is no murmur or rub.  ABDOMEN:  Examination reveals a colostomy in place.  There is no   hepatosplenomegaly.  There is no significant tenderness or any rebound.  EXTREMITIES:  There is no edema of lower  extremities.  BACK:  Reveals no kyphoscoliosis.  NEUROLOGIC:  Power is equal in both upper and lower extremities.  PSYCHIATRIC:  Reveals anxiety, no depression.    LABORATORY DATA:  Labs reviewed.    DIAGNOSTIC DATA:  Radiological studies reviewed.    ASSESSMENT AND PLAN:  1.  Chronic variable immune deficiency.  I have advised the patient to get me   her old records from [place] and from [place].  She will try to get these   results for me.  In the meantime, I will check her IgG level.  If needed, we   will start her on IVIG therapy again.  At this time, the patient denies any   cough or any shortness of breath.  She denies any sinus infections or any skin   infections in the recent past.  2.  Anemia.  Patient is anemic.  We will further work her up including workup   for hemolysis.  3.  Thrombocytopenia.  She has developed worsening thrombocytopenia in the   recent past.  She does not have any splenomegaly.  However, I will get an   ultrasound of her spleen done.  I will check a hepatitis panel and human   immunodeficiency virus test.  We will get a B12, folate, and an DAYNA checked   out on her.  Patients with chronic variable immune deficiency can develop an   immune thrombocytopenic purpura like picture.  There is no evidence of   bleeding or any bruising.  4.  Diarrhea.  Patient has significant diarrhea.  She denies any travel   overseas.  We will check her stool for ova and parasite, and we will also get   a stool culture.  Apparently, stool for Clostridium difficile has already been   ordered by Dr. Bowden.  I will continue to follow her up and we will see   her tomorrow.       ____________________________________     MD CARLTON Phipps / ANGELICA    DD:  09/23/2020 15:03:29  DT:  09/23/2020 19:10:17    D#:  [id number]  Job#:  [id number]

## 2020-09-25 VITALS
WEIGHT: 83.55 LBS | SYSTOLIC BLOOD PRESSURE: 86 MMHG | OXYGEN SATURATION: 97 % | HEART RATE: 50 BPM | TEMPERATURE: 98 F | BODY MASS INDEX: 13.92 KG/M2 | HEIGHT: 65 IN | DIASTOLIC BLOOD PRESSURE: 54 MMHG | RESPIRATION RATE: 12 BRPM

## 2020-09-25 LAB
CMV IGG SERPL IA-ACNC: >10 U/ML
CMV IGM SERPL IA-ACNC: <8 AU/ML

## 2020-09-25 PROCEDURE — 302098 PASTE RING (FLAT): Performed by: INTERNAL MEDICINE

## 2020-09-25 PROCEDURE — A9270 NON-COVERED ITEM OR SERVICE: HCPCS | Performed by: INTERNAL MEDICINE

## 2020-09-25 PROCEDURE — G0378 HOSPITAL OBSERVATION PER HR: HCPCS

## 2020-09-25 PROCEDURE — 99217 PR OBSERVATION CARE DISCHARGE: CPT | Performed by: INTERNAL MEDICINE

## 2020-09-25 PROCEDURE — 700102 HCHG RX REV CODE 250 W/ 637 OVERRIDE(OP): Performed by: INTERNAL MEDICINE

## 2020-09-25 PROCEDURE — 302106 OSTOMY POWDER: Performed by: INTERNAL MEDICINE

## 2020-09-25 RX ORDER — ONDANSETRON 4 MG/1
4 TABLET, ORALLY DISINTEGRATING ORAL EVERY 4 HOURS PRN
Qty: 10 TAB | Refills: 0 | Status: SHIPPED | OUTPATIENT
Start: 2020-09-25 | End: 2020-10-08

## 2020-09-25 RX ORDER — FLUDROCORTISONE ACETATE 0.1 MG/1
0.1 TABLET ORAL EVERY MORNING
Qty: 30 TAB | Refills: 0 | Status: SHIPPED | OUTPATIENT
Start: 2020-09-26 | End: 2020-10-08

## 2020-09-25 RX ORDER — MIDODRINE HYDROCHLORIDE 10 MG/1
10 TABLET ORAL
Qty: 60 TAB | Refills: 0 | Status: SHIPPED | OUTPATIENT
Start: 2020-09-25 | End: 2020-10-08

## 2020-09-25 RX ORDER — METRONIDAZOLE 250 MG/1
250 TABLET ORAL EVERY 8 HOURS
Qty: 9 TAB | Refills: 0 | Status: SHIPPED | OUTPATIENT
Start: 2020-09-25 | End: 2020-09-28

## 2020-09-25 RX ORDER — HYDROXYZINE HYDROCHLORIDE 25 MG/1
25 TABLET, FILM COATED ORAL 3 TIMES DAILY PRN
Qty: 30 TAB | Refills: 0 | Status: SHIPPED | OUTPATIENT
Start: 2020-09-25 | End: 2020-10-08

## 2020-09-25 RX ORDER — POTASSIUM CHLORIDE 20 MEQ/1
20 TABLET, EXTENDED RELEASE ORAL DAILY
Qty: 10 TAB | Refills: 0 | Status: SHIPPED | OUTPATIENT
Start: 2020-09-25 | End: 2020-10-08

## 2020-09-25 RX ADMIN — VANCOMYCIN HYDROCHLORIDE 125 MG: KIT ORAL at 05:53

## 2020-09-25 RX ADMIN — VANCOMYCIN HYDROCHLORIDE 125 MG: KIT ORAL at 09:53

## 2020-09-25 RX ADMIN — LEVOTHYROXINE SODIUM 50 MCG: 0.05 TABLET ORAL at 05:52

## 2020-09-25 ASSESSMENT — ENCOUNTER SYMPTOMS
ABDOMINAL PAIN: 1
MYALGIAS: 0
WEIGHT LOSS: 1
PALPITATIONS: 0
BACK PAIN: 0
NERVOUS/ANXIOUS: 1
VOMITING: 1
SPUTUM PRODUCTION: 0
SORE THROAT: 0
DEPRESSION: 0
COUGH: 0
NECK PAIN: 0
DIARRHEA: 1
NAUSEA: 1
HEMOPTYSIS: 0

## 2020-09-25 ASSESSMENT — PAIN DESCRIPTION - PAIN TYPE
TYPE: ACUTE PAIN

## 2020-09-25 ASSESSMENT — PAIN SCALES - WONG BAKER
WONGBAKER_NUMERICALRESPONSE: DOESN'T HURT AT ALL
WONGBAKER_NUMERICALRESPONSE: DOESN'T HURT AT ALL

## 2020-09-25 NOTE — PROGRESS NOTES
Oncology/Hematology Progress Note               Author: Lester Chang M.D. Date & Time created: 9/25/2020  10:42 AM   Diagnosis-combined variable immune deficiency  Thrombocytopenia  Anemia  Interval History:  Treatment for C. difficile started on 9/24/2020.  Tolerating her treatment well and diarrhea is improving.  Still has abdominal discomfort.  Ferritin level is adequate and HIV test is negative.  IgG level is still pending    Review of Systems:  Review of Systems   Constitutional: Positive for malaise/fatigue and weight loss.   HENT: Negative for hearing loss and sore throat.    Respiratory: Negative for cough, hemoptysis and sputum production.    Cardiovascular: Negative for chest pain and palpitations.   Gastrointestinal: Positive for abdominal pain, diarrhea, nausea and vomiting.   Genitourinary: Negative for dysuria and hematuria.   Musculoskeletal: Negative for back pain, myalgias and neck pain.   Psychiatric/Behavioral: Negative for depression. The patient is nervous/anxious.        Physical Exam:  Physical Exam  Constitutional:       Appearance: Normal appearance. She is not ill-appearing.   HENT:      Head: Normocephalic and atraumatic.      Mouth/Throat:      Mouth: Mucous membranes are moist.      Pharynx: Oropharynx is clear.   Eyes:      Conjunctiva/sclera: Conjunctivae normal.      Pupils: Pupils are equal, round, and reactive to light.   Abdominal:      General: Abdomen is flat. Bowel sounds are normal. There is no distension.      Tenderness: There is no abdominal tenderness. There is no guarding.      Comments: Colostomy in place   Neurological:      General: No focal deficit present.      Mental Status: She is alert and oriented to person, place, and time.   Psychiatric:         Mood and Affect: Mood normal.         Thought Content: Thought content normal.      Comments: Anxious         Labs:          Recent Labs     09/23/20  0524 09/24/20  0212   SODIUM 137 136   POTASSIUM 3.9 4.2   CHLORIDE  105 106   CO2 21 23   BUN 19 11   CREATININE 0.51 0.57   MAGNESIUM 1.9 1.9   PHOSPHORUS 2.8 3.2   CALCIUM 8.0* 8.0*     Recent Labs     20  0524 20  021   ALTSGPT 18 22   ASTSGOT 15 19   ALKPHOSPHAT 41 44   TBILIRUBIN <0.2 <0.2   GLUCOSE 90 79     Recent Labs     20  0316 20  1605 20  0212   RBC 3.13*  --  2.91*   HEMOGLOBIN 10.4*  --  9.8*   HEMATOCRIT 32.2*  --  30.5*   PLATELETCT 103*  --  97*   FERRITIN  --  41.5  --      Recent Labs     20  0520  021   WBC 3.4*  --  3.4*   NEUTSPOLYS 62.00  --  49.30   LYMPHOCYTES 25.30  --  35.50   MONOCYTES 10.00  --  10.80   EOSINOPHILS 1.80  --  3.80   BASOPHILS 0.60  --  0.60   ASTSGOT  --  15 19   ALTSGPT  --  18 22   ALKPHOSPHAT  --  41 44   TBILIRUBIN  --  <0.2 <0.2     Recent Labs     20  0520  021   SODIUM 137 136   POTASSIUM 3.9 4.2   CHLORIDE 105 106   CO2 21 23   GLUCOSE 90 79   BUN 19 11   CREATININE 0.51 0.57   CALCIUM 8.0* 8.0*     Hemodynamics:  Temp (24hrs), Av.6 °C (97.8 °F), Min:36.2 °C (97.2 °F), Max:36.8 °C (98.3 °F)  Temperature: 36.5 °C (97.7 °F)  Pulse  Av.6  Min: 42  Max: 78   Blood Pressure: (!) 85/56(will notify RN)     Respiratory:    Respiration: 13, Pulse Oximetry: 98 %           Fluids:    Intake/Output Summary (Last 24 hours) at 2020 1257  Last data filed at 2020 1846  Gross per 24 hour   Intake --   Output 1000 ml   Net -1000 ml        GI/Nutrition:  Orders Placed This Encounter   Procedures   • Diet Order Cardiac     Standing Status:   Standing     Number of Occurrences:   1     Order Specific Question:   Diet:     Answer:   Cardiac [6]     Medical Decision Making, by Problem:  Active Hospital Problems    Diagnosis   • Severe malnutrition (HCC) [E43]   • Shortness of breath [R06.02]   • C. difficile colitis [A04.72]   • Diarrhea [R19.7]   • Hypotension [I95.9]   • Pancytopenia (HCC) [D61.818]   • Reactive hypoglycemia [E16.1]   • Hypothyroidism  [E03.9]   • CVID (common variable immunodeficiency) ([facility]) [D83.9]       Plan:  Chronic variable immune deficiency- we will check her IgG level and will give immunoglobulin as needed.  No active infection ongoing other than C. Difficile.  C. difficile-treatment initiated yesterday.  Tolerating it well.  Diarrhea is better  Thrombocytopenia- could be autoimmune related to CVI D.  No acute evidence of any active bleeding  Anemia-stable.  Ferritin is adequate reticulocyte count is normal.  Stool for occult blood is negative.    Quality-Core Measures

## 2020-09-25 NOTE — PROGRESS NOTES
Re- checked bp, 75/48-77/50, claims that her bp is always like that, denies any symptoms. Refuses any iv fluids. Reminded her that the benadryl  Might have caused her bp to drop. Very sleepy, denies dizziness. Call light within reach. To continue to monitor.

## 2020-09-25 NOTE — PROGRESS NOTES
Pt DC'd. IV removed, discharge instructions provided to patient, pt verbalizes understanding. Pt states all questions have been answered. Copy of discharge paperwork provided to pt, signed copy in chart. Pt states all belongings in possession. Pt escorted off unit by wheelchair without incident.

## 2020-09-25 NOTE — PROGRESS NOTES
Received in bed, aox4, sb  on monitor. Assessment as per CDU. Call light within reach. Needs attended. Plan of care discussed and understood.

## 2020-09-25 NOTE — DISCHARGE INSTRUCTIONS
Discharge Instructions    Discharged to home by car with relative. Discharged via wheelchair, hospital escort: Yes.  Special equipment needed: Not Applicable    Be sure to schedule a follow-up appointment with your primary care doctor or any specialists as instructed.     Discharge Plan:   Diet Plan: (P) Discussed  Activity Level: (P) Discussed  Confirmed Follow up Appointment: (P) Patient to Call and Schedule Appointment  Confirmed Symptoms Management: (P) Discussed  Medication Reconciliation Updated: (P) Yes  Influenza Vaccine Indication: Patient Refuses    I understand that a diet low in cholesterol, fat, and sodium is recommended for good health. Unless I have been given specific instructions below for another diet, I accept this instruction as my diet prescription.   Other diet: heart healthy diet    Special Instructions: None    · Is patient discharged on Warfarin / Coumadin?   No     Depression / Suicide Risk    As you are discharged from this [facility] facility, it is important to learn how to keep safe from harming yourself.    Recognize the warning signs:  · Abrupt changes in personality, positive or negative- including increase in energy   · Giving away possessions  · Change in eating patterns- significant weight changes-  positive or negative  · Change in sleeping patterns- unable to sleep or sleeping all the time   · Unwillingness or inability to communicate  · Depression  · Unusual sadness, discouragement and loneliness  · Talk of wanting to die  · Neglect of personal appearance   · Rebelliousness- reckless behavior  · Withdrawal from people/activities they love  · Confusion- inability to concentrate     If you or a loved one observes any of these behaviors or has concerns about self-harm, here's what you can do:  · Talk about it- your feelings and reasons for harming yourself  · Remove any means that you might use to hurt yourself (examples: pills, rope, extension cords, firearm)  · Get  professional help from the community (Mental Health, Substance Abuse, psychological counseling)  · Do not be alone:Call your Safe Contact- someone whom you trust who will be there for you.  · Call your local CRISIS HOTLINE 921-7245 or 232-963-0284  · Call your local Children's Mobile Crisis Response Team [place] (303) 045-8235 or www.My-wardrobe.com  · Call the toll free National Suicide Prevention Hotlines   · National Suicide Prevention Lifeline 193-512-UCJU (5522)  · [employer] Line Network 800-SUICIDE (080-6820)      Clostridioides Difficile Infection  Clostridioides difficile (C. difficile or C. diff) infection is a condition that occurs when an overgrowth of C. diff bacteria causes irritation and swelling (inflammation) of the colon (colitis). It is typically associated with recent use of antibiotic medication.  This infection can be passed from person to person (is contagious). You may also be exposed to the bacteria from the environment, such as from food or water, or from touching surfaces that have the bacteria on them.  What are the causes?  Certain bacteria normally live in the colon and help to digest food. This infection develops when the balance of bacteria in the colon is changed and the C. diff bacteria grow out of control. This is often caused by taking antibiotics.  What increases the risk?  You are more likely to develop this condition if you:  · Need to take antibiotics for a long period of time.  · Take certain antibiotics that kill a wide range of bacteria.  · Are in the hospital.  · Are older than 65 years of age.  · Live in a place where there is a lot of contact with others, such as a nursing home.  · Have had a C. diff infection before.  · Have a weak defense (immune) system.  · Take a medicine called a proton pump inhibitor over a long period of time.  · Have serious underlying conditions, such as colon cancer.  · Have had a gastrointestinal (GI) tract procedure or  surgery.  What are the signs or symptoms?  Symptoms of this condition include:  · Diarrhea. This may be bloody, watery, or yellow or green in color.  · Fever.  · Fatigue.  · Loss of appetite.  · Nausea.  · Swelling, pain, or tenderness in the abdomen.  How is this diagnosed?  This condition is diagnosed with medical history and physical exam. You may also have other tests, including:  · A test that checks for C. diff in your stool.  · Blood tests.  · Imaging tests, such as a CT scan of your abdomen.  In rare cases, a sigmoidoscopy or colonoscopy may be used to look directly into your colon. These procedures involve passing an instrument through your rectum to look at the inside of your colon.  How is this treated?  Treatment for this condition includes:  · Stopping the antibiotics that you were on when the C. diff infection began. Do this only as told by your health care provider.  · Taking certain antibiotics to stop C. diff from growing.  · Taking donor stool from a healthy person and placing it into the colon (fecal transplant) through a colonoscope or nasogastric tube. This may be done if you have a recurrent infection.  · Having surgery to remove the infected part of the colon. This is rare.  Follow these instructions at home:  Eating and drinking    · Eat bland, easy-to-digest foods in small amounts as you are able. These foods include bananas, applesauce, rice, lean meats, toast, and crackers.  · Follow specific instructions on how to get enough water into your body (rehydrate). This may include:  ? Drinking clear fluids, such as water, ice chips, diluted fruit juice, and low-calorie sports drinks.  ? Taking an oral rehydration solution (ORS). This is a drink that is sold at pharmacies and retail stores.  · Avoid milk, caffeine, and alcohol.  · Drink enough fluid to keep your urine pale yellow.  General instructions  · Take over-the-counter and prescription medicines only as told by your health care  provider.  · Take your antibiotic medicine as told by your health care provider. Do not stop taking the antibiotic, even if you start to feel better. You may stop taking it only if your health care provider tells you to stop.  · Wash your hands with soap and water.  · Do not use medicines to help with diarrhea. You may use these medicines only if your health care provider tells you to.  · Keep all follow-up visits as told by your health care provider. This is important.  How is this prevented?  Hand hygiene    · Use soap and water to wash your hands thoroughly before you prepare food and after you use the bathroom. Make sure that people who live with you also wash their hands often.  · If you are being treated at a hospital or clinic, make sure that all health care providers wash their hands with soap and water before touching you.  · If you are staying in the hospital, make sure that all visitors wash their hands with soap and water before touching you.  Contact precautions  · If you develop diarrhea while in the hospital or a long-term care facility, let your health care team know right away.  · When visiting someone in the hospital or a long-term care facility, follow guidelines for wearing a gown, gloves, or other protective equipment.  · If possible, avoid contact with people who have diarrhea.  Clean environment  · Clean surfaces with a product that contains chlorine bleach.  · If you are in the hospital, make sure that staff members clean the surfaces in your room daily. Let a staff person know right away if body fluids have splashed or spilled in your room.  · Use chlorine bleach and high heat when cleaning soiled clothing or linens.  Contact a health care provider if:  · Your symptoms do not get better with treatment.  · Your symptoms get worse, even with treatment.  · Your symptoms go away and then come back.  · You have a fever.  · You have new symptoms.  Get help right away if you:  · Have more pain or  tenderness in your abdomen.  · Have stool that is mostly bloody, or your stool looks dark black and tarry.  · Cannot eat or drink without vomiting.  · Have signs or symptoms of dehydration, such as:  ? Dark urine, very little urine, or no urine.  ? Cracked lips.  ? Not making tears when you cry.  ? Dry mouth.  ? Sunken eyes.  ? Sleepiness.  ? Weakness.  ? Dizziness.  Summary  · Clostridioides difficile (C. difficile or C. diff) infection is a condition that occurs when an overgrowth of C. diff bacteria causes irritation and swelling (inflammation) of the colon; typically after taking antibiotics.  · Symptoms of C. diff infection include diarrhea, fever, fatigue, loss of appetite, nausea, and swelling, pain, or tenderness in the abdomen.  · C. diff infection is treated by stopping the antibiotics you were on when the C. diff infection began, and then taking certain antibiotics to keep C. diff from growing. In some cases, fecal transplant or surgery is performed.  · Hand washing with soap and water, contact precautions, and a clean environment can help prevent or limit the spread of C. diff infection.  This information is not intended to replace advice given to you by your health care provider. Make sure you discuss any questions you have with your health care provider.  Document Released: 09/27/2006 Document Revised: 08/22/2019 Document Reviewed: 08/22/2019  "[employer] Patient Education © 2020 "[employer] Inc.

## 2020-09-25 NOTE — WOUND TEAM
Received order for colostomy. Stoma assessed and noted to be beefy red. Does not appear swollen. Pt declined appliance change stating she had already changed it earlier today. Pt states she usually uses pre-cut barriers, but is agreeable to letting staff assist with cutting barrier since the hospital has no pre-cut options available. 2 3/4, 2 piece applied provided. Pt also requested adhesive remover and this was provided as well. Wound team signing off, nursing to re-consult as needed.

## 2020-09-25 NOTE — DISCHARGE SUMMARY
Discharge Summary    CHIEF COMPLAINT ON ADMISSION  Chief Complaint   Patient presents with   • Shortness of Breath     with exertion. Also complains of bilateral ankle swelling x1 week. Sent from urgent care who report that SpO2 was 86% while ambulating   • Low Blood Sugar     per [facility] FSBS 49 and she was given approx 10g oral glucose, improved to 83.       Reason for Admission  EMS     Admission Date  9/21/2020    CODE STATUS  Full Code    HPI & HOSPITAL COURSE  This is a 60 y.o. female with history of CVID, colostomy, presented with complaints of shortness of breath on exertion, generalized weakness hypertension.  On admission she noted to have hypoglycemia 49, hypotension, pancytopenia, cachexia.  Patient had infectious work-up for diarrhea.  Stool study came back positive for C. difficile.  Patient was treated with p.o. vancomycin.  Gastroenterology consulted and recommended to continue treatment with vancomycin.  Diarrhea resolved in the hospital.  CT of the abdomen and pelvis with contrast was negative for bowel obstruction or any other acute abnormalities.  Despite persistent hypotension in the hospital, patient has been asymptomatic.  She received IV hydration with improvement of blood pressure.  She was started on fludrocortisone and midodrine.  For shortness of breath she was evaluated with CT of pulmonary artery, which was negative for PE or parenchymal pulmonary process.  Transthoracic echo which was done due to shortness of breath and mildly elevated BNP, showed EF 60%, trace mitral and trace aortic insufficiency, RVSP 30, normal pericardium without effusion.  Hematology/Dr. Chang consulted for CVID and pancytopenia.  IgGG level is currently pending.  Hematology office will contact patient after IgG level resulted, to consider IVIG transfusion  Dietitian consulted for severe protein calorie malnutrition, patient received supplements.      Therefore, she is discharged in good and stable condition to  home with close outpatient follow-up.    Discharge Date  9/25/2020    FOLLOW UP ITEMS POST DISCHARGE  Hematology  Gastroenterology  Cardiology  PCP    DISCHARGE DIAGNOSES  Active Problems:    Severe malnutrition (HCC) POA: Unknown    CVID (common variable immunodeficiency) (HCC) (Chronic) POA: Yes      Overview: Dx at [facility] in 2007            Referred [facility]    Reactive hypoglycemia POA: Yes    Hypothyroidism (Chronic) POA: Yes      Overview: Pt on Cytomel      T3  Level  And tsh : nl. Cont cytomel      T4 low: start levothyroxine 25>. rck lab 8-10 wks.    Pancytopenia (HCC) (Chronic) POA: Yes      Overview: Referred [facility]    Hypotension POA: Unknown    Diarrhea POA: Unknown    C. difficile colitis POA: Unknown    Shortness of breath POA: Unknown  Resolved Problems:    * No resolved hospital problems. *      FOLLOW UP  Future Appointments   Date Time Provider Department Center   9/29/2020  4:15 PM IHV EXAM 10 ECHO [facility]   9/30/2020  4:00 PM CARDIAC EVENT MONITOR-[facility] None   10/19/2020  1:45 PM TREADMILL-[facility] None   10/27/2020  2:00 PM [facility]   10/29/2020  3:20 PM Clifton Harvey M.D. [facility]     No follow-up provider specified.    MEDICATIONS ON DISCHARGE     Medication List      START taking these medications      Instructions   fludrocortisone 0.1 MG Tabs  Start taking on: September 26, 2020  Commonly known as: FLORINEF   Take 1 Tab by mouth every morning.  Dose: 0.1 mg     hydrOXYzine HCl 25 MG Tabs  Commonly known as: ATARAX   Take 1 Tab by mouth 3 times a day as needed for Itching.  Dose: 25 mg     midodrine 10 MG tablet  Commonly known as: PROAMATINE   Take 1 Tab by mouth 3 times a day, with meals.  Dose: 10 mg     ondansetron 4 MG Tbdp  Commonly known as: ZOFRAN ODT   Take 1 Tab by mouth every four hours as needed for Nausea.  Dose: 4 mg     vancomycin 50 mg/mL 50 mg/mL Soln   Take 2.5 mL by mouth every 6 hours for 8 days.  Dose: 125 mg        CHANGE  how you take these medications      Instructions   potassium chloride SA 20 MEQ Tbcr  What changed: when to take this  Commonly known as: Kdur   Take 1 Tab by mouth every day.  Dose: 20 mEq        CONTINUE taking these medications      Instructions   levothyroxine 50 MCG Tabs  Commonly known as: SYNTHROID   Take 1 Tab by mouth Every morning on an empty stomach.  Dose: 50 mcg     Linzess 290 MCG Caps  Generic drug: linaCLOtide   Take 290 mcg by mouth every evening.  Dose: 290 mcg     metoclopramide 10 MG Tabs  Commonly known as: REGLAN   Take 1 Tab by mouth four times daily - before meals and nightly as needed.  Dose: 10 mg     sennosides 8.6 MG Tabs  Commonly known as: SENOKOT   Take 8.6 mg by mouth 1 time daily as needed.  Dose: 8.6 mg        STOP taking these medications    thiamine 100 MG tablet  Commonly known as: THIAMINE            Allergies  Allergies   Allergen Reactions   • Sulfa Drugs Hives       DIET  Orders Placed This Encounter   Procedures   • Diet Order Cardiac     Standing Status:   Standing     Number of Occurrences:   1     Order Specific Question:   Diet:     Answer:   Cardiac [6]       ACTIVITY  As tolerated.  Weight bearing as tolerated    CONSULTATIONS  Hematology  Gastroenterology    PROCEDURES  None    LABORATORY  Lab Results   Component Value Date    SODIUM 136 09/24/2020    POTASSIUM 4.2 09/24/2020    CHLORIDE 106 09/24/2020    CO2 23 09/24/2020    GLUCOSE 79 09/24/2020    BUN 11 09/24/2020    CREATININE 0.57 09/24/2020        Lab Results   Component Value Date    WBC 3.4 (L) 09/24/2020    HEMOGLOBIN 9.8 (L) 09/24/2020    HEMATOCRIT 30.5 (L) 09/24/2020    PLATELETCT 97 (L) 09/24/2020      CT-ABDOMEN-PELVIS WITH   Final Result      1.  Increase in expected amount of stool within the colon      2.  Presence of descending colostomy and there is a sigmoid colon anastomosis      3.  Small amount of peritoneal fluid      4.  Small bilateral pleural effusion and mild atelectasis      US-ABDOMEN  COMPLETE SURVEY   Final Result      1.  Liver and spleen are normal in size.   2.  Small amount of perihepatic and peripancreatic fluid.   3.  Minimal nonspecific dilatation of the renal pelvis bilaterally. There are bilateral ureteral jets seen. No significant hydronephrosis         EC-ECHOCARDIOGRAM COMPLETE W/O CONT   Final Result      CT-CTA CHEST PULMONARY ARTERY W/ RECONS   Final Result      Negative for pulmonary embolism or other thoracic abnormality                  Total time of the discharge process exceeds 37 minutes.

## 2020-09-25 NOTE — PROGRESS NOTES
Pt A&O x4. Pt denies pain. Respirations even and unlabored on room air.  Updated on POC, communication board updated. Bed locked and in lowest position. Call light and belongings within reach. Non-skid socks in place. Needs met, will continue to monitor.

## 2020-09-26 LAB
BACTERIA STL CULT: NORMAL
E COLI SXT1+2 STL IA: NORMAL
HAPTOGLOB SERPL-MCNC: 48 MG/DL (ref 30–200)
IGG SERPL-MCNC: 301 MG/DL (ref 768–1632)
NUCLEAR IGG SER QL IA: NORMAL
SIGNIFICANT IND 70042: NORMAL
SITE SITE: NORMAL
SOURCE SOURCE: NORMAL

## 2020-09-27 LAB
BACTERIA BLD CULT: NORMAL
BACTERIA BLD CULT: NORMAL
CMV DNA # SERPL NAA+PROBE: <390 CPY/ML
CMV DNA SERPL NAA+PROBE-ACNC: <227 IU/ML
CMV DNA SERPL NAA+PROBE-LOG IU: <2.4 LOG IU/ML
CMV DNA SERPL NAA+PROBE-LOG#: <2.6 LOG CPY/ML
CMV GENE MUT DET ISLT: NOT DETECTED
CMV PCR SOURCE Q4398: NORMAL
CRYPTOSP SPEC QL ACID FAST STN: NORMAL
OVA AND PARASITE, FECAL INTERPRETATION Q0595: NEGATIVE
SIGNIFICANT IND 70042: NORMAL
SITE SITE: NORMAL
SOURCE SOURCE: NORMAL

## 2020-09-28 ENCOUNTER — TELEPHONE (OUTPATIENT)
Dept: CARDIOLOGY | Facility: MEDICAL CENTER | Age: 60
End: 2020-09-28

## 2020-09-28 NOTE — TELEPHONE ENCOUNTER
FILI/blanco    Pt calling about echo tomorrow.  Pt was in [facility] for a heart attack and was d/c'd 9/25.  Pt asking inasmuch as she had echo done during hospital stay, does she need to do tomorrow's echo also.    Please call janusz

## 2020-09-28 NOTE — TELEPHONE ENCOUNTER
Returned call. Pt has had several recent hospitalizations, and during the most recent one they completed an echo. Cancelled pt's echo appt tomorrow. She also mentioned that she doesn't think she'll be able to complete the treadmill stress test on 10/19 because she has had so many health problems recently and doesn't think she'll have the strength. She also said that after her most recent hospitalization she developed swelling in both of her lower legs. She has tried elevation and compression stockings, but this isn't helping.

## 2020-09-28 NOTE — TELEPHONE ENCOUNTER
You  Abran Calvillo M.D. 2 hours ago (11:34 AM)     Wanted to give you a heads up that pt had echo during most recent hospitalization. I don't think it will result to you.   Also, could pt's swelling be due to Florinef started in the hospital? Or maybe just the IV fluids?   And do you want to change treadmill to a chemical NM?   Thanks!      Abran Calvillo M.D.  You 2 hours ago (12:07 PM)     Could be the fludricortisone. We should see her in the office and decide what to do      Called pt and scheduled with RT on 10/8.

## 2020-09-29 ENCOUNTER — APPOINTMENT (OUTPATIENT)
Dept: CARDIOLOGY | Facility: MEDICAL CENTER | Age: 60
End: 2020-09-29
Attending: INTERNAL MEDICINE
Payer: OTHER GOVERNMENT

## 2020-09-30 ENCOUNTER — TELEPHONE (OUTPATIENT)
Dept: CARDIOLOGY | Facility: MEDICAL CENTER | Age: 60
End: 2020-09-30

## 2020-09-30 ENCOUNTER — NON-PROVIDER VISIT (OUTPATIENT)
Dept: CARDIOLOGY | Facility: MEDICAL CENTER | Age: 60
End: 2020-09-30
Attending: INTERNAL MEDICINE
Payer: OTHER GOVERNMENT

## 2020-09-30 DIAGNOSIS — R06.09 DOE (DYSPNEA ON EXERTION): ICD-10-CM

## 2020-09-30 DIAGNOSIS — R00.2 PALPITATIONS: ICD-10-CM

## 2020-09-30 DIAGNOSIS — I47.10 SVT (SUPRAVENTRICULAR TACHYCARDIA) (HCC): ICD-10-CM

## 2020-09-30 NOTE — TELEPHONE ENCOUNTER
Home enrollment completed for the 14 day Zio patch program per .  Monitor to be mailed to patient by [employer].  >Currently pending EOS.

## 2020-10-05 ENCOUNTER — PATIENT MESSAGE (OUTPATIENT)
Dept: MEDICAL GROUP | Facility: PHYSICIAN GROUP | Age: 60
End: 2020-10-05

## 2020-10-05 NOTE — TELEPHONE ENCOUNTER
From: Pita Bueno  To: Gita Fleming, Med Ass't  Sent: 10/5/2020 12:48 PM PDT  Subject: Prescription Question    Absolutely! 290 mcg 1capsule a day. Linzess generic linaclotide. Thank you !!!      ----- Message -----   From:Gita Fleming, Med Ass't   Sent:10/5/2020 11:34 AM PDT   To:Pita Bueno   Subject:RE: Prescription Question    Stuart Lema,    I am not seeing the quantity or day supply from your last 2 prescriptions. Can you please help me with that?    Thanks,  Gita MENDOZA      ----- Message -----   From:Pita Bueno   Sent:10/5/2020 9:20 AM PDT   To:Clifton Harvey M.D.   Subject:Prescription Question    I need a refill on medication linzess 290mcg. Needs verbal authorization at [phone]. Then pharmacy [phone]

## 2020-10-05 NOTE — TELEPHONE ENCOUNTER
From: Pita Bueno  To: Clifton Harvey M.D.  Sent: 10/5/2020 9:20 AM PDT  Subject: Prescription Question    I need a refill on medication linzess 290casey. Needs verbal authorization at [phone]. Then pharmacy [phone]

## 2020-10-07 ENCOUNTER — TELEPHONE (OUTPATIENT)
Dept: MEDICAL GROUP | Facility: PHYSICIAN GROUP | Age: 60
End: 2020-10-07

## 2020-10-07 NOTE — TELEPHONE ENCOUNTER
----- Message from Pita Bueno sent at 10/5/2020  3:29 PM PDT -----  Regarding: Prescription Question  Contact: [phone]  90 would be preferable and don't be sorry I am grateful thank you so much for your help. Uyen

## 2020-10-07 NOTE — TELEPHONE ENCOUNTER
FINAL PRIOR AUTHORIZATION STATUS:    1.  Name of Medication & Dose: linaCLOtide (LINZESS) 290 MCG Cap     2. Prior Auth Status: Approved through [facility] pharmacy     3. Action Taken: Pharmacy Notified: yes Patient Notified: yes

## 2020-10-08 ENCOUNTER — OFFICE VISIT (OUTPATIENT)
Dept: CARDIOLOGY | Facility: MEDICAL CENTER | Age: 60
End: 2020-10-08
Payer: OTHER GOVERNMENT

## 2020-10-08 ENCOUNTER — PATIENT MESSAGE (OUTPATIENT)
Dept: MEDICAL GROUP | Facility: PHYSICIAN GROUP | Age: 60
End: 2020-10-08

## 2020-10-08 VITALS
DIASTOLIC BLOOD PRESSURE: 60 MMHG | SYSTOLIC BLOOD PRESSURE: 88 MMHG | BODY MASS INDEX: 12.83 KG/M2 | RESPIRATION RATE: 18 BRPM | HEIGHT: 65 IN | OXYGEN SATURATION: 98 % | HEART RATE: 57 BPM | WEIGHT: 77 LBS

## 2020-10-08 DIAGNOSIS — A04.72 C. DIFFICILE COLITIS: ICD-10-CM

## 2020-10-08 DIAGNOSIS — R00.2 PALPITATIONS: ICD-10-CM

## 2020-10-08 DIAGNOSIS — E86.1 HYPOTENSION DUE TO HYPOVOLEMIA: ICD-10-CM

## 2020-10-08 DIAGNOSIS — I95.89 HYPOTENSION DUE TO HYPOVOLEMIA: ICD-10-CM

## 2020-10-08 DIAGNOSIS — R06.02 SHORTNESS OF BREATH: ICD-10-CM

## 2020-10-08 PROCEDURE — 99214 OFFICE O/P EST MOD 30 MIN: CPT | Performed by: NURSE PRACTITIONER

## 2020-10-08 RX ORDER — MIDODRINE HYDROCHLORIDE 10 MG/1
10 TABLET ORAL
Qty: 60 TAB | Refills: 0 | Status: SHIPPED | OUTPATIENT
Start: 2020-10-08 | End: 2021-03-11

## 2020-10-08 ASSESSMENT — ENCOUNTER SYMPTOMS
HEMOPTYSIS: 0
WEAKNESS: 0
VOMITING: 0
ORTHOPNEA: 0
COUGH: 0
SPUTUM PRODUCTION: 0
SHORTNESS OF BREATH: 1
DIARRHEA: 1
CLAUDICATION: 0
DIZZINESS: 0
NAUSEA: 0
PALPITATIONS: 1
WHEEZING: 0
PND: 0

## 2020-10-08 ASSESSMENT — FIBROSIS 4 INDEX: FIB4 SCORE: 2.46

## 2020-10-08 NOTE — PROGRESS NOTES
Chief Complaint   Patient presents with   • Hypercholesterolemia Follow-up   • Palpitations       Subjective:   Pita Bueno is a 60 y.o. female who presents today for hospital follow up     She is followed by Dr. Calvillo in our clinic, history of palpitations, hypothyroid, Colostomy from complication from abdominal surgery in 2013, low body weight/malnutrition, and common variable immunodeficiency.    Hospitalized on 9/21/2020 for hypoglycemia and hypotension. Positive for c.difficile and started on vancomycin. Hypotension from malnutrition. Echo was done showed preserved ef 60% with normal regional wall motion. For pancytopenia, hematology was consulted for IgG infusion. Follow up as outpatient.    Hospitalized in 9/16/2020 with dehydration. She was given IV fluid.     Patient continues to feel fatigue, palpitations, and dyspnea on exertion. She gained a lot of weight since being in the hospital, in which she stated her legs were very swollen and weeping fluid. She stopped taking her florinef and midodrine, the swelling resolved.     She is following up with her GI for her cdiff. She continues to have diarrhea.     Palpitations, zio patch is currently in place. She will take it off in 1 week.     Past Medical History:   Diagnosis Date   • Colostomy in place (HCC)    • Eating disorder    • Hypotension    • Hypothyroid    • Palpitations      Past Surgical History:   Procedure Laterality Date   • OTHER ABDOMINAL SURGERY      rectal prolapse repair, anastomy leak/abcess,colostony     Family History   Problem Relation Age of Onset   • Thyroid Mother    • Alcohol abuse Father    • Heart Disease Father    • Hypertension Father    • Stroke Father      Social History     Socioeconomic History   • Marital status:      Spouse name: Not on file   • Number of children: Not on file   • Years of education: Not on file   • Highest education level: Not on file   Occupational History   • Not on file   Social Needs   •  Financial resource strain: Not on file   • Food insecurity     Worry: Not on file     Inability: Not on file   • Transportation needs     Medical: Not on file     Non-medical: Not on file   Tobacco Use   • Smoking status: Never Smoker   • Smokeless tobacco: Never Used   Substance and Sexual Activity   • Alcohol use: Not Currently   • Drug use: Not Currently   • Sexual activity: Not Currently   Lifestyle   • Physical activity     Days per week: Not on file     Minutes per session: Not on file   • Stress: Not on file   Relationships   • Social connections     Talks on phone: Not on file     Gets together: Not on file     Attends Holiness service: Not on file     Active member of club or organization: Not on file     Attends meetings of clubs or organizations: Not on file     Relationship status: Not on file   • Intimate partner violence     Fear of current or ex partner: Not on file     Emotionally abused: Not on file     Physically abused: Not on file     Forced sexual activity: Not on file   Other Topics Concern   • Not on file   Social History Narrative   • Not on file     Allergies   Allergen Reactions   • Sulfa Drugs Hives     Outpatient Encounter Medications as of 10/8/2020   Medication Sig Dispense Refill   • midodrine (PROAMATINE) 10 MG tablet Take 1 Tab by mouth 3 times a day, with meals. 60 Tab 0   • sennosides (SENOKOT) 8.6 MG Tab Take 8.6 mg by mouth 1 time daily as needed.     • levothyroxine (SYNTHROID) 50 MCG Tab Take 1 Tab by mouth Every morning on an empty stomach. 30 Tab 0   • linaCLOtide (LINZESS) 290 MCG Cap Take 290 mcg by mouth every evening.     • [DISCONTINUED] potassium chloride SA (KDUR) 20 MEQ Tab CR Take 1 Tab by mouth every day. (Patient not taking: Reported on 10/8/2020) 10 Tab 0   • [DISCONTINUED] hydrOXYzine HCl (ATARAX) 25 MG Tab Take 1 Tab by mouth 3 times a day as needed for Itching. (Patient not taking: Reported on 10/8/2020) 30 Tab 0   • [DISCONTINUED] fludrocortisone (FLORINEF)  "0.1 MG Tab Take 1 Tab by mouth every morning. (Patient not taking: Reported on 10/8/2020) 30 Tab 0   • [DISCONTINUED] midodrine (PROAMATINE) 10 MG tablet Take 1 Tab by mouth 3 times a day, with meals. (Patient not taking: Reported on 10/8/2020) 60 Tab 0   • [DISCONTINUED] ondansetron (ZOFRAN ODT) 4 MG TABLET DISPERSIBLE Take 1 Tab by mouth every four hours as needed for Nausea. (Patient not taking: Reported on 10/8/2020) 10 Tab 0   • [DISCONTINUED] metoclopramide (REGLAN) 10 MG Tab Take 1 Tab by mouth four times daily - before meals and nightly as needed. (Patient not taking: Reported on 10/8/2020) 120 Tab 0     No facility-administered encounter medications on file as of 10/8/2020.      Review of Systems   Constitutional: Positive for malaise/fatigue.   Respiratory: Positive for shortness of breath. Negative for cough, hemoptysis, sputum production and wheezing.    Cardiovascular: Positive for palpitations. Negative for chest pain, orthopnea, claudication, leg swelling and PND.   Gastrointestinal: Positive for diarrhea. Negative for nausea and vomiting.   Neurological: Negative for dizziness and weakness.        Objective:   BP (!) 88/60 (BP Location: Left arm, Patient Position: Sitting, BP Cuff Size: Adult)   Pulse (!) 57   Resp 18   Ht 1.651 m (5' 5\")   Wt 34.9 kg (77 lb)   SpO2 98%   BMI 12.81 kg/m²     Physical Exam   Constitutional: She is oriented to person, place, and time. She appears lethargic. She appears cachectic. No distress.   HENT:   Head: Normocephalic and atraumatic.   Eyes: Pupils are equal, round, and reactive to light.   Neck: No JVD present.   Cardiovascular: Normal rate, regular rhythm and normal heart sounds.   Pulmonary/Chest: Effort normal and breath sounds normal.   Abdominal: Soft. Bowel sounds are normal. She exhibits no distension.   Musculoskeletal:         General: No edema.   Neurological: She is oriented to person, place, and time. She appears lethargic.   Skin: Skin is warm " and dry. She is not diaphoretic.   Psychiatric: She has a normal mood and affect. Her behavior is normal. Judgment and thought content normal.         ECHO  9/2/2020  No prior study is available for comparison.   Normal left ventricular systolic function.  Left ventricular ejection fraction is visually estimated to be 60%.  Normal regional wall motion.  Normal right ventricular size.  Normal left atrial size.  Trace mitral regurgitation.  Trace aortic insufficiency.  The aortic valve is not well visualized.  Right ventricular systolic pressure is estimated to be 30 mmHg.  Normal pericardium without effusion.  Assessment:     1. C. difficile colitis     2. Shortness of breath     3. Palpitations     4. Hypotension due to hypovolemia         Medical Decision Making:  Today's Assessment / Status / Plan:     1. Dyspnea on exertion and palpitations:  - zio patch in place.  - echo showed ef 60% with trace mitral regurgitation and trace aortic insufficiency     2. Cdiff:  - follow up with GI     3. Hypotension:  - restart midodrine 10mg TID     Follow up after zio monitor, sooner as needed.

## 2020-10-09 ENCOUNTER — PATIENT MESSAGE (OUTPATIENT)
Dept: MEDICAL GROUP | Facility: PHYSICIAN GROUP | Age: 60
End: 2020-10-09

## 2020-10-09 NOTE — TELEPHONE ENCOUNTER
From: Pita Bueno  To: Gita Fleming Med Ass't  Sent: 10/8/2020 7:19 PM PDT  Subject: Prescription Question    Sorry to bug you but I am completely out of linzess...[employer] [facility] suggested you call in to [facility] right there by you for a 30 day supply to hold me over. Let me know if that's ok. Thanks so much. Uyen Buneo      ----- Message -----   From:Gita Fleming Med Ass't   Sent:10/7/2020 8:42 AM PDT   To:Pita Bueno   Subject:RE: Prescription Question    Stuart Lema,    I called in your prior auth for linaCLOtide (LINZESS) 290 MCG Cap your reference #[id number]. You have been approved from dates 9/7/2020-10/7/2021.    Thanks,  Gita MENDOZA      ----- Message -----   From:Pita Bueno   Sent:10/5/2020 3:29 PM PDT   To:Gita Fleming, Med Ass't   Subject:Prescription Question    90 would be preferable and don't be sorry I am grateful thank you so much for your help. Uyen      ----- Message -----   From:Gita Fleming Med Ass't   Sent:10/5/2020 2:11 PM PDT   To:Pita Bueno   Subject:RE: Prescription Question    Sorry but is it a 30 day supply?      ----- Message -----   From:Pita Bueno   Sent:10/5/2020 12:48 PM PDT    To:Gita Fleming Med Ass't   Subject:Prescription Question    Absolutely! 290 mcg 1capsule a day. Linzess generic linaclotide. Thank you !!!      ----- Message -----   From:Gita Fleming Med Ass't   Sent:10/5/2020 11:34 AM PDT   To:Pita Bueno   Subject:RE: Prescription Question    Stuart Lema,    I am not seeing the quantity or day supply from your last 2 prescriptions. Can you please help me with that?    ThanksGita MA      ----- Message -----   From:Pita Bueno   Sent:10/5/2020 9:20 AM PDT   To:Clifton T Harvey, M.D.   Subject:Prescription Question    I need a refill on medication linzess 290mcg. Needs verbal authorization at [phone]. Then pharmacy [phone]

## 2020-10-09 NOTE — TELEPHONE ENCOUNTER
From: Pita Bueno  To: Gita Fleming, Med Ass't  Sent: 10/9/2020 9:27 AM PDT  Subject: Prescription Question    Thanks! UYEN      ----- Message -----   From:Gita Fleming Med Ass't   Sent:10/9/2020 7:37 AM PDT   To:Pita Bueno   Subject:RE: Prescription Question    Stuart Lema,    I will give [facility] a call this morning and see what I can do.    Thanks,  Gita MENDOZA      ----- Message -----   From:Pita Bueno   Sent:10/8/2020 7:19 PM PDT   To:Gita Fleming Med Ass't   Subject:Prescription Question    Sorry to bug you but I am completely out of linzess...[facility] suggested you call in to [facility] right there by you for a 30 day supply to hold me over. Let me know if that's ok. Thanks so much. Uyen Bueno      ----- Message -----   From:Gita Fleming Med Ass't   Sent:10/7/2020 8:42 AM PDT   To:Pita Bueno   Subject:RE: Prescription Question    Stuart Lema,    I called in your prior auth for linaCLOtide (LINZESS) 290 MCG Cap your reference #[id number]. You have been approved from dates 9/7/2020-10/7/2021.    Thanks,  Gita MENDOZA      ----- Message -----   From:Pita Bueno   Sent:10/5/2020 3:29 PM PDT    To:Gita Fleming Med Ass't   Subject:Prescription Question    90 would be preferable and don't be sorry I am grateful thank you so much for your help. Uyen      ----- Message -----   From:Gita Fleming Med Ass't   Sent:10/5/2020 2:11 PM PDT   To:Pita Bueno   Subject:RE: Prescription Question    Sorry but is it a 30 day supply?      ----- Message -----   From:Pita Bueno   Sent:10/5/2020 12:48 PM PDT   To:Gita Fleming Med Ass't   Subject:Prescription Question    Absolutely! 290 mcg 1capsule a day. Linzess generic linaclotide. Thank you !!!      ----- Message -----   From:Gita Fleming, Med Ass't   Sent:10/5/2020 11:34 AM PDT   To:Pita Bueno   Subject:RE: Prescription Question    Stuart Lema,    I am not seeing the quantity or day supply from your last 2  prescriptions. Can you please help me with that?    Thanks,  Gita MENDOZA      ----- Message -----   From:Pita Bueno   Sent:10/5/2020 9:20 AM PDT   To:Clifton Harvey M.D.   Subject:Prescription Question    I need a refill on medication linzess 290mcg. Needs verbal authorization at [phone]. Then pharmacy [phone]

## 2020-10-12 RX ORDER — LINACLOTIDE 290 UG/1
290 CAPSULE, GELATIN COATED ORAL EVERY EVENING
Qty: 90 CAP | Refills: 0 | Status: SHIPPED | OUTPATIENT
Start: 2020-10-12 | End: 2022-02-10

## 2020-10-12 RX ORDER — LEVOTHYROXINE SODIUM 0.05 MG/1
50 TABLET ORAL
Qty: 90 TAB | Refills: 3 | Status: SHIPPED | OUTPATIENT
Start: 2020-10-12 | End: 2020-10-15

## 2020-10-15 ENCOUNTER — OFFICE VISIT (OUTPATIENT)
Dept: MEDICAL GROUP | Facility: PHYSICIAN GROUP | Age: 60
End: 2020-10-15
Payer: OTHER GOVERNMENT

## 2020-10-15 VITALS
SYSTOLIC BLOOD PRESSURE: 98 MMHG | BODY MASS INDEX: 15.79 KG/M2 | WEIGHT: 80.4 LBS | OXYGEN SATURATION: 100 % | HEART RATE: 52 BPM | RESPIRATION RATE: 12 BRPM | HEIGHT: 60 IN | DIASTOLIC BLOOD PRESSURE: 52 MMHG | TEMPERATURE: 97.6 F

## 2020-10-15 DIAGNOSIS — D61.818 PANCYTOPENIA (HCC): Chronic | ICD-10-CM

## 2020-10-15 DIAGNOSIS — K52.9 CHRONIC DIARRHEA: ICD-10-CM

## 2020-10-15 DIAGNOSIS — E03.9 HYPOTHYROIDISM, UNSPECIFIED TYPE: Chronic | ICD-10-CM

## 2020-10-15 DIAGNOSIS — D83.9 COMMON VARIABLE IMMUNODEFICIENCY (HCC): Chronic | ICD-10-CM

## 2020-10-15 DIAGNOSIS — E43 SEVERE MALNUTRITION (HCC): ICD-10-CM

## 2020-10-15 PROBLEM — K59.1 FUNCTIONAL DIARRHEA: Status: RESOLVED | Noted: 2020-07-13 | Resolved: 2020-10-15

## 2020-10-15 PROBLEM — E16.1 REACTIVE HYPOGLYCEMIA: Status: RESOLVED | Noted: 2020-02-03 | Resolved: 2020-10-15

## 2020-10-15 PROBLEM — E87.6 HYPOKALEMIA: Chronic | Status: RESOLVED | Noted: 2020-09-14 | Resolved: 2020-10-15

## 2020-10-15 PROCEDURE — 99214 OFFICE O/P EST MOD 30 MIN: CPT | Performed by: INTERNAL MEDICINE

## 2020-10-15 RX ORDER — LEVOTHYROXINE SODIUM 0.05 MG/1
50 TABLET ORAL
Qty: 90 TAB | Refills: 3 | Status: SHIPPED | OUTPATIENT
Start: 2020-10-15 | End: 2020-10-15

## 2020-10-15 RX ORDER — LEVOTHYROXINE SODIUM 0.05 MG/1
50 TABLET ORAL
Qty: 90 TAB | Refills: 3 | Status: SHIPPED | OUTPATIENT
Start: 2020-10-15 | End: 2020-11-16 | Stop reason: SDUPTHER

## 2020-10-15 ASSESSMENT — FIBROSIS 4 INDEX: FIB4 SCORE: 2.46

## 2020-10-16 ENCOUNTER — HOSPITAL ENCOUNTER (OUTPATIENT)
Dept: LAB | Facility: MEDICAL CENTER | Age: 60
End: 2020-10-16
Attending: INTERNAL MEDICINE
Payer: OTHER GOVERNMENT

## 2020-10-16 DIAGNOSIS — D61.818 PANCYTOPENIA (HCC): Chronic | ICD-10-CM

## 2020-10-16 DIAGNOSIS — E03.9 HYPOTHYROIDISM, UNSPECIFIED TYPE: Chronic | ICD-10-CM

## 2020-10-16 LAB
BASOPHILS # BLD AUTO: 0.6 % (ref 0–1.8)
BASOPHILS # BLD: 0.02 K/UL (ref 0–0.12)
EOSINOPHIL # BLD AUTO: 0.02 K/UL (ref 0–0.51)
EOSINOPHIL NFR BLD: 0.6 % (ref 0–6.9)
ERYTHROCYTE [DISTWIDTH] IN BLOOD BY AUTOMATED COUNT: 51.5 FL (ref 35.9–50)
HCT VFR BLD AUTO: 38 % (ref 37–47)
HGB BLD-MCNC: 11.7 G/DL (ref 12–16)
IMM GRANULOCYTES # BLD AUTO: 0.01 K/UL (ref 0–0.11)
IMM GRANULOCYTES NFR BLD AUTO: 0.3 % (ref 0–0.9)
LYMPHOCYTES # BLD AUTO: 0.54 K/UL (ref 1–4.8)
LYMPHOCYTES NFR BLD: 15.7 % (ref 22–41)
MCH RBC QN AUTO: 32.3 PG (ref 27–33)
MCHC RBC AUTO-ENTMCNC: 30.8 G/DL (ref 33.6–35)
MCV RBC AUTO: 105 FL (ref 81.4–97.8)
MONOCYTES # BLD AUTO: 0.26 K/UL (ref 0–0.85)
MONOCYTES NFR BLD AUTO: 7.6 % (ref 0–13.4)
NEUTROPHILS # BLD AUTO: 2.58 K/UL (ref 2–7.15)
NEUTROPHILS NFR BLD: 75.2 % (ref 44–72)
NRBC # BLD AUTO: 0 K/UL
NRBC BLD-RTO: 0 /100 WBC
PLATELET # BLD AUTO: 132 K/UL (ref 164–446)
PMV BLD AUTO: 14.2 FL (ref 9–12.9)
RBC # BLD AUTO: 3.62 M/UL (ref 4.2–5.4)
WBC # BLD AUTO: 3.4 K/UL (ref 4.8–10.8)

## 2020-10-16 PROCEDURE — 82607 VITAMIN B-12: CPT

## 2020-10-16 PROCEDURE — 83540 ASSAY OF IRON: CPT

## 2020-10-16 PROCEDURE — 36415 COLL VENOUS BLD VENIPUNCTURE: CPT

## 2020-10-16 PROCEDURE — 84443 ASSAY THYROID STIM HORMONE: CPT

## 2020-10-16 PROCEDURE — 85025 COMPLETE CBC W/AUTO DIFF WBC: CPT

## 2020-10-16 PROCEDURE — 82728 ASSAY OF FERRITIN: CPT

## 2020-10-16 PROCEDURE — 83516 IMMUNOASSAY NONANTIBODY: CPT

## 2020-10-16 PROCEDURE — 87493 C DIFF AMPLIFIED PROBE: CPT

## 2020-10-16 PROCEDURE — 82784 ASSAY IGA/IGD/IGG/IGM EACH: CPT

## 2020-10-16 PROCEDURE — 82746 ASSAY OF FOLIC ACID SERUM: CPT

## 2020-10-16 NOTE — ASSESSMENT & PLAN NOTE
Patient was seen by hematologist recently Dr. Chang.  Recent blood test showed pancytopenia  Results for SUSAN WILCOX (MRN [id number]) as of 10/15/2020 17:07   Ref. Range 9/24/2020 02:12   WBC Latest Ref Range: 4.8 - 10.8 K/uL 3.4 (L)   RBC Latest Ref Range: 4.20 - 5.40 M/uL 2.91 (L)   Hemoglobin Latest Ref Range: 12.0 - 16.0 g/dL 9.8 (L)   Hematocrit Latest Ref Range: 37.0 - 47.0 % 30.5 (L)   MCV Latest Ref Range: 81.4 - 97.8 fL 104.8 (H)   MCH Latest Ref Range: 27.0 - 33.0 pg 33.7 (H)   MCHC Latest Ref Range: 33.6 - 35.0 g/dL 32.1 (L)   RDW Latest Ref Range: 35.9 - 50.0 fL 53.6 (H)   Platelet Count Latest Ref Range: 164 - 446 K/uL 97 (L)   MPV Latest Ref Range: 9.0 - 12.9 fL 13.4 (H)   Neutrophils-Polys Latest Ref Range: 44.00 - 72.00 % 49.30   Neutrophils (Absolute) Latest Ref Range: 2.00 - 7.15 K/uL 1.70 (L)   Patient has pending appointment to see Dr. Chang tomorrow.  Stressed importance to keep the appointment.  Have also order a follow-up blood test to check on his CBC.  The patient denies any history of bleeding.

## 2020-10-16 NOTE — PROGRESS NOTES
CC: Hospital follow-up  Refilled levothyroxine  Discuss lab test result      HPI: This is a 60 y.o. pt.  Pt's medical history is notable for:     Chronic diarrhea  This is a chronic condition.  The patient is presently followed by GI specialist Dr. Starr.  This condition is associated with high output stoma.  Review the [facility] specialist note the patient seemed to cycle between severe obstipation and high-volume liquid ostomy output leading to dehydration/hypovolemia.  GI specialist refer the patient to colorectal surgery for consideration of possible colectomy.  Patient has pending appointment to see the surgeon.    Severe malnutrition (HCC)  Patient stated that her appetite has improved since she left the hospital.  She is using 3 cans of Ensure or boost per day.  I recommend to refer the patient also to see dietitian.    CVID (common variable immunodeficiency) (HCC)  Patient was seen by hematologist recently Dr. Chang.  Recent blood test showed pancytopenia  Results for SUSAN WILCOX (MRN [id number]) as of 10/15/2020 17:07   Ref. Range 9/24/2020 02:12   WBC Latest Ref Range: 4.8 - 10.8 K/uL 3.4 (L)   RBC Latest Ref Range: 4.20 - 5.40 M/uL 2.91 (L)   Hemoglobin Latest Ref Range: 12.0 - 16.0 g/dL 9.8 (L)   Hematocrit Latest Ref Range: 37.0 - 47.0 % 30.5 (L)   MCV Latest Ref Range: 81.4 - 97.8 fL 104.8 (H)   MCH Latest Ref Range: 27.0 - 33.0 pg 33.7 (H)   MCHC Latest Ref Range: 33.6 - 35.0 g/dL 32.1 (L)   RDW Latest Ref Range: 35.9 - 50.0 fL 53.6 (H)   Platelet Count Latest Ref Range: 164 - 446 K/uL 97 (L)   MPV Latest Ref Range: 9.0 - 12.9 fL 13.4 (H)   Neutrophils-Polys Latest Ref Range: 44.00 - 72.00 % 49.30   Neutrophils (Absolute) Latest Ref Range: 2.00 - 7.15 K/uL 1.70 (L)   Patient has pending appointment to see Dr. Chang tomorrow.  Stressed importance to keep the appointment.  Have also order a follow-up blood test to check on his CBC.  The patient denies any history of bleeding.    Hypothyroidism  Patient  is currently on levothyroxine 50 MCG daily..  She is due for blood test.  Ordered today.    Pancytopenia (HCC)  Noted with recent blood test.  She has pending appointment to see hematologist tomorrow.  Patient denies fever chills chest pain shortness of breath abdominal pain at this time.  No history of bleeding.          REVIEW OF SYSTEMS:     Constitutional:  no fever / chills   Neurologic: no headaches, no numbness/tingling  Eyes: no changes in vision  ENT: no sore throat, no hearing loss  CV:  no chest pain, no palpitations  Pulmonary: no SOB, no cough        Allergies: Sulfa drugs    Current Outpatient Medications Ordered in Epic   Medication Sig Dispense Refill   • levothyroxine (SYNTHROID) 50 MCG Tab Take 1 Tab by mouth Every morning on an empty stomach. 90 Tab 3   • linaCLOtide (LINZESS) 290 MCG Cap Take 290 mcg by mouth every evening. 90 Cap 0   • midodrine (PROAMATINE) 10 MG tablet Take 1 Tab by mouth 3 times a day, with meals. 60 Tab 0   • sennosides (SENOKOT) 8.6 MG Tab Take 8.6 mg by mouth 1 time daily as needed.       No current Epic-ordered facility-administered medications on file.        Past Medical History:   Diagnosis Date   • Colostomy in place (HCC)    • Eating disorder    • Hypotension    • Hypothyroid    • Palpitations         Past Surgical History:   Procedure Laterality Date   • OTHER ABDOMINAL SURGERY      rectal prolapse repair, anastomy leak/abcess,colostony        Family History   Problem Relation Age of Onset   • Thyroid Mother    • Alcohol abuse Father    • Heart Disease Father    • Hypertension Father    • Stroke Father         Social History     Tobacco Use   Smoking Status Never Smoker   Smokeless Tobacco Never Used          Social History     Substance and Sexual Activity   Alcohol Use Not Currently         ------------------------------------------------------------------------------     PHYSICAL EXAM:   Vitals:    10/15/20 1604   BP: (!) 98/52   Pulse: (!) 52   Resp: 12   Temp:  36.4 °C (97.6 °F)   SpO2: 100%      Body mass index is 15.7 kg/m².         Constitutional: no acute distress  Neck: supple, no JVD  CV: heart RRR  Resp: normal effort, no wheezing or rales.  GI: abdomen soft, no obvious mass, no tenderness  Neuro: CN 2-12 grossly intact        -----------------------------------------------------------------------------    ASSESSMENT:   1. Pancytopenia (HCC)    2. Hypothyroidism, unspecified type    3. Severe malnutrition (HCC)    4. Chronic diarrhea    5. CVID (common variable immunodeficiency) (HCC)       Prior to leaving the patient also stated that she developed hypotension while in the hospital and presently she is taking Midrin 10 mg daily.  Her blood pressure today 90/52 noted on exam today.  Advised the patient continue to take the medication.    MEDICAL DECISION MAKING: TODAY'S ASSESSMENT / STATUS / PLAN:    Lab tests ordered today including CBC iron study B12 and folate.  Advised the patient to continue take current medication.  Refilled levothyroxine.  Refer the patient to see dietitian  Advised the patient to keep the appointment to see the surgeon  Continue follow-up with GI specialist  Continue follow-up with hematologist     Return in about 3 months (around 1/15/2021).       PATIENT EDUCATION:  -If any problems should arise, patient was advised to contact our office or go to ER to be evaluated.  -Advised pt to follow a healthy diet and regular aerobic exercise regimen. Advised pt to avoid alcohol and tobacco use.    Please note that this dictation was created using voice recognition software. I have made every reasonable attempt to correct obvious errors, but it is possible there are errors of grammar and possibly content that I did not discover before finalizing the note.

## 2020-10-16 NOTE — ASSESSMENT & PLAN NOTE
Patient stated that her appetite has improved since she left the hospital.  She is using 3 cans of Ensure or boost per day.  I recommend to refer the patient also to see dietitian.

## 2020-10-16 NOTE — ASSESSMENT & PLAN NOTE
Noted with recent blood test.  She has pending appointment to see hematologist tomorrow.  Patient denies fever chills chest pain shortness of breath abdominal pain at this time.  No history of bleeding.

## 2020-10-16 NOTE — ASSESSMENT & PLAN NOTE
This is a chronic condition.  The patient is presently followed by GI specialist Dr. Starr.  This condition is associated with high output stoma.  Review the [facility] specialist note the patient seemed to cycle between severe obstipation and high-volume liquid ostomy output leading to dehydration/hypovolemia.  GI specialist refer the patient to colorectal surgery for consideration of possible colectomy.  Patient has pending appointment to see the surgeon.

## 2020-10-17 LAB
C DIFF DNA SPEC QL NAA+PROBE: NEGATIVE
C DIFF TOX GENS STL QL NAA+PROBE: NEGATIVE
FERRITIN SERPL-MCNC: 26 NG/ML (ref 10–291)
FOLATE SERPL-MCNC: 27.6 NG/ML
IRON SERPL-MCNC: 66 UG/DL (ref 40–170)
TSH SERPL DL<=0.005 MIU/L-ACNC: 3.21 UIU/ML (ref 0.38–5.33)
VIT B12 SERPL-MCNC: 684 PG/ML (ref 211–911)

## 2020-10-20 LAB — IGA SERPL-MCNC: 111 MG/DL (ref 68–408)

## 2020-10-21 LAB — GLIADIN PEPTIDE+TTG IGA+IGG SER QL IA: 7 UNITS (ref 0–19)

## 2020-10-23 ENCOUNTER — OFFICE VISIT (OUTPATIENT)
Dept: URGENT CARE | Facility: PHYSICIAN GROUP | Age: 60
End: 2020-10-23
Payer: OTHER GOVERNMENT

## 2020-10-23 VITALS
TEMPERATURE: 98.1 F | SYSTOLIC BLOOD PRESSURE: 70 MMHG | HEART RATE: 64 BPM | BODY MASS INDEX: 12.97 KG/M2 | WEIGHT: 76 LBS | OXYGEN SATURATION: 100 % | DIASTOLIC BLOOD PRESSURE: 50 MMHG | HEIGHT: 64 IN | RESPIRATION RATE: 12 BRPM

## 2020-10-23 DIAGNOSIS — E86.1 HYPOTENSION DUE TO HYPOVOLEMIA: ICD-10-CM

## 2020-10-23 DIAGNOSIS — I95.89 HYPOTENSION DUE TO HYPOVOLEMIA: ICD-10-CM

## 2020-10-23 PROCEDURE — 99213 OFFICE O/P EST LOW 20 MIN: CPT | Performed by: FAMILY MEDICINE

## 2020-10-23 ASSESSMENT — ENCOUNTER SYMPTOMS
NAUSEA: 1
DIARRHEA: 1
DIZZINESS: 1
COUGH: 0
ABDOMINAL PAIN: 1
FEVER: 0
SORE THROAT: 0

## 2020-10-23 ASSESSMENT — FIBROSIS 4 INDEX: FIB4 SCORE: 1.81

## 2020-10-24 NOTE — PROGRESS NOTES
Subjective:     Pita Bueno is a 60 y.o. female who presents for Shortness of Breath (nausea, pt states she cant get a read on pulse or bp x2 days) and Bump (Lower R side or back)    HPI  Pt presents for evaluation of dizziness   Pt with nausea and feeling poorly the past few days   Has had great increase in the output of her colostomy bag recently  Colostomy bag with watery fluid  Needing to empty bag multiple times per hour  Feeling very dizzy when standing or sitting up  Having some numbness in the arms   Having some pain in abdomen  Tried to take her blood pressure today at home and machine unable to read it as it was too low  Also unable to obtain pulse using the machine which concerned her  Patient has medications for nausea at home which are not helping her  Having difficulties keeping up p.o. intake    Review of Systems   Constitutional: Positive for malaise/fatigue. Negative for fever.   HENT: Negative for sore throat.    Respiratory: Negative for cough.    Gastrointestinal: Positive for abdominal pain, diarrhea and nausea.   Skin: Negative for rash.   Neurological: Positive for dizziness.     PMH:  has a past medical history of Colostomy in place (HCC), Eating disorder, Hypotension, Hypothyroid, and Palpitations.  MEDS:   Current Outpatient Medications:   •  levothyroxine (SYNTHROID) 50 MCG Tab, Take 1 Tab by mouth Every morning on an empty stomach., Disp: 90 Tab, Rfl: 3  •  linaCLOtide (LINZESS) 290 MCG Cap, Take 290 mcg by mouth every evening., Disp: 90 Cap, Rfl: 0  •  midodrine (PROAMATINE) 10 MG tablet, Take 1 Tab by mouth 3 times a day, with meals., Disp: 60 Tab, Rfl: 0  •  sennosides (SENOKOT) 8.6 MG Tab, Take 8.6 mg by mouth 1 time daily as needed., Disp: , Rfl:   ALLERGIES:   Allergies   Allergen Reactions   • Sulfa Drugs Hives     SURGHX:   Past Surgical History:   Procedure Laterality Date   • OTHER ABDOMINAL SURGERY      rectal prolapse repair, anastomy leak/abcess,colostony     SOCHX:   "reports that she has never smoked. She has never used smokeless tobacco. She reports previous alcohol use. She reports previous drug use.  FH: Family history was reviewed, not contributing to acute complaint     Objective:   BP (!) 70/50   Pulse 64   Temp 36.7 °C (98.1 °F) (Temporal)   Resp 12   Ht 1.626 m (5' 4\")   Wt 34.5 kg (76 lb)   SpO2 100%   BMI 13.05 kg/m²     Physical Exam  Constitutional:       General: She is not in acute distress.     Appearance: She is well-developed. She is not diaphoretic.   HENT:      Head: Normocephalic and atraumatic.      Right Ear: External ear normal.      Left Ear: External ear normal.      Nose: Nose normal.   Eyes:      General: No scleral icterus.        Right eye: No discharge.         Left eye: No discharge.      Conjunctiva/sclera: Conjunctivae normal.   Neck:      Musculoskeletal: Normal range of motion.      Trachea: No tracheal deviation.   Cardiovascular:      Rate and Rhythm: Normal rate and regular rhythm.   Pulmonary:      Effort: Pulmonary effort is normal. No respiratory distress.      Breath sounds: Normal breath sounds. No wheezing or rales.   Abdominal:      General: Abdomen is flat. Bowel sounds are normal. There is no distension.      Palpations: Abdomen is soft.      Tenderness: There is right CVA tenderness. There is no left CVA tenderness, guarding or rebound.      Comments: +TTP in epigastric area as well as left lower quadrant, colostomy bag intact and without leak, no surrounding erythema in the area   Skin:     General: Skin is warm and dry.      Findings: No rash.   Neurological:      Mental Status: She is alert and oriented to person, place, and time.   Psychiatric:         Mood and Affect: Mood normal.         Behavior: Behavior normal.         Thought Content: Thought content normal.         Judgment: Judgment normal.       Assessment/Plan:   Assessment    1. Hypotension due to hypovolemia    Patient with significant hypotension in office " (70/50).  Advised that she does need higher level of care which could include further work-up with labs, imaging, and treatment with IV fluids.  These are things I would not be able to provide here in this office today.  Patient is agreeable to this and prefers to be seen at [facility].  She was driven by personal car to emergency room.

## 2020-10-26 ENCOUNTER — PATIENT MESSAGE (OUTPATIENT)
Dept: MEDICAL GROUP | Facility: PHYSICIAN GROUP | Age: 60
End: 2020-10-26

## 2020-10-26 PROCEDURE — 0296T PR EXT ECG > 48HR TO 21 DAY RCRD W/CONECT INTL RCRD: CPT | Performed by: INTERNAL MEDICINE

## 2020-10-26 PROCEDURE — 0298T PR EXT ECG > 48HR TO 21 DAY REVIEW AND INTERPRETATN: CPT | Performed by: INTERNAL MEDICINE

## 2020-10-27 NOTE — TELEPHONE ENCOUNTER
From: Pita Bueno  To: Clifton Harvey M.D.  Sent: 10/26/2020 9:31 PM PDT  Subject: Non-Urgent Medical Question    Please let doctor Candice know i was at [facility] Friday nite urgent care sent me. Bp 70/50 white count lower. Ct showing bowel obstruction. Called on call surgeon for dr. Duong. Making no progress getting seen. Any ideas for help. Thanks riccardo

## 2020-11-16 ENCOUNTER — OFFICE VISIT (OUTPATIENT)
Dept: MEDICAL GROUP | Facility: PHYSICIAN GROUP | Age: 60
End: 2020-11-16
Payer: OTHER GOVERNMENT

## 2020-11-16 VITALS
OXYGEN SATURATION: 98 % | BODY MASS INDEX: 12.26 KG/M2 | HEIGHT: 64 IN | WEIGHT: 71.8 LBS | HEART RATE: 80 BPM | DIASTOLIC BLOOD PRESSURE: 60 MMHG | SYSTOLIC BLOOD PRESSURE: 90 MMHG | TEMPERATURE: 97.1 F | RESPIRATION RATE: 14 BRPM

## 2020-11-16 DIAGNOSIS — L29.9 ITCHING: ICD-10-CM

## 2020-11-16 DIAGNOSIS — D83.9 COMMON VARIABLE IMMUNODEFICIENCY (HCC): Chronic | ICD-10-CM

## 2020-11-16 DIAGNOSIS — Z43.3 ATTENTION TO COLOSTOMY (HCC): Chronic | ICD-10-CM

## 2020-11-16 DIAGNOSIS — Z12.12 SCREENING FOR COLORECTAL CANCER: ICD-10-CM

## 2020-11-16 DIAGNOSIS — E03.9 HYPOTHYROIDISM, UNSPECIFIED TYPE: Chronic | ICD-10-CM

## 2020-11-16 DIAGNOSIS — Z12.11 SCREENING FOR COLORECTAL CANCER: ICD-10-CM

## 2020-11-16 DIAGNOSIS — K52.9 CHRONIC DIARRHEA: Chronic | ICD-10-CM

## 2020-11-16 PROCEDURE — 99214 OFFICE O/P EST MOD 30 MIN: CPT | Performed by: INTERNAL MEDICINE

## 2020-11-16 RX ORDER — LEVOTHYROXINE SODIUM 0.05 MG/1
50 TABLET ORAL
Qty: 90 TAB | Refills: 3 | Status: SHIPPED | OUTPATIENT
Start: 2020-11-16 | End: 2021-01-20 | Stop reason: SDUPTHER

## 2020-11-16 RX ORDER — HYDROXYZINE HYDROCHLORIDE 10 MG/1
10 TABLET, FILM COATED ORAL
Qty: 30 TAB | Refills: 1 | Status: SHIPPED | OUTPATIENT
Start: 2020-11-16 | End: 2021-01-20

## 2020-11-16 ASSESSMENT — FIBROSIS 4 INDEX: FIB4 SCORE: 1.81

## 2020-11-16 NOTE — ASSESSMENT & PLAN NOTE
This is a chronic condition diagnosed in [place] since 2007.  The patient has been followed by hematologist and intermittently she has received infusion treatment.  Patient is requesting a renewal referral

## 2020-11-16 NOTE — ASSESSMENT & PLAN NOTE
This is a chronic condition.  The patient currently taking levothyroxine.  Recent TSH is within the acceptable range.

## 2020-11-16 NOTE — ASSESSMENT & PLAN NOTE
Patient with history of colostomy after rectal prolapse surgery.  She is currently followed by GI specialist.

## 2020-11-16 NOTE — ASSESSMENT & PLAN NOTE
This is a chronic condition.  Patient denies any rash.  She has been taking Benadryl without significant improvement.  Skin examination today show dry skin.  No rash noted.

## 2020-11-16 NOTE — ASSESSMENT & PLAN NOTE
This is a chronic condition.  Patient is followed by GI specialist.  It is associated with high output stoma.  Patient was seen by a colorectal surgeon recently and surgery was not considered.  Patient has pending appointment to see the GI specialist in a couple of days.  Advised the patient it is important to keep this appointment.  Surgeon felt the patient may be a candidate for TPN treatment.  The patient wishes to weight to discuss with GI specialist.    She has been getting intermittent nausea especially after eating Chinese food yesterday.  Patient denies vomiting or GI bleeding.

## 2020-11-16 NOTE — PROGRESS NOTES
CC: Follow-up surgery visit  Recurrent itching  Recurrent nausea  Follow-up hypothyroidism condition      HPI: This is a 60 y.o. pt.  Pt's medical history is notable for:     Colostomy care (HCC)  Patient with history of colostomy after rectal prolapse surgery.  She is currently followed by GI specialist.      CVID (common variable immunodeficiency) ([facility])  This is a chronic condition diagnosed in [place] since 2007.  The patient has been followed by hematologist and intermittently she has received infusion treatment.  Patient is requesting a renewal referral    Hypothyroidism  This is a chronic condition.  The patient currently taking levothyroxine.  Recent TSH is within the acceptable range.    Chronic diarrhea  This is a chronic condition.  Patient is followed by GI specialist.  It is associated with high output stoma.  Patient was seen by a colorectal surgeon recently and surgery was not considered.  Patient has pending appointment to see the GI specialist in a couple of days.  Advised the patient it is important to keep this appointment.  Surgeon felt the patient may be a candidate for TPN treatment.  The patient wishes to wait to discuss with GI specialist.    She has been getting intermittent nausea especially after eating Chinese food yesterday.  Patient denies vomiting or GI bleeding.    Itching  This is a chronic condition.  Patient denies any rash.  She has been taking Benadryl without significant improvement.  Skin examination today show dry skin.  No rash noted.          REVIEW OF SYSTEMS:     Constitutional:  no fever / chills   Neurologic: no headaches, no numbness/tingling  Eyes: no changes in vision  ENT: no sore throat, no hearing loss  CV:  no chest pain, no palpitations  Pulmonary: no SOB, no cough          Allergies: Sulfa drugs    Current Outpatient Medications Ordered in Epic   Medication Sig Dispense Refill   • levothyroxine (SYNTHROID) 50 MCG Tab Take 1 Tab by mouth Every morning on an  empty stomach. 90 Tab 3   • hydrOXYzine HCl (ATARAX) 10 MG Tab Take 1 Tab by mouth at bedtime as needed for Itching. 30 Tab 1   • linaCLOtide (LINZESS) 290 MCG Cap Take 290 mcg by mouth every evening. 90 Cap 0   • midodrine (PROAMATINE) 10 MG tablet Take 1 Tab by mouth 3 times a day, with meals. 60 Tab 0   • sennosides (SENOKOT) 8.6 MG Tab Take 8.6 mg by mouth 1 time daily as needed.       No current Epic-ordered facility-administered medications on file.        Past Medical History:   Diagnosis Date   • Colostomy in place ([facility])    • Eating disorder    • Hypotension    • Hypothyroid    • Palpitations         Past Surgical History:   Procedure Laterality Date   • OTHER ABDOMINAL SURGERY      rectal prolapse repair, anastomy leak/abcess,colostony        Family History   Problem Relation Age of Onset   • Thyroid Mother    • Alcohol abuse Father    • Heart Disease Father    • Hypertension Father    • Stroke Father         Social History     Tobacco Use   Smoking Status Never Smoker   Smokeless Tobacco Never Used          Social History     Substance and Sexual Activity   Alcohol Use Not Currently         ------------------------------------------------------------------------------     PHYSICAL EXAM:   Vitals:    11/16/20 1316   BP: (!) 90/60   Pulse: 80   Resp: 14   Temp: 36.2 °C (97.1 °F)   SpO2: 98%      Body mass index is 12.32 kg/m².         Constitutional: no acute distress  Neck: supple, no JVD  CV: heart RRR  Resp: normal effort, no wheezing or rales.  GI: abdomen soft, no obvious mass, no tenderness  Neuro: CN 2-12 grossly intact        -----------------------------------------------------------------------------    ASSESSMENT:            . Colostomy care ([facility])      CVID (common variable immunodeficiency) (HCC)      Hypothyroidism, unspecified type      Chronic diarrhea      Itching             MEDICAL DECISION MAKING: TODAY'S DISCUSSION / STATUS / PLAN:    Due to recurrent nausea and poor appetite: I recommend  for the patient to go to urgent care or ER now to be evaluated.  The patient however declined.  Patient wishes to try Phenergan first to see how she does if no improvement noted the patient will go to the emergency room.  Advised increase in fluid intake.  Advised the patient to keep the appointment to see the GI specialist.  She will continue with Ensure 3 cans a day with meals.  Advised the patient to continue to monitor her weight.  She may take Phenergan as needed for nausea/vomiting as needed.  Regarding the itching I suspect probably due to dry skin.  Advised the patient to try hydroxyzine 10 mg at bedtime as needed.  The potential side effect medication discussed with the patient.  Offered to refer the patient to dermatologist however the patient declined.  Advised the patient to follow-up with hematologist regarding the common variable immunodeficiency condition.     Return in about 3 months (around 2/16/2021).       PATIENT EDUCATION:  -If any problems should arise, patient was advised to contact our office or go to ER to be evaluated.      Please note that this dictation was created using voice recognition software. I have made every reasonable attempt to correct obvious errors, but it is possible there are errors of grammar and possibly content that I did not discover before finalizing the note.

## 2020-11-20 ENCOUNTER — HOSPITAL ENCOUNTER (OUTPATIENT)
Facility: MEDICAL CENTER | Age: 60
End: 2020-11-20
Attending: INTERNAL MEDICINE | Admitting: INTERNAL MEDICINE
Payer: OTHER GOVERNMENT

## 2020-11-28 ENCOUNTER — OFFICE VISIT (OUTPATIENT)
Dept: URGENT CARE | Facility: PHYSICIAN GROUP | Age: 60
End: 2020-11-28
Payer: OTHER GOVERNMENT

## 2020-11-28 ENCOUNTER — HOSPITAL ENCOUNTER (OUTPATIENT)
Facility: MEDICAL CENTER | Age: 60
End: 2020-11-28
Attending: FAMILY MEDICINE
Payer: OTHER GOVERNMENT

## 2020-11-28 VITALS
RESPIRATION RATE: 17 BRPM | HEIGHT: 64 IN | SYSTOLIC BLOOD PRESSURE: 72 MMHG | WEIGHT: 70.4 LBS | TEMPERATURE: 97.5 F | HEART RATE: 80 BPM | OXYGEN SATURATION: 98 % | DIASTOLIC BLOOD PRESSURE: 42 MMHG | BODY MASS INDEX: 12.02 KG/M2

## 2020-11-28 DIAGNOSIS — Z20.822 SUSPECTED COVID-19 VIRUS INFECTION: ICD-10-CM

## 2020-11-28 PROCEDURE — U0003 INFECTIOUS AGENT DETECTION BY NUCLEIC ACID (DNA OR RNA); SEVERE ACUTE RESPIRATORY SYNDROME CORONAVIRUS 2 (SARS-COV-2) (CORONAVIRUS DISEASE [COVID-19]), AMPLIFIED PROBE TECHNIQUE, MAKING USE OF HIGH THROUGHPUT TECHNOLOGIES AS DESCRIBED BY CMS-2020-01-R: HCPCS

## 2020-11-28 PROCEDURE — 99214 OFFICE O/P EST MOD 30 MIN: CPT | Mod: CS | Performed by: FAMILY MEDICINE

## 2020-11-28 ASSESSMENT — FIBROSIS 4 INDEX: FIB4 SCORE: 1.81

## 2020-11-29 DIAGNOSIS — Z20.822 SUSPECTED COVID-19 VIRUS INFECTION: ICD-10-CM

## 2020-11-29 NOTE — PROGRESS NOTES
Subjective:     Pita Bueno is a 60 y.o. female who presents for Shortness of Breath (weakness, sx x 2 days)    HPI  Pt presents for evaluation of a new problem   Pt ill for the past 2 days   Started with fatigue   Pt feeling constantly fatigued and not improving   Feeling occasionally dizzy if exerting herself too much   Feels more SOB   Symptoms started suddenly had rapidly worsened, though they are now stable   No fevers ranging  No vomiting, diarrhea, or abdominal pain at this time  Not taking any medications for this    Review of Systems   Constitutional: Positive for malaise/fatigue. Negative for fever.   HENT: Negative for sore throat.    Respiratory: Positive for cough and shortness of breath.    Cardiovascular: Negative for chest pain.   Gastrointestinal: Negative for abdominal pain, diarrhea and vomiting.   Skin: Negative for rash.       PMH:  has a past medical history of Colostomy in place (HCC), Eating disorder, Hypotension, Hypothyroid, and Palpitations.  MEDS:   Current Outpatient Medications:   •  levothyroxine (SYNTHROID) 50 MCG Tab, Take 1 Tab by mouth Every morning on an empty stomach., Disp: 90 Tab, Rfl: 3  •  hydrOXYzine HCl (ATARAX) 10 MG Tab, Take 1 Tab by mouth at bedtime as needed for Itching., Disp: 30 Tab, Rfl: 1  •  linaCLOtide (LINZESS) 290 MCG Cap, Take 290 mcg by mouth every evening., Disp: 90 Cap, Rfl: 0  •  midodrine (PROAMATINE) 10 MG tablet, Take 1 Tab by mouth 3 times a day, with meals., Disp: 60 Tab, Rfl: 0  •  sennosides (SENOKOT) 8.6 MG Tab, Take 8.6 mg by mouth 1 time daily as needed., Disp: , Rfl:   ALLERGIES:   Allergies   Allergen Reactions   • Sulfa Drugs Hives     SURGHX:   Past Surgical History:   Procedure Laterality Date   • OTHER ABDOMINAL SURGERY      rectal prolapse repair, anastomy leak/abcess,colostony     SOCHX:  reports that she has never smoked. She has never used smokeless tobacco. She reports previous alcohol use. She reports previous drug use.  FH:  "Family history was reviewed, not contributing to acute illness     Objective:   BP (!) 72/42   Pulse 80   Temp 36.4 °C (97.5 °F)   Resp 17   Ht 1.626 m (5' 4\")   Wt 31.9 kg (70 lb 6.4 oz)   SpO2 98%   BMI 12.08 kg/m²     Physical Exam  Constitutional:       Appearance: She is well-developed. She is not toxic-appearing.      Comments: Chronically underweight and malnourished    HENT:      Head: Normocephalic and atraumatic.      Right Ear: Tympanic membrane, ear canal and external ear normal.      Left Ear: Tympanic membrane, ear canal and external ear normal.      Nose: Congestion and rhinorrhea present.      Mouth/Throat:      Mouth: Mucous membranes are moist.      Pharynx: Oropharynx is clear. No oropharyngeal exudate or posterior oropharyngeal erythema.   Eyes:      General: No scleral icterus.        Right eye: No discharge.         Left eye: No discharge.      Conjunctiva/sclera: Conjunctivae normal.   Neck:      Musculoskeletal: Normal range of motion.      Trachea: No tracheal deviation.   Cardiovascular:      Rate and Rhythm: Normal rate and regular rhythm.   Pulmonary:      Effort: Pulmonary effort is normal. No respiratory distress.      Breath sounds: Normal breath sounds. No wheezing or rales.   Skin:     General: Skin is warm and dry.      Findings: No rash.   Neurological:      Mental Status: She is alert and oriented to person, place, and time.   Psychiatric:         Mood and Affect: Mood normal.         Behavior: Behavior normal.         Thought Content: Thought content normal.         Judgment: Judgment normal.         Assessment/Plan:   Assessment    1. Suspected COVID-19 virus infection  - COVID/SARS COV-2 PCR; Future    Patient with COVID-19 versus viral URI.  Reviewed home isolation protocol, medication use for symptomatic management, and given a work note and handout with information about follow-up and isolation discontinuation.  COVID-19 testing sent today and will follow-up test " results.  Pt is chronically underweight and with chronically low BP.  She is malnourished chronically, but nontoxic appearing.   Discussed importance of maintaining good PO intake over the next few days while trying to recover from this illness.  If worsening, even a little, she was advised to be seen in ER immediately.  Pt is agreeable to this plan and her daughter lives with her and is available to help keep an eye on her.  She will F/U as needed.

## 2020-11-30 LAB
COVID ORDER STATUS COVID19: NORMAL
SARS-COV-2 RNA RESP QL NAA+PROBE: NOTDETECTED
SPECIMEN SOURCE: NORMAL

## 2020-12-01 ASSESSMENT — ENCOUNTER SYMPTOMS
FEVER: 0
COUGH: 1
ABDOMINAL PAIN: 0
SORE THROAT: 0
DIARRHEA: 0
SHORTNESS OF BREATH: 1
VOMITING: 0

## 2020-12-02 ENCOUNTER — APPOINTMENT (OUTPATIENT)
Dept: ADMISSIONS | Facility: MEDICAL CENTER | Age: 60
End: 2020-12-02
Payer: OTHER GOVERNMENT

## 2020-12-10 ENCOUNTER — HOSPITAL ENCOUNTER (OUTPATIENT)
Facility: MEDICAL CENTER | Age: 60
End: 2020-12-10
Attending: NURSE PRACTITIONER
Payer: OTHER GOVERNMENT

## 2020-12-10 ENCOUNTER — TELEPHONE (OUTPATIENT)
Dept: URGENT CARE | Facility: PHYSICIAN GROUP | Age: 60
End: 2020-12-10

## 2020-12-10 ENCOUNTER — OFFICE VISIT (OUTPATIENT)
Dept: URGENT CARE | Facility: PHYSICIAN GROUP | Age: 60
End: 2020-12-10
Payer: OTHER GOVERNMENT

## 2020-12-10 VITALS
RESPIRATION RATE: 18 BRPM | TEMPERATURE: 98.7 F | WEIGHT: 71.6 LBS | DIASTOLIC BLOOD PRESSURE: 48 MMHG | BODY MASS INDEX: 13.18 KG/M2 | OXYGEN SATURATION: 97 % | SYSTOLIC BLOOD PRESSURE: 72 MMHG | HEIGHT: 62 IN | HEART RATE: 80 BPM

## 2020-12-10 DIAGNOSIS — R11.0 NAUSEA: ICD-10-CM

## 2020-12-10 DIAGNOSIS — Z20.822 SUSPECTED COVID-19 VIRUS INFECTION: ICD-10-CM

## 2020-12-10 LAB
ALBUMIN SERPL BCP-MCNC: 4.4 G/DL (ref 3.2–4.9)
ALBUMIN/GLOB SERPL: 2.3 G/DL
ALP SERPL-CCNC: 54 U/L (ref 30–99)
ALT SERPL-CCNC: 56 U/L (ref 2–50)
ANION GAP SERPL CALC-SCNC: 10 MMOL/L (ref 7–16)
AST SERPL-CCNC: 37 U/L (ref 12–45)
BASOPHILS # BLD AUTO: 0.5 % (ref 0–1.8)
BASOPHILS # BLD: 0.01 K/UL (ref 0–0.12)
BILIRUB SERPL-MCNC: 0.3 MG/DL (ref 0.1–1.5)
BUN SERPL-MCNC: 42 MG/DL (ref 8–22)
CALCIUM SERPL-MCNC: 9 MG/DL (ref 8.5–10.5)
CHLORIDE SERPL-SCNC: 104 MMOL/L (ref 96–112)
CO2 SERPL-SCNC: 16 MMOL/L (ref 20–33)
CREAT SERPL-MCNC: 0.6 MG/DL (ref 0.5–1.4)
EOSINOPHIL # BLD AUTO: 0.01 K/UL (ref 0–0.51)
EOSINOPHIL NFR BLD: 0.5 % (ref 0–6.9)
ERYTHROCYTE [DISTWIDTH] IN BLOOD BY AUTOMATED COUNT: 53 FL (ref 35.9–50)
GLOBULIN SER CALC-MCNC: 1.9 G/DL (ref 1.9–3.5)
GLUCOSE SERPL-MCNC: 82 MG/DL (ref 65–99)
HCT VFR BLD AUTO: 38.8 % (ref 37–47)
HGB BLD-MCNC: 13 G/DL (ref 12–16)
IMM GRANULOCYTES # BLD AUTO: 0.01 K/UL (ref 0–0.11)
IMM GRANULOCYTES NFR BLD AUTO: 0.5 % (ref 0–0.9)
LYMPHOCYTES # BLD AUTO: 0.65 K/UL (ref 1–4.8)
LYMPHOCYTES NFR BLD: 30.8 % (ref 22–41)
MCH RBC QN AUTO: 33.6 PG (ref 27–33)
MCHC RBC AUTO-ENTMCNC: 33.5 G/DL (ref 33.6–35)
MCV RBC AUTO: 100.3 FL (ref 81.4–97.8)
MONOCYTES # BLD AUTO: 0.26 K/UL (ref 0–0.85)
MONOCYTES NFR BLD AUTO: 12.3 % (ref 0–13.4)
NEUTROPHILS # BLD AUTO: 1.17 K/UL (ref 2–7.15)
NEUTROPHILS NFR BLD: 55.4 % (ref 44–72)
NRBC # BLD AUTO: 0 K/UL
NRBC BLD-RTO: 0 /100 WBC
PLATELET # BLD AUTO: 134 K/UL (ref 164–446)
PMV BLD AUTO: 12.8 FL (ref 9–12.9)
POTASSIUM SERPL-SCNC: 3.7 MMOL/L (ref 3.6–5.5)
PROCALCITONIN SERPL-MCNC: <0.05 NG/ML
PROT SERPL-MCNC: 6.3 G/DL (ref 6–8.2)
RBC # BLD AUTO: 3.87 M/UL (ref 4.2–5.4)
SODIUM SERPL-SCNC: 130 MMOL/L (ref 135–145)
WBC # BLD AUTO: 2.1 K/UL (ref 4.8–10.8)

## 2020-12-10 PROCEDURE — 85025 COMPLETE CBC W/AUTO DIFF WBC: CPT

## 2020-12-10 PROCEDURE — 99214 OFFICE O/P EST MOD 30 MIN: CPT | Mod: CS | Performed by: NURSE PRACTITIONER

## 2020-12-10 PROCEDURE — 80053 COMPREHEN METABOLIC PANEL: CPT

## 2020-12-10 PROCEDURE — 84145 PROCALCITONIN (PCT): CPT

## 2020-12-10 PROCEDURE — U0003 INFECTIOUS AGENT DETECTION BY NUCLEIC ACID (DNA OR RNA); SEVERE ACUTE RESPIRATORY SYNDROME CORONAVIRUS 2 (SARS-COV-2) (CORONAVIRUS DISEASE [COVID-19]), AMPLIFIED PROBE TECHNIQUE, MAKING USE OF HIGH THROUGHPUT TECHNOLOGIES AS DESCRIBED BY CMS-2020-01-R: HCPCS

## 2020-12-10 RX ORDER — ONDANSETRON 4 MG/1
4 TABLET, ORALLY DISINTEGRATING ORAL EVERY 6 HOURS PRN
Qty: 10 TAB | Refills: 0 | Status: SHIPPED | OUTPATIENT
Start: 2020-12-10 | End: 2020-12-15

## 2020-12-10 RX ORDER — ONDANSETRON 4 MG/1
4 TABLET, ORALLY DISINTEGRATING ORAL ONCE
Status: COMPLETED | OUTPATIENT
Start: 2020-12-10 | End: 2020-12-10

## 2020-12-10 RX ADMIN — ONDANSETRON 4 MG: 4 TABLET, ORALLY DISINTEGRATING ORAL at 14:58

## 2020-12-10 ASSESSMENT — ENCOUNTER SYMPTOMS
COUGH: 0
SPUTUM PRODUCTION: 0
WHEEZING: 0
DIZZINESS: 1
ABDOMINAL PAIN: 0
SORE THROAT: 1
CHILLS: 1
MYALGIAS: 1
FEVER: 1
NAUSEA: 1
DIARRHEA: 1
VOMITING: 1
HEADACHES: 1
SHORTNESS OF BREATH: 0
EYE PAIN: 1
HEMOPTYSIS: 0

## 2020-12-10 ASSESSMENT — FIBROSIS 4 INDEX: FIB4 SCORE: 1.81

## 2020-12-10 NOTE — PROGRESS NOTES
Subjective:     Pita Bueno is a 60 y.o. female who presents for Nasal Congestion (2 weeks ago. daughter was positive for covid, loss of taste. was tested a week ago and was negative)      GEORGI To is a very pleasant 60-year-old female who presents to urgent care today with complaints of generalized fatigue, diarrhea, nausea, vomiting, body aches, chills and fever.  She states that her daughter came home from college and tested positive for COVID-19 while she was in town visiting.  Zuleika states the entire family is now sick.  She did lose her hair loss of taste and smell.  She has been using Tylenol for relief of her pain.  She states that she is unable to drink adequate fluid and is suffering from constant watery diarrhea.  She states that she was tested for Covid a week ago through [facility] and was negative.  Her symptoms are rapidly worsening.  Review of Systems   Constitutional: Positive for chills, fever and malaise/fatigue.   HENT: Positive for congestion and sore throat.    Eyes: Positive for pain.   Respiratory: Negative for cough, hemoptysis, sputum production, shortness of breath and wheezing.    Gastrointestinal: Positive for diarrhea, nausea and vomiting. Negative for abdominal pain.   Musculoskeletal: Positive for myalgias.   Skin: Positive for itching and rash.   Neurological: Positive for dizziness and headaches.   All other systems reviewed and are negative.      PMH:   Past Medical History:   Diagnosis Date   • Colostomy in place (HCC)    • Eating disorder    • Hypotension    • Hypothyroid    • Palpitations      ALLERGIES:   Allergies   Allergen Reactions   • Sulfa Drugs Hives     SURGHX:   Past Surgical History:   Procedure Laterality Date   • OTHER ABDOMINAL SURGERY      rectal prolapse repair, anastomy leak/abcess,colostony     SOCHX:   Social History     Socioeconomic History   • Marital status:      Spouse name: Not on file   • Number of children: Not on file   • Years  "of education: Not on file   • Highest education level: Not on file   Occupational History   • Not on file   Social Needs   • Financial resource strain: Not on file   • Food insecurity     Worry: Not on file     Inability: Not on file   • Transportation needs     Medical: Not on file     Non-medical: Not on file   Tobacco Use   • Smoking status: Never Smoker   • Smokeless tobacco: Never Used   Substance and Sexual Activity   • Alcohol use: Not Currently   • Drug use: Not Currently   • Sexual activity: Not Currently   Lifestyle   • Physical activity     Days per week: Not on file     Minutes per session: Not on file   • Stress: Not on file   Relationships   • Social connections     Talks on phone: Not on file     Gets together: Not on file     Attends Muslim service: Not on file     Active member of club or organization: Not on file     Attends meetings of clubs or organizations: Not on file     Relationship status: Not on file   • Intimate partner violence     Fear of current or ex partner: Not on file     Emotionally abused: Not on file     Physically abused: Not on file     Forced sexual activity: Not on file   Other Topics Concern   • Not on file   Social History Narrative   • Not on file     FH:   Family History   Problem Relation Age of Onset   • Thyroid Mother    • Alcohol abuse Father    • Heart Disease Father    • Hypertension Father    • Stroke Father          Objective:   BP (!) 72/48   Pulse 80   Temp 37.1 °C (98.7 °F) (Temporal)   Resp 18   Ht 1.575 m (5' 2\")   Wt 32.5 kg (71 lb 9.6 oz)   SpO2 97%   BMI 13.10 kg/m²     Physical Exam  Vitals signs and nursing note reviewed.   Constitutional:       General: She is not in acute distress.     Appearance: Normal appearance. She is ill-appearing and toxic-appearing.      Comments: Frail   HENT:      Head: Normocephalic and atraumatic.      Right Ear: Tympanic membrane, ear canal and external ear normal. There is no impacted cerumen.      Left Ear: " Tympanic membrane, ear canal and external ear normal. There is no impacted cerumen.      Nose: No congestion or rhinorrhea.      Mouth/Throat:      Mouth: Mucous membranes are moist.      Pharynx: No oropharyngeal exudate or posterior oropharyngeal erythema.   Eyes:      General:         Right eye: No discharge.         Left eye: No discharge.      Extraocular Movements: Extraocular movements intact.      Pupils: Pupils are equal, round, and reactive to light.   Neck:      Musculoskeletal: Normal range of motion and neck supple. No muscular tenderness.   Cardiovascular:      Rate and Rhythm: Normal rate and regular rhythm.      Pulses: Normal pulses.      Heart sounds: Normal heart sounds. No murmur.      Comments: Hypotensive  Pulmonary:      Effort: Pulmonary effort is normal. No respiratory distress.      Breath sounds: Normal breath sounds. No stridor. No wheezing, rhonchi or rales.   Chest:      Chest wall: No tenderness.   Abdominal:      General: Abdomen is flat. Bowel sounds are normal.      Palpations: Abdomen is soft.      Tenderness: There is no abdominal tenderness. There is no right CVA tenderness or left CVA tenderness.   Musculoskeletal: Normal range of motion.   Lymphadenopathy:      Cervical: No cervical adenopathy.   Skin:     General: Skin is warm and dry.      Capillary Refill: Capillary refill takes less than 2 seconds.   Neurological:      General: No focal deficit present.      Mental Status: She is alert and oriented to person, place, and time. Mental status is at baseline.   Psychiatric:         Mood and Affect: Mood normal.         Behavior: Behavior normal.         Thought Content: Thought content normal.         Judgment: Judgment normal.         Assessment/Plan:   Assessment    1. Suspected COVID-19 virus infection  CBC WITH DIFFERENTIAL    Comp Metabolic Panel    PROCALCITONIN    COVID/SARS COV-2 PCR   2. Nausea  ondansetron (ZOFRAN ODT) dispertab 4 mg     Pt was tested for COVID today.  I will call with results. Upon entering the room PPE was worn throughout the duration of his visit for both provider and patient. Mask of patient briefly removed for examination of oropharynx. PT was educated to remain in self isolation for at least 10 days following the onset of symptoms and to not return to work until symptoms have resolved for three days without the use of symptom altering medication.     We discussed supportive measures including humidifier, warm salt water gargles, over-the-counter Cepacol throat lozenges, rest  and increased fluids. Pt was encouraged to seek treatment back in the ER or urgent care for worsening symptoms,  fever greater than 100.5, wheezes or shortness of breath.    AVS handout given and reviewed with patient. Pt educated on red flags and when to seek treatment back in ER or UC.

## 2020-12-11 DIAGNOSIS — Z20.822 SUSPECTED COVID-19 VIRUS INFECTION: ICD-10-CM

## 2020-12-11 LAB
COVID ORDER STATUS COVID19: NORMAL
SARS-COV-2 RNA RESP QL NAA+PROBE: DETECTED
SPECIMEN SOURCE: ABNORMAL

## 2020-12-13 ENCOUNTER — TELEPHONE (OUTPATIENT)
Dept: URGENT CARE | Facility: CLINIC | Age: 60
End: 2020-12-13

## 2020-12-13 NOTE — TELEPHONE ENCOUNTER
"Zuleika was notified of her positive Covid result.  She states that she did seek treatment in [facility] who did not do a rapid Covid test on her.  They did do a chest x-ray which he stated looks suspicious for COPD.  Zuleika states that she does not smoke and has never smoked in the past.  [facility] we hydrated her with normal saline and Zofran and sent her home stating that they did not want to admit her and increase her chance of exposure to Covid.  Today she states that she feels so sick, \"that she thinks that she is going to die\".  I urged her to seek treatment at [facility] for further evaluation.  She verbalizes agreement.    "

## 2020-12-14 ENCOUNTER — TELEPHONE (OUTPATIENT)
Dept: MEDICAL GROUP | Facility: PHYSICIAN GROUP | Age: 60
End: 2020-12-14

## 2020-12-14 NOTE — TELEPHONE ENCOUNTER
Spoke to pt on the phone and advised her about discussion with Clifton Harvey M.D.    Dr. Harvey advises symptom management with Robitussin, Chlorseptic Spray, salt water gargles, and electrolyte fluid replacement. Dr. Harvey also advises pt to go to ER if she feels dizzy or lightheaded for fluid bolus. Dr. Harvey is not concerned about WBC count as illness can cause it to go low and 3 has been her approximate baseline for several months.    Pt verbalized understanding of instructions. Advised pt to message us back if she has any other questions or concerns.    Vianney LIPSCOMB RN, BSN

## 2020-12-14 NOTE — TELEPHONE ENCOUNTER
Called pt at [phone] regarding patient message to Dr. Harvey about covid symptoms. Advised pt that I would talk to Dr. Harvey and call her back.    Vianney LIPSCOMB RN, BSN

## 2020-12-14 NOTE — TELEPHONE ENCOUNTER
Regarding: Test Result Question  ----- Message from Chantel L Magill, R.N. sent at 12/14/2020 10:19 AM PST -----       ----- Message from Pita Bueno to Clifton Harvey M.D. sent at 12/12/2020  3:21 PM -----   Dr. Harvey, what should I do? I WENT TO URGENT CARE Friday.  They did bloodwork and covid test and sent me home. The dr then called that night and said go to ER bloodwork critically low in some tests. Went to [facility] they did ivs sent me home to keep me from covid.  I then got covid results positive.  Now I'm home not sick enough for respirator too sick from other health problems to be admitted.what do I do? Help please

## 2020-12-17 ENCOUNTER — TELEPHONE (OUTPATIENT)
Dept: MEDICAL GROUP | Facility: PHYSICIAN GROUP | Age: 60
End: 2020-12-17

## 2020-12-17 NOTE — TELEPHONE ENCOUNTER
Spoke to pt on the phone, pt will call her hematologist to see if they will order her Igg infusions for home health to give to pt at home. Pt verbalized she would let us know if there is a problem or if she needs assistance.    Vianney LIPSCOMB RN, BSN

## 2020-12-26 ENCOUNTER — HOSPITAL ENCOUNTER (EMERGENCY)
Facility: MEDICAL CENTER | Age: 60
End: 2020-12-26
Attending: EMERGENCY MEDICINE | Admitting: EMERGENCY MEDICINE
Payer: OTHER GOVERNMENT

## 2020-12-26 ENCOUNTER — APPOINTMENT (OUTPATIENT)
Dept: RADIOLOGY | Facility: MEDICAL CENTER | Age: 60
End: 2020-12-26
Attending: EMERGENCY MEDICINE
Payer: OTHER GOVERNMENT

## 2020-12-26 ENCOUNTER — OFFICE VISIT (OUTPATIENT)
Dept: URGENT CARE | Facility: PHYSICIAN GROUP | Age: 60
End: 2020-12-26
Payer: OTHER GOVERNMENT

## 2020-12-26 VITALS
SYSTOLIC BLOOD PRESSURE: 96 MMHG | HEART RATE: 62 BPM | DIASTOLIC BLOOD PRESSURE: 58 MMHG | BODY MASS INDEX: 15.36 KG/M2 | HEIGHT: 64 IN | RESPIRATION RATE: 17 BRPM | WEIGHT: 89.95 LBS | TEMPERATURE: 97.5 F | OXYGEN SATURATION: 98 %

## 2020-12-26 VITALS
WEIGHT: 90 LBS | TEMPERATURE: 97.4 F | HEART RATE: 74 BPM | HEIGHT: 64 IN | BODY MASS INDEX: 15.36 KG/M2 | RESPIRATION RATE: 18 BRPM | SYSTOLIC BLOOD PRESSURE: 78 MMHG | OXYGEN SATURATION: 99 % | DIASTOLIC BLOOD PRESSURE: 58 MMHG

## 2020-12-26 DIAGNOSIS — R23.8 COVID TOES: ICD-10-CM

## 2020-12-26 DIAGNOSIS — U07.1 COVID TOES: ICD-10-CM

## 2020-12-26 DIAGNOSIS — R60.0 BILATERAL LEG EDEMA: ICD-10-CM

## 2020-12-26 DIAGNOSIS — R63.5 ABNORMAL WEIGHT GAIN: ICD-10-CM

## 2020-12-26 DIAGNOSIS — I95.0 IDIOPATHIC HYPOTENSION: ICD-10-CM

## 2020-12-26 LAB
ALBUMIN SERPL BCP-MCNC: 3.3 G/DL (ref 3.2–4.9)
ALBUMIN/GLOB SERPL: 1.9 G/DL
ALP SERPL-CCNC: 62 U/L (ref 30–99)
ALT SERPL-CCNC: 38 U/L (ref 2–50)
ANION GAP SERPL CALC-SCNC: 9 MMOL/L (ref 7–16)
APTT PPP: 24 SEC (ref 24.7–36)
AST SERPL-CCNC: 22 U/L (ref 12–45)
BASOPHILS # BLD AUTO: 0.4 % (ref 0–1.8)
BASOPHILS # BLD: 0.01 K/UL (ref 0–0.12)
BILIRUB SERPL-MCNC: 0.4 MG/DL (ref 0.1–1.5)
BUN SERPL-MCNC: 25 MG/DL (ref 8–22)
CALCIUM SERPL-MCNC: 8.4 MG/DL (ref 8.4–10.2)
CHLORIDE SERPL-SCNC: 108 MMOL/L (ref 96–112)
CO2 SERPL-SCNC: 22 MMOL/L (ref 20–33)
CREAT SERPL-MCNC: 0.34 MG/DL (ref 0.5–1.4)
D DIMER PPP IA.FEU-MCNC: 1.49 UG/ML (FEU) (ref 0–0.5)
EKG IMPRESSION: NORMAL
EOSINOPHIL # BLD AUTO: 0.01 K/UL (ref 0–0.51)
EOSINOPHIL NFR BLD: 0.4 % (ref 0–6.9)
ERYTHROCYTE [DISTWIDTH] IN BLOOD BY AUTOMATED COUNT: 58.7 FL (ref 35.9–50)
GLOBULIN SER CALC-MCNC: 1.7 G/DL (ref 1.9–3.5)
GLUCOSE SERPL-MCNC: 76 MG/DL (ref 65–99)
HCT VFR BLD AUTO: 29.4 % (ref 37–47)
HGB BLD-MCNC: 9.2 G/DL (ref 12–16)
IMM GRANULOCYTES # BLD AUTO: 0.01 K/UL (ref 0–0.11)
IMM GRANULOCYTES NFR BLD AUTO: 0.4 % (ref 0–0.9)
INR PPP: 0.98 (ref 0.87–1.13)
LIPASE SERPL-CCNC: 36 U/L (ref 7–58)
LYMPHOCYTES # BLD AUTO: 0.53 K/UL (ref 1–4.8)
LYMPHOCYTES NFR BLD: 20.3 % (ref 22–41)
MCH RBC QN AUTO: 33 PG (ref 27–33)
MCHC RBC AUTO-ENTMCNC: 31.3 G/DL (ref 33.6–35)
MCV RBC AUTO: 105.4 FL (ref 81.4–97.8)
MONOCYTES # BLD AUTO: 0.42 K/UL (ref 0–0.85)
MONOCYTES NFR BLD AUTO: 16.1 % (ref 0–13.4)
NEUTROPHILS # BLD AUTO: 1.63 K/UL (ref 2–7.15)
NEUTROPHILS NFR BLD: 62.4 % (ref 44–72)
NRBC # BLD AUTO: 0 K/UL
NRBC BLD-RTO: 0 /100 WBC
NT-PROBNP SERPL IA-MCNC: 382 PG/ML (ref 0–125)
PLATELET # BLD AUTO: 106 K/UL (ref 164–446)
PMV BLD AUTO: 12.6 FL (ref 9–12.9)
POTASSIUM SERPL-SCNC: 3.7 MMOL/L (ref 3.6–5.5)
PROT SERPL-MCNC: 5 G/DL (ref 6–8.2)
PROTHROMBIN TIME: 12.7 SEC (ref 12–14.6)
RBC # BLD AUTO: 2.79 M/UL (ref 4.2–5.4)
SODIUM SERPL-SCNC: 139 MMOL/L (ref 135–145)
TROPONIN T SERPL-MCNC: 8 NG/L (ref 6–19)
WBC # BLD AUTO: 2.6 K/UL (ref 4.8–10.8)

## 2020-12-26 PROCEDURE — 80053 COMPREHEN METABOLIC PANEL: CPT

## 2020-12-26 PROCEDURE — 85610 PROTHROMBIN TIME: CPT

## 2020-12-26 PROCEDURE — 99285 EMERGENCY DEPT VISIT HI MDM: CPT

## 2020-12-26 PROCEDURE — 83690 ASSAY OF LIPASE: CPT

## 2020-12-26 PROCEDURE — 93005 ELECTROCARDIOGRAM TRACING: CPT | Performed by: EMERGENCY MEDICINE

## 2020-12-26 PROCEDURE — 36415 COLL VENOUS BLD VENIPUNCTURE: CPT

## 2020-12-26 PROCEDURE — 83880 ASSAY OF NATRIURETIC PEPTIDE: CPT

## 2020-12-26 PROCEDURE — 85730 THROMBOPLASTIN TIME PARTIAL: CPT

## 2020-12-26 PROCEDURE — 93970 EXTREMITY STUDY: CPT

## 2020-12-26 PROCEDURE — 99214 OFFICE O/P EST MOD 30 MIN: CPT | Performed by: NURSE PRACTITIONER

## 2020-12-26 PROCEDURE — 85025 COMPLETE CBC W/AUTO DIFF WBC: CPT

## 2020-12-26 PROCEDURE — 84484 ASSAY OF TROPONIN QUANT: CPT

## 2020-12-26 PROCEDURE — 71045 X-RAY EXAM CHEST 1 VIEW: CPT

## 2020-12-26 PROCEDURE — 85379 FIBRIN DEGRADATION QUANT: CPT

## 2020-12-26 PROCEDURE — 93005 ELECTROCARDIOGRAM TRACING: CPT

## 2020-12-26 ASSESSMENT — ENCOUNTER SYMPTOMS
ABDOMINAL PAIN: 0
COUGH: 0
WEAKNESS: 0
NAUSEA: 0
CHILLS: 0
SHORTNESS OF BREATH: 0
FOCAL WEAKNESS: 0
FEVER: 0
TINGLING: 0

## 2020-12-26 ASSESSMENT — FIBROSIS 4 INDEX
FIB4 SCORE: 2.18
FIB4 SCORE: 2.18

## 2020-12-26 NOTE — ED NOTES
Assumed care of pt s/p exam by erp-in no apparent distress. Plan of care reviewed with pt. Labs drawn from remsa iv and to lab. Call light in reach

## 2020-12-26 NOTE — PROGRESS NOTES
Subjective:      Pita Bueno is a 60 y.o. female who presents with Leg Swelling (Sudden lower extremity edema after covid with congestion in chest x4 days)            HPI New. 60 year old female with bilateral new onset of leg swelling as well as 20# weight gain in 2 weeks. She denies shortness of breath, chest pain or abdominal pain. She recently recovered from covid. She is noted to be hypotensive here in clinic at 78/58. No history of heart failure. She does have extensive history of intestinal issues with colostomy.  Sulfa drugs  Current Outpatient Medications on File Prior to Visit   Medication Sig Dispense Refill   • levothyroxine (SYNTHROID) 50 MCG Tab Take 1 Tab by mouth Every morning on an empty stomach. 90 Tab 3   • hydrOXYzine HCl (ATARAX) 10 MG Tab Take 1 Tab by mouth at bedtime as needed for Itching. 30 Tab 1   • linaCLOtide (LINZESS) 290 MCG Cap Take 290 mcg by mouth every evening. 90 Cap 0   • sennosides (SENOKOT) 8.6 MG Tab Take 8.6 mg by mouth 1 time daily as needed.     • midodrine (PROAMATINE) 10 MG tablet Take 1 Tab by mouth 3 times a day, with meals. (Patient not taking: Reported on 12/10/2020) 60 Tab 0     No current facility-administered medications on file prior to visit.      Social History     Socioeconomic History   • Marital status:      Spouse name: Not on file   • Number of children: Not on file   • Years of education: Not on file   • Highest education level: Not on file   Occupational History   • Not on file   Social Needs   • Financial resource strain: Not on file   • Food insecurity     Worry: Not on file     Inability: Not on file   • Transportation needs     Medical: Not on file     Non-medical: Not on file   Tobacco Use   • Smoking status: Never Smoker   • Smokeless tobacco: Never Used   Substance and Sexual Activity   • Alcohol use: Not Currently   • Drug use: Not Currently   • Sexual activity: Not Currently   Lifestyle   • Physical activity     Days per week:  "Not on file     Minutes per session: Not on file   • Stress: Not on file   Relationships   • Social connections     Talks on phone: Not on file     Gets together: Not on file     Attends Pentecostal service: Not on file     Active member of club or organization: Not on file     Attends meetings of clubs or organizations: Not on file     Relationship status: Not on file   • Intimate partner violence     Fear of current or ex partner: Not on file     Emotionally abused: Not on file     Physically abused: Not on file     Forced sexual activity: Not on file   Other Topics Concern   • Not on file   Social History Narrative   • Not on file     Breast Cancer-related family history is not on file.      Review of Systems   Constitutional: Positive for malaise/fatigue. Negative for chills and fever.        +weight gain from 71# to 90# in 2 weeks.   Respiratory: Negative for cough and shortness of breath.    Cardiovascular: Positive for leg swelling. Negative for chest pain.   Gastrointestinal: Negative for abdominal pain and nausea.   Neurological: Negative for tingling, focal weakness and weakness.          Objective:     BP (!) 78/58   Pulse 74   Temp 36.3 °C (97.4 °F) (Temporal)   Resp 18   Ht 1.626 m (5' 4\")   Wt 40.8 kg (90 lb)   SpO2 99%   BMI 15.45 kg/m²      Physical Exam  Constitutional:       Appearance: She is ill-appearing.   Cardiovascular:      Rate and Rhythm: Normal rate and regular rhythm.      Heart sounds: No murmur.   Pulmonary:      Effort: Pulmonary effort is normal.      Breath sounds: Normal breath sounds.   Musculoskeletal:      Right lower leg: Edema present.      Left lower leg: Edema present.   Skin:     General: Skin is dry.      Coloration: Skin is pale.   Neurological:      General: No focal deficit present.      Mental Status: She is alert.                 Assessment/Plan:        1. Idiopathic hypotension     2. Bilateral leg edema     3. Abnormal weight gain       To [facility] for further " evaluation of this.  She is going by [facility] (her request).  Differential diagnosis, natural history, supportive care, and indications for immediate follow-up discussed at length.

## 2020-12-26 NOTE — ED PROVIDER NOTES
ED Provider Note    CHIEF COMPLAINT  Chief Complaint   Patient presents with   • Leg Swelling     bilteral       HPI  Pita Bueno is a 60 y.o. female who presents complaining of bilateral leg swelling and a rash on her feet that started over the last couple of days.  She states that she has had a 20 pound weight gain and before she got Covid she was actually losing weight.  She denies any chest pains or shortness of breath.  She states that her cough is improving.  Covid diagnosis was on 10 December.  She denies any fevers or chills.  She has never had leg swelling in the past.  She denies any numbness or tingling states that her toes and feet are sore.    REVIEW OF SYSTEMS  HEENT:  No ear pain, congestion or sore throat   EYES: no discharge redness or vision changes  CARDIAC: no chest pain, palpitations    PULMONARY: no dyspnea, positive cough or congestion   GI: no vomiting diarrhea or abdominal pain   : no dysuria, back pain or hematuria   Neuro: no weakness, numbness aphasia or headache  Musculoskeletal: That of bilateral lower leg swelling deformity or pain no joint swelling  Endocrine: no fevers, sweating, weight loss   SKIN: Sitive bilateral leg rash,no contusions     See history of present illness all other systems are negative    PAST MEDICAL HISTORY  Past Medical History:   Diagnosis Date   • Colostomy in place (HCC)    • Eating disorder    • Hypotension    • Hypothyroid    • Palpitations        FAMILY HISTORY  Family History   Problem Relation Age of Onset   • Thyroid Mother    • Alcohol abuse Father    • Heart Disease Father    • Hypertension Father    • Stroke Father        SOCIAL HISTORY  Social History     Socioeconomic History   • Marital status:      Spouse name: Not on file   • Number of children: Not on file   • Years of education: Not on file   • Highest education level: Not on file   Occupational History   • Not on file   Social Needs   • Financial resource strain: Not on  "file   • Food insecurity     Worry: Not on file     Inability: Not on file   • Transportation needs     Medical: Not on file     Non-medical: Not on file   Tobacco Use   • Smoking status: Never Smoker   • Smokeless tobacco: Never Used   Substance and Sexual Activity   • Alcohol use: Not Currently   • Drug use: Not Currently   • Sexual activity: Not Currently   Lifestyle   • Physical activity     Days per week: Not on file     Minutes per session: Not on file   • Stress: Not on file   Relationships   • Social connections     Talks on phone: Not on file     Gets together: Not on file     Attends Restoration service: Not on file     Active member of club or organization: Not on file     Attends meetings of clubs or organizations: Not on file     Relationship status: Not on file   • Intimate partner violence     Fear of current or ex partner: Not on file     Emotionally abused: Not on file     Physically abused: Not on file     Forced sexual activity: Not on file   Other Topics Concern   • Not on file   Social History Narrative   • Not on file       SURGICAL HISTORY  Past Surgical History:   Procedure Laterality Date   • OTHER ABDOMINAL SURGERY      rectal prolapse repair, anastomy leak/abcess,colostony       CURRENT MEDICATIONS  Home Medications    **Home medications have not yet been reviewed for this encounter**         ALLERGIES  Allergies   Allergen Reactions   • Sulfa Drugs Hives       PHYSICAL EXAM  VITAL SIGNS: BP (!) 85/54   Pulse (!) 57   Temp 36.5 °C (97.7 °F) (Temporal)   Resp (!) 23   Ht 1.626 m (5' 4\")   Wt 40.8 kg (89 lb 15.2 oz)   SpO2 98%   BMI 15.44 kg/m²   Constitutional: Well developed, Well nourished, No acute distress, Non-toxic appearance.   HEENT: Normocephalic, Atraumatic,  external ears normal, pharynx pink,  Mucous  Membranes moist, No rhinorrhea or mucosal edema  Eyes: PERRL, EOMI, Conjunctiva normal, No discharge.   Neck: Normal range of motion, No tenderness, Supple, No stridor. "   Lymphatic: No lymphadenopathy    Cardiovascular: Regular Rate and Rhythm, No murmurs,  rubs, or gallops.   Thorax & Lungs: Lungs clear to auscultation bilaterally, No respiratory distress, No wheezes, rhales or rhonchi, No chest wall tenderness.   Abdomen: Bowel sounds normal, Soft, non tender, non distended,  No pulsatile masses., no rebound guarding or peritoneal signs.   Skin: Warm, Dry, No erythema, sitive for a maculopapular and  in some places petechial rash on the patient's bilateral feet.  There is no skin sloughing.    Back:  No CVA tenderness,  No spinal tenderness, bony crepitance step offs or instability.   Extremities: Equal, intact distal pulses, No cyanosis, clubbing or edema,  No tenderness.   Musculoskeletal: Good range of motion in all major joints. No tenderness to palpation or major deformities noted.   Neurologic: Alert & oriented x 3, Cranial nerves II-XII intact, Equal strength and sensation upper and lower extremities bilaterally,  No focal deficits noted.   Psychiatric: Affect normal, Judgment normal, Mood normal      RADIOLOGY/PROCEDURES  US-EXTREMITY VENOUS LOWER BILAT   Final Result      No deep venous thrombosis.                  INTERPRETING LOCATION:  [address] [place] [address], [id number]      DX-CHEST-PORTABLE (1 VIEW)   Final Result      No evidence of acute cardiopulmonary process.            COURSE & MEDICAL DECISION MAKING  Pertinent Labs & Imaging studies reviewed. (See chart for details)  Differential diagnosis: DVT, Covid toes, congestive heart failure, cardiac ischemia     BNP was ordered because the patient complained of bilateral leg swelling that was new with weight gain and I was concerned about congestive heart failure    Results for orders placed or performed during the hospital encounter of 12/26/20   CBC WITH DIFFERENTIAL   Result Value Ref Range    WBC 2.6 (L) 4.8 - 10.8 K/uL    RBC 2.79 (L) 4.20 - 5.40 M/uL    Hemoglobin 9.2 (L) 12.0 - 16.0 g/dL    Hematocrit 29.4 (L) 37.0  - 47.0 %    .4 (H) 81.4 - 97.8 fL    MCH 33.0 27.0 - 33.0 pg    MCHC 31.3 (L) 33.6 - 35.0 g/dL    RDW 58.7 (H) 35.9 - 50.0 fL    Platelet Count 106 (L) 164 - 446 K/uL    MPV 12.6 9.0 - 12.9 fL    Neutrophils-Polys 62.40 44.00 - 72.00 %    Lymphocytes 20.30 (L) 22.00 - 41.00 %    Monocytes 16.10 (H) 0.00 - 13.40 %    Eosinophils 0.40 0.00 - 6.90 %    Basophils 0.40 0.00 - 1.80 %    Immature Granulocytes 0.40 0.00 - 0.90 %    Nucleated RBC 0.00 /100 WBC    Neutrophils (Absolute) 1.63 (L) 2.00 - 7.15 K/uL    Lymphs (Absolute) 0.53 (L) 1.00 - 4.80 K/uL    Monos (Absolute) 0.42 0.00 - 0.85 K/uL    Eos (Absolute) 0.01 0.00 - 0.51 K/uL    Baso (Absolute) 0.01 0.00 - 0.12 K/uL    Immature Granulocytes (abs) 0.01 0.00 - 0.11 K/uL    NRBC (Absolute) 0.00 K/uL   COMP METABOLIC PANEL   Result Value Ref Range    Sodium 139 135 - 145 mmol/L    Potassium 3.7 3.6 - 5.5 mmol/L    Chloride 108 96 - 112 mmol/L    Co2 22 20 - 33 mmol/L    Anion Gap 9.0 7.0 - 16.0    Glucose 76 65 - 99 mg/dL    Bun 25 (H) 8 - 22 mg/dL    Creatinine 0.34 (L) 0.50 - 1.40 mg/dL    Calcium 8.4 8.4 - 10.2 mg/dL    AST(SGOT) 22 12 - 45 U/L    ALT(SGPT) 38 2 - 50 U/L    Alkaline Phosphatase 62 30 - 99 U/L    Total Bilirubin 0.4 0.1 - 1.5 mg/dL    Albumin 3.3 3.2 - 4.9 g/dL    Total Protein 5.0 (L) 6.0 - 8.2 g/dL    Globulin 1.7 (L) 1.9 - 3.5 g/dL    A-G Ratio 1.9 g/dL   PROTHROMBIN TIME   Result Value Ref Range    PT 12.7 12.0 - 14.6 sec    INR 0.98 0.87 - 1.13   APTT   Result Value Ref Range    APTT 24.0 (L) 24.7 - 36.0 sec   TROPONIN   Result Value Ref Range    Troponin T 8 6 - 19 ng/L   LIPASE   Result Value Ref Range    Lipase 36 7 - 58 U/L   proBrain Natriuretic Peptide, NT   Result Value Ref Range    NT-proBNP 382 (H) 0 - 125 pg/mL   D-DIMER   Result Value Ref Range    D-Dimer Screen 1.49 (H) 0.00 - 0.50 ug/mL (FEU)   ESTIMATED GFR   Result Value Ref Range    GFR If African American >60 >60 mL/min/1.73 m 2    GFR If Non  >60 >60  mL/min/1.73 m 2   EKG   Result Value Ref Range    Report       [facility]    Test Date:  2020  Pt Name:    SUSAN WILCOX             Department: [facility]  MRN:        [id number]                      [facility]  Gender:     Female                       Technician: [id number]  :        1960                   Requested By:ER TRIAGE PROTOCOL  Order #:    [id number]                    Reading MD: RONNIE DWYER MD    Measurements  Intervals                                Axis  Rate:       60                           P:          79  MA:         149                          QRS:        60  QRSD:       112                          T:          71  QT:         424  QTc:        424    Interpretive Statements  Sinus rhythm  Borderline intraventricular conduction delay  Low voltage with right axis deviation  ST elevation suggests acute pericarditis  Compared to ECG 2020 15:40:13  Right-axis deviation now present  Low QRS voltage now present  ST (T wave) deviation still present  Electronically Farhana d On 2020 17:27:06 PST by RONNIE DWYER MD          5:28 PM the patient has had a low blood pressure during her visit to the ER but she states that this is a normal blood pressure for her.  Her work-up is essentially negative.  I believe she has Covid toes status post COVID-19 infection.  I advised her to take 325 mg of aspirin daily.  You may use Benadryl or topical cortisone cream for the itching.  Return for any worsening swelling any chest pain shortness of breath or worsening symptoms.  The patient will return for new or worsening symptoms and is stable at the time of discharge.    The patient is referred to a primary physician for blood pressure management, diabetic screening, and for all other preventative health concerns.      DISPOSITION:  Patient will be discharged home in stable condition.    FOLLOW UP:  Clifton Harvey M.D.  [address] [facility]  [id number]  [phone]    Call in 2 days  for recheck      OUTPATIENT MEDICATIONS:  New Prescriptions    No medications on file         FINAL IMPRESSION  1. COVID toes           PLAN/DISPOSITION  discharged          Electronically signed by: Gauri Toney M.D., 12/26/2020 3:04 PM

## 2020-12-26 NOTE — ED TRIAGE NOTES
Pt bib remsa from UC c/o pawel leg swelling. Pt has hx colostomy. Pt states she gained weight '20 lbs in 4 days' per pt. Pt has hx COVID +, as of 12-10-20.

## 2020-12-27 NOTE — DISCHARGE INSTRUCTIONS
Take 325 mg of aspirin daily.  You may use Benadryl or topical cortisone cream for the itching.  Return for any worsening swelling any chest pain shortness of breath or worsening symptoms.

## 2020-12-29 ENCOUNTER — OFFICE VISIT (OUTPATIENT)
Dept: MEDICAL GROUP | Facility: PHYSICIAN GROUP | Age: 60
End: 2020-12-29
Payer: OTHER GOVERNMENT

## 2020-12-29 VITALS
RESPIRATION RATE: 14 BRPM | TEMPERATURE: 98.2 F | DIASTOLIC BLOOD PRESSURE: 54 MMHG | BODY MASS INDEX: 15.54 KG/M2 | HEART RATE: 76 BPM | HEIGHT: 64 IN | SYSTOLIC BLOOD PRESSURE: 86 MMHG | WEIGHT: 91 LBS | OXYGEN SATURATION: 99 %

## 2020-12-29 DIAGNOSIS — M79.89 LEG SWELLING: ICD-10-CM

## 2020-12-29 DIAGNOSIS — I95.9 HYPOTENSION, UNSPECIFIED HYPOTENSION TYPE: ICD-10-CM

## 2020-12-29 DIAGNOSIS — L29.9 ITCHING: ICD-10-CM

## 2020-12-29 PROCEDURE — 99214 OFFICE O/P EST MOD 30 MIN: CPT | Performed by: FAMILY MEDICINE

## 2020-12-29 ASSESSMENT — FIBROSIS 4 INDEX: FIB4 SCORE: 1.99

## 2020-12-29 NOTE — PROGRESS NOTES
Subjective:     CC:   Chief Complaint   Patient presents with   • Hospital Follow-up       HPI:   Pita presents today with continued problems with leg swelling.  When asked if she is taking the midodrine patient states she is not taking it anymore.  Patient was seen in urgent care just 2 days ago at that time she had lab work done that shows her platelet count down to slightly above 100,000 her hemoglobin in the nine range her sodium and potassium were within normal limits.  Patient's natruretic peptide was up to 382 compared to September 10 it was 61 at that time.  They did do a study to rule out DVT in her legs which was negative for that.  Patient also states that she has been having problems with her stoma with some bleeding patient was supposed to be scheduled for procedure with GI toward the end of January this coming year.  I advised patient to contact GI so they can evaluate that.  Reviewing  her records she has gained 20 pounds in less than 3 weeks (she was concerned this was fat).  She has also noticed that last night when she went to bed and woke up this morning that her swelling was slightly better.  Patient does state that she is wearing compression stockings for at this visit but she does not have any compression stockings on.      Past Medical History:   Diagnosis Date   • Colostomy in place (HCC)    • Eating disorder    • Hypotension    • Hypothyroid    • Palpitations        Social History     Tobacco Use   • Smoking status: Never Smoker   • Smokeless tobacco: Never Used   Substance Use Topics   • Alcohol use: Not Currently   • Drug use: Not Currently       Current Outpatient Medications Ordered in Epic   Medication Sig Dispense Refill   • levothyroxine (SYNTHROID) 50 MCG Tab Take 1 Tab by mouth Every morning on an empty stomach. 90 Tab 3   • hydrOXYzine HCl (ATARAX) 10 MG Tab Take 1 Tab by mouth at bedtime as needed for Itching. 30 Tab 1   • linaCLOtide (LINZESS) 290 MCG Cap Take 290 mcg by mouth  "every evening. 90 Cap 0   • midodrine (PROAMATINE) 10 MG tablet Take 1 Tab by mouth 3 times a day, with meals. (Patient not taking: Reported on 12/10/2020) 60 Tab 0   • sennosides (SENOKOT) 8.6 MG Tab Take 8.6 mg by mouth 1 time daily as needed.       No current Epic-ordered facility-administered medications on file.        Allergies:  Sulfa drugs    Health Maintenance: Completed    ROS:  Gen: no fevers/chills, no changes in weight  Eyes: no changes in vision  ENT: no sore throat, no hearing loss, no bloody nose  Pulm: no sob, no cough  CV: no chest pain, no palpitations  GI: no nausea/vomiting, no diarrhea  : no dysuria  MSk: no myalgias  Skin: no rash  Neuro: no headaches, no numbness/tingling  Heme/Lymph: no easy bruising    Objective:     Exam:  BP (!) 86/54   Pulse 76   Temp 36.8 °C (98.2 °F)   Resp 14   Ht 1.626 m (5' 4\")   Wt 41.3 kg (91 lb)   SpO2 99%   BMI 15.62 kg/m²  Body mass index is 15.62 kg/m².    Gen: Alert and oriented, No apparent distress.  Skin: Warm and dry.    Eyes: Sclera wnl Pupils normal in size  Lungs: Normal effort, CTA bilaterally, no wheezes, rhonchi, or rales  CV: Regular rate and rhythm. No murmurs, rubs, or gallops.  ABD: Soft non-tender no organomegaly  Musculoskeletal: Normal gait. No extremity cyanosis.  Patient does have swelling noted up to her knees with some pitting edema I do not see any leakage of fluid patient does have some slight bruising noted on her legs.  Neuro: Oriented to person, place and time  Psych: Mood is wnl       Assessment & Plan:     60 y.o. female with the following -     1. Hypotension, unspecified hypotension type  I emphasized the patient to start taking the midrodrine as prescribed three times a day in order to elevate her blood pressure.  I did contact cardiology and she is scheduled for appointment to see them on 5 January at 415 she is also going to be seeing Dr. Harvey next day.  I expressed to the patient that if the leg swelling is due to " overload  with her blood pressure being so low I cannot put her on a diuretic.  This is a chronic problem.    2. Leg swelling  Patient encouraged to keep her legs elevated since she did admit that the leg swelling did seem to go down from last night after laying down in bed.  Patient also to continue using her compression stockings at this time.  This is a acute problem.    3. Itching  Patient has taken Benadryl over-the-counter and would like to try the Atarax again that she was taking at bedtime we will refill that prescription at this time.  I did notice that patient does have a refill for the Atarax we will notify patient that she just needs to call the pharmacy and they can refill it for her.  This is  a acute problem.      Return in about 1 week (around 1/5/2021) for Pt to contact GI on appt for issues with bleeding and swelling to her stoma.    Please note that this dictation was created using voice recognition software. I have made every reasonable attempt to correct obvious errors, but I expect that there are errors of grammar and possibly content that I did not discover before finalizing the note.

## 2020-12-31 ENCOUNTER — OFFICE VISIT (OUTPATIENT)
Dept: URGENT CARE | Facility: PHYSICIAN GROUP | Age: 60
End: 2020-12-31
Payer: OTHER GOVERNMENT

## 2020-12-31 VITALS
TEMPERATURE: 97.9 F | DIASTOLIC BLOOD PRESSURE: 50 MMHG | HEART RATE: 70 BPM | RESPIRATION RATE: 14 BRPM | OXYGEN SATURATION: 100 % | HEIGHT: 64 IN | SYSTOLIC BLOOD PRESSURE: 80 MMHG | BODY MASS INDEX: 16.05 KG/M2 | WEIGHT: 94 LBS

## 2020-12-31 DIAGNOSIS — R60.0 BILATERAL LOWER EXTREMITY EDEMA: ICD-10-CM

## 2020-12-31 DIAGNOSIS — E46 HYPOALBUMINEMIA DUE TO PROTEIN-CALORIE MALNUTRITION (HCC): ICD-10-CM

## 2020-12-31 DIAGNOSIS — E88.09 HYPOALBUMINEMIA DUE TO PROTEIN-CALORIE MALNUTRITION (HCC): ICD-10-CM

## 2020-12-31 DIAGNOSIS — E43 SEVERE PROTEIN-CALORIE MALNUTRITION (HCC): ICD-10-CM

## 2020-12-31 PROCEDURE — 99213 OFFICE O/P EST LOW 20 MIN: CPT | Performed by: STUDENT IN AN ORGANIZED HEALTH CARE EDUCATION/TRAINING PROGRAM

## 2020-12-31 ASSESSMENT — FIBROSIS 4 INDEX: FIB4 SCORE: 1.99

## 2021-01-01 NOTE — PROGRESS NOTES
Subjective:   CHIEF COMPLAINT  Chief Complaint   Patient presents with   • Leg Swelling     possible cellulitis, came to UC a few days ago, swelling worse       HPI  Pita Bueno is a 60 y.o. female with a complex past medical history including including history common variable immunodeficiency disorder, pancytopenia, hypothyroidism, + covid (12/10/20), h/o rectal prolapse s/p colonic resection complicated by intra-abdominal abscess requiring multiple surgeries, now w/ a colostomy and protein calorie malnutrition 2/2 malabsorption due to above, presents with a chief complaint of bilateral lower extremity swelling associated with pain and a new draining wound on her right lower extremity.  This is the patient's 2nd urgent care visit for this issue and has been seen in the ED 2x (once at [facility]).  Patient reports since her initial urgent care visit on 12/10/2020 the swelling and pain has been getting progressively worse and now the swelling is extending from her b/l lower extremities to her abdomen.  Pain is constant and worse with any physical contact.  There are no alleviating factors.  Due to the chronic hypotension the patient has not been started on any diuretics.    REVIEW OF SYSTEMS  General: no fever or chills  GI: no nausea or vomiting  See HPI for further details.    PAST MEDICAL HISTORY  Patient Active Problem List    Diagnosis Date Noted   • Severe malnutrition (HCC) 02/03/2020     Priority: High   • Shortness of breath 09/21/2020     Priority: Low   • Itching 11/16/2020   • Chronic diarrhea 10/15/2020   • Palpitations 10/08/2020   • C. difficile colitis 09/23/2020   • Diarrhea 09/22/2020   • Hypotension due to hypovolemia 09/21/2020   • Nausea & vomiting 09/14/2020   • Low back pain 09/14/2020   • Bowel dysfunction 07/13/2020   • Constipation by delayed colonic transit 07/13/2020   • Pancytopenia (HCC) 07/10/2020   • Pure hypercholesterolemia 02/07/2020   • Vitamin D deficiency  "02/07/2020   • CVID (common variable immunodeficiency) ([facility]) 02/03/2020   • CFS (chronic fatigue syndrome) 02/03/2020   • Hypothyroidism 02/03/2020   • PTSD (post-traumatic stress disorder) 02/03/2020   • Constipation 02/03/2020   • Colostomy care ([facility]) 04/02/2015       SURGICAL HISTORY   has a past surgical history that includes other abdominal surgery.    ALLERGIES  Allergies   Allergen Reactions   • Sulfa Drugs Hives       CURRENT MEDICATIONS  Home Medications    **Home medications have not yet been reviewed for this encounter**         SOCIAL HISTORY  Social History     Tobacco Use   • Smoking status: Never Smoker   • Smokeless tobacco: Never Used   Substance and Sexual Activity   • Alcohol use: Not Currently   • Drug use: Not Currently   • Sexual activity: Not Currently       FAMILY HISTORY  Family History   Problem Relation Age of Onset   • Thyroid Mother    • Alcohol abuse Father    • Heart Disease Father    • Hypertension Father    • Stroke Father           Objective:   PHYSICAL EXAM  VITAL SIGNS: BP (!) 80/50   Pulse 70   Temp 36.6 °C (97.9 °F) (Temporal)   Resp 14   Ht 1.626 m (5' 4\")   Wt 42.6 kg (94 lb)   SpO2 100%   BMI 16.14 kg/m²     Gen: no acute distress, normal voice  Lungs: CTAB w/ symmetric expansion  CV: RRR w/o murmurs or clicks  B/l LE: Venous stasis dermatitis with early stages of a venous stasis ulcer along the posterior distal third margins of the right lower extremity.  Diffuse global tenderness to palpation distal to the knee.  Nonpitting edema bilaterally.  Palpable DP b/l  Psych: normal affect, normal judgement, alert, awake    Assessment/Plan:     1. Severe protein-calorie malnutrition (HCC)     2. Hypoalbuminemia due to protein-calorie malnutrition (HCC)     3. Bilateral lower extremity edema     Highly complex 60-year-old female w/ her 5th visit to an acute care setting for management of bilateral lower extremity swelling, pain and new development of a venous stasis ulcer.  I " do not believe there is an underlying cellulitis.  The differentials are broad however I believe the LE swelling is 2/2 to third space losses, in part 2/2 PCM. In other circumstances it would be reasonable to try diuresing the patient however, with her BP of 80/50 this cannot be safely performed at home.   Additionally the patient has gained over 20 pounds of weight over the last 2 weeks w/ a p-BNP of 382 per recent labs.  She has an upcoming appointment with cardiology however given the progressive worsening of her symptoms she needs further evaluation and intervention than what we  can be provided at our outpatient facility. The patient was instructed to go to the emergency room; she agreed with the plan and verbalized her understanding.  All questions were answered.      Please note that this dictation was created using voice recognition software. I have made a reasonable attempt to correct obvious errors, but I expect that there are errors of grammar and possibly content that I did not discover before finalizing the note.

## 2021-01-05 ENCOUNTER — PATIENT MESSAGE (OUTPATIENT)
Dept: MEDICAL GROUP | Facility: PHYSICIAN GROUP | Age: 61
End: 2021-01-05

## 2021-01-05 ENCOUNTER — OFFICE VISIT (OUTPATIENT)
Dept: CARDIOLOGY | Facility: MEDICAL CENTER | Age: 61
End: 2021-01-05
Payer: OTHER GOVERNMENT

## 2021-01-05 VITALS
OXYGEN SATURATION: 99 % | SYSTOLIC BLOOD PRESSURE: 102 MMHG | DIASTOLIC BLOOD PRESSURE: 64 MMHG | HEIGHT: 64 IN | WEIGHT: 102 LBS | BODY MASS INDEX: 17.42 KG/M2 | HEART RATE: 70 BPM

## 2021-01-05 DIAGNOSIS — R60.1 ANASARCA: ICD-10-CM

## 2021-01-05 DIAGNOSIS — R60.0 BILATERAL LEG EDEMA: ICD-10-CM

## 2021-01-05 PROCEDURE — 99214 OFFICE O/P EST MOD 30 MIN: CPT | Performed by: NURSE PRACTITIONER

## 2021-01-05 ASSESSMENT — ENCOUNTER SYMPTOMS
FOCAL WEAKNESS: 0
ORTHOPNEA: 0
DOUBLE VISION: 0
BLURRED VISION: 0
WEAKNESS: 0
SHORTNESS OF BREATH: 0
FALLS: 0
HEADACHES: 0
LOSS OF CONSCIOUSNESS: 0
WHEEZING: 0
PND: 1
DIZZINESS: 0
COUGH: 0
PALPITATIONS: 0
NERVOUS/ANXIOUS: 1

## 2021-01-05 ASSESSMENT — FIBROSIS 4 INDEX: FIB4 SCORE: 1.99

## 2021-01-05 NOTE — PROGRESS NOTES
Chief Complaint   Patient presents with   • Palpitations     F/V DX:PALPITATIONS     Chief Complaint   Patient presents with   • Palpitations     F/V DX:PALPITATIONS       Subjective:   Pita Bueno is a 60 y.o. female who presents today for follow up     She is followed by Dr. Calvillo in our clinic, history of palpitations, hypothyroid, Colostomy from complication from abdominal surgery in 2013, low body weight/malnutrition, and common variable immunodeficiency.    Patient was diagnosed with covid 12/10, then had leg swelling. Reported back to ER on 12/26 diagnosed with covid toes. She continues to have LE edema with weeping edema. Weight gain 30lbs since 12/10. Dry weight 70lbs . Venous duplex showed no DVT     Patient does not consume high sodium diet. She has notices swollen lymph nodes at the groin sites.     She is following up with her GI for her cdiff. She continues to have diarrhea.       Past Medical History:   Diagnosis Date   • Colostomy in place (HCC)    • Eating disorder    • Hypotension    • Hypothyroid    • Palpitations      Past Surgical History:   Procedure Laterality Date   • OTHER ABDOMINAL SURGERY      rectal prolapse repair, anastomy leak/abcess,colostony     Family History   Problem Relation Age of Onset   • Thyroid Mother    • Alcohol abuse Father    • Heart Disease Father    • Hypertension Father    • Stroke Father      Social History     Socioeconomic History   • Marital status:      Spouse name: Not on file   • Number of children: Not on file   • Years of education: Not on file   • Highest education level: Not on file   Occupational History   • Not on file   Social Needs   • Financial resource strain: Not on file   • Food insecurity     Worry: Not on file     Inability: Not on file   • Transportation needs     Medical: Not on file     Non-medical: Not on file   Tobacco Use   • Smoking status: Never Smoker   • Smokeless tobacco: Never Used   Substance and Sexual Activity   •  Alcohol use: Not Currently   • Drug use: Not Currently   • Sexual activity: Not Currently   Lifestyle   • Physical activity     Days per week: Not on file     Minutes per session: Not on file   • Stress: Not on file   Relationships   • Social connections     Talks on phone: Not on file     Gets together: Not on file     Attends Sikh service: Not on file     Active member of club or organization: Not on file     Attends meetings of clubs or organizations: Not on file     Relationship status: Not on file   • Intimate partner violence     Fear of current or ex partner: Not on file     Emotionally abused: Not on file     Physically abused: Not on file     Forced sexual activity: Not on file   Other Topics Concern   • Not on file   Social History Narrative   • Not on file     Allergies   Allergen Reactions   • Sulfa Drugs Hives     Outpatient Encounter Medications as of 1/5/2021   Medication Sig Dispense Refill   • levothyroxine (SYNTHROID) 50 MCG Tab Take 1 Tab by mouth Every morning on an empty stomach. 90 Tab 3   • hydrOXYzine HCl (ATARAX) 10 MG Tab Take 1 Tab by mouth at bedtime as needed for Itching. 30 Tab 1   • linaCLOtide (LINZESS) 290 MCG Cap Take 290 mcg by mouth every evening. 90 Cap 0   • sennosides (SENOKOT) 8.6 MG Tab Take 8.6 mg by mouth 1 time daily as needed.     • midodrine (PROAMATINE) 10 MG tablet Take 1 Tab by mouth 3 times a day, with meals. (Patient not taking: Reported on 12/10/2020) 60 Tab 0     No facility-administered encounter medications on file as of 1/5/2021.        Review of Systems   Constitutional: Positive for malaise/fatigue.   Eyes: Negative for blurred vision and double vision.   Respiratory: Negative for cough, shortness of breath and wheezing. Hemoptysis: .obje.    Cardiovascular: Positive for leg swelling and PND. Negative for chest pain, palpitations and orthopnea.   Musculoskeletal: Negative for falls.   Neurological: Negative for dizziness, focal weakness, loss of  "consciousness, weakness and headaches.   Psychiatric/Behavioral: The patient is nervous/anxious.    All other systems reviewed and are negative.      /64 (BP Location: Left arm, Patient Position: Sitting, BP Cuff Size: Adult)   Pulse 70   Ht 1.626 m (5' 4\")   Wt 46.3 kg (102 lb)   SpO2 99%      Physical Exam   Constitutional: She is oriented to person, place, and time. She appears distressed.   Cardiovascular: Normal rate, regular rhythm and normal heart sounds.   No murmur heard.  Pulmonary/Chest: No respiratory distress.   Abdominal: She exhibits distension.   Musculoskeletal:         General: Edema (3+ pitting edema bilateral LE ) present.   Neurological: She is alert and oriented to person, place, and time.   Skin: Skin is warm. She is not diaphoretic.   Psychiatric: Mood, memory, affect and judgment normal.       ECHO  9/2/2020  No prior study is available for comparison.   Normal left ventricular systolic function.  Left ventricular ejection fraction is visually estimated to be 60%.  Normal regional wall motion.  Normal right ventricular size.  Normal left atrial size.  Trace mitral regurgitation.  Trace aortic insufficiency.  The aortic valve is not well visualized.  Right ventricular systolic pressure is estimated to be 30 mmHg.  Normal pericardium without effusion.    Assessment:     1. Bilateral leg edema     2. Anasarca         Medical Decision Making:  Today's Assessment / Status / Plan:     1. Bilateral leg edema and anasarca:  - patient has gained 30 lbs within a month period. Has weeping pitting edema pretibial. Blood pressure very soft for outpatient diuretics.   -  She has palpable swollen lymph nodes bilateral groin sites  - venous duplex showed no DVT     Venous compression vs heart failure. We will direct admit the patient for further evaluation. Patient does not to go to the emergency room, and she understand the risk associated not going to the ER.         "

## 2021-01-06 ENCOUNTER — PATIENT MESSAGE (OUTPATIENT)
Dept: MEDICAL GROUP | Facility: PHYSICIAN GROUP | Age: 61
End: 2021-01-06

## 2021-01-06 ENCOUNTER — HOSPITAL ENCOUNTER (INPATIENT)
Facility: MEDICAL CENTER | Age: 61
LOS: 4 days | DRG: 641 | End: 2021-01-10
Attending: HOSPITALIST | Admitting: INTERNAL MEDICINE
Payer: OTHER GOVERNMENT

## 2021-01-06 ENCOUNTER — APPOINTMENT (OUTPATIENT)
Dept: MEDICAL GROUP | Facility: PHYSICIAN GROUP | Age: 61
End: 2021-01-06
Payer: OTHER GOVERNMENT

## 2021-01-06 DIAGNOSIS — Z43.3 ATTENTION TO COLOSTOMY (HCC): ICD-10-CM

## 2021-01-06 DIAGNOSIS — E43 SEVERE MALNUTRITION (HCC): ICD-10-CM

## 2021-01-06 DIAGNOSIS — R60.1 ANASARCA: ICD-10-CM

## 2021-01-06 LAB
ALBUMIN SERPL BCP-MCNC: 3.4 G/DL (ref 3.2–4.9)
ALBUMIN/GLOB SERPL: 1.8 G/DL
ALP SERPL-CCNC: 79 U/L (ref 30–99)
ALT SERPL-CCNC: 33 U/L (ref 2–50)
ANION GAP SERPL CALC-SCNC: 5 MMOL/L (ref 7–16)
ANION GAP SERPL CALC-SCNC: 6 MMOL/L (ref 7–16)
APPEARANCE UR: CLEAR
AST SERPL-CCNC: 19 U/L (ref 12–45)
BILIRUB SERPL-MCNC: 0.2 MG/DL (ref 0.1–1.5)
BILIRUB UR QL STRIP.AUTO: NEGATIVE
BUN SERPL-MCNC: 42 MG/DL (ref 8–22)
BUN SERPL-MCNC: 42 MG/DL (ref 8–22)
CALCIUM SERPL-MCNC: 8.4 MG/DL (ref 8.5–10.5)
CALCIUM SERPL-MCNC: 8.4 MG/DL (ref 8.5–10.5)
CHLORIDE SERPL-SCNC: 107 MMOL/L (ref 96–112)
CHLORIDE SERPL-SCNC: 108 MMOL/L (ref 96–112)
CO2 SERPL-SCNC: 24 MMOL/L (ref 20–33)
CO2 SERPL-SCNC: 25 MMOL/L (ref 20–33)
COLOR UR: YELLOW
CREAT SERPL-MCNC: 0.59 MG/DL (ref 0.5–1.4)
CREAT SERPL-MCNC: 0.6 MG/DL (ref 0.5–1.4)
EKG IMPRESSION: NORMAL
ERYTHROCYTE [DISTWIDTH] IN BLOOD BY AUTOMATED COUNT: 56 FL (ref 35.9–50)
GLOBULIN SER CALC-MCNC: 1.9 G/DL (ref 1.9–3.5)
GLUCOSE SERPL-MCNC: 92 MG/DL (ref 65–99)
GLUCOSE SERPL-MCNC: 98 MG/DL (ref 65–99)
GLUCOSE UR STRIP.AUTO-MCNC: NEGATIVE MG/DL
HCT VFR BLD AUTO: 28.7 % (ref 37–47)
HGB BLD-MCNC: 8.9 G/DL (ref 12–16)
KETONES UR STRIP.AUTO-MCNC: NEGATIVE MG/DL
LEUKOCYTE ESTERASE UR QL STRIP.AUTO: NEGATIVE
MAGNESIUM SERPL-MCNC: 1.9 MG/DL (ref 1.5–2.5)
MAGNESIUM SERPL-MCNC: 1.9 MG/DL (ref 1.5–2.5)
MCH RBC QN AUTO: 33.5 PG (ref 27–33)
MCHC RBC AUTO-ENTMCNC: 31 G/DL (ref 33.6–35)
MCV RBC AUTO: 107.9 FL (ref 81.4–97.8)
MICRO URNS: NORMAL
NITRITE UR QL STRIP.AUTO: NEGATIVE
NT-PROBNP SERPL IA-MCNC: 559 PG/ML (ref 0–125)
PH UR STRIP.AUTO: 5.5 [PH] (ref 5–8)
PHOSPHATE SERPL-MCNC: 3.7 MG/DL (ref 2.5–4.5)
PHOSPHATE SERPL-MCNC: 3.7 MG/DL (ref 2.5–4.5)
PLATELET # BLD AUTO: 146 K/UL (ref 164–446)
PMV BLD AUTO: 12.6 FL (ref 9–12.9)
POTASSIUM SERPL-SCNC: 4.6 MMOL/L (ref 3.6–5.5)
POTASSIUM SERPL-SCNC: 4.7 MMOL/L (ref 3.6–5.5)
PREALB SERPL-MCNC: 14.9 MG/DL (ref 18–38)
PROT SERPL-MCNC: 5.3 G/DL (ref 6–8.2)
PROT UR QL STRIP: NEGATIVE MG/DL
RBC # BLD AUTO: 2.66 M/UL (ref 4.2–5.4)
RBC UR QL AUTO: NEGATIVE
SODIUM SERPL-SCNC: 137 MMOL/L (ref 135–145)
SODIUM SERPL-SCNC: 138 MMOL/L (ref 135–145)
SP GR UR STRIP.AUTO: 1.01
UROBILINOGEN UR STRIP.AUTO-MCNC: 0.2 MG/DL
WBC # BLD AUTO: 3.8 K/UL (ref 4.8–10.8)

## 2021-01-06 PROCEDURE — 82270 OCCULT BLOOD FECES: CPT

## 2021-01-06 PROCEDURE — 770020 HCHG ROOM/CARE - TELE (206)

## 2021-01-06 PROCEDURE — 36415 COLL VENOUS BLD VENIPUNCTURE: CPT

## 2021-01-06 PROCEDURE — 85027 COMPLETE CBC AUTOMATED: CPT

## 2021-01-06 PROCEDURE — 84100 ASSAY OF PHOSPHORUS: CPT

## 2021-01-06 PROCEDURE — 83880 ASSAY OF NATRIURETIC PEPTIDE: CPT

## 2021-01-06 PROCEDURE — 84134 ASSAY OF PREALBUMIN: CPT

## 2021-01-06 PROCEDURE — 80053 COMPREHEN METABOLIC PANEL: CPT

## 2021-01-06 PROCEDURE — 81003 URINALYSIS AUTO W/O SCOPE: CPT

## 2021-01-06 PROCEDURE — 93010 ELECTROCARDIOGRAM REPORT: CPT | Performed by: INTERNAL MEDICINE

## 2021-01-06 PROCEDURE — A9270 NON-COVERED ITEM OR SERVICE: HCPCS | Performed by: STUDENT IN AN ORGANIZED HEALTH CARE EDUCATION/TRAINING PROGRAM

## 2021-01-06 PROCEDURE — 99222 1ST HOSP IP/OBS MODERATE 55: CPT | Performed by: INTERNAL MEDICINE

## 2021-01-06 PROCEDURE — 83735 ASSAY OF MAGNESIUM: CPT

## 2021-01-06 PROCEDURE — 93005 ELECTROCARDIOGRAM TRACING: CPT | Performed by: NURSE PRACTITIONER

## 2021-01-06 PROCEDURE — 99223 1ST HOSP IP/OBS HIGH 75: CPT | Mod: GC | Performed by: INTERNAL MEDICINE

## 2021-01-06 PROCEDURE — 700111 HCHG RX REV CODE 636 W/ 250 OVERRIDE (IP): Performed by: INTERNAL MEDICINE

## 2021-01-06 PROCEDURE — 80048 BASIC METABOLIC PNL TOTAL CA: CPT

## 2021-01-06 PROCEDURE — 700102 HCHG RX REV CODE 250 W/ 637 OVERRIDE(OP): Performed by: STUDENT IN AN ORGANIZED HEALTH CARE EDUCATION/TRAINING PROGRAM

## 2021-01-06 RX ORDER — POLYETHYLENE GLYCOL 3350 17 G/17G
1 POWDER, FOR SOLUTION ORAL
Status: DISCONTINUED | OUTPATIENT
Start: 2021-01-06 | End: 2021-01-08

## 2021-01-06 RX ORDER — LEVOTHYROXINE SODIUM 0.05 MG/1
50 TABLET ORAL
Status: DISCONTINUED | OUTPATIENT
Start: 2021-01-07 | End: 2021-01-10 | Stop reason: HOSPADM

## 2021-01-06 RX ORDER — SENNOSIDES A AND B 8.6 MG/1
8.6 TABLET, FILM COATED ORAL
Status: DISCONTINUED | OUTPATIENT
Start: 2021-01-06 | End: 2021-01-06

## 2021-01-06 RX ORDER — OXYCODONE HYDROCHLORIDE 5 MG/1
5 TABLET ORAL ONCE
Status: DISCONTINUED | OUTPATIENT
Start: 2021-01-06 | End: 2021-01-07

## 2021-01-06 RX ORDER — ACETAMINOPHEN 500 MG
1000 TABLET ORAL 3 TIMES DAILY PRN
Status: DISCONTINUED | OUTPATIENT
Start: 2021-01-06 | End: 2021-01-10 | Stop reason: HOSPADM

## 2021-01-06 RX ORDER — MIDODRINE HYDROCHLORIDE 5 MG/1
10 TABLET ORAL
Status: DISCONTINUED | OUTPATIENT
Start: 2021-01-06 | End: 2021-01-10 | Stop reason: HOSPADM

## 2021-01-06 RX ORDER — AMOXICILLIN 250 MG
2 CAPSULE ORAL 2 TIMES DAILY
Status: DISCONTINUED | OUTPATIENT
Start: 2021-01-06 | End: 2021-01-08

## 2021-01-06 RX ORDER — FUROSEMIDE 10 MG/ML
20 INJECTION INTRAMUSCULAR; INTRAVENOUS
Status: DISCONTINUED | OUTPATIENT
Start: 2021-01-06 | End: 2021-01-10 | Stop reason: HOSPADM

## 2021-01-06 RX ORDER — BISACODYL 10 MG
10 SUPPOSITORY, RECTAL RECTAL
Status: DISCONTINUED | OUTPATIENT
Start: 2021-01-06 | End: 2021-01-08

## 2021-01-06 RX ADMIN — FUROSEMIDE 20 MG: 10 INJECTION, SOLUTION INTRAMUSCULAR; INTRAVENOUS at 12:59

## 2021-01-06 RX ADMIN — MIDODRINE HYDROCHLORIDE 10 MG: 5 TABLET ORAL at 14:11

## 2021-01-06 RX ADMIN — DOCUSATE SODIUM 50 MG AND SENNOSIDES 8.6 MG 2 TABLET: 8.6; 5 TABLET, FILM COATED ORAL at 17:57

## 2021-01-06 ASSESSMENT — ENCOUNTER SYMPTOMS
DIZZINESS: 1
SPUTUM PRODUCTION: 0
NAUSEA: 0
VOMITING: 0
SHORTNESS OF BREATH: 1
BACK PAIN: 0
ABDOMINAL PAIN: 0
POLYDIPSIA: 0
DIAPHORESIS: 0
NECK PAIN: 0
BRUISES/BLEEDS EASILY: 0
DOUBLE VISION: 0
PALPITATIONS: 1
HEARTBURN: 0
SINUS PAIN: 0
CONSTIPATION: 1
FEVER: 0
COUGH: 0
WEIGHT LOSS: 1
FOCAL WEAKNESS: 0
DIZZINESS: 0
BLOOD IN STOOL: 0
SORE THROAT: 0
SENSORY CHANGE: 0
CHILLS: 0
BLURRED VISION: 0
PHOTOPHOBIA: 0
HEMOPTYSIS: 0
SPEECH CHANGE: 0
SEIZURES: 0
ORTHOPNEA: 1
MYALGIAS: 0
HEADACHES: 0
WHEEZING: 0
DEPRESSION: 0

## 2021-01-06 ASSESSMENT — COGNITIVE AND FUNCTIONAL STATUS - GENERAL
DAILY ACTIVITIY SCORE: 24
MOBILITY SCORE: 23
SUGGESTED CMS G CODE MODIFIER MOBILITY: CI
SUGGESTED CMS G CODE MODIFIER DAILY ACTIVITY: CH
CLIMB 3 TO 5 STEPS WITH RAILING: A LITTLE

## 2021-01-06 ASSESSMENT — LIFESTYLE VARIABLES
HAVE YOU EVER FELT YOU SHOULD CUT DOWN ON YOUR DRINKING: NO
HOW MANY TIMES IN THE PAST YEAR HAVE YOU HAD 5 OR MORE DRINKS IN A DAY: 0
EVER FELT BAD OR GUILTY ABOUT YOUR DRINKING: NO
SUBSTANCE_ABUSE: 0
TOTAL SCORE: 0
ALCOHOL_USE: NO
TOTAL SCORE: 0
EVER HAD A DRINK FIRST THING IN THE MORNING TO STEADY YOUR NERVES TO GET RID OF A HANGOVER: NO
HAVE PEOPLE ANNOYED YOU BY CRITICIZING YOUR DRINKING: NO
AVERAGE NUMBER OF DAYS PER WEEK YOU HAVE A DRINK CONTAINING ALCOHOL: 0
ON A TYPICAL DAY WHEN YOU DRINK ALCOHOL HOW MANY DRINKS DO YOU HAVE: 0
TOTAL SCORE: 0
DOES PATIENT WANT TO STOP DRINKING: NO
CONSUMPTION TOTAL: NEGATIVE

## 2021-01-06 ASSESSMENT — FIBROSIS 4 INDEX
FIB4 SCORE: 1.34
FIB4 SCORE: 1.99

## 2021-01-06 NOTE — PROGRESS NOTES
60-year-old female with a past medical history of Covid, nutritional deficiencies, arrhythmias who presented to her cardiologist office with bilateral lower extremity edema that is pitting and worsening.  She was also found to have inguinal lymphadenopathy.  Her last cardiac echo did not reveal any abnormalities.  She will need another cardiac echo and cardiology consult upon arrival.

## 2021-01-06 NOTE — ASSESSMENT & PLAN NOTE
- Per patient 30-35 weight gain in past 2 weeks  - Weight on admission 107 lbs, baseline 70-75 pounds.  - On admission EKG 1/6/21 Sinus Rhythm rate 68, ECHO on 12/22/2020 was Unremarkable  - No known Cardiovascular disease, but was recently diagnosed with COVID-19, Patient has malnutrition with albumin 4.0 but depressed prealbumin of 14.9, liver function test normal, urinalysis negative for proteinuria.  - Cardiology on board.  Currently diuresing with IV Lasix.  - Echocardiogram obtained on this admission left ventricular ejection fraction 65%, RVSP 25%.  - DVT study 12/26 negative   - Differential diagnosis include but not limited to venous insufficiency, left heart failure with preserved ejection fraction, malnutrition  - 01/08/21 c/o pain with palpation to RLE, increased swelling RLE - RLE DVT study requested      PLAN:  - Lasix 20 mg BID - Will be monitoring renal function and electrolytes while doing this   - Fluid restriction 1.5 L daily  - Daily weights standing scale  - Strict I and O   - Chronic Low BP 80-90, on midodrine, asymptomatic . Can continue lasix unless patient is symptomatic   - RLE DVT study to r/o ipsilateral DVT to that extremity, negative on presentation   - Plan to optimize nutritional status

## 2021-01-06 NOTE — ASSESSMENT & PLAN NOTE
- Patient has Hx of Common variable immunodeficiency diagnosed in 2007, Patient used to have recurrent sinusitis prior to diagnosis  - She was seen in the past by Hem in [place], She received IVIG last maybe 1 year ago  -Seen by oncology Dr. Chang on previous hospitalization.  IgG was obtained which was low.  Patient is scheduled on 1/8 for IVIG infusion.    -Chronic pancytopenia with hemoglobin 9-11 , WBC 3.8, platelets 146  -Anemia work-up  Shows Vitamin B12 deficiency. Serum iron 28, total iron-binding capacity 327, ferritin 26.8 and vitamin B12 deficiency.  Stool occult in September 2020 negative, repeat stool occult test pending.  -On last admission hemolytic anemia work-up was negative.    Plan  Monitor with daily CBC  IM B12   IV Iron per pharmacy protocol 01/08/21  Obtain intrinsic factor, Gastrin levels, H.pylori.   Hemtology follow up in outpatient setting.

## 2021-01-06 NOTE — ASSESSMENT & PLAN NOTE
- Severe protein  chronic malnutrition  - Patient probably weighs around 70-75 lbs. Weight 107 on presentation due to Anasarca.  - History of anorexia nervosa in teenage.  It appears that patient still has an eating disorder and not just complication from ostomy as patient is over concerned regarding her weight and excessively exercises to up to 5 hours daily.    - Low Pancreatic lipase in the past, celiac disease negative  - Albumin 4.0, prealbumin low  - DDX: Eating disorder versus chronic malabsorption   - Nutritionist following, recommends outpatient follow-up with dietitian.  - Low B12 levels, Vitamin d levels and Iron deficiency anemia noted      PLAN:  - High protein diet  - Monitor for refeeding syndrome   - Nutritionist following  - Psychiatry evaluation requested for evaluation and concerns if any for psychiatric eating disorders  - IM cyanocobalamin replacement    - IV Iron replacement per pharmacy protocol ordered 01/08/21  - Vitamin D replacement, 50,000 units weekly x 8 - PCP to monitor levels in 8 weeks of initiation of therapy

## 2021-01-06 NOTE — TELEPHONE ENCOUNTER
From: Pita Bueno  To: Clifton Harvey M.D.  Sent: 1/5/2021 10:23 PM PST  Subject: Non-Urgent Medical Question    Please let my very favorite doctor know that I am being admitted into [facility] tomorrow am. I've gained 35lbs in 2 weeks. But I will miss my appointment at 4 with him. Please tell him. Thanks. Uyen Bueno

## 2021-01-06 NOTE — NON-PROVIDER
"      Internal Medicine Admitting History and Physical    Note Author: Alessandro Ignacio, Student       Name Pita Bueno     1960   Age/Sex 60 y.o. female   MRN [id number]   Code Status Full     After 5PM or if no immediate response to page, please call for cross-coverage  Attending/Team: Rea See Patient List for primary contact information  Call [phone] to page    1st Call - Day Intern (R1):   Dr. Bustos 2nd Call - Day Sr. Resident (R2/R3):          Chief Complaint:  Lower extremity swelling    HPI:  Mrs. Bueno is a 59 yo F with PMHx of common variable immunodeficiency, recent COVID and C. Diff infections, and chronic malnutrition who presents to the telemetry unit for direct admission due to lower extremity swelling. The patient states the swelling occurred several weeks ago following a COVID infection. In addition to swelling her lower extremities are painful to touch and have had several opening wounds due to stretching of the skin. Recent venous doppler US was negative for DVT. She has also noticed increased shortness of breath, fatigue, inability to lay flat at night, abdominal bloating and chest pain during activity. She stated prior to the swelling of her legs, she lost roughly 10 pounds and states she thinks she has regained 20-30 pounds of fluid. She was previously seeing a cardiologist for the fluid retention however recent echo was normal and last BNP was 382.     The patient is very active and completes roughly 3-4 hours of exercise daily. She is a \"picky eater\" only eating vegetables, white meat and egg whites. She states she has been under 100 pounds for several years however just recently dropped to 70 pounds due to recent illnesses. She is uncomfortable with her recent appearance currently and believes she has gained an enormous amount of weight. The patient also has an ostomy in place from a complication in a rectal prolapse surgery in 2013. She states she empties her " ostomy roughly 50 times per day due to fluid build up but has not had any output in the past day. She is being followed by GI for the amount of ostomy output she is having and was recently treated for C. Diff.       Review of Systems   Constitutional: Positive for malaise/fatigue and weight loss. Negative for chills and fever.   HENT: Negative for congestion, ear discharge, ear pain and nosebleeds.    Eyes: Negative for blurred vision.   Respiratory: Positive for shortness of breath. Negative for cough and wheezing.    Cardiovascular: Positive for chest pain, palpitations, orthopnea and leg swelling.   Gastrointestinal: Positive for constipation. Negative for abdominal pain, blood in stool, heartburn, nausea and vomiting.   Genitourinary: Negative for dysuria, frequency and urgency.   Skin: Negative for itching and rash.        Scaling and stretching of skin in bilateral lower extremities. Wounds on right lower extremity.    Neurological: Positive for dizziness. Negative for headaches.           Past Medical History:   Past Medical History:   Diagnosis Date   • Colostomy in place (HCC)    • Eating disorder    • Hypotension    • Hypothyroid    • Common Variable Immunodeficiency    • Irritable Bowel Syndrome    • Palpitations        Past Surgical History:  Past Surgical History:   Procedure Laterality Date   • OTHER ABDOMINAL SURGERY      rectal prolapse repair, anastomy leak/abcess,colostony       Current Outpatient Medications:  Home Medications     Reviewed by Yamilka Shane R.N. (Registered Nurse) on 01/06/21 at 1104  Med List Status: Complete   Medication Last Dose Status   hydrOXYzine HCl (ATARAX) 10 MG Tab 1/5/2021 Active   levothyroxine (SYNTHROID) 50 MCG Tab 1/6/2021 Active   linaCLOtide (LINZESS) 290 MCG Cap 1/6/2021 Active   midodrine (PROAMATINE) 10 MG tablet 12/10/20 Active   sennosides (SENOKOT) 8.6 MG Tab 1/6/2021 Active                Medication Allergy/Sensitivities:  Allergies   Allergen  "Reactions   • Sulfa Drugs Hives         Family History:  Family History   Problem Relation Age of Onset   • Thyroid Mother    • Alcohol abuse Father    • Heart Disease Father    • Hypertension Father    • Stroke Father        Social History:  Social History     Socioeconomic History   • Marital status:      Spouse name: Not on file   • Number of children: Not on file   • Years of education: Not on file   • Highest education level: Not on file   Occupational History   • Not on file   Social Needs   • Financial resource strain: Not on file   • Food insecurity     Worry: Not on file     Inability: Not on file   • Transportation needs     Medical: Not on file     Non-medical: Not on file   Tobacco Use   • Smoking status: Never Smoker   • Smokeless tobacco: Never Used   Substance and Sexual Activity   • Alcohol use: Not Currently   • Drug use: Not Currently   • Sexual activity: Not Currently   Lifestyle   • Physical activity     Days per week: Not on file     Minutes per session: Not on file   • Stress: Not on file   Relationships   • Social connections     Talks on phone: Not on file     Gets together: Not on file     Attends Latter-day service: Not on file     Active member of club or organization: Not on file     Attends meetings of clubs or organizations: Not on file     Relationship status: Not on file   • Intimate partner violence     Fear of current or ex partner: Not on file     Emotionally abused: Not on file     Physically abused: Not on file     Forced sexual activity: Not on file   Other Topics Concern   • Not on file   Social History Narrative   • Not on file     Living situation: 2 story home in [place], [place]  PCP : Clifton Harvey M.D.      Physical Exam     Vitals:    01/06/21 1030 01/06/21 1033   BP: (!) 92/69    Pulse: 78    Resp: 18    Temp: 36.3 °C (97.4 °F)    TempSrc: Temporal    SpO2: 90%    Weight:  48.9 kg (107 lb 12.9 oz)   Height:  1.626 m (5' 4\")     Body mass index is 18.5 kg/m².  BP (!) 92/69 " "  Pulse 78   Temp 36.3 °C (97.4 °F) (Temporal)   Resp 18   Ht 1.626 m (5' 4\")   Wt 48.9 kg (107 lb 12.9 oz)   SpO2 90%   BMI 18.50 kg/m²   O2 therapy: Pulse Oximetry: 90 %, O2 (LPM): 0, O2 Delivery Device: None - Room Air    Physical Exam   Constitutional: She is oriented to person, place, and time. She appears malnourished. She appears unhealthy. She appears cachectic. No distress.   HENT:   Head: Normocephalic and atraumatic.   Eyes: Pupils are equal, round, and reactive to light. Conjunctivae and EOM are normal. No scleral icterus.   Neck: Normal range of motion. Neck supple. No JVD present.   Cardiovascular: Normal rate, regular rhythm and normal heart sounds.   Pulmonary/Chest: Effort normal and breath sounds normal. No respiratory distress. She has no wheezes. She has no rales.   Abdominal: Soft. Bowel sounds are normal. She exhibits distension. She exhibits no mass. There is abdominal tenderness. There is no rebound and no guarding.   Colostomy located in LLQ. Appears dry clean and intact   Musculoskeletal:         General: Tenderness and edema present.      Comments: Bilateral lower extremity edema and tenderness to palpation   Neurological: She is alert and oriented to person, place, and time. No cranial nerve deficit.   Skin: Skin is warm and dry. She is not diaphoretic. There is erythema.   Mild bilateral erythema of lower extremities with two open and wheeping wounds located on the calf.    Psychiatric: Affect and judgment normal.       Data Review       Old Records Request:   Completed  Current Records review/summary: Completed    Lab Data Review:  Recent Results (from the past 24 hour(s))   EKG    Collection Time: 21 11:25 AM   Result Value Ref Range    Report       [facility]    Test Date:  2021  Pt Name:    SUSAN WILCOX             Department: 183  MRN:        [id number]                      Room:       T823  Gender:     Female                       Technician: LIANNE  :    "     1960                   Requested By:YADIRA MORALEZ  Order #:    [id number]                    Reading MD: Garth Najera MD    Measurements  Intervals                                Axis  Rate:       68                           P:          70  MI:         140                          QRS:        67  QRSD:       76                           T:          78  QT:         416  QTc:        443    Interpretive Statements  SINUS RHYTHM  Compared to ECG 12/26/2020 14:46:07  No significant change.  Electronically Signed On 1-6-2021 12:13:18 PST by Garth Najera MD     CBC WITHOUT DIFFERENTIAL    Collection Time: 01/06/21 12:17 PM   Result Value Ref Range    WBC 3.8 (L) 4.8 - 10.8 K/uL    RBC 2.66 (L) 4.20 - 5.40 M/uL    Hemoglobin 8.9 (L) 12.0 - 16.0 g/dL    Hematocrit 28.7 (L) 37.0 - 47.0 %    .9 (H) 81.4 - 97.8 fL    MCH 33.5 (H) 27.0 - 33.0 pg    MCHC 31.0 (L) 33.6 - 35.0 g/dL    RDW 56.0 (H) 35.9 - 50.0 fL    Platelet Count 146 (L) 164 - 446 K/uL    MPV 12.6 9.0 - 12.9 fL   Comp Metabolic Panel    Collection Time: 01/06/21 12:17 PM   Result Value Ref Range    Sodium 137 135 - 145 mmol/L    Potassium 4.6 3.6 - 5.5 mmol/L    Chloride 107 96 - 112 mmol/L    Co2 25 20 - 33 mmol/L    Anion Gap 5.0 (L) 7.0 - 16.0    Glucose 92 65 - 99 mg/dL    Bun 42 (H) 8 - 22 mg/dL    Creatinine 0.59 0.50 - 1.40 mg/dL    Calcium 8.4 (L) 8.5 - 10.5 mg/dL    AST(SGOT) 19 12 - 45 U/L    ALT(SGPT) 33 2 - 50 U/L    Alkaline Phosphatase 79 30 - 99 U/L    Total Bilirubin 0.2 0.1 - 1.5 mg/dL    Albumin 3.4 3.2 - 4.9 g/dL    Total Protein 5.3 (L) 6.0 - 8.2 g/dL    Globulin 1.9 1.9 - 3.5 g/dL    A-G Ratio 1.8 g/dL   MAGNESIUM    Collection Time: 01/06/21 12:17 PM   Result Value Ref Range    Magnesium 1.9 1.5 - 2.5 mg/dL   PHOSPHORUS    Collection Time: 01/06/21 12:17 PM   Result Value Ref Range    Phosphorus 3.7 2.5 - 4.5 mg/dL   Basic Metabolic Panel    Collection Time: 01/06/21 12:17 PM   Result Value Ref Range    Sodium 138 135 -  145 mmol/L    Potassium 4.7 3.6 - 5.5 mmol/L    Chloride 108 96 - 112 mmol/L    Co2 24 20 - 33 mmol/L    Glucose 98 65 - 99 mg/dL    Bun 42 (H) 8 - 22 mg/dL    Creatinine 0.60 0.50 - 1.40 mg/dL    Calcium 8.4 (L) 8.5 - 10.5 mg/dL    Anion Gap 6.0 (L) 7.0 - 16.0   MAGNESIUM    Collection Time: 01/06/21 12:17 PM   Result Value Ref Range    Magnesium 1.9 1.5 - 2.5 mg/dL   PHOSPHORUS    Collection Time: 01/06/21 12:17 PM   Result Value Ref Range    Phosphorus 3.7 2.5 - 4.5 mg/dL   ESTIMATED GFR    Collection Time: 01/06/21 12:17 PM   Result Value Ref Range    GFR If African American >60 >60 mL/min/1.73 m 2    GFR If Non African American >60 >60 mL/min/1.73 m 2   ESTIMATED GFR    Collection Time: 01/06/21 12:17 PM   Result Value Ref Range    GFR If African American >60 >60 mL/min/1.73 m 2    GFR If Non African American >60 >60 mL/min/1.73 m 2       Records reviewed and summarized in current documentation :  Yes  [employer] teaching service handout given to patient:  Yes             Assessment/Plan   Ms. Bueno is a 61 yo F with a PMHx of CVID, recent COVID and C. Diff infections, and malnutrition who is admitted for further evaluation of lower extremity edema. She has also had increased SOB, orthopnea, fatigue, and chest pain with exertion. All symptoms began following a recent COVID infection. Previous echo and BNP were within normal limits however patient continues to gain weight from fluid accumulation. Vitals, including blood pressure, are within normal limits for the patient and physical exam shows signs of cachexia and 2+ pitting edema of bilateral LE up to the level of the knees. Labs were significant for pancytopenia, elevated BUN, and decreased total protein.     1. Lower Extremity Edema  Present for roughly 3-4 weeks and worsening with patient noticing swelling up to the level of her abdomen. Echo from 9/22 was within normal limits with LVEF 60%. BNP 12/26 382. Venous duplex US 12/26 WNL. Physical exam showed normal  lung and heart exam.  Plan:   - Furosemide 20 mg injection q12 hr  - Ordered CMP, repeat BNP and UA to determine if liver, kidney or heart origin  - Repeat echocardiogram per cardiology Dr. Calvillo   - Consider repeating venous duplex US  - Daily weights and strict Ins/Outs    2. Malnutrition  Patient has been underweight for several years. Likely has an underlying eating disorder due to patient's exercise regimen, low weight, and behavior with food and weight gain.   Plan:   - Order CMP to determine protein and albumin status   - Nutritional consult needed   - consider psychiatry consult to if eating disorder is likely   - encourage oral intake and ordered high protein diet for meals    3. Hypotension  Patient has been hypotensive for the past few weeks since infection with COVID. Followed by outpatient cardiologist for low blood pressures. Patient is taking midodrine to increase BP.   Plan:   - Continue midodrine 10 mg TID  - If blood pressure <90 systolic consider holding furosemide    4. Pancytopenia   WBC 3.8, Hgb 8.9, Platelets 56. .9. Previous WBC, Hgb and platelets have been low for the past year per hospital record. Previous ANC 1.63 12/26  Plan:   - Order B12 and folate levels   - CBC with differential tomorrow AM to determine ANC  - Patient does not need transfusion at this time.     4. Irritable Bowel Syndrome  Previously diagnosed and followed by outpatient GI. Patient has had no ostomy output since last night.   Plan:   - continue Linaclotide 290 mg  - monitor colostomy output     5. Hypothyroidism  Previously diagnosed and controlled with levothyroxine 50 mcg   Plan:   - Continue levothyroxine     6. Common variable immunodeficiency  Diagnosed in 2007 by [facility]. Next IVIG is Friday 1/8  Plan:   - Work with  to determine if patient can receive inpatient IVIG     Anticipated Hospital stay:  >2 midnights    Quality Measures  Quality-Core Measures   Reviewed items::  EKG reviewed, Labs  reviewed, Medications reviewed and Radiology images reviewed  Zheng catheter::  No Zheng  DVT prophylaxis pharmacological::  Enoxaparin (Lovenox)  DVT prophylaxis - mechanical:  Not indicated at this time, ambulatory    PCP: Clifton Harvey M.D.

## 2021-01-06 NOTE — CARE PLAN
Problem: Communication  Goal: The ability to communicate needs accurately and effectively will improve  Outcome: PROGRESSING AS EXPECTED  Intervention: [place] patient and significant other/support system to call light to alert staff of needs  Flowsheets (Taken 1/6/2021 1110)  Oriented to::   All of the Following : Location of Bathroom, Visiting Policy, Unit Routine, Call Light and Bedside Controls, Bedside Rail Policy, Smoking Policy, Rights and Responsibilities, Bedside Report, and Patient Education Notebook   Bedside Rail Policy   Location of bathroom   Smoking Policy   Visiting Policy   Patient Rights and Responsibilities   Unit Routine   Patient Education Notebook   Bedside Report   Call Light & Bedside Controls     Problem: Infection  Goal: Will remain free from infection  Outcome: PROGRESSING AS EXPECTED  Intervention: Implement standard precautions and perform hand washing before and after patient contact  Note: Patient educated on the importance of hand hygiene and wearing mask in the hallway to prevent infections. Verbalized understanding.

## 2021-01-06 NOTE — TELEPHONE ENCOUNTER
From: Pita Bueno  To: [facility]ita [facility] Med Ass't  Sent: 1/6/2021 9:49 AM PST  Subject: Non-Urgent Medical Question    Yes in hospital      ----- Message -----   From:Sahil Sahu Ass't   Sent:1/6/2021 9:29 AM PST   To:Pita Bueno   Subject:RE: Non-Urgent Medical Question    Stuart Qiu,    Just to confirm, you would like for me to cancel your appt for today? Please advise.    Thanks,  Gita MENDOZA      ----- Message -----   From:Pita Bueno   Sent:1/5/2021 10:23 PM PST   To:Clifton Harvey M.D.   Subject:Non-Urgent Medical Question    Please let my very favorite doctor know that I am being admitted into [facility] tomorrow am. I've gained 35lbs in 2 weeks. But I will miss my appointment at 4 with him. Please tell him. Thanks. Uyen Bueno

## 2021-01-06 NOTE — H&P
History & Physical Note    Date of Admission: 1/6/2021  Admission Status: Inpatient  [facility] Team: [facility]  Attending: Dr. Rea M.D.   Senior Resident:   Intern: Dr. Akash MD  Contact Number: [phone]    Chief Complaint:   Bilateral Lower Extremity edema, Orthopnea.     History of Present Illness (HPI):  Pita is a 61 y/o female with PMHX of Chronic malnutrition, Common variable immunodeficency, and Hypothyroidism, She has an Ostomy bag placed in 2013 due to complications from rectal prolapse.  She presented to our service as Direct admit from Cardiologist Clinic for significant weight gain, patient believes over 30 Lbs in the past couple of weeks.  She also endorsed SOB, palpitations, lightheadedness with exertion, and orthopnea using 3 pillows to prompt her head up in bed. She also is very worried about her ostomy output, she mentions that usually she empties the bag dozen of times a day, but has not had any output since yesterday evening, she also believes her abdomen is distended, but denied any nauseas, vomiting or severe pain.  She denied fever, chills, night sweats.  Patient was seen previously by Cardiologist Dr Calvillo once for palpitations.  She stated that in the past few-several months has been noticing moderate to severe lower extremity edema she has been seen at Urgent care and Emergency Department multiple times for this complaint.  Recent ECHO performed on 12/22/2020 was Unremarkable.  Recent labs end of last month showed pancytopenia, Hgb:9.2, WBC:2.6, Trop:8, BNP:382, CMP: creatinine:0.34, Tot Prot:5.0, Albumin:3.3. She tested positive for covid on 12/10/2020.  Patient has an appointment with Hem/Onc to follow up for CVID, she is relatively new to [place], moved here a year ago and had issues to get referred to Hematologist. In the past treated with IVIG but has not been treated for about a year.      Review of Systems:     Review of Systems   Constitutional: Negative for chills, diaphoresis  and fever.   HENT: Negative for ear discharge, hearing loss, nosebleeds, sinus pain, sore throat and tinnitus.    Eyes: Negative for blurred vision, double vision and photophobia.   Respiratory: Positive for shortness of breath. Negative for cough, hemoptysis and sputum production.         SOB with exertion only.   Cardiovascular: Positive for palpitations, orthopnea and leg swelling. Negative for chest pain.        Palpitations with exertion only   Gastrointestinal: Negative for abdominal pain, heartburn, nausea and vomiting.   Genitourinary: Negative for dysuria, frequency, hematuria and urgency.   Musculoskeletal: Negative for back pain, joint pain, myalgias and neck pain.   Skin: Negative for itching and rash.   Neurological: Negative for dizziness, sensory change, speech change, focal weakness, seizures and headaches.   Endo/Heme/Allergies: Negative for environmental allergies and polydipsia. Does not bruise/bleed easily.   Psychiatric/Behavioral: Negative for depression, substance abuse and suicidal ideas.       Past Medical History:     Past Medical History was reviewed with patient.   has a past medical history of Colostomy in place (HCC), Eating disorder, Hypotension, Hypothyroid, and Palpitations.    Past Surgical History: Past Surgical History was reviewed with patient.   has a past surgical history that includes other abdominal surgery.    Medications: Medications have been reviewed with patient.  Prior to Admission Medications   Prescriptions Last Dose Informant Patient Reported? Taking?   hydrOXYzine HCl (ATARAX) 10 MG Tab 1/5/2021 at Unknown time  No No   Sig: Take 1 Tab by mouth at bedtime as needed for Itching.   levothyroxine (SYNTHROID) 50 MCG Tab 1/6/2021 at Unknown time  No No   Sig: Take 1 Tab by mouth Every morning on an empty stomach.   linaCLOtide (LINZESS) 290 MCG Cap 1/6/2021 at Unknown time  No No   Sig: Take 290 mcg by mouth every evening.   midodrine (PROAMATINE) 10 MG tablet 12/10/20   No No   Sig: Take 1 Tab by mouth 3 times a day, with meals.   Patient not taking: Reported on 12/10/2020   sennosides (SENOKOT) 8.6 MG Tab 1/6/2021 at Unknown time Patient Yes No   Sig: Take 8.6 mg by mouth 1 time daily as needed.      Facility-Administered Medications: None        Allergies: Allergies have been reviewed with patient.  Allergies   Allergen Reactions   • Sulfa Drugs Hives       Family History:   family history includes Alcohol abuse in her father; Heart Disease in her father; Hypertension in her father; Stroke in her father; Thyroid in her mother.     Social History:   Tobacco: Never   Alcohol: Never   Recreational drugs (illegal and prescription):  Never   Employment: No  Activity Level: Very active, walks, swims but unable to do so in the past few weeks.   Living situation:  Lives with her  and one son.  Recent travel:  No  Primary Care Provider: reviewed Clifton Harvey M.D.  Other (stressors, spirituality, exposures):  No    Physical Exam:    Vitals:  Temp:  [36.3 °C (97.4 °F)] 36.3 °C (97.4 °F)  Pulse:  [78] 78  Resp:  [18] 18  BP: (92)/(69) 92/69  SpO2:  [90 %] 90 %    Physical Exam  Constitutional:       General: She is not in acute distress.     Appearance: She is ill-appearing. She is not diaphoretic.      Comments: Emaciated appearance, temporal muscle wasting, pale.    HENT:      Head: Atraumatic.      Nose: Nose normal. No congestion or rhinorrhea.      Mouth/Throat:      Mouth: Mucous membranes are dry.      Pharynx: Oropharynx is clear. No oropharyngeal exudate or posterior oropharyngeal erythema.      Comments: Mallampati score 2  Eyes:      General: No scleral icterus.     Extraocular Movements: Extraocular movements intact.      Conjunctiva/sclera: Conjunctivae normal.      Pupils: Pupils are equal, round, and reactive to light.   Neck:      Musculoskeletal: Normal range of motion and neck supple. No neck rigidity or muscular tenderness.   Cardiovascular:      Rate and Rhythm:  Normal rate and regular rhythm.      Pulses: Normal pulses.      Heart sounds: Normal heart sounds. No murmur.   Pulmonary:      Effort: Pulmonary effort is normal. No respiratory distress.      Breath sounds: Normal breath sounds. No wheezing or rhonchi.   Abdominal:      General: There is no distension.      Palpations: Abdomen is soft.      Tenderness: There is abdominal tenderness. There is no right CVA tenderness, left CVA tenderness, guarding or rebound.      Comments: Hyperactive bowel sounds, mild tenderness RLQ with deep palpation only. Unable to palpate any inguinal lymphadenopathy.   Musculoskeletal: Normal range of motion.      Right lower leg: Edema present.      Left lower leg: Edema present.   Skin:     General: Skin is warm.      Capillary Refill: Capillary refill takes less than 2 seconds.      Coloration: Skin is pale. Skin is not jaundiced.      Findings: No erythema or rash.      Comments: Right lower extremity inner part just above ankle she has two small lesions apparently skin loss maybe secondary to friction due to fragile skin and edema, there is also mild clear fluid seen. No signs of infection.   Neurological:      General: No focal deficit present.      Mental Status: She is alert and oriented to person, place, and time.      Cranial Nerves: No cranial nerve deficit.   Psychiatric:         Mood and Affect: Mood normal.         Thought Content: Thought content normal.         Labs:   Ordered     EKG: Per my read, Sinus Rhythm rate 68.    Imaging:   ECHO ordered    Previous Data Review: reviewed    Problem Representation:   Patient presented to the ED from Cards clinic for Anasarca likely around 30 lb weight gain in the past month or so per notes, but patient states in the past 2 weeks.  Patient likely weighs around 70-75 lbs prior to edematous state, likely has an eating disorder.  She will be admitted for further work up and rule out cardiac causes of Anasarca.  She also has multiple  other conditions that can be addressed after initial work up, she eventually will need Hem/Onc, Surgery Consultation.      Anasarca  Assessment & Plan  - Per patient 30-35 weight gain in 2 weeks  - Most likely weight gain has been much slower rate per Notes  - Weight today was 107 lbs  - Patient usually weight around 70-75 lbs  - No known Cardiovascular disease  - EKG 1/6/21 Sinus Rhythm rate 68  - Recent ECHO on 12/22/2020 was Unremarkable.  - PE: Lower extremity edema up to thighs.  - Cardiology assessed patient at bedside recommending Lasix and ECHO                PLAN:  - Lasix 20 mg BID  - Monitor Electrolytes  - ECHO  - Fluid restriction 1.5 L daily  - Daily weights standing scale  - Strict I and O  - Telemetry  - Daily labs  -     Severe malnutrition (HCC)- (present on admission)  Assessment & Plan  - Caloric chronic malnutrition  - Patient probably weighs around 70-75 lbs. Today's weight 107 due to Anasarca.  - Patient has been extremely thin for about a decade, but believes that her weigh has been this low    For the past 4-5 months.  - She Likely has an Eating disorder and not just complications from ostomy, she was very worried that her weight gain was not solely fluid retention and expressed relief after we reassure her that her weight gain was only due to Fluid retention.  - She also stated that usually she exercises daily about 4 to 4 1/2 hours daily, she walks, does stairs and swimming.            PLAN:  - High protein diet  - Nutritionist consult  - [facility]     Colostomy care (HCC)- (present on admission)  Assessment & Plan  - Placed after complications after rectal surgery   - She mentioned that she has not had any output in more than 12 hrs  - Usually she empties bag dozens of times daily?  - PE: Ostomy clean, no signs of infection, no output seen in bag  - Abdomen mild tenderness deep palpation RLQ, but not signs of peritoneal irritation, she also has not had any nauseas, vomiting or significant  pain.  - CTM  - Consider imaging if worrisome symptoms   - Consider Surgery Consultation     Pancytopenia (HCC)- (present on admission)  Assessment & Plan  - Patient has Hx of Common variable immunodeficiency diagnosed in 2007  - Patient used to have recurrent sinusitis prior diagnosis  - She was seen in the past by Hem in [place]  - She received IVIG last maybe 1 year ago  - Moved to [place] a year ago and was has not been able to Get an appointment to Hem due to Insurance issues, she has an scheduled appointment on 1/8/2021  - Labs: Hgb: 8.9, WBC:3.8, Platelets:146, MCV:107  - CTM  - Consider Hem/Onc consultation if not DC before appointment  - Iron studies    Hypothyroidism- (present on admission)  Assessment & Plan  - Hx of Hypothyroidism  - Currently on Synthroid 50 mcg  - Continue Home dose  - CTM

## 2021-01-06 NOTE — CONSULTS
"CARDIAC CONSULTATION    Requesting Provider: Hannah Orourke M.D.  Reason for consultation: Edema    Impressions:  #.  Acute lower extremity edema-suspect congestive heart failure though venous insufficiency or malnutrition mediated mechanism are also felt likely  #. Chronic malnutrition, possible eating disorder  #. Recent COVID and C.Diff infection    Recommendations:  Lasix 20 mg BID, follow electrolytes BID maintain Mg/K >4/2    Echocardiogram, venous assessment    Appreciate IM input    Will follow    History: Pita Bueno is a 60 y.o. female with a past medical history of CVID, malnutrition, Ostomy who I have been consulted regarding edema.  I met her over the summer and she was experiencing shortness of breath and fatigue/weakness.  She also reported orthopnea at that time.  Cardiac evaluation was entirely unrevealing.  In the fall she developed C. difficile and then the following month Covid.  These had a substantial impact on her appetite and resulted in 10 pound weight loss.  In the interim she has developed uncomfortable lower extremity edema, shortness of breath and orthopnea.  Given the magnitude of symptoms she was directly admitted following an office visit yesterday.    ROS:  All other systems reviewed and negative except as per the HPI    PE:  BP (!) 92/69   Pulse 78   Temp 36.3 °C (97.4 °F) (Temporal)   Resp 18   Ht 1.626 m (5' 4\")   Wt 48.9 kg (107 lb 12.9 oz)   SpO2 90%   BMI 18.50 kg/m²   Gen: Well appearing  HEENT: Symmetric face. Anicteric sclerae. Moist mucus membranes  NECK: + JVD. No lymphadenopathy  CARDIAC: Normal PMI, regular, normal S1, S2. without murmur  VASCULATURE: Normal carotid amplitude without bruit.   RESP: Clear to auscultation bilaterally  ABD: Soft, non-tender, non-distended  EXT: 3+ lower extremity edema, no clubbing or cyanosis  SKIN: Warm and dry  NEURO: No gross deficits  PSYCH: Appropriate affect, participates in conversation      Past Medical History: "   Diagnosis Date   • Colostomy in place (HCC)    • Eating disorder    • Hypotension    • Hypothyroid    • Palpitations      Past Surgical History:   Procedure Laterality Date   • OTHER ABDOMINAL SURGERY      rectal prolapse repair, anastomy leak/abcess,colostony     Allergies   Allergen Reactions   • Sulfa Drugs Hives       No current facility-administered medications for this encounter.   Medications Prior to Admission   Medication Sig Dispense Refill Last Dose   • levothyroxine (SYNTHROID) 50 MCG Tab Take 1 Tab by mouth Every morning on an empty stomach. 90 Tab 3 1/6/2021 at Unknown time   • hydrOXYzine HCl (ATARAX) 10 MG Tab Take 1 Tab by mouth at bedtime as needed for Itching. 30 Tab 1 1/5/2021 at Unknown time   • linaCLOtide (LINZESS) 290 MCG Cap Take 290 mcg by mouth every evening. 90 Cap 0 1/6/2021 at Unknown time   • midodrine (PROAMATINE) 10 MG tablet Take 1 Tab by mouth 3 times a day, with meals. (Patient not taking: Reported on 12/10/2020) 60 Tab 0 12/10/20   • sennosides (SENOKOT) 8.6 MG Tab Take 8.6 mg by mouth 1 time daily as needed.   1/6/2021 at Unknown time      Social History     Socioeconomic History   • Marital status:      Spouse name: Not on file   • Number of children: Not on file   • Years of education: Not on file   • Highest education level: Not on file   Occupational History   • Not on file   Social Needs   • Financial resource strain: Not on file   • Food insecurity     Worry: Not on file     Inability: Not on file   • Transportation needs     Medical: Not on file     Non-medical: Not on file   Tobacco Use   • Smoking status: Never Smoker   • Smokeless tobacco: Never Used   Substance and Sexual Activity   • Alcohol use: Not Currently   • Drug use: Not Currently   • Sexual activity: Not Currently   Lifestyle   • Physical activity     Days per week: Not on file     Minutes per session: Not on file   • Stress: Not on file   Relationships   • Social connections     Talks on phone: Not  on file     Gets together: Not on file     Attends [employer] service: Not on file     Active member of club or organization: Not on file     Attends meetings of clubs or organizations: Not on file     Relationship status: Not on file   • Intimate partner violence     Fear of current or ex partner: Not on file     Emotionally abused: Not on file     Physically abused: Not on file     Forced sexual activity: Not on file   Other Topics Concern   • Not on file   Social History Narrative   • Not on file       Family History   Problem Relation Age of Onset   • Thyroid Mother    • Alcohol abuse Father    • Heart Disease Father    • Hypertension Father    • Stroke Father           Studies  Lab Results   Component Value Date/Time    CHOLSTRLTOT 235 (H) 02/05/2020 02:04 PM     (H) 02/05/2020 02:04 PM    HDL 79 02/05/2020 02:04 PM    TRIGLYCERIDE 124 02/05/2020 02:04 PM       Lab Results   Component Value Date/Time    SODIUM 139 12/26/2020 03:40 PM    POTASSIUM 3.7 12/26/2020 03:40 PM    CHLORIDE 108 12/26/2020 03:40 PM    CO2 22 12/26/2020 03:40 PM    GLUCOSE 76 12/26/2020 03:40 PM    BUN 25 (H) 12/26/2020 03:40 PM    CREATININE 0.34 (L) 12/26/2020 03:40 PM     Lab Results   Component Value Date/Time    ALKPHOSPHAT 62 12/26/2020 03:40 PM    ASTSGOT 22 12/26/2020 03:40 PM    ALTSGPT 38 12/26/2020 03:40 PM    TBILIRUBIN 0.4 12/26/2020 03:40 PM      No results found for: BNPBTYPENAT    Medical records were reviewed for this encounter

## 2021-01-06 NOTE — PROGRESS NOTES
Direct admit walked up with admitting. Updated on POC.  Assumed care of patient upon arrival to unit. Patient currently A & O x 4; on RA; up without complaints of acute pain. Pt placed on monitor, monitor room notified, SR 68. Patient oriented to unit and to call light system. Call light within reach. Pt educated to fall risk. Fall precautions in place. Pt provided with personal grooming items. Bed locked and in lowest position. All questions answered. No other needs indicated at this time.

## 2021-01-06 NOTE — ASSESSMENT & PLAN NOTE
- History of failed rectal prolapse repair which resulted in anatomic leak or abscess and ultimately needing to left upper quadrant colostomy followed by multiple surgeries in the past.  -On admission, patient mentioned that she had no output in more than 12 hrs, Usually she has very high output from back.    -On admission abdomen mild tenderness deep palpation RLQ, but not signs of peritoneal irritation,   -This morning patient reports good output from colostomy bag with resolution and abdominal distention.  She is eating and tolerating well.    Plan  -Continue to monitor  -Will consider imaging if worrisome symptoms   -Outpatient follow-up with GI.

## 2021-01-07 ENCOUNTER — APPOINTMENT (OUTPATIENT)
Dept: CARDIOLOGY | Facility: MEDICAL CENTER | Age: 61
DRG: 641 | End: 2021-01-07
Attending: NURSE PRACTITIONER
Payer: OTHER GOVERNMENT

## 2021-01-07 LAB
ALBUMIN SERPL BCP-MCNC: 4 G/DL (ref 3.2–4.9)
ALBUMIN/GLOB SERPL: 1.9 G/DL
ALP SERPL-CCNC: 84 U/L (ref 30–99)
ALT SERPL-CCNC: 37 U/L (ref 2–50)
ANION GAP SERPL CALC-SCNC: 10 MMOL/L (ref 7–16)
ANION GAP SERPL CALC-SCNC: 12 MMOL/L (ref 7–16)
ANION GAP SERPL CALC-SCNC: 8 MMOL/L (ref 7–16)
AST SERPL-CCNC: 19 U/L (ref 12–45)
BASOPHILS # BLD AUTO: 0.3 % (ref 0–1.8)
BASOPHILS # BLD: 0.01 K/UL (ref 0–0.12)
BILIRUB SERPL-MCNC: 0.3 MG/DL (ref 0.1–1.5)
BODY FLD TYPE: NORMAL
BUN SERPL-MCNC: 32 MG/DL (ref 8–22)
BUN SERPL-MCNC: 38 MG/DL (ref 8–22)
BUN SERPL-MCNC: 38 MG/DL (ref 8–22)
CALCIUM SERPL-MCNC: 8.5 MG/DL (ref 8.5–10.5)
CALCIUM SERPL-MCNC: 9.4 MG/DL (ref 8.5–10.5)
CALCIUM SERPL-MCNC: 9.5 MG/DL (ref 8.5–10.5)
CHLORIDE SERPL-SCNC: 101 MMOL/L (ref 96–112)
CHLORIDE SERPL-SCNC: 103 MMOL/L (ref 96–112)
CHLORIDE SERPL-SCNC: 103 MMOL/L (ref 96–112)
CO2 SERPL-SCNC: 23 MMOL/L (ref 20–33)
CO2 SERPL-SCNC: 23 MMOL/L (ref 20–33)
CO2 SERPL-SCNC: 24 MMOL/L (ref 20–33)
CREAT SERPL-MCNC: 0.4 MG/DL (ref 0.5–1.4)
CREAT SERPL-MCNC: 0.4 MG/DL (ref 0.5–1.4)
CREAT SERPL-MCNC: 0.41 MG/DL (ref 0.5–1.4)
EOSINOPHIL # BLD AUTO: 0.09 K/UL (ref 0–0.51)
EOSINOPHIL NFR BLD: 2.4 % (ref 0–6.9)
ERYTHROCYTE [DISTWIDTH] IN BLOOD BY AUTOMATED COUNT: 53.9 FL (ref 35.9–50)
FAT FLD QL: NORMAL
FERRITIN SERPL-MCNC: 26.8 NG/ML (ref 10–291)
FOLATE SERPL-MCNC: 8.8 NG/ML
GLOBULIN SER CALC-MCNC: 2.1 G/DL (ref 1.9–3.5)
GLUCOSE SERPL-MCNC: 110 MG/DL (ref 65–99)
GLUCOSE SERPL-MCNC: 83 MG/DL (ref 65–99)
GLUCOSE SERPL-MCNC: 83 MG/DL (ref 65–99)
H PYLORI AG STL QL IA: NOT DETECTED
HCT VFR BLD AUTO: 32.9 % (ref 37–47)
HGB BLD-MCNC: 10.1 G/DL (ref 12–16)
HGB RETIC QN AUTO: 31.7 PG/CELL (ref 29–35)
IMM GRANULOCYTES # BLD AUTO: 0.02 K/UL (ref 0–0.11)
IMM GRANULOCYTES NFR BLD AUTO: 0.5 % (ref 0–0.9)
IMM RETICS NFR: 22.9 % (ref 9.3–17.4)
IRON SATN MFR SERPL: 9 % (ref 15–55)
IRON SERPL-MCNC: 28 UG/DL (ref 40–170)
LV EJECT FRACT  99904: 65
LV EJECT FRACT MOD 4C 99902: 79.09
LYMPHOCYTES # BLD AUTO: 0.87 K/UL (ref 1–4.8)
LYMPHOCYTES NFR BLD: 23.6 % (ref 22–41)
MAGNESIUM SERPL-MCNC: 2 MG/DL (ref 1.5–2.5)
MAGNESIUM SERPL-MCNC: 2.2 MG/DL (ref 1.5–2.5)
MCH RBC QN AUTO: 32.6 PG (ref 27–33)
MCHC RBC AUTO-ENTMCNC: 30.7 G/DL (ref 33.6–35)
MCV RBC AUTO: 106.1 FL (ref 81.4–97.8)
MONOCYTES # BLD AUTO: 0.54 K/UL (ref 0–0.85)
MONOCYTES NFR BLD AUTO: 14.7 % (ref 0–13.4)
NEUTROPHILS # BLD AUTO: 2.15 K/UL (ref 2–7.15)
NEUTROPHILS NFR BLD: 58.5 % (ref 44–72)
NRBC # BLD AUTO: 0 K/UL
NRBC BLD-RTO: 0 /100 WBC
PHOSPHATE SERPL-MCNC: 3.8 MG/DL (ref 2.5–4.5)
PHOSPHATE SERPL-MCNC: 4.7 MG/DL (ref 2.5–4.5)
PLATELET # BLD AUTO: 168 K/UL (ref 164–446)
PMV BLD AUTO: 12.7 FL (ref 9–12.9)
POTASSIUM SERPL-SCNC: 3.4 MMOL/L (ref 3.6–5.5)
POTASSIUM SERPL-SCNC: 4.2 MMOL/L (ref 3.6–5.5)
POTASSIUM SERPL-SCNC: 4.2 MMOL/L (ref 3.6–5.5)
PROT SERPL-MCNC: 6.1 G/DL (ref 6–8.2)
RBC # BLD AUTO: 3.1 M/UL (ref 4.2–5.4)
RETICS # AUTO: 0.08 M/UL (ref 0.04–0.06)
RETICS/RBC NFR: 2.6 % (ref 0.8–2.1)
SODIUM SERPL-SCNC: 135 MMOL/L (ref 135–145)
SODIUM SERPL-SCNC: 136 MMOL/L (ref 135–145)
SODIUM SERPL-SCNC: 136 MMOL/L (ref 135–145)
T4 FREE SERPL-MCNC: 1.05 NG/DL (ref 0.93–1.7)
TIBC SERPL-MCNC: 327 UG/DL (ref 250–450)
UIBC SERPL-MCNC: 299 UG/DL (ref 110–370)
VIT B12 SERPL-MCNC: 200 PG/ML (ref 211–911)
WBC # BLD AUTO: 3.7 K/UL (ref 4.8–10.8)

## 2021-01-07 PROCEDURE — 85046 RETICYTE/HGB CONCENTRATE: CPT

## 2021-01-07 PROCEDURE — 97535 SELF CARE MNGMENT TRAINING: CPT

## 2021-01-07 PROCEDURE — 97161 PT EVAL LOW COMPLEX 20 MIN: CPT

## 2021-01-07 PROCEDURE — 82746 ASSAY OF FOLIC ACID SERUM: CPT

## 2021-01-07 PROCEDURE — 83540 ASSAY OF IRON: CPT

## 2021-01-07 PROCEDURE — 82728 ASSAY OF FERRITIN: CPT

## 2021-01-07 PROCEDURE — 80053 COMPREHEN METABOLIC PANEL: CPT

## 2021-01-07 PROCEDURE — 83735 ASSAY OF MAGNESIUM: CPT

## 2021-01-07 PROCEDURE — A9270 NON-COVERED ITEM OR SERVICE: HCPCS | Performed by: STUDENT IN AN ORGANIZED HEALTH CARE EDUCATION/TRAINING PROGRAM

## 2021-01-07 PROCEDURE — 700111 HCHG RX REV CODE 636 W/ 250 OVERRIDE (IP): Performed by: INTERNAL MEDICINE

## 2021-01-07 PROCEDURE — 700102 HCHG RX REV CODE 250 W/ 637 OVERRIDE(OP): Performed by: STUDENT IN AN ORGANIZED HEALTH CARE EDUCATION/TRAINING PROGRAM

## 2021-01-07 PROCEDURE — 770020 HCHG ROOM/CARE - TELE (206)

## 2021-01-07 PROCEDURE — 82607 VITAMIN B-12: CPT

## 2021-01-07 PROCEDURE — 99232 SBSQ HOSP IP/OBS MODERATE 35: CPT | Performed by: INTERNAL MEDICINE

## 2021-01-07 PROCEDURE — 84100 ASSAY OF PHOSPHORUS: CPT

## 2021-01-07 PROCEDURE — 83550 IRON BINDING TEST: CPT

## 2021-01-07 PROCEDURE — 36415 COLL VENOUS BLD VENIPUNCTURE: CPT

## 2021-01-07 PROCEDURE — 82784 ASSAY IGA/IGD/IGG/IGM EACH: CPT

## 2021-01-07 PROCEDURE — 93306 TTE W/DOPPLER COMPLETE: CPT | Mod: 26 | Performed by: INTERNAL MEDICINE

## 2021-01-07 PROCEDURE — 80048 BASIC METABOLIC PNL TOTAL CA: CPT

## 2021-01-07 PROCEDURE — 89125 SPECIMEN FAT STAIN: CPT

## 2021-01-07 PROCEDURE — 85025 COMPLETE CBC W/AUTO DIFF WBC: CPT

## 2021-01-07 PROCEDURE — 700111 HCHG RX REV CODE 636 W/ 250 OVERRIDE (IP): Performed by: STUDENT IN AN ORGANIZED HEALTH CARE EDUCATION/TRAINING PROGRAM

## 2021-01-07 PROCEDURE — 99233 SBSQ HOSP IP/OBS HIGH 50: CPT | Mod: GC | Performed by: STUDENT IN AN ORGANIZED HEALTH CARE EDUCATION/TRAINING PROGRAM

## 2021-01-07 PROCEDURE — 87338 HPYLORI STOOL AG IA: CPT

## 2021-01-07 PROCEDURE — 93306 TTE W/DOPPLER COMPLETE: CPT

## 2021-01-07 PROCEDURE — 84439 ASSAY OF FREE THYROXINE: CPT

## 2021-01-07 RX ORDER — MORPHINE SULFATE 4 MG/ML
3 INJECTION, SOLUTION INTRAMUSCULAR; INTRAVENOUS EVERY 4 HOURS PRN
Status: DISCONTINUED | OUTPATIENT
Start: 2021-01-07 | End: 2021-01-10 | Stop reason: HOSPADM

## 2021-01-07 RX ORDER — DIPHENHYDRAMINE HYDROCHLORIDE 50 MG/ML
12.5 INJECTION INTRAMUSCULAR; INTRAVENOUS EVERY 6 HOURS PRN
Status: DISCONTINUED | OUTPATIENT
Start: 2021-01-07 | End: 2021-01-09

## 2021-01-07 RX ORDER — CYANOCOBALAMIN 1000 UG/ML
1000 INJECTION, SOLUTION INTRAMUSCULAR; SUBCUTANEOUS DAILY
Status: DISCONTINUED | OUTPATIENT
Start: 2021-01-07 | End: 2021-01-08

## 2021-01-07 RX ORDER — ONDANSETRON 2 MG/ML
4 INJECTION INTRAMUSCULAR; INTRAVENOUS EVERY 6 HOURS PRN
Status: DISCONTINUED | OUTPATIENT
Start: 2021-01-07 | End: 2021-01-10 | Stop reason: HOSPADM

## 2021-01-07 RX ADMIN — FUROSEMIDE 20 MG: 10 INJECTION, SOLUTION INTRAMUSCULAR; INTRAVENOUS at 17:39

## 2021-01-07 RX ADMIN — FUROSEMIDE 20 MG: 10 INJECTION, SOLUTION INTRAMUSCULAR; INTRAVENOUS at 04:58

## 2021-01-07 RX ADMIN — DIPHENHYDRAMINE HYDROCHLORIDE 12.5 MG: 50 INJECTION, SOLUTION INTRAMUSCULAR; INTRAVENOUS at 23:40

## 2021-01-07 RX ADMIN — MORPHINE SULFATE 3 MG: 4 INJECTION INTRAVENOUS at 23:40

## 2021-01-07 RX ADMIN — LEVOTHYROXINE SODIUM 50 MCG: 0.05 TABLET ORAL at 05:01

## 2021-01-07 RX ADMIN — MIDODRINE HYDROCHLORIDE 10 MG: 5 TABLET ORAL at 11:50

## 2021-01-07 RX ADMIN — DOCUSATE SODIUM 50 MG AND SENNOSIDES 8.6 MG 2 TABLET: 8.6; 5 TABLET, FILM COATED ORAL at 17:39

## 2021-01-07 RX ADMIN — MIDODRINE HYDROCHLORIDE 10 MG: 5 TABLET ORAL at 17:39

## 2021-01-07 RX ADMIN — CYANOCOBALAMIN 1000 MCG: 1000 INJECTION, SOLUTION INTRAMUSCULAR; SUBCUTANEOUS at 17:39

## 2021-01-07 RX ADMIN — DIPHENHYDRAMINE HYDROCHLORIDE 12.5 MG: 50 INJECTION, SOLUTION INTRAMUSCULAR; INTRAVENOUS at 11:44

## 2021-01-07 RX ADMIN — MORPHINE SULFATE 3 MG: 4 INJECTION INTRAVENOUS at 11:44

## 2021-01-07 RX ADMIN — DOCUSATE SODIUM 50 MG AND SENNOSIDES 8.6 MG 2 TABLET: 8.6; 5 TABLET, FILM COATED ORAL at 04:57

## 2021-01-07 RX ADMIN — MIDODRINE HYDROCHLORIDE 10 MG: 5 TABLET ORAL at 09:40

## 2021-01-07 ASSESSMENT — ENCOUNTER SYMPTOMS
FEVER: 0
STRIDOR: 0
MYALGIAS: 0
BLOOD IN STOOL: 0
CHEST TIGHTNESS: 0
SENSORY CHANGE: 0
DEPRESSION: 0
SHORTNESS OF BREATH: 0
NAUSEA: 0
PALPITATIONS: 0
CHOKING: 0
PHOTOPHOBIA: 0
COUGH: 0
FOCAL WEAKNESS: 0
HEMOPTYSIS: 0
SPUTUM PRODUCTION: 0
APNEA: 0
VOMITING: 0
DOUBLE VISION: 0
CHILLS: 0
ORTHOPNEA: 1
WHEEZING: 0
SPEECH CHANGE: 0
BLURRED VISION: 0
ABDOMINAL PAIN: 0

## 2021-01-07 ASSESSMENT — COGNITIVE AND FUNCTIONAL STATUS - GENERAL
SUGGESTED CMS G CODE MODIFIER MOBILITY: CH
MOBILITY SCORE: 24

## 2021-01-07 ASSESSMENT — PAIN DESCRIPTION - PAIN TYPE
TYPE: ACUTE PAIN
TYPE: ACUTE PAIN

## 2021-01-07 ASSESSMENT — GAIT ASSESSMENTS
DISTANCE (FEET): 400
GAIT LEVEL OF ASSIST: SUPERVISED

## 2021-01-07 ASSESSMENT — FIBROSIS 4 INDEX
FIB4 SCORE: 1.1
FIB4 SCORE: 1.1

## 2021-01-07 NOTE — PROGRESS NOTES
Patient stating bilateral lower extremity pain is not resolving with ambulation and requesting something for pain. Paged Dr. Bustos regarding patient concerns, new orders for oxycodone immediate-release 5 mg tablet once and acetaminophen 1000 mg tablet as needed. Patient refusing and states that will not help, per patient she needs something intravenous for pain. Notified Dr. Bustos.

## 2021-01-07 NOTE — PROGRESS NOTES
Bedside shift report received by nightshift RN, safety check completed, all patient needs met at this time. Will continue to monitor.

## 2021-01-07 NOTE — PROGRESS NOTES
Cardiology Follow Up Progress Note    Date of Service  1/7/2021    Attending Physician  Hannah Orourke M.D.          Presented as a direct admit from cardiology clinic secondary to significant volume overload/weight gain (per patient report 30 pounds in the past couple of weeks) lower extremity edema, dyspnea, orthopnea      Past medical history significant for chronic malnutrition, common variable immunodeficiency, hypothyroid, recent Covid infection, colostomy (2013) due to complications from rectal prolapse, recent CAD    Interim Events  No overnight cardiac events  Sinus rhythm  Diuresed well overnight  Standing weight pending this morning  Labs reviewed  Sodium, potassium, creatinine, mag stable  NT proBNP 559      Review of Systems  Review of Systems   Respiratory: Negative for apnea, cough, choking, chest tightness, shortness of breath, wheezing and stridor.    Cardiovascular: Positive for leg swelling. Negative for chest pain.       Vital signs in last 24 hours  Temp:  [36.1 °C (96.9 °F)-36.8 °C (98.2 °F)] 36.2 °C (97.1 °F)  Pulse:  [63-78] 65  Resp:  [14-18] 14  BP: ()/(44-69) 81/44  SpO2:  [90 %-98 %] 97 %    Physical Exam  Physical Exam  Cardiovascular:      Rate and Rhythm: Normal rate and regular rhythm.   Pulmonary:      Effort: Pulmonary effort is normal.   Abdominal:      General: Abdomen is flat.   Skin:     General: Skin is warm.      Comments: colostomy   Neurological:      General: No focal deficit present.      Mental Status: She is alert.   Psychiatric:         Mood and Affect: Mood normal.         Lab Review  Lab Results   Component Value Date/Time    WBC 3.7 (L) 01/07/2021 12:28 AM    RBC 3.10 (L) 01/07/2021 12:28 AM    HEMOGLOBIN 10.1 (L) 01/07/2021 12:28 AM    HEMATOCRIT 32.9 (L) 01/07/2021 12:28 AM    .1 (H) 01/07/2021 12:28 AM    MCH 32.6 01/07/2021 12:28 AM    MCHC 30.7 (L) 01/07/2021 12:28 AM    MPV 12.7 01/07/2021 12:28 AM      Lab Results   Component Value Date/Time     SODIUM 135 01/07/2021 12:28 AM    SODIUM 136 01/07/2021 12:28 AM    POTASSIUM 4.2 01/07/2021 12:28 AM    POTASSIUM 4.2 01/07/2021 12:28 AM    CHLORIDE 103 01/07/2021 12:28 AM    CHLORIDE 103 01/07/2021 12:28 AM    CO2 24 01/07/2021 12:28 AM    CO2 23 01/07/2021 12:28 AM    GLUCOSE 83 01/07/2021 12:28 AM    GLUCOSE 83 01/07/2021 12:28 AM    BUN 38 (H) 01/07/2021 12:28 AM    BUN 38 (H) 01/07/2021 12:28 AM    CREATININE 0.40 (L) 01/07/2021 12:28 AM    CREATININE 0.41 (L) 01/07/2021 12:28 AM      Lab Results   Component Value Date/Time    ASTSGOT 19 01/07/2021 12:28 AM    ALTSGPT 37 01/07/2021 12:28 AM     Lab Results   Component Value Date/Time    CHOLSTRLTOT 235 (H) 02/05/2020 02:04 PM     (H) 02/05/2020 02:04 PM    HDL 79 02/05/2020 02:04 PM    TRIGLYCERIDE 124 02/05/2020 02:04 PM    TROPONINT 8 12/26/2020 03:40 PM       Recent Labs     01/06/21  1217   NTPROBNP 559*       Cardiac Imaging and Procedures Review  EKG: Sinus rhythm    Echocardiogram: Pending    Cardiac Catheterization: Not applicable    Imaging  Chest X-Ray: No evidence of acute pulmonary process    Stress Test: Not applicable    Assessment/Plan      Acute lower extremity edema/orthopnea  -Per patient report 30 LBS weight gain x2 weeks  -Per patient report dry weight 70 to 75 pounds  -Suspect HFpEF versus chronic malnutrition  -Follow-up on echocardiogram  -Continue diuresis with furosemide 20 mg IV twice daily  -Daily standing weight  -Strict intake and output      Severe malnutrition  -Likely eating disorder  -Patient reports exercises 4 hours daily  -Nutrition consult    Hypotension  -Midodrine 10 mg 3 times daily      Recent Covid/C. difficile infection      Cardiology will follow along  Follow-up on echocardiogram    Please contact me with any questions.    MARC Black   Cardiologist, [facility]  [phone]

## 2021-01-07 NOTE — DISCHARGE PLANNING
Anticipated Discharge Disposition: home    Action: Patient discussed in IDT rounds. She is being diureses and may be ready for discharge tomorrow.    Barriers to Discharge: medical clearance    Plan: Case coordination to f/u with treatment team for further discharge planning needs

## 2021-01-07 NOTE — THERAPY
Physical Therapy   Initial Evaluation     Patient Name: Pita Bueno  Age:  60 y.o., Sex:  female  Medical Record #: [id number]  Today's Date: 1/7/2021     Precautions: Fall Risk    Assessment  Patient is 60 y.o. female with a diagnosis of suspected HFpEF.  Patient demonstrates community level functional mobility.  Requires significant education of pacing, patient able to demonstrate.  Patient significant fatigued post session, but likely due to 15 minutes of walking without pacing around unit, per patient.  Patient BP, O2 were normal at end of session.  Patient continues to have significant pitting edema in B LE, L>R. PT will not follow.      01/07/21 1130   Precautions   Precautions Fall Risk   Prior Living Situation   Prior Services None   Housing / Facility 2 Story House   Steps Into Home 16   Steps In Home 0   Equipment Owned None   Lives with - Patient's Self Care Capacity Spouse;Adult Children   Comments 21 year old doesn't work and can help   Prior Level of Functional Mobility   Bed Mobility Independent   Transfer Status Independent   Ambulation Independent   Distance Ambulation (Feet) 150   Assistive Devices Used None   Stairs Independent   History of Falls   History of Falls No   Cognition    Cognition / Consciousness WDL   Passive ROM Lower Body   Passive ROM Lower Body WDL   Active ROM Lower Body    Active ROM Lower Body  WDL   Strength Lower Body   Lower Body Strength  WDL   Sensation Lower Body   Lower Extremity Sensation   WDL   Balance Assessment   Sitting Balance (Static) Good   Sitting Balance (Dynamic) Good   Standing Balance (Static) Good   Standing Balance (Dynamic) Good   Weight Shift Sitting Good   Weight Shift Standing Good   Gait Analysis   Gait Level Of Assist Supervised   Assistive Device None   Distance (Feet) 400   # of Times Distance was Traveled 1   # of Stairs Climbed 16   Level of Assist with Stairs Supervised   Weight Bearing Status fwb   Comments significant education on  pacing (patient states she was walking 15 minutes panting around unit, and that she will now pace herself)   Bed Mobility    Supine to Sit Independent   Sit to Supine Independent   Scooting Independent   Functional Mobility   Sit to Stand Independent   How much difficulty does the patient currently have...   Turning over in bed (including adjusting bedclothes, sheets and blankets)? 4   Sitting down on and standing up from a chair with arms (e.g., wheelchair, bedside commode, etc.) 4   Moving from lying on back to sitting on the side of the bed? 4   How much help from another person does the patient currently need...   Moving to and from a bed to a chair (including a wheelchair)? 4   Need to walk in a hospital room? 4   Climbing 3-5 steps with a railing? 4   6 clicks Mobility Score 24   Education Group   Education Provided Role of Physical Therapist;Gait Training   Role of Physical Therapist Patient Response Patient;Acceptance;Explanation;Demonstration;Verbal Demonstration;Action Demonstration   Gait Training Patient Response Patient;Acceptance;Explanation;Demonstration;Verbal Demonstration;Action Demonstration   Problem List    Problems Other (Comments)  (significant education on pacing)   Anticipated Discharge Equipment and Recommendations   DC Equipment Recommendations None     Plan    Recommend Physical Therapy for Evaluation only     DC Equipment Recommendations: None  Discharge Recommendations: Anticipate that the patient will have no further physical therapy needs after discharge from the hospital

## 2021-01-07 NOTE — DIETARY
"Nutrition services: Able to follow-up with patient this afternoon.    Patient seen in hallway walking- requested visit at bedside to talk. Upon nutrition focused physical examination, patient noted with hollowing at orbitalis and buccal regions, thin arms with minimal fat tissue indicative of severe fat wasting, scooping at temporalis and protruding clavicles/acromion process suggestive of severe muscle wasting.     Patient relays abnormal meal regimen due to typically high ostomy output. Patient reports she will typically eat meals at 1800, 2000, and 2400 to avoid ostomy output interfering with her workouts - states she typically has to empty ostomy ~10 times per day. Patient was interested in foods to help slow output- RD verbally listed some foods such as peanut butter and marshmellows, will provide handout at follow-up tomorrow.     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 Patient restricts carbohydrate foods but will permit herself to eat fruit, some complex carbohydrates occasionally. Patient states she eats high volumes of low-caloric density foods and enjoys eating.     Patient admits fears of gaining weight, states she uses exercise to compensate for food intake. Patient reports she has cut her exercise regimen from 6 hours per day to 1.5 and notes this has added increased stress to her life having to make this adjustment, but notes she is \"feeling too weak\" and that even this \"small amount of exercise\" has been a struggle. Patient admits that she is addicted to exercising and uses it as a compensatory mechanism to prevent weight gain. Patient was willing and agreeable to receive accountability through an outpatient dietitian or therapist- RD to provide resources tomorrow. She also notes that she would like to find an ostomy support group - RD will look and see if there is a group within the " community.     Malnutrition Risk: Patient with severe chronic malnutrition RT inadequate oral intake to maintain body habitus AEB severe fat and muscle wasting and severe fluid accumulation (see previous RD note this morning).    Recommendations/Plan:  1. Recommend referral to outpatient dietitian - [facility].  2. Provide patient with ostomy diet education materials.  3. Follow up tomorrow - see if ostomy support group resources available in community.   4. Encourage intake of meals and snacks  5. Document intake of all meals and snacks as % taken in ADL's to provide interdisciplinary communication across all shifts.   6. Nutrition rep will continue to see patient for ongoing meal and snack preferences.       RD following.

## 2021-01-07 NOTE — CARE PLAN
Problem: Nutritional:  Goal: Achieve adequate nutritional intake  Description: Patient will consume >50% of meals  Outcome: NOT MET   See RD note; will follow up with patient as able.     RD following.

## 2021-01-07 NOTE — DIETARY
"Nutrition services: Day 1 of admit.  Pita Bueno is a 60 y.o. female with admitting DX of heart failure, anasarca.   Consult received for chronic malnutrition, tube feeding. (Spoke with RN, tube feeding consult likely error).     Visited patient at bedside; however, patient states she is tired after receiving pain medications, requested RD visit at later time.     Spoke with RN regarding pt. RN states pt does eat; however, is restrictive regarding types of foods she eats. RN also confirms patient has fixation with weight and notes pt was happy to hear she had lost weight today. Per H&P, pt also reports exercising 3-4 hours per day prior to admission.     Assessment:  Height: 162.6 cm (5' 4\")  Weight: 41.7 kg (91 lb 14.9 oz)  Body mass index is 15.78 kg/m²., BMI classification: underweight  Diet/Intake: Cardiac, high protein, no red meats.     Evaluation:   1. PMH: hypothyroidism, rectal prolapse s/p colostomy (2013), CAD, recent COVID infection, pancytopenia, hypothyroidism  2. MAR: Lasix BID, synthroid, colace  3. Labs: K 3.4, BUN 32, Creat 0.40  4. Last bowel movement 1/7, ostomy emptied 7x per RN documentation of pt report.   5. Per H&P, dry weight usually around 70-75 lb.   6. Pt noted with anasarca, 3+ pitting BLE edema (severe)- suspect malnutrition contributing to fluid accumulation.     Malnutrition Risk: Unable to determine at this time; however, highly suspect will be able to meet criteria for severe chronic malnutrition upon follow-up with nutrition focused physical examination.    Recommendations/Plan:  1. RD to follow up later this afternoon/tomorrow as able.  2. Strongly recommend consult to psychiatry- texted resident via [employer], in agreement that consult appropriate.   3. Encourage intake of meals.  4. Document intake of all meals as % taken in ADL's to provide interdisciplinary communication across all shifts.   5. Monitor weight- given pt's fixation with weight, blind weights may be " appropriate.   6. Nutrition rep will continue to see patient for ongoing meal and snack preferences.     RD to see patient later this afternoon or tomorrow; RD following.

## 2021-01-07 NOTE — PROGRESS NOTES
Notified MD Dionisio about patient receiving Benadryl and Morphine for itching and 9/10 pain in her leg, her BP at the time was 91/49 which MD said if patient is asymptomatic then that is fine. Midodrine was also given to help with low BP. Will continue to monitor at this time.

## 2021-01-07 NOTE — PROGRESS NOTES
[facility] [place] INTERNAL MEDICINE ATTENDING NOTE:   Vivek Dominique MD      Visit Time:   Attending/resident bedside rounds 9-11:30 AM     PATIENT ID  Name:             Pita Bueno   YOB: 1960  Age:                 60 y.o.  female   MRN:               [id number]  Admit:  1/6/2021     I saw and examined the patient and discussed the management with the resident staff.  I reviewed the resident's note and agree with the resident's findings and plan as documented in the resident's note except as documented in the attending note. Please reference resident daily note for complete information.    The chart was reviewed and summarized.  Available labs, imaging, O2 sats ,  EKGs were reviewed. Available nursing, consultant, and resident notes were reviewed. I am actively involved in the patient's care.                                                                            ______________________________________________________________________        60(   admit Jan 6, HF  )  INTERVAL:  Chart reviewed/summarized,      cardiology: HFpEF not excluded, no LVH, no LAE , no pulm infiltrates however ?  -- diuretics if tolerated      Jan 7AM:  AF< HR 65, BP 81, 97% RA  NPT: 23/24 , leg pain, edema, ambulatory , ALB 4   WBC 3.7, HB 10 (8.9),  (146), Na 136, K 4.2, CO2 23, BUN 38, CR 0.40 , MG 2.2, PHOS 3.8   MEDS: enox LD, lasix, LT4, midodrine  OK for general medicine if asymptomatic hypotension (chronic)         Jan  6PM  AF, HR 63, BP 92, 98% RA  Admit  leg edema (subacute)   ,  hx covid, cardiac arrythmias, inguinal adenopathy, neg ECHO, US legs, no CKD/no chronic liver disease, HIV neg, ALB 3.8   MEDS: enxo LD, lasix, LT4 50mcg, midodiren 10 TID, oxycodone     CORE:  Code Status (  FULL  --------------------------------------------------------------------------------------------------  Hospital Summary/ Patient System Review      NP:   *admit( alert, nonfocal  , B12 200     EENT:   *admit(        MSK/PAIN:   *admit(  DAYNA neg, XRAY: scoliosis, DJD spine     CVS:   *admit(  BLE leg edema (worse after COVID ? )  SOB, orthopnea, JVD, no cardiomyopathy on exam , 3+ BLE edema , pBNP 559 (382) , trop 8 , EKG: SR, QTc 443/unchanGed  ECHO 1/7: EF  65%, No DD, RV, RA, LA wnl, valves wnl, RVSP 25   US Dec 26: no DVT noted BLE   Impression: BLE leg edema , normal echo, US legs neg , no CKD/no Chronic liver disease, ALB wnl  - > apparent venous stasis --> elevation, compression hose, cautious lasix (hypoperfusion risks) , abd/pelvic imaging no masses   Impression: chronic Hypotension (mitodrine)  , asymptomatic  (don’t hold meds unless symptoms)         Dec 2020: US legs, ECHO wnl , RVSP wnl      PULM:   *admit(  clear lungs, never smoker      GI:   *admit(  BMI 18, wht loss, B12 200, Ferrint 26, VIT D125 wnl   Impression: severe protein calorie chronic malnutrition, small bowel malabsorption by hx,  preALB 15L , wht loss (intake, malabsorpption?, consumption? (imaging neg for obvious cancer, cortisol, thyroid wnl ),  followed by GI   Impression: hx coloscopy  (rectal prolpase repair, anastomy leak/abscess requiring colostomy   Impression: low B12, Ferritin, nutrition, achlorhydria, malabsorption?  (celiac neg)      2020: celiac neg  CT abd with: DJD spine, small effusions, small ascites, colon stool, olivia colostoy , sigmoid colon anastomosis, no SBO, liver wnl, GB/bile wnl , no  masses, pelvis wnl , US small ascites, nonspecific renal pelvis dilation +bilateral ureteral jets , no hydronephrosis      :   *admit(  UA neg, no protein loss     RENAL:   *admit(  Na 137, K 4.7, CO2 25, CR 0.59, CA 8.4, ALB 3.4 , MG 1.9      HEME:   *admit(  WBC 3.8, HB 8.9, , PTl 146,  ferritin 27 (OCT) ,  (Sept 2020) , B12 200L, Ferrint 26, foatle 8.8, FT4 1.05 , %FE sat 9 , Retic 2.6   Impression DONNA, hx colostomy ?   Impression: chronic cytopenias, macrocytosis, likely nutritional/inflammatory  MDS?   Impression: Common  Variable immune def (CVID)  -- IVIG if indicated   Impression: B12 def, r/o achlorhydria -- IM and PO B12 1000mcg  Impression: DONNA, Ferrtin 27 -- r/o small bowel pathology vs achlorhydia  - IV FE, gastrin     ENDO:   *admit(  TSH 3.2, FT4 1.05  Impression: thyroid replacement 0.5mcg     2020: cortisol 26-41     DERM/BREAST:   *admit(       ID:   *admit(    Impresson: recent COVID (Dx Dec 10) , CDIff, HIV neg (2020) , hep ABC neg (2020)         2020: viral panel remote  CMV

## 2021-01-07 NOTE — THERAPY
Missed Therapy     Patient Name: Pita Bueno  Age:  60 y.o., Sex:  female  Medical Record #: [id number]  Today's Date: 1/7/2021 01/07/21 1042   Interdisciplinary Plan of Care Collaboration   IDT Collaboration with  Nursing;Physician   Collaboration Comments OT consult received, acknowledged. Per pt and medical team, functionally pt is back to her baseline. Pt denies any concerns for d/c home once medically cleared, mobilizing and completed ADLs with nursing w/o concerns. Will d/c OT orders, no acute needs identified. Please re-consult should there be a change in function.

## 2021-01-08 LAB
25(OH)D3 SERPL-MCNC: 14 NG/ML (ref 30–100)
ANION GAP SERPL CALC-SCNC: 7 MMOL/L (ref 7–16)
BASOPHILS # BLD AUTO: 0.3 % (ref 0–1.8)
BASOPHILS # BLD: 0.01 K/UL (ref 0–0.12)
BUN SERPL-MCNC: 27 MG/DL (ref 8–22)
C DIFF DNA SPEC QL NAA+PROBE: NEGATIVE
C DIFF TOX GENS STL QL NAA+PROBE: NEGATIVE
CALCIUM SERPL-MCNC: 8.9 MG/DL (ref 8.5–10.5)
CHLORIDE SERPL-SCNC: 103 MMOL/L (ref 96–112)
CO2 SERPL-SCNC: 28 MMOL/L (ref 20–33)
CREAT SERPL-MCNC: 0.46 MG/DL (ref 0.5–1.4)
EOSINOPHIL # BLD AUTO: 0.06 K/UL (ref 0–0.51)
EOSINOPHIL NFR BLD: 1.8 % (ref 0–6.9)
ERYTHROCYTE [DISTWIDTH] IN BLOOD BY AUTOMATED COUNT: 54 FL (ref 35.9–50)
GLUCOSE SERPL-MCNC: 79 MG/DL (ref 65–99)
HCT VFR BLD AUTO: 29.6 % (ref 37–47)
HEMOCCULT SP1 STL QL: NEGATIVE
HEMOCCULT SP2 STL QL: NEGATIVE
HGB BLD-MCNC: 9.1 G/DL (ref 12–16)
IGG SERPL-MCNC: 448 MG/DL (ref 768–1632)
IMM GRANULOCYTES # BLD AUTO: 0.01 K/UL (ref 0–0.11)
IMM GRANULOCYTES NFR BLD AUTO: 0.3 % (ref 0–0.9)
LYMPHOCYTES # BLD AUTO: 0.65 K/UL (ref 1–4.8)
LYMPHOCYTES NFR BLD: 19.2 % (ref 22–41)
MAGNESIUM SERPL-MCNC: 2.1 MG/DL (ref 1.5–2.5)
MAGNESIUM SERPL-MCNC: 2.1 MG/DL (ref 1.5–2.5)
MCH RBC QN AUTO: 32.6 PG (ref 27–33)
MCHC RBC AUTO-ENTMCNC: 30.7 G/DL (ref 33.6–35)
MCV RBC AUTO: 106.1 FL (ref 81.4–97.8)
MONOCYTES # BLD AUTO: 0.61 K/UL (ref 0–0.85)
MONOCYTES NFR BLD AUTO: 18 % (ref 0–13.4)
NEUTROPHILS # BLD AUTO: 2.05 K/UL (ref 2–7.15)
NEUTROPHILS NFR BLD: 60.4 % (ref 44–72)
NRBC # BLD AUTO: 0 K/UL
NRBC BLD-RTO: 0 /100 WBC
PHOSPHATE SERPL-MCNC: 4.5 MG/DL (ref 2.5–4.5)
PHOSPHATE SERPL-MCNC: 4.6 MG/DL (ref 2.5–4.5)
PLATELET # BLD AUTO: 158 K/UL (ref 164–446)
PMV BLD AUTO: 12.7 FL (ref 9–12.9)
POTASSIUM SERPL-SCNC: 3.9 MMOL/L (ref 3.6–5.5)
PTH-INTACT SERPL-MCNC: 91.3 PG/ML (ref 14–72)
RBC # BLD AUTO: 2.79 M/UL (ref 4.2–5.4)
SODIUM SERPL-SCNC: 138 MMOL/L (ref 135–145)
WBC # BLD AUTO: 3.4 K/UL (ref 4.8–10.8)

## 2021-01-08 PROCEDURE — A9270 NON-COVERED ITEM OR SERVICE: HCPCS | Performed by: STUDENT IN AN ORGANIZED HEALTH CARE EDUCATION/TRAINING PROGRAM

## 2021-01-08 PROCEDURE — 36415 COLL VENOUS BLD VENIPUNCTURE: CPT

## 2021-01-08 PROCEDURE — 99233 SBSQ HOSP IP/OBS HIGH 50: CPT | Mod: GC | Performed by: INTERNAL MEDICINE

## 2021-01-08 PROCEDURE — 700111 HCHG RX REV CODE 636 W/ 250 OVERRIDE (IP): Performed by: STUDENT IN AN ORGANIZED HEALTH CARE EDUCATION/TRAINING PROGRAM

## 2021-01-08 PROCEDURE — 93005 ELECTROCARDIOGRAM TRACING: CPT | Performed by: STUDENT IN AN ORGANIZED HEALTH CARE EDUCATION/TRAINING PROGRAM

## 2021-01-08 PROCEDURE — 84425 ASSAY OF VITAMIN B-1: CPT

## 2021-01-08 PROCEDURE — 84255 ASSAY OF SELENIUM: CPT

## 2021-01-08 PROCEDURE — A9270 NON-COVERED ITEM OR SERVICE: HCPCS | Performed by: INTERNAL MEDICINE

## 2021-01-08 PROCEDURE — 700102 HCHG RX REV CODE 250 W/ 637 OVERRIDE(OP): Performed by: STUDENT IN AN ORGANIZED HEALTH CARE EDUCATION/TRAINING PROGRAM

## 2021-01-08 PROCEDURE — 83970 ASSAY OF PARATHORMONE: CPT

## 2021-01-08 PROCEDURE — 84100 ASSAY OF PHOSPHORUS: CPT

## 2021-01-08 PROCEDURE — 700111 HCHG RX REV CODE 636 W/ 250 OVERRIDE (IP): Performed by: INTERNAL MEDICINE

## 2021-01-08 PROCEDURE — 770006 HCHG ROOM/CARE - MED/SURG/GYN SEMI*

## 2021-01-08 PROCEDURE — 83735 ASSAY OF MAGNESIUM: CPT

## 2021-01-08 PROCEDURE — 82941 ASSAY OF GASTRIN: CPT

## 2021-01-08 PROCEDURE — 80048 BASIC METABOLIC PNL TOTAL CA: CPT

## 2021-01-08 PROCEDURE — 700102 HCHG RX REV CODE 250 W/ 637 OVERRIDE(OP): Performed by: INTERNAL MEDICINE

## 2021-01-08 PROCEDURE — 99232 SBSQ HOSP IP/OBS MODERATE 35: CPT | Performed by: INTERNAL MEDICINE

## 2021-01-08 PROCEDURE — 87493 C DIFF AMPLIFIED PROBE: CPT

## 2021-01-08 PROCEDURE — 85025 COMPLETE CBC W/AUTO DIFF WBC: CPT

## 2021-01-08 PROCEDURE — 86340 INTRINSIC FACTOR ANTIBODY: CPT

## 2021-01-08 PROCEDURE — 82306 VITAMIN D 25 HYDROXY: CPT

## 2021-01-08 RX ORDER — ACETAMINOPHEN 325 MG/1
650 TABLET ORAL ONCE
Status: ACTIVE | OUTPATIENT
Start: 2021-01-08 | End: 2021-01-09

## 2021-01-08 RX ORDER — DIPHENHYDRAMINE HYDROCHLORIDE 50 MG/ML
25 INJECTION INTRAMUSCULAR; INTRAVENOUS ONCE
Status: DISPENSED | OUTPATIENT
Start: 2021-01-08 | End: 2021-01-09

## 2021-01-08 RX ORDER — DIPHENHYDRAMINE HCL 25 MG
25 TABLET ORAL ONCE
Status: ACTIVE | OUTPATIENT
Start: 2021-01-08 | End: 2021-01-09

## 2021-01-08 RX ORDER — FERROUS SULFATE 325(65) MG
325 TABLET ORAL 2 TIMES DAILY WITH MEALS
Status: DISCONTINUED | OUTPATIENT
Start: 2021-01-08 | End: 2021-01-08

## 2021-01-08 RX ORDER — CYANOCOBALAMIN 1000 UG/ML
1000 INJECTION, SOLUTION INTRAMUSCULAR; SUBCUTANEOUS
Status: DISCONTINUED | OUTPATIENT
Start: 2021-01-15 | End: 2021-01-10 | Stop reason: HOSPADM

## 2021-01-08 RX ORDER — POTASSIUM CHLORIDE 20 MEQ/1
40 TABLET, EXTENDED RELEASE ORAL ONCE
Status: COMPLETED | OUTPATIENT
Start: 2021-01-08 | End: 2021-01-08

## 2021-01-08 RX ORDER — ERGOCALCIFEROL 1.25 MG/1
50000 CAPSULE ORAL
Status: DISCONTINUED | OUTPATIENT
Start: 2021-01-08 | End: 2021-01-10 | Stop reason: HOSPADM

## 2021-01-08 RX ADMIN — FUROSEMIDE 20 MG: 10 INJECTION, SOLUTION INTRAMUSCULAR; INTRAVENOUS at 04:57

## 2021-01-08 RX ADMIN — MIDODRINE HYDROCHLORIDE 10 MG: 5 TABLET ORAL at 17:14

## 2021-01-08 RX ADMIN — FERROUS SULFATE TAB 325 MG (65 MG ELEMENTAL FE) 325 MG: 325 (65 FE) TAB at 17:14

## 2021-01-08 RX ADMIN — FUROSEMIDE 20 MG: 10 INJECTION, SOLUTION INTRAMUSCULAR; INTRAVENOUS at 17:14

## 2021-01-08 RX ADMIN — POTASSIUM CHLORIDE 40 MEQ: 1500 TABLET, EXTENDED RELEASE ORAL at 08:16

## 2021-01-08 RX ADMIN — ONDANSETRON 4 MG: 2 INJECTION INTRAMUSCULAR; INTRAVENOUS at 08:22

## 2021-01-08 RX ADMIN — LEVOTHYROXINE SODIUM 50 MCG: 0.05 TABLET ORAL at 04:57

## 2021-01-08 RX ADMIN — FERROUS SULFATE TAB 325 MG (65 MG ELEMENTAL FE) 325 MG: 325 (65 FE) TAB at 12:14

## 2021-01-08 RX ADMIN — DIPHENHYDRAMINE HYDROCHLORIDE 12.5 MG: 50 INJECTION, SOLUTION INTRAMUSCULAR; INTRAVENOUS at 13:50

## 2021-01-08 RX ADMIN — MORPHINE SULFATE 3 MG: 4 INJECTION INTRAVENOUS at 22:03

## 2021-01-08 RX ADMIN — DIPHENHYDRAMINE HYDROCHLORIDE 12.5 MG: 50 INJECTION, SOLUTION INTRAMUSCULAR; INTRAVENOUS at 22:03

## 2021-01-08 RX ADMIN — ERGOCALCIFEROL 50000 UNITS: 1.25 CAPSULE ORAL at 13:30

## 2021-01-08 RX ADMIN — MIDODRINE HYDROCHLORIDE 10 MG: 5 TABLET ORAL at 12:14

## 2021-01-08 RX ADMIN — DOCUSATE SODIUM 50 MG AND SENNOSIDES 8.6 MG 2 TABLET: 8.6; 5 TABLET, FILM COATED ORAL at 08:16

## 2021-01-08 RX ADMIN — MORPHINE SULFATE 3 MG: 4 INJECTION INTRAVENOUS at 05:49

## 2021-01-08 RX ADMIN — MIDODRINE HYDROCHLORIDE 10 MG: 5 TABLET ORAL at 08:16

## 2021-01-08 ASSESSMENT — ENCOUNTER SYMPTOMS
STRIDOR: 0
SPUTUM PRODUCTION: 0
SENSORY CHANGE: 0
ORTHOPNEA: 1
FOCAL WEAKNESS: 0
BLOOD IN STOOL: 0
MYALGIAS: 0
WHEEZING: 0
NAUSEA: 0
ABDOMINAL PAIN: 0
PHOTOPHOBIA: 0
COUGH: 0
PALPITATIONS: 0
SPEECH CHANGE: 0
VOMITING: 0
CHEST TIGHTNESS: 0
DOUBLE VISION: 0
APNEA: 0
CHOKING: 0
FEVER: 0
HEMOPTYSIS: 0
CHILLS: 0
SHORTNESS OF BREATH: 1
SHORTNESS OF BREATH: 0
BLURRED VISION: 0
DEPRESSION: 0

## 2021-01-08 ASSESSMENT — PAIN DESCRIPTION - PAIN TYPE: TYPE: ACUTE PAIN

## 2021-01-08 ASSESSMENT — FIBROSIS 4 INDEX
FIB4 SCORE: 1.17
FIB4 SCORE: 1.17

## 2021-01-08 NOTE — PROGRESS NOTES
IV Iron Per Pharmacy Note    Patient Lean Body Weight:  55 kg  Reason for Iron Replacement:  Iron Deficiency Anemia      Lab Results   Component Value Date/Time    WBC 3.4 (L) 01/08/2021 12:35 AM    RBC 2.79 (L) 01/08/2021 12:35 AM    HEMOGLOBIN 9.1 (L) 01/08/2021 12:35 AM    HEMATOCRIT 29.6 (L) 01/08/2021 12:35 AM    .1 (H) 01/08/2021 12:35 AM    MCH 32.6 01/08/2021 12:35 AM    MCHC 30.7 (L) 01/08/2021 12:35 AM    MPV 12.7 01/08/2021 12:35 AM       Recent Labs     01/07/21  0028   FERRITIN 26.8   FOLATE 8.8   IRON 28*         Recent Labs     01/08/21  0035   CREATININE 0.46*          Assessment/Plan:  1. IV Iron Indicated.   2. Following diphenhydramine/acetaminophen premeds, administer Iron Dextran 25 mg IV test dose over 30 minutes.  3. If no reaction (Anaphylaxis, Hypotension/Hypertension, N/V/D, Chest pain/Back Pain, Urticaria/Pruritis) in the next hour, proceed to full dose.   4. Full dose: Iron Dextran 1375 mg IV over 4 hours. Continue to monitor for delayed ADR including: Arthralgia/myalgia, Headache/backache, chills/dizziness/malaise, moderate to high fever and n/v.      Michelet Paredse, PharmD

## 2021-01-08 NOTE — PROGRESS NOTES
Cardiology Follow Up Progress Note    Date of Service  1/8/2021    Attending Physician  Hannah Orourke M.D.          Presented as a direct admit from cardiology clinic secondary to significant volume overload/weight gain (per patient report 30 pounds in the past couple of weeks) lower extremity edema, dyspnea, orthopnea      Past medical history significant for chronic malnutrition, common variable immunodeficiency, hypothyroid, recent Covid infection, colostomy (2013) due to complications from rectal prolapse, recent CAD    Interim Events  No overnight cardiac events  Sinus rhythm  Weight down  Labs reviewed  Phosphorus high, 4.6  NT proBNP 559 on admit  LE edema on admit, improving, R worst than left      Review of Systems  Review of Systems   Respiratory: Negative for apnea, cough, choking, chest tightness, shortness of breath, wheezing and stridor.    Cardiovascular: Positive for leg swelling. Negative for chest pain.       Vital signs in last 24 hours  Temp:  [36.1 °C (96.9 °F)-36.5 °C (97.7 °F)] 36.1 °C (96.9 °F)  Pulse:  [59-67] 60  Resp:  [15-20] 17  BP: (80-97)/(49-63) 80/49  SpO2:  [95 %-100 %] 95 %    Physical Exam  Physical Exam  Cardiovascular:      Rate and Rhythm: Normal rate and regular rhythm.   Pulmonary:      Effort: Pulmonary effort is normal.   Abdominal:      General: Abdomen is flat.   Skin:     General: Skin is warm.      Comments: colostomy   Neurological:      General: No focal deficit present.      Mental Status: She is alert.   Psychiatric:         Mood and Affect: Mood normal.         Lab Review  Lab Results   Component Value Date/Time    WBC 3.4 (L) 01/08/2021 12:35 AM    RBC 2.79 (L) 01/08/2021 12:35 AM    HEMOGLOBIN 9.1 (L) 01/08/2021 12:35 AM    HEMATOCRIT 29.6 (L) 01/08/2021 12:35 AM    .1 (H) 01/08/2021 12:35 AM    MCH 32.6 01/08/2021 12:35 AM    MCHC 30.7 (L) 01/08/2021 12:35 AM    MPV 12.7 01/08/2021 12:35 AM      Lab Results   Component Value Date/Time    SODIUM 138  01/08/2021 12:35 AM    POTASSIUM 3.9 01/08/2021 12:35 AM    CHLORIDE 103 01/08/2021 12:35 AM    CO2 28 01/08/2021 12:35 AM    GLUCOSE 79 01/08/2021 12:35 AM    BUN 27 (H) 01/08/2021 12:35 AM    CREATININE 0.46 (L) 01/08/2021 12:35 AM      Lab Results   Component Value Date/Time    ASTSGOT 19 01/07/2021 12:28 AM    ALTSGPT 37 01/07/2021 12:28 AM     Lab Results   Component Value Date/Time    CHOLSTRLTOT 235 (H) 02/05/2020 02:04 PM     (H) 02/05/2020 02:04 PM    HDL 79 02/05/2020 02:04 PM    TRIGLYCERIDE 124 02/05/2020 02:04 PM    TROPONINT 8 12/26/2020 03:40 PM       Recent Labs     01/06/21  1217   NTPROBNP 559*       Cardiac Imaging and Procedures Review  EKG: Sinus rhythm    Echocardiogram: Pending    Cardiac Catheterization: Not applicable    Imaging  Chest X-Ray: No evidence of acute pulmonary process    Stress Test: Not applicable    Assessment/Plan      Acute lower extremity edema/orthopnea  -Per patient report 30 LBS weight gain x2 weeks  -Per patient report dry weight 70 to 75 pounds  -Suspect HFpEF versus chronic malnutrition vs venous insufficinavy  -Echo reassuring   -Continue diuresis with furosemide 20 mg IV twice daily  -Daily standing weight  -Strict intake and output      Severe malnutrition  -Likely eating disorder  -Patient reports exercises 4 hours daily  -Nutrition consult    High P04  -PTH, selenium pending    Hypotension  -Midodrine 10 mg 3 times daily      Recent Covid/C. difficile infection      Cardiology will follow along      Please contact me with any questions.    URIAH Black.   Cardiologist, [facility]  [phone]      The patient was personally seen, examined, evaluated and formulated the plan of care by myself in conjunction with the DAMASO (NP/PA) cardiology team. I have personally reviewed the documentation, and agree except as otherwise noted    She is making progress with diuresis and I recommend continuing these efforts until  the fluid is fully mobilized. Discussed malnutrition as the likely inciting event    Abran Calvillo M.D.  [facility]  [phone]

## 2021-01-08 NOTE — PROGRESS NOTES
[facility] [place], [place] INTERNAL MEDICINE ATTENDING NOTE:   Vivek Dominique MD      Visit Time:   Attending/resident bedside rounds 9-11:30 AM     PATIENT ID  Name:             Pita Bueno   YOB: 1960  Age:                 60 y.o.  female   MRN:               [id number]  Admit:  1/6/2021     I saw and examined the patient and discussed the management with the resident staff.  I reviewed the resident's note and agree with the resident's findings and plan as documented in the resident's note except as documented in the attending note. Please reference resident daily note for complete information.    The chart was reviewed and summarized.  Available labs, imaging, O2 sats ,  EKGs were reviewed. Available nursing, consultant, and resident notes were reviewed. I am actively involved in the patient's care.                                                                            ______________________________________________________________________            60(   admit Jan 6, HF  )  INTERVAL:  Chart reviewed/summarized,       IgG level ?  VIT D 14 LL  high output ostomy ?  --> recent Cdiff, recheck      Jan 8AM: AF, HR 59, BP 89/52, 96% RA  WBC 3.4 (3.8), HB 9.1, , ACL 65, K 3.9, CO2 28, BS 79, CR 0.46, MG 2.1, PHOS 4.6 , UA nweg  stool fat neg, HPYLORI neg, stool heme neg,   VIT D 14, MG 2.1, PHOS 4.6, ferrint 26, Foalte 8.8, FT4 1.05, retic 2.6      cardiology: HFpEF not excluded, no LVH, no LAE , no pulm infiltrates however ?  -- diuretics if tolerated , hypotension (no aortic stenosis or HTN)   midodrine, compression if tolerated , gabapentin may be needed for edema related neuropathic pain, but can worsen edema by itself         Jan 7AM:  AF< HR 65, BP 81, 97% RA  NPT: 23/24 , leg pain, edema, ambulatory , ALB 4   WBC 3.7, HB 10 (8.9),  (146), Na 136, K 4.2, CO2 23, BUN 38, CR 0.40 , MG 2.2, PHOS 3.8   MEDS: enox LD, lasix, LT4, midodrine  OK for general medicine if asymptomatic hypotension  (chronic)         Jan  6PM  AF, HR 63, BP 92, 98% RA  Admit  leg edema (subacute)   ,  hx covid, cardiac arrythmias, inguinal adenopathy, neg ECHO, US legs, no CKD/no chronic liver disease, HIV neg, ALB 3.8   MEDS: enxo LD, lasix, LT4 50mcg, midodiren 10 TID, oxycodone     CORE:  Code Status (  FULL  --------------------------------------------------------------------------------------------------  Hospital Summary/ Patient System Review      NP:   *admit( alert, nonfocal  , B12 200     EENT:   *admit(       MSK/PAIN:   *admit(  DAYNA neg, XRAY: scoliosis, DJD spine     CVS:   *admit(  BLE leg edema (worse after COVID ? )  SOB, orthopnea, JVD, no cardiomyopathy on exam , 3+ BLE edema , pBNP 559 (382) , trop 8 , EKG: SR, QTc 443/unchanGed  ECHO 1/7: EF  65%, No DD, RV, RA, LA wnl, valves wnl, RVSP 25   US Dec 26: no DVT noted BLE   Impression: BLE leg edema , normal echo, US legs neg , no CKD/no Chronic liver disease, ALB wnl  - > apparent venous stasis --> elevation, compression hose, cautious lasix (hypoperfusion risks) , abd/pelvic imaging no masses   Impression: chronic Hypotension (mitodrine)  , asymptomatic  (don’t hold meds unless symptoms)         Dec 2020: US legs, ECHO wnl , RVSP wnl      PULM:   *admit(  clear lungs, never smoker      GI:   *admit(  BMI 18, wht loss, B12 200, Ferrint 26, VIT D 14LL,  PTH 91, CA 9, PHOS 4.5HN, ALB 3.8  Dietary: excessive weight (gaining weight) concerns, excessive exercise ?  - eating disorder concerns (patient denies)   Impression: severe protein calorie chronic malnutrition, small bowel malabsorption by hx (no documentation of this however) , high output ostomy, small bowel overgrowth ? ,  preALB 15L , chronic wht loss (intake, malabsorpption?, consumption? (imaging neg for obvious cancer, cortisol, thyroid wnl ),  followed by GI   Impression: eating disorder ?  (patient denies)  - psychiatry if patient consents, otherwise PCP f/u , normal K, HCO3 against bulemia    Impression: hx coloscopy  (rectal prolpase repair, anastomy leak/abscess requiring colostomy   Impression: low B12, Ferritin, nutrition, achlorhydria, malabsorption?  (celiac neg) , SBBO?  (low B12, but usually higer folate)  - IV/IM B12, IV FE, VIT D   Impression: hx Cdiff (treated Sept 2020) -- repeat ?      2020: celiac neg  CT abd with: DJD spine, small effusions, small ascites, colon stool, olviia colostoy , sigmoid colon anastomosis, no SBO, liver wnl, GB/bile wnl , no  masses, pelvis wnl , US small ascites, nonspecific renal pelvis dilation +bilateral ureteral jets , no hydronephrosis      :   *admit(  UA neg, no protein loss     RENAL:   *admit(  Na 137, K 4.7, CO2 25, CR 0.59, CA 8.4, ALB 3.4 , MG 1.9      HEME:   *admit(  WBC 3.8, HB 8.9, , PTl 146,  ferritin 27 (OCT) ,  (Sept 2020) , B12 200L, Ferrint 26, foatle 8.8, FT4 1.05 , %FE sat 9 , Retic 2.6  --> HB 9  Impression: chronic cytopenias, macrocytosis, likely nutritional/inflammatory  MDS?   Impression: Common Variable immune def (CVID)  -- IVIG if indicated   Impression: B12 def, r/o achlorhydria -- IM and PO B12 1000mcg  Impression: DONNA, Ferrtin 27 -->  r/o small bowel pathology vs achlorhydia  - IV FE, gastrin     ENDO:   *admit(  TSH 3.2, FT4 1.05  Impression: thyroid replacement 0.5mcg  Impression: VIT D 14, malabsorption vs eating disorder, replace, CA wnl, PHOS high normal ? , PTH pending  (91 HIGH, c/w 2ndary hyperPTH due to VIT D def)      2020: cortisol 26-41     DERM/BREAST:   *admit(       ID:   *admit(    Impresson: recent COVID (Dx Dec 10) , CDIff, HIV neg (2020) , hep ABC neg (2020)         2020: viral panel remote  CMV

## 2021-01-08 NOTE — PROGRESS NOTES
Daily Progress Note:     Date of Service: 1/7/2021  Primary Team: [facility] IM Red Team    Attending: Vivek Dominique M.D.   Senior Resident: Dr. Nichole   Contact:  [phone]    ID: This is a 60-year-old female with past medical history notable for CVA ID on IVIG infusion in the past, failed rectal prolapse repair which resulted in anatomic leak or abscess and ultimately needing to left upper quadrant colostomy, chronic malnutrition and hypothyroidism who was a direct admission from cardiology clinic for work-up of progressive edema and weight gain.  On presentation patient reported bilateral lower extremity edema since past 1 to 2 months and orthopnea.  Patient was hemodynamically stable. Cardiology on board.       Interval update:  No overnight events,  Patient was seen and examined at the bedside this AM.  Patient reported right lower extremity pain, 8 out of 10 in intensity.  She was requesting for IV pain medication as oral was not helping.  She also reported shortness of breath which has improved since admission    CNS: Alert and oriented x4, no neurological deficits.  CVS: On telemetry, sinus rhythm 65-75 with rare PAC, PVC.  SBP 80-90.  Patient asymptomatic  RESP: On room air.  GI: Ostomy bag in place, no signs of infection, good ostomy output since this morning.  No abdominal tenderness, distention.  Per patient takes up to 2000 michael/day.  Nutritionist following.  Kidney: Good UOP, creatinine stable.  Electrolyte/fluids: K4.2, sodium 136, no IV fluids.  ID: No signs of infection  Heme: Hemoglobin at baseline 9-11, no signs of bleeding.. History of CVID, follows with hematology.    Consultants/Specialty:  Cardiology consulted    Review of Systems:    Review of Systems   Constitutional: Negative for chills and fever.   HENT: Negative for ear discharge and nosebleeds.    Eyes: Negative for blurred vision, double vision and photophobia.   Respiratory: Negative for cough, hemoptysis, sputum production and shortness of  breath.    Cardiovascular: Positive for orthopnea and leg swelling. Negative for chest pain and palpitations.        Right more than left leg pain.   Gastrointestinal: Negative for abdominal pain, blood in stool, nausea and vomiting.        Increase output from ostomy bag.   Genitourinary: Negative for dysuria and urgency.   Musculoskeletal: Negative for myalgias.   Neurological: Negative for sensory change, speech change and focal weakness.   Psychiatric/Behavioral: Negative for depression.       Objective Data:   Physical Exam:   Vitals:   Temp:  [36 °C (96.8 °F)-36.8 °C (98.2 °F)] 36.1 °C (96.9 °F)  Pulse:  [59-69] 62  Resp:  [14-20] 18  BP: ()/(44-57) 91/49  SpO2:  [95 %-99 %] 97 %   Physical Exam  Constitutional:       Comments: Malnutrition, muscle wasting   HENT:      Head: Normocephalic and atraumatic.      Nose: Nose normal.      Mouth/Throat:      Mouth: Mucous membranes are moist.   Eyes:      Extraocular Movements: Extraocular movements intact.      Pupils: Pupils are equal, round, and reactive to light.   Neck:      Musculoskeletal: Normal range of motion and neck supple.   Cardiovascular:      Rate and Rhythm: Normal rate and regular rhythm.      Pulses: Normal pulses.      Heart sounds: Normal heart sounds. No murmur.   Pulmonary:      Effort: Pulmonary effort is normal.      Breath sounds: Normal breath sounds.   Abdominal:      General: Abdomen is flat. There is no distension.      Palpations: Abdomen is soft.      Tenderness: There is no abdominal tenderness.      Comments: Ostomy bag in place, no signs of infection.   Musculoskeletal:      Right lower leg: Edema present.      Left lower leg: Edema present.   Skin:     General: Skin is warm.      Findings: No rash.   Neurological:      General: No focal deficit present.      Mental Status: She is alert and oriented to person, place, and time. Mental status is at baseline.      Cranial Nerves: No cranial nerve deficit.      Sensory: No sensory  deficit.   Psychiatric:         Mood and Affect: Mood normal.           Labs:   Lab Results   Component Value Date/Time    WBC 3.7 (L) 01/07/2021 12:28 AM    RBC 3.10 (L) 01/07/2021 12:28 AM    HEMOGLOBIN 10.1 (L) 01/07/2021 12:28 AM    HEMATOCRIT 32.9 (L) 01/07/2021 12:28 AM    .1 (H) 01/07/2021 12:28 AM    MCH 32.6 01/07/2021 12:28 AM    MCHC 30.7 (L) 01/07/2021 12:28 AM    MPV 12.7 01/07/2021 12:28 AM    NEUTSPOLYS 58.50 01/07/2021 12:28 AM    LYMPHOCYTES 23.60 01/07/2021 12:28 AM    MONOCYTES 14.70 (H) 01/07/2021 12:28 AM    EOSINOPHILS 2.40 01/07/2021 12:28 AM    BASOPHILS 0.30 01/07/2021 12:28 AM    HYPOCHROMIA 1+ 09/07/2020 02:13 PM    ANISOCYTOSIS 1+ 09/07/2020 02:13 PM        Lab Results   Component Value Date/Time    SODIUM 136 01/07/2021 12:04 PM    POTASSIUM 3.4 (L) 01/07/2021 12:04 PM    CHLORIDE 101 01/07/2021 12:04 PM    CO2 23 01/07/2021 12:04 PM    GLUCOSE 110 (H) 01/07/2021 12:04 PM    BUN 32 (H) 01/07/2021 12:04 PM    CREATININE 0.40 (L) 01/07/2021 12:04 PM        Imaging:   EC-ECHOCARDIOGRAM COMPLETE W/O CONT   Final Result        Problem Representation:   his is a 60-year-old female with past medical history notable for CVA ID on IVIG infusion in the past, failed rectal prolapse repair which resulted in anatomic leak or abscess and ultimately needing to left upper quadrant colostomy, chronic malnutrition and hypothyroidism who was a direct admission from cardiology clinic for work-up of progressive edema and weight gain.  On presentation patient reported bilateral lower extremity edema since past 1 to 2 months and orthopnea.  Patient was hemodynamically stable. Cardiology on board.     Anasarca  Assessment & Plan  - Per patient 30-35 weight gain in past 2 weeks  - Weight on admission 107 lbs, baseline 70-75 pounds.  -On admission EKG 1/6/21 Sinus Rhythm rate 68, ECHO on 12/22/2020 was Unremarkable  - No known Cardiovascular disease, but was recently diagnosed with COVID-19, is  malnutrition with albumin 4.0, liver function test normal, urinalysis negative for proteinuria.  - Cardiology on board.  Currently diuresing with IV Lasix.  -Echocardiogram obtained on this admission left ventricular ejection fraction 65%, RVSP 25%.  -Differential diagnosis include but not limited to venous insufficiency, left heart failure with preserved ejection fraction, malnutrition     PLAN:  - Lasix 20 mg BID  - Monitor Electrolytes  - Fluid restriction 1.5 L daily  - Daily weights standing scale  - Strict I and O  - Chronic Low BP 80-90, on midodrine, asymptomatic . Can continue lasix unless patient is asymptomatic     Severe malnutrition (HCC)- (present on admission)  Assessment & Plan  -Severe protein  chronic malnutrition  -Patient probably weighs around 70-75 lbs. Today's weight 107 due to Anasarca.  -History of anorexia nervosa in teenage.  It appears that patient still has an eating disorder and not just complication from ostomy as patient is over concerned regarding her weight and excessively exercises to up to 5 hours daily.    -Low Pancreatic lipase in the past, celiac disease negative  -Albumin 4.0, prealbumin low  -DDX: Eating disorder versus chronic malabsorption   -Nutritionist following, recommends outpatient follow-up with dietitian.     PLAN:  - High protein diet  - Nutritionist following  - CTM, will consider psychiatry consultation in the a.m.    Colostomy care (HCC)- (present on admission)  Assessment & Plan  - History of failed rectal prolapse repair which resulted in anatomic leak or abscess and ultimately needing to left upper quadrant colostomy followed by multiple surgeries in the past.  -On admission, patient mentioned that she had no output in more than 12 hrs, Usually she has very high output from back.    -On admission abdomen mild tenderness deep palpation RLQ, but not signs of peritoneal irritation,   -This morning patient reports good output from colostomy bag with resolution  and abdominal distention.  She is eating and tolerating well.    Plan  -Continue to monitor  -Will consider imaging if worrisome symptoms   -Outpatient follow-up with GI.    Pancytopenia (HCC)- (present on admission)  Assessment & Plan  - Patient has Hx of Common variable immunodeficiency diagnosed in 2007, Patient used to have recurrent sinusitis prior to diagnosis  - She was seen in the past by Hem in [place], She received IVIG last maybe 1 year ago  -Seen by oncology Dr. Chang on previous hospitalization.  IgG was obtained which was low.  Patient is scheduled on 1/8 for IVIG infusion.    -Chronic pancytopenia with hemoglobin 9-11 , WBC 3.8, platelets 146  -Anemia work-up  Shows Vitamin B12 deficiency. Serum iron 28, total iron-binding capacity 327, ferritin 26.8 and vitamin B12 deficiency.  Stool occult in September 2020 negative, repeat stool occult test pending.  -On last admission hemolytic anemia work-up was negative.    Plan  Monitor with daily CBC  IM B12 x 5 days   Spoke to pharmacy, patient does not qualify for IV iron.    Obtain intrinsic factor, Gastrin levels, H.pylori.   Hemtology follow up in outpatient setting.     Hypothyroidism- (present on admission)  Assessment & Plan  - Hx of Hypothyroidism  - Currently on Synthroid 50 mcg  - Continue Home dose  - CTM

## 2021-01-08 NOTE — DIETARY
Nutrition Services: Update   Day 2 of admit.  Pita Bueno is a 60 y.o. female with admitting DX of anasarca.    Wt 39.7 kg via stand up scale - 1/8 - BMI 15.02 - BMI Classification: underweight - weight continues to trend down with diuresis. Per pt, dry weight is ~ 70-75 lb.     Patient visited walking around hospital floor- relays it has been uncomfortable to sit/lay in bed. Was able to lead patient into her room, speak at bedside to follow up with resources on nutrition/foods to slow/thicken ostomy output, resources for ostomy support group within the community (requested by patient).    Pt expressed relief to this RD regarding continued weight loss, continues to relay concerns regarding having to weigh herself daily as she realizes this brings her anxiety. RN reports pt is eating well; however, has only seen patient eat vegetables and fruit. Spoke with [facility] Red Team- continue to strongly recommend psychiatric consult as pt presents with obsession regarding weight and exercise, eating only high-volume/low caloric density foods. Resident to discuss with pt if she is receptive to seeing psychiatry.     Malnutrition Risk: Severe per RD note 1/7.     Recommendations/Plan:  1. Strongly recommend consult to psychiatry- strong consideration of referral to therapy/nutrition resources upon discharge.   2. Encourage intake of meals  3. Document intake of all meals as % taken in ADL's to provide interdisciplinary communication across all shifts.   4. Monitor weight- patient may benefit from blind weights.     RD following

## 2021-01-09 ENCOUNTER — APPOINTMENT (OUTPATIENT)
Dept: RADIOLOGY | Facility: MEDICAL CENTER | Age: 61
DRG: 641 | End: 2021-01-09
Attending: STUDENT IN AN ORGANIZED HEALTH CARE EDUCATION/TRAINING PROGRAM
Payer: OTHER GOVERNMENT

## 2021-01-09 LAB
ANION GAP SERPL CALC-SCNC: 12 MMOL/L (ref 7–16)
BUN SERPL-MCNC: 21 MG/DL (ref 8–22)
CALCIUM SERPL-MCNC: 8.2 MG/DL (ref 8.5–10.5)
CHLORIDE SERPL-SCNC: 101 MMOL/L (ref 96–112)
CO2 SERPL-SCNC: 26 MMOL/L (ref 20–33)
CREAT SERPL-MCNC: 0.57 MG/DL (ref 0.5–1.4)
CRP SERPL HS-MCNC: 0.03 MG/DL (ref 0–0.75)
EKG IMPRESSION: NORMAL
ERYTHROCYTE [DISTWIDTH] IN BLOOD BY AUTOMATED COUNT: 52.2 FL (ref 35.9–50)
ERYTHROCYTE [SEDIMENTATION RATE] IN BLOOD BY WESTERGREN METHOD: 22 MM/HOUR (ref 0–30)
GLUCOSE SERPL-MCNC: 74 MG/DL (ref 65–99)
HCT VFR BLD AUTO: 29 % (ref 37–47)
HGB BLD-MCNC: 9 G/DL (ref 12–16)
MAGNESIUM SERPL-MCNC: 2.1 MG/DL (ref 1.5–2.5)
MAGNESIUM SERPL-MCNC: 2.3 MG/DL (ref 1.5–2.5)
MCH RBC QN AUTO: 32.7 PG (ref 27–33)
MCHC RBC AUTO-ENTMCNC: 31 G/DL (ref 33.6–35)
MCV RBC AUTO: 105.5 FL (ref 81.4–97.8)
PHOSPHATE SERPL-MCNC: 4.8 MG/DL (ref 2.5–4.5)
PHOSPHATE SERPL-MCNC: 5.2 MG/DL (ref 2.5–4.5)
PLATELET # BLD AUTO: 156 K/UL (ref 164–446)
PMV BLD AUTO: 12.4 FL (ref 9–12.9)
POTASSIUM SERPL-SCNC: 4.3 MMOL/L (ref 3.6–5.5)
RBC # BLD AUTO: 2.75 M/UL (ref 4.2–5.4)
SODIUM SERPL-SCNC: 139 MMOL/L (ref 135–145)
WBC # BLD AUTO: 3 K/UL (ref 4.8–10.8)

## 2021-01-09 PROCEDURE — 770006 HCHG ROOM/CARE - MED/SURG/GYN SEMI*

## 2021-01-09 PROCEDURE — 83735 ASSAY OF MAGNESIUM: CPT | Mod: 91

## 2021-01-09 PROCEDURE — 700102 HCHG RX REV CODE 250 W/ 637 OVERRIDE(OP): Performed by: STUDENT IN AN ORGANIZED HEALTH CARE EDUCATION/TRAINING PROGRAM

## 2021-01-09 PROCEDURE — 700111 HCHG RX REV CODE 636 W/ 250 OVERRIDE (IP): Performed by: STUDENT IN AN ORGANIZED HEALTH CARE EDUCATION/TRAINING PROGRAM

## 2021-01-09 PROCEDURE — 99222 1ST HOSP IP/OBS MODERATE 55: CPT | Performed by: PSYCHIATRY & NEUROLOGY

## 2021-01-09 PROCEDURE — 99232 SBSQ HOSP IP/OBS MODERATE 35: CPT | Performed by: INTERNAL MEDICINE

## 2021-01-09 PROCEDURE — A9270 NON-COVERED ITEM OR SERVICE: HCPCS | Performed by: STUDENT IN AN ORGANIZED HEALTH CARE EDUCATION/TRAINING PROGRAM

## 2021-01-09 PROCEDURE — 85652 RBC SED RATE AUTOMATED: CPT

## 2021-01-09 PROCEDURE — 86140 C-REACTIVE PROTEIN: CPT

## 2021-01-09 PROCEDURE — 93010 ELECTROCARDIOGRAM REPORT: CPT | Performed by: INTERNAL MEDICINE

## 2021-01-09 PROCEDURE — 99232 SBSQ HOSP IP/OBS MODERATE 35: CPT | Mod: GC | Performed by: INTERNAL MEDICINE

## 2021-01-09 PROCEDURE — 84100 ASSAY OF PHOSPHORUS: CPT | Mod: 91

## 2021-01-09 PROCEDURE — 80048 BASIC METABOLIC PNL TOTAL CA: CPT

## 2021-01-09 PROCEDURE — 93971 EXTREMITY STUDY: CPT | Mod: RT

## 2021-01-09 PROCEDURE — A9270 NON-COVERED ITEM OR SERVICE: HCPCS | Performed by: INTERNAL MEDICINE

## 2021-01-09 PROCEDURE — 85027 COMPLETE CBC AUTOMATED: CPT

## 2021-01-09 PROCEDURE — 302098 PASTE RING (FLAT): Performed by: INTERNAL MEDICINE

## 2021-01-09 PROCEDURE — 700102 HCHG RX REV CODE 250 W/ 637 OVERRIDE(OP): Performed by: INTERNAL MEDICINE

## 2021-01-09 PROCEDURE — 302109 OSTOMY WAFER 4X4: Performed by: INTERNAL MEDICINE

## 2021-01-09 PROCEDURE — 36415 COLL VENOUS BLD VENIPUNCTURE: CPT

## 2021-01-09 PROCEDURE — 700111 HCHG RX REV CODE 636 W/ 250 OVERRIDE (IP): Performed by: INTERNAL MEDICINE

## 2021-01-09 RX ORDER — GAUZE BANDAGE 2" X 2"
100 BANDAGE TOPICAL DAILY
Status: DISCONTINUED | OUTPATIENT
Start: 2021-01-10 | End: 2021-01-10 | Stop reason: HOSPADM

## 2021-01-09 RX ORDER — DIAPER,BRIEF,INFANT-TODD,DISP
EACH MISCELLANEOUS 2 TIMES DAILY
Status: DISCONTINUED | OUTPATIENT
Start: 2021-01-09 | End: 2021-01-10 | Stop reason: HOSPADM

## 2021-01-09 RX ORDER — DIPHENHYDRAMINE HYDROCHLORIDE 50 MG/ML
25 INJECTION INTRAMUSCULAR; INTRAVENOUS EVERY 6 HOURS PRN
Status: DISCONTINUED | OUTPATIENT
Start: 2021-01-09 | End: 2021-01-10 | Stop reason: HOSPADM

## 2021-01-09 RX ORDER — M-VIT,TX,IRON,MINS/CALC/FOLIC 27MG-0.4MG
1 TABLET ORAL DAILY
Status: DISCONTINUED | OUTPATIENT
Start: 2021-01-09 | End: 2021-01-10 | Stop reason: HOSPADM

## 2021-01-09 RX ORDER — FAMOTIDINE 20 MG/1
20 TABLET, FILM COATED ORAL 2 TIMES DAILY
Status: DISCONTINUED | OUTPATIENT
Start: 2021-01-09 | End: 2021-01-10 | Stop reason: HOSPADM

## 2021-01-09 RX ORDER — AMOXICILLIN 250 MG
2 CAPSULE ORAL 2 TIMES DAILY PRN
Status: DISCONTINUED | OUTPATIENT
Start: 2021-01-09 | End: 2021-01-10 | Stop reason: HOSPADM

## 2021-01-09 RX ORDER — FOLIC ACID 1 MG/1
1 TABLET ORAL DAILY
Status: DISCONTINUED | OUTPATIENT
Start: 2021-01-10 | End: 2021-01-10 | Stop reason: HOSPADM

## 2021-01-09 RX ADMIN — FAMOTIDINE 20 MG: 20 TABLET ORAL at 17:14

## 2021-01-09 RX ADMIN — LEVOTHYROXINE SODIUM 50 MCG: 0.05 TABLET ORAL at 04:31

## 2021-01-09 RX ADMIN — MORPHINE SULFATE 3 MG: 4 INJECTION INTRAVENOUS at 02:43

## 2021-01-09 RX ADMIN — DIPHENHYDRAMINE HYDROCHLORIDE 25 MG: 50 INJECTION INTRAMUSCULAR; INTRAVENOUS at 22:28

## 2021-01-09 RX ADMIN — DIPHENHYDRAMINE HYDROCHLORIDE 12.5 MG: 50 INJECTION, SOLUTION INTRAMUSCULAR; INTRAVENOUS at 11:36

## 2021-01-09 RX ADMIN — MIDODRINE HYDROCHLORIDE 10 MG: 5 TABLET ORAL at 07:14

## 2021-01-09 RX ADMIN — MIDODRINE HYDROCHLORIDE 10 MG: 5 TABLET ORAL at 17:14

## 2021-01-09 RX ADMIN — HYDROCORTISONE: 5 CREAM TOPICAL at 17:08

## 2021-01-09 RX ADMIN — FUROSEMIDE 20 MG: 10 INJECTION, SOLUTION INTRAMUSCULAR; INTRAVENOUS at 04:31

## 2021-01-09 RX ADMIN — FUROSEMIDE 20 MG: 10 INJECTION, SOLUTION INTRAMUSCULAR; INTRAVENOUS at 20:15

## 2021-01-09 RX ADMIN — MIDODRINE HYDROCHLORIDE 10 MG: 5 TABLET ORAL at 11:11

## 2021-01-09 RX ADMIN — ONDANSETRON 4 MG: 2 INJECTION INTRAMUSCULAR; INTRAVENOUS at 11:14

## 2021-01-09 RX ADMIN — MORPHINE SULFATE 3 MG: 4 INJECTION INTRAVENOUS at 22:28

## 2021-01-09 RX ADMIN — DIPHENHYDRAMINE HYDROCHLORIDE 12.5 MG: 50 INJECTION, SOLUTION INTRAMUSCULAR; INTRAVENOUS at 04:31

## 2021-01-09 RX ADMIN — MULTIPLE VITAMINS W/ MINERALS TAB 1 TABLET: TAB at 11:11

## 2021-01-09 ASSESSMENT — ENCOUNTER SYMPTOMS
SHORTNESS OF BREATH: 1
STRIDOR: 0
NAUSEA: 0
ORTHOPNEA: 1
MYALGIAS: 0
COUGH: 0
BLURRED VISION: 0
PALPITATIONS: 0
DEPRESSION: 0
APNEA: 0
FOCAL WEAKNESS: 0
SPUTUM PRODUCTION: 0
VOMITING: 0
CHEST TIGHTNESS: 0
SHORTNESS OF BREATH: 0
SENSORY CHANGE: 0
PHOTOPHOBIA: 0
HEMOPTYSIS: 0
ABDOMINAL PAIN: 0
ROS SKIN COMMENTS: ITCHING
BLOOD IN STOOL: 0
WHEEZING: 0
CHOKING: 0
DOUBLE VISION: 0
CHILLS: 0
FEVER: 0
SPEECH CHANGE: 0

## 2021-01-09 ASSESSMENT — PAIN DESCRIPTION - PAIN TYPE: TYPE: ACUTE PAIN

## 2021-01-09 ASSESSMENT — FIBROSIS 4 INDEX: FIB4 SCORE: 1.18

## 2021-01-09 NOTE — PROGRESS NOTES
"Pt expressed concerns about care with regards to diuresing, ultrasound of right lower extremity, meal tray being left at nursing station, no being able to sleep, vital signs not being monitored on a more regular basis, weight not decreasing faster, C-diff testing, Covid isolation and general lack of plan for hospital stay. This charge RN spoke with patient about all the above issues, 35 minutes were spent trying to resolve concerns and accommodate patient were possible. Spoke with bedside RN to follow up on ultra sound of lower extremity as test was ordered yesterday. This RN educated patient on isolation practices with regards to Covid and C-diff, pt was still not pleased with response stating \"my roommate said she had covid and she had all the symptoms they said 'she is negative' but I don't believe it\". Pt feels her care has been substandard, this RN will have physician team outline care decisions and plan going forward. This RN will speak with night shift team about interruptions and noise level during the evening time. Pt stated \"I feel I should be in one of the private rooms since my immune system is shot and people are here in the hospital with covid\". This RN again educated pt on covid policy for admission and that currently there were no open private rooms available, pt disagreed with explanation \"I walked the halls and they were all empty and I still have roommate\", RN educated that rooms are asgned based no patient need and most often assigned when the previous patient leaves, pt still dissatisfied with explanation. RN to follow up with plan of care, dietary, and pending tests.     0850: Cardiology team at the bedside. Plan to have ultrasound of right lower extremity, wrap right leg with ace wrap to help with decrease RLE edema, [facility] team to discuss benadryl dosing, extensive conversation on nutritional intake and furosemide dosing, vital signs to be take Q4h, pt not on telemetry at this time, RN to have " dietitian come to he bedside. Pt agreeable at this time to plan of care.

## 2021-01-09 NOTE — PROGRESS NOTES
[facility] [place] ATTENDING NOTE:   Vivek Dominique MD      Visit Time:   Attending/resident bedside rounds 9-11:30 AM     PATIENT ID  Name:             Pita Bueno   YOB: 1960  Age:                 60 y.o.  female   MRN:               [id number]  Admit:  1/6/2021     I saw and examined the patient and discussed the management with the resident staff.  I reviewed the resident's note and agree with the resident's findings and plan as documented in the resident's note except as documented in the attending note. Please reference resident daily note for complete information.    The chart was reviewed and summarized.  Available labs, imaging, O2 sats ,  EKGs were reviewed. Available nursing, consultant, and resident notes were reviewed. I am actively involved in the patient's care.                                                                            ______________________________________________________________________            60(   admit Jan 6, HF  )  INTERVAL:  Chart reviewed/summarized,       PYSCH: EATING disorder, , CBT, no meds inidcated, watch for cardiomyopathy, hypoglycemia, K, MG, PHOS stable      Jan 9AM: AF, HR 55, BP 99, 98% RA  R>L leg swelling, ambulatory w/o SOB or desats , US pending, compression:   itching: Benadryl, H2B, topicals, dry skin     WBC 3, ANC 2000, , H B9 (10), , Na 139, K 4.3, CO2 26, CR 0.57, CA 8.2,  PHOS 5.2, MG 2.1, , Cdiff neg, stool heme neg x 2, , PTH 91,   IgG level ?  VIT D 14 LL, MG 2.1, PHS 5.2, , CA 8.2  high output ostomy ?  --> recent Cdiff, recheck      Jan 8AM: AF, HR 59, BP 89/52, 96% RA  WBC 3.4 (3.8), HB 9.1, , ACL 65, K 3.9, CO2 28, BS 79, CR 0.46, MG 2.1, PHOS 4.6 , UA nweg  stool fat neg, HPYLORI neg, stool heme neg,   VIT D 14, MG 2.1, PHOS 4.6, ferrint 26, Foalte 8.8, FT4 1.05, retic 2.6      cardiology: HFpEF not excluded, no LVH, no LAE , no pulm infiltrates however ?  -- diuretics if tolerated ,  hypotension (no aortic stenosis or HTN)   midodrine, compression if tolerated , gabapentin may be needed for edema related neuropathic pain, but can worsen edema by itself         Jan 7AM:  AF< HR 65, BP 81, 97% RA  NPT: 23/24 , leg pain, edema, ambulatory , ALB 4   WBC 3.7, HB 10 (8.9),  (146), Na 136, K 4.2, CO2 23, BUN 38, CR 0.40 , MG 2.2, PHOS 3.8   MEDS: enox LD, lasix, LT4, midodrine  OK for general medicine if asymptomatic hypotension (chronic)         Jan  6PM  AF, HR 63, BP 92, 98% RA  Admit  leg edema (subacute)   ,  hx covid, cardiac arrythmias, inguinal adenopathy, neg ECHO, US legs, no CKD/no chronic liver disease, HIV neg, ALB 3.8   MEDS: enxo LD, lasix, LT4 50mcg, midodiren 10 TID, oxycodone     CORE:  Code Status (  FULL  --------------------------------------------------------------------------------------------------  Hospital Summary/ Patient System Review      NP:   *admit( alert, nonfocal  , B12 200  Impression: low B12, no dementia or neuropathy , --> IM /PO B12      EENT:   *admit(       MSK/PAIN:   *admit(  DAYNA neg, XRAY: scoliosis, DJD spine     CVS:   *admit(  BLE leg edema (worse after COVID ? )  SOB, orthopnea, JVD, no cardiomyopathy on exam , 3+ BLE edema , pBNP 559 (382) , trop 8 , EKG: SR, QTc 443/unchanGed  ECHO 1/7: EF  65%, No DD, RV, RA, LA wnl, valves wnl, RVSP 25   US Dec 26: no DVT noted BLE   Impression: BLE leg edema , normal echo, US legs neg , no CKD/no Chronic liver disease, ALB wnl  - > apparent venous stasis --> elevation, compression hose, cautious lasix (hypoperfusion risks) , abd/pelvic imaging no masses , midodrine   Impression: chronic Hypotension (mitodrine)  , asymptomatic  (don’t hold meds unless symptoms)         Dec 2020: US legs, ECHO wnl , RVSP wnl      PULM:   *admit(  clear lungs, never smoker      GI:   *admit(  BMI 18, wht loss, B12 200, Ferrint 26, VIT D 14LL,  PTH 91, CA 9, PHOS 4.5HN, ALB 3.8  Dietary: excessive weight (gaining weight) concerns,  excessive exercise ?  - eating disorder concerns (patient denies)   Impression: severe protein calorie chronic malnutrition, small bowel malabsorption by hx (no documentation of this however) , high output ostomy, small bowel overgrowth ? ,  preALB 15L , chronic wht loss (intake, malabsorpption?, consumption? (imaging neg for obvious cancer, cortisol, thyroid wnl ),  followed by GI , MVI   Impression: eating disorder ?  (patient denies)  - psychiatry if patient consents, otherwise PCP f/u , normal K, HCO3 against bulemia   Impression: hx coloscopy  (rectal prolpase repair, anastomy leak/abscess requiring colostomy   Impression: low B12, Ferritin, nutrition, achlorhydria, malabsorption?  (celiac neg) , SBBO?  (low B12, but usually higer folate)  - IV/IM B12, IV FE, VIT D   Impression: hx Cdiff (treated Sept 2020) -- repeat negative       2020: celiac neg  CT abd with: DJD spine, small effusions, small ascites, colon stool, olivia colostoy , sigmoid colon anastomosis, no SBO, liver wnl, GB/bile wnl , no  masses, pelvis wnl , US small ascites, nonspecific renal pelvis dilation +bilateral ureteral jets , no hydronephrosis      :   *admit(  UA neg, no protein loss     RENAL:   *admit(  Na 137, K 4.7, CO2 25, CR 0.59, CA 8.4, ALB 3.4 , MG 1.9      HEME:   *admit(  WBC 3.8, HB 8.9, , PTl 146,  ferritin 27 (OCT) ,  (Sept 2020) , B12 200L, Ferrint 26, foatle 8.8, FT4 1.05 , %FE sat 9 , Retic 2.6  --> HB 9  Impression: chronic cytopenias, macrocytosis, likely nutritional/inflammatory  MDS?   Impression: Common Variable immune def (CVID)  -- IVIG if indicated  (outpatient)   Impression: B12 def, r/o achlorhydria -- IM and PO B12 1000mcg  Impression: DONNA, Ferrtin 27 -->  r/o small bowel pathology vs achlorhydia  - IV FE, gastrin     ENDO:   *admit(  TSH 3.2, FT4 1.05  Impression: thyroid replacement 0.5mcg  Impression: VIT D 14, malabsorption vs eating disorder, replace, CA wnl, PHOS high normal ? , PTH high   (91  HIGH, c/w 2ndary hyperPTH due to VIT D def) , 11989 Qweek  - recheck CA, PHOS, VIT Dm, PTH  2 months      2020: cortisol 26-41     DERM/BREAST:   *admit(    Impression: stasis dermatitis, --> topicals, compression , midodrine     ID:   *admit(    Impresson: recent COVID (Dx Dec 10) , CDIff, HIV neg (2020) , hep ABC neg (2020)         2020: viral panel remote  CMV

## 2021-01-09 NOTE — PROGRESS NOTES
Daily Progress Note:     Date of Service: 1/8/2021  Primary Team: [facility] IM Red Team    Attending: Vivek Dominique M.D.   Senior Resident: Dr. Nichole   Contact:  [phone]    ID: This is a 60-year-old female with past medical history notable for CVA ID on IVIG infusion in the past, failed rectal prolapse repair which resulted in anatomic leak or abscess and ultimately needing to left upper quadrant colostomy, chronic malnutrition and hypothyroidism who was a direct admission from cardiology clinic for work-up of progressive edema and weight gain.  On presentation patient reported bilateral lower extremity edema since past 1 to 2 months and orthopnea.  Patient was hemodynamically stable. Cardiology on board.       Interval update:  No overnight events     Patient was seen and examined at the bedside this AM.  Patient reported right lower extremity pain, 8 out of 10 in intensity.  She was requesting for IV pain medication as oral was not helping.  She also reported shortness of breath which has improved since admission. RLE is more swollen compared to left, DVT study for RLE requested. Seen by cardiology plan to continue diuresis with IV lasix. Patient reporting nausea with daily IM B12 injections - plan to transition to weekly. IV Iron per pharmacy protocol ordered, PO order placed per cardiology discontinued for now. Will consider PO therapy at the time of discharge. Ph levels high - cardiology team to order PTH levels. Taken off telemetry monitoring yesterday.     CNS: Alert and oriented x4, no neurological deficits.  CVS: Previous sinus rhythm on telemetry - No chest pain   RESP: On room air. Intermittent SOB per patient but improving   GI: Ostomy bag in place, no signs of infection, good ostomy output since this morning.  No abdominal tenderness, distention.  Per patient takes up to 2000 michael/day.  Nutritionist following.  Kidney: Good UOP, creatinine stable.  Electrolyte/fluids: K4.2, sodium 136, no IV fluids.  ID:  No signs of infection  Heme: Hemoglobin at baseline 9-11, no signs of bleeding.. History of CVID, follows with hematology.    Consultants/Specialty:  Cardiology   Psychiatry     Review of Systems:    Review of Systems   Constitutional: Negative for chills and fever.   HENT: Negative for ear discharge and nosebleeds.    Eyes: Negative for blurred vision, double vision and photophobia.   Respiratory: Positive for shortness of breath. Negative for cough, hemoptysis and sputum production.    Cardiovascular: Positive for orthopnea and leg swelling. Negative for chest pain and palpitations.        Right more than left leg pain.   Gastrointestinal: Negative for abdominal pain, blood in stool, nausea and vomiting.        Increase output from ostomy bag.   Genitourinary: Negative for dysuria and urgency.   Musculoskeletal: Negative for myalgias.   Neurological: Negative for sensory change, speech change and focal weakness.   Psychiatric/Behavioral: Negative for depression.       Objective Data:   Physical Exam:   Vitals:   Temp:  [35.9 °C (96.7 °F)-36.5 °C (97.7 °F)] 35.9 °C (96.7 °F)  Pulse:  [59-67] 65  Resp:  [15-18] 18  BP: (78-97)/(49-63) 84/56  SpO2:  [95 %-100 %] 98 %   Physical Exam  Constitutional:       Comments: Malnutrition, muscle wasting   HENT:      Head: Normocephalic and atraumatic.      Nose: Nose normal.      Mouth/Throat:      Mouth: Mucous membranes are moist.   Eyes:      Extraocular Movements: Extraocular movements intact.      Pupils: Pupils are equal, round, and reactive to light.   Neck:      Musculoskeletal: Normal range of motion and neck supple.   Cardiovascular:      Rate and Rhythm: Normal rate and regular rhythm.      Pulses: Normal pulses.      Heart sounds: Normal heart sounds. No murmur.   Pulmonary:      Effort: Pulmonary effort is normal.      Breath sounds: Normal breath sounds.   Abdominal:      General: Abdomen is flat. There is no distension.      Palpations: Abdomen is soft.       Tenderness: There is no abdominal tenderness.      Comments: Ostomy bag in place, no signs of infection.   Musculoskeletal:         General: Tenderness present.      Right lower leg: Edema present.      Left lower leg: Edema present.      Comments: Increased swelling RLE and tenderness to palpation RLE    Skin:     General: Skin is warm.      Findings: No rash.   Neurological:      General: No focal deficit present.      Mental Status: She is alert and oriented to person, place, and time. Mental status is at baseline.      Cranial Nerves: No cranial nerve deficit.      Sensory: No sensory deficit.   Psychiatric:         Mood and Affect: Mood normal.           Labs:   Lab Results   Component Value Date/Time    WBC 3.4 (L) 01/08/2021 12:35 AM    RBC 2.79 (L) 01/08/2021 12:35 AM    HEMOGLOBIN 9.1 (L) 01/08/2021 12:35 AM    HEMATOCRIT 29.6 (L) 01/08/2021 12:35 AM    .1 (H) 01/08/2021 12:35 AM    MCH 32.6 01/08/2021 12:35 AM    MCHC 30.7 (L) 01/08/2021 12:35 AM    MPV 12.7 01/08/2021 12:35 AM    NEUTSPOLYS 60.40 01/08/2021 12:35 AM    LYMPHOCYTES 19.20 (L) 01/08/2021 12:35 AM    MONOCYTES 18.00 (H) 01/08/2021 12:35 AM    EOSINOPHILS 1.80 01/08/2021 12:35 AM    BASOPHILS 0.30 01/08/2021 12:35 AM    HYPOCHROMIA 1+ 09/07/2020 02:13 PM    ANISOCYTOSIS 1+ 09/07/2020 02:13 PM        Lab Results   Component Value Date/Time    SODIUM 138 01/08/2021 12:35 AM    POTASSIUM 3.9 01/08/2021 12:35 AM    CHLORIDE 103 01/08/2021 12:35 AM    CO2 28 01/08/2021 12:35 AM    GLUCOSE 79 01/08/2021 12:35 AM    BUN 27 (H) 01/08/2021 12:35 AM    CREATININE 0.46 (L) 01/08/2021 12:35 AM        Imaging:   EC-ECHOCARDIOGRAM COMPLETE W/O CONT   Final Result      US-EXTREMITY VENOUS LOWER UNILAT RIGHT    (Results Pending)     Problem Representation:   his is a 60-year-old female with past medical history notable for CVA ID on IVIG infusion in the past, failed rectal prolapse repair which resulted in anatomic leak or abscess and ultimately  needing to left upper quadrant colostomy, chronic malnutrition and hypothyroidism who was a direct admission from cardiology clinic for work-up of progressive edema and weight gain.  On presentation patient reported bilateral lower extremity edema since past 1 to 2 months and orthopnea.  Patient was hemodynamically stable. Cardiology on board.     Anasarca- (present on admission)  Assessment & Plan  - Per patient 30-35 weight gain in past 2 weeks  - Weight on admission 107 lbs, baseline 70-75 pounds.  - On admission EKG 1/6/21 Sinus Rhythm rate 68, ECHO on 12/22/2020 was Unremarkable  - No known Cardiovascular disease, but was recently diagnosed with COVID-19, Patient has malnutrition with albumin 4.0 but depressed prealbumin of 14.9, liver function test normal, urinalysis negative for proteinuria.  - Cardiology on board.  Currently diuresing with IV Lasix.  - Echocardiogram obtained on this admission left ventricular ejection fraction 65%, RVSP 25%.  - DVT study 12/26 negative   - Differential diagnosis include but not limited to venous insufficiency, left heart failure with preserved ejection fraction, malnutrition  - 01/08/21 c/o pain with palpation to RLE, increased swelling RLE - RLE DVT study requested      PLAN:  - Lasix 20 mg BID - Will be monitoring renal function and electrolytes while doing this   - Fluid restriction 1.5 L daily  - Daily weights standing scale  - Strict I and O   - Chronic Low BP 80-90, on midodrine, asymptomatic . Can continue lasix unless patient is symptomatic   - RLE DVT study to r/o ipsilateral DVT to that extremity, negative on presentation   - Plan to optimize nutritional status     Severe malnutrition (HCC)- (present on admission)  Assessment & Plan  - Severe protein  chronic malnutrition  - Patient probably weighs around 70-75 lbs. Weight 107 on presentation due to Anasarca.  - History of anorexia nervosa in teenage.  It appears that patient still has an eating disorder and not  just complication from ostomy as patient is over concerned regarding her weight and excessively exercises to up to 5 hours daily.    - Low Pancreatic lipase in the past, celiac disease negative  - Albumin 4.0, prealbumin low  - DDX: Eating disorder versus chronic malabsorption   - Nutritionist following, recommends outpatient follow-up with dietitian.  - Low B12 levels, Vitamin d levels and Iron deficiency anemia noted      PLAN:  - High protein diet  - Monitor for refeeding syndrome   - Nutritionist following  - Psychiatry evaluation requested for evaluation and concerns if any for psychiatric eating disorders  - IM cyanocobalamin replacement    - IV Iron replacement per pharmacy protocol ordered 01/08/21  - Vitamin D replacement, 50,000 units weekly x 8 - PCP to monitor levels in 8 weeks of initiation of therapy    Colostomy care ([facility])- (present on admission)  Assessment & Plan  - History of failed rectal prolapse repair which resulted in anatomic leak or abscess and ultimately needing to left upper quadrant colostomy followed by multiple surgeries in the past.  -On admission, patient mentioned that she had no output in more than 12 hrs, Usually she has very high output from back.    -On admission abdomen mild tenderness deep palpation RLQ, but not signs of peritoneal irritation,   -This morning patient reports good output from colostomy bag with resolution and abdominal distention.  She is eating and tolerating well.    Plan  -Continue to monitor  -Will consider imaging if worrisome symptoms   -Outpatient follow-up with GI.    Pancytopenia ([facility])- (present on admission)  Assessment & Plan  - Patient has Hx of Common variable immunodeficiency diagnosed in 2007, Patient used to have recurrent sinusitis prior to diagnosis  - She was seen in the past by Hem in [place], She received IVIG last maybe 1 year ago  -Seen by oncology Dr. Chang on previous hospitalization.  IgG was obtained which was low.  Patient is scheduled on  1/8 for IVIG infusion.    -Chronic pancytopenia with hemoglobin 9-11 , WBC 3.8, platelets 146  -Anemia work-up  Shows Vitamin B12 deficiency. Serum iron 28, total iron-binding capacity 327, ferritin 26.8 and vitamin B12 deficiency.  Stool occult in September 2020 negative, repeat stool occult test pending.  -On last admission hemolytic anemia work-up was negative.    Plan  Monitor with daily CBC  IM B12   IV Iron per pharmacy protocol 01/08/21  Obtain intrinsic factor, Gastrin levels, H.pylori.   Hemtology follow up in outpatient setting.     Hypothyroidism- (present on admission)  Assessment & Plan  - Hx of Hypothyroidism  - Currently on Synthroid 50 mcg  - Continue Home dose  - CTM

## 2021-01-09 NOTE — DIETARY
"Nutrition Services Update: RD has been following since 1/7 - re-consulted today for \"unexplained weight loss\"    Day 3 of admit. Cardiac diet. No ADL documentation.     Weight trend during admit:   1/6 = 48.9 kg  1/8 = 35.2 kg   13.7 kg (28%) weight loss since admit related to diuresis of anasarca, which per MD notes is thought to have been largely malnutrition related.     Charge RN contacted RD regarding pt complaints regarding meal trays not being what she had ordered. Had Nutrition Representative visit patient to verify selected meals. Pt thought that the standard turkey slice portion would be larger, therefore Nutrition Representative increased to 2 portions (6 oz turkey). Pt also states she did not receive the diet lemon lime soda, but changed drink order to iced tea from now on. Other than these complaints meals were as ordered.     Noted that pt has very low calorie selections in and has no protein foods at breakfast (cornflakes with no milk and fruit), tried to encourage pt to had a protein food to breakfast however she declined. States she typically has carbohydrates in the morning then works out and eats her protein after working out.     Overall, RD reviewed the meals that patient has ordered and total nutritional value is very minimal: ~1000 kcal and 57 gm protein. Attempted to encourage additional foods and protein at breakfast, however pt declined all offerings.     Plan/Recommend:   1. Psychiatry attempted visit today for possible eating disorder, however patient falling asleep when interview attempted - reeval planned for tomorrow per note  2. Meals adjusted per pt preference  3. Cardiology okay with liberalizing cardiac diet to regular (although pt's preferences are so restrictive that she did not want to order anything that would have not been allowed on cardiac diet)  4. RD will continue to follow during admission     RD following   "

## 2021-01-09 NOTE — PROGRESS NOTES
Spoke with MD Nichole about continuing to give the Lasix even with soft pressures, lasix will be given with midodrine.

## 2021-01-09 NOTE — CONSULTS
"PSYCHIATRIC INTAKE EVALUATION    *Reason for admission:  past medical history of Covid, nutritional deficiencies, arrhythmias who presented to her cardiologist office with bilateral lower extremity edema that is pitting and worsening.  She was also found to have inguinal lymphadenopathy.               *Reason for consult: possible eating disorder     *Requesting Physician/APN:  PACO Nichole MD        Legal Hold status:  not applicable         *Chief Complaint::I just took benadryl for itching and it makes me very sleepy     *HPI (includes Psychiatric ROS): 59 yo female who felt she could not participate because she \"will be\" asleep quickly because of the benadryl. Attempted to do a brief interview but she kept telling me she was falling asleep and could not participate.    Chart reviewed.     Notes:   1/6 Cardiac consult:  In the fall she developed C. difficile and then the following month Covid.  These had a substantial impact on her appetite and resulted in 10 pound weight loss.   1/6: MD: history of ostomy placement due to bowel complications after a surgery.    1/6 MD: ....Likely has an Eating disorder and not just complications from ostomy, she was very worried that her weight gain was not solely fluid retention and expressed relief after we reassure her that her weight gain was only due to Fluid retention....- She also stated that usually she exercises daily about 4 to 4 1/2 hours daily, she walks, does stairs and swimming.    *Medical Review Of Symptoms (not dx conditions):   Review of Systems   Unable to perform ROS: Psychiatric disorder     *Psychiatric Examination:   Vitals:   Vitals:    01/08/21 2035 01/08/21 2038 01/09/21 0438 01/09/21 0750   BP: (!) 86/52  (!) 94/64 (!) 99/60   Pulse: 66  (!) 58 (!) 55   Resp: 18  18 20   Temp: 36.1 °C (96.9 °F)  (!) 35.8 °C (96.4 °F) 35.8 °C (96.5 °F)   TempSrc: Temporal  Temporal Temporal   SpO2: 96%  99% 98%   Weight:  35.2 kg (77 lb 9.6 oz)     Height:         General " Appearance:very  thin, good eye contact and not willing/able to cooperate  Abnormal Movements: none   Gait and Posture: not observed  Speech: minimal  Thought Process: normal rate  Associations:   perseverant on that she will be falling asleep and can't participate  Abnormal or Psychotic Thoughts: unable to assess  Judgement and Insight: unable to assess   Orientation: grossly intact  Recent and Remote Memory: unable to determine  Attention Span and Concentration: intact  Language:fluent  Fund of Knowledge: not tested  Mood and Affect: unable to asssess  SI/HI:  unable to assess    *PAST MEDICAL/PSYCH/FAMILY/SOCIAL(as reported by patient):       *medical hx:           Past Medical History:   Diagnosis Date   • Colostomy in place (HCC)    • Eating disorder    • Hypotension    • Hypothyroid    • Palpitations      Past Surgical History:   Procedure Laterality Date   • OTHER ABDOMINAL SURGERY      rectal prolapse repair, anastomy leak/abcess,colostony        *psychiatric hx:  None on file     *family Psych hx:  alcoholism in dad per records     *social hx: unable to obtain  Alcohol:  unable to obtain  Drugs: unable to obtain     *MEDICAL HX: labs, MARS, medications, etc were reviewed. Only those findings of potential interest to psychiatry are noted below:    *Current Medical issues:   see below     *Allergies:  Allergies   Allergen Reactions   • Sulfa Drugs Hives      *Current Medications:    Current Facility-Administered Medications:   •  therapeutic multivitamin-minerals (THERAGRAN-M) tablet 1 Tab, 1 Tab, Oral, DAILY, Abran Calvillo M.D., 1 Tab at 01/09/21 1111  •  [START ON 1/15/2021] cyanocobalamin (VITAMIN B-12) injection 1,000 mcg, 1,000 mcg, Intramuscular, Q7 DAYS, Torsten Nichole M.D.  •  acetaminophen (Tylenol) tablet 650 mg, 650 mg, Oral, Once, Torsten Nichole M.D.  •  diphenhydrAMINE (BENADRYL) tablet/capsule 25 mg, 25 mg, Oral, Once **OR** diphenhydrAMINE (BENADRYL) injection 25 mg, 25 mg, Intravenous, Once,  Torsten Nichole M.D.  •  iron dextran complex (INFED) 25 mg in NS 50 mL IVPB, 25 mg, Intravenous, Once, Torsten Nichole M.D.  •  iron dextran complex (INFED) 1,375 mg in  mL IVPB, 1,375 mg, Intravenous, Once, Torsten Nichole M.D.  •  vitamin D (Ergocalciferol) (DRISDOL) capsule 50,000 Units, 50,000 Units, Oral, Q7 DAYS, Torsten Nichole M.D., 50,000 Units at 01/08/21 1330  •  [CANCELED] Special Contact Isolation, , , CONTINUOUS **AND** [COMPLETED] C Diff by PCR rflx Toxin, , , Once **AND** Pharmacy Consult Request, 1 Each, Other, PHARMACY TO DOSE, Torsten Nichole M.D.  •  morphine (pf) 4 mg/mL injection 3 mg, 3 mg, Intravenous, Q4HRS PRN, Torsten Nichole M.D., 3 mg at 01/09/21 0243  •  diphenhydrAMINE (BENADRYL) injection 12.5 mg, 12.5 mg, Intravenous, Q6HRS PRN, Torsten Nichole M.D., 12.5 mg at 01/09/21 0431  •  ondansetron (ZOFRAN) syringe/vial injection 4 mg, 4 mg, Intravenous, Q6HRS PRN, Vivek Dominique M.D., 4 mg at 01/09/21 1114  •  furosemide (LASIX) injection 20 mg, 20 mg, Intravenous, BID DIURETIC, Vivek Dominique M.D., 20 mg at 01/09/21 0431  •  levothyroxine (SYNTHROID) tablet 50 mcg, 50 mcg, Oral, AM ES, Hai Bustos M.D., 50 mcg at 01/09/21 0431  •  midodrine (PROAMATINE) tablet 10 mg, 10 mg, Oral, TID WITH MEALS, Hai Bustos M.D., 10 mg at 01/09/21 1111  •  enoxaparin (LOVENOX) inj 40 mg, 40 mg, Subcutaneous, DAILY, Hai Bustos M.D.  •  acetaminophen (TYLENOL) tablet 1,000 mg, 1,000 mg, Oral, TID PRN, Hai Bustos M.D.  *ECG: personally reviewed QTc 456  *Cranial Imaging: none   *Labs:  Recent Labs     01/07/21  0028 01/08/21  0035 01/09/21  0014   WBC 3.7* 3.4* 3.0*   RBC 3.10* 2.79* 2.75*   HEMOGLOBIN 10.1* 9.1* 9.0*   HEMATOCRIT 32.9* 29.6* 29.0*   .1* 106.1* 105.5*   MCH 32.6 32.6 32.7   RDW 53.9* 54.0* 52.2*   PLATELETCT 168 158* 156*   MPV 12.7 12.7 12.4   NEUTSPOLYS 58.50 60.40  --    LYMPHOCYTES 23.60 19.20*  --    MONOCYTES 14.70* 18.00*  --    EOSINOPHILS 2.40 1.80  --   "  BASOPHILS 0.30 0.30  --      Lab Results   Component Value Date/Time    SODIUM 139 01/09/2021 12:14 AM    POTASSIUM 4.3 01/09/2021 12:14 AM    CHLORIDE 101 01/09/2021 12:14 AM    CO2 26 01/09/2021 12:14 AM    GLUCOSE 74 01/09/2021 12:14 AM    BUN 21 01/09/2021 12:14 AM    CREATININE 0.57 01/09/2021 12:14 AM      Lab Results   Component Value Date/Time    FREET4 1.05 01/07/2021 0028         *ASSESSMENT/RECOMENDATIONS:    1. Eating disorder unspc: most likely restrictive type  - from notes she exercises excessively,  mention of many bowel movements, requiring ostomy emptying frequently, and being a \"picky eater\" (white meat, vegetables and egg whites)    2. Medical:  -common variable immunodeficiency  -hypothyroidism  -ostomy 2013 secondary to rectal prolapse complications  -anasacra  -severe malnutrition  -hx of covid    Legal hold: not applicable. Unfortunately, the anorexia/restrictive type eating disorders have a poor prognosis and are vulnerable to cardiomyopathies. Be cautious with meds that can lower BS such as bactrim, because they do not have glycogen reserves and any further lowering of BS can cause a cardiac arrest.  She is noted to have cardiology on board on this admit  The best treatment for this condition, which has shown to have benefit, is family therapy with the patient.. There are no medications identified that have shown efficacy in improving the disorder. In addition this patient has GI issues that might worsen her eating disorder. Will reeval tomorrow.    Regarding nutrition: if she shows electrolyte and BS changes that are medically compromising, a cortrak should be considered to stabilize her medically.    If pt shows interest, there is an oupt eating disorder clinic in [place]. Will discuss this with her.      Observation status: routine  Privileges (while on legal hold): no restrictions     *Will Follow  *Thank you for the consult      "

## 2021-01-09 NOTE — PROGRESS NOTES
Cardiology Follow Up Progress Note    Date of Service  1/9/2021    Attending Physician  Hannah Orourke M.D.          Presented as a direct admit from cardiology clinic secondary to significant volume overload/weight gain (per patient report 30 pounds in the past couple of weeks) lower extremity edema, dyspnea, orthopnea      Past medical history significant for chronic malnutrition, common variable immunodeficiency, hypothyroid, recent Covid infection, colostomy (2013) due to complications from rectal prolapse, recent CAD    Interim Events    Off of telemetry monitoring.  Diuresed well overnight.  Weight down.  Right lower extremity edema persists, ultrasound pending, recommend wrapping right leg with Ace wrap to help with edema, elevation.  Chronic hypotension, on midodrine, asymptomatic.  Ambulates in the [place]ano several times a day without difficulty.  Labs reviewed.  Phosphorus high, elevated parathyroid hormone levels  Sodium, potassium, creatinine, Mg stable  NT proBNP 559  Serum selenium pending   Detailed discussion>25 minutes about cardiac and noncardiac issues.        Review of Systems  Review of Systems   Respiratory: Negative for apnea, cough, choking, chest tightness, shortness of breath, wheezing and stridor.    Cardiovascular: Positive for leg swelling. Negative for chest pain.        Right lower extremity edema, tenderness   Skin:        itching       Vital signs in last 24 hours  Temp:  [35.8 °C (96.4 °F)-36.1 °C (96.9 °F)] 35.8 °C (96.5 °F)  Pulse:  [55-66] 55  Resp:  [18-20] 20  BP: (78-99)/(49-64) 99/60  SpO2:  [96 %-99 %] 98 %    Physical Exam  Physical Exam  Constitutional:       Comments: Frail, muscle wasting, malnutrition   Cardiovascular:      Rate and Rhythm: Normal rate and regular rhythm.   Pulmonary:      Effort: Pulmonary effort is normal.   Abdominal:      General: Abdomen is flat.   Skin:     General: Skin is warm.      Comments: colostomy bag in place   Neurological:      General: No  focal deficit present.      Mental Status: She is alert.   Psychiatric:         Mood and Affect: Mood normal.         Lab Review  Lab Results   Component Value Date/Time    WBC 3.0 (L) 01/09/2021 12:14 AM    RBC 2.75 (L) 01/09/2021 12:14 AM    HEMOGLOBIN 9.0 (L) 01/09/2021 12:14 AM    HEMATOCRIT 29.0 (L) 01/09/2021 12:14 AM    .5 (H) 01/09/2021 12:14 AM    MCH 32.7 01/09/2021 12:14 AM    MCHC 31.0 (L) 01/09/2021 12:14 AM    MPV 12.4 01/09/2021 12:14 AM      Lab Results   Component Value Date/Time    SODIUM 139 01/09/2021 12:14 AM    POTASSIUM 4.3 01/09/2021 12:14 AM    CHLORIDE 101 01/09/2021 12:14 AM    CO2 26 01/09/2021 12:14 AM    GLUCOSE 74 01/09/2021 12:14 AM    BUN 21 01/09/2021 12:14 AM    CREATININE 0.57 01/09/2021 12:14 AM      Lab Results   Component Value Date/Time    ASTSGOT 19 01/07/2021 12:28 AM    ALTSGPT 37 01/07/2021 12:28 AM     Lab Results   Component Value Date/Time    CHOLSTRLTOT 235 (H) 02/05/2020 02:04 PM     (H) 02/05/2020 02:04 PM    HDL 79 02/05/2020 02:04 PM    TRIGLYCERIDE 124 02/05/2020 02:04 PM    TROPONINT 8 12/26/2020 03:40 PM       Recent Labs     01/06/21  1217   NTPROBNP 559*       Cardiac Imaging and Procedures Review  EKG: Sinus rhythm    Echocardiogram:   1/7/2021  LVEF 65%    Cardiac Catheterization: Not applicable    Imaging  Chest X-Ray: No evidence of acute pulmonary process    Stress Test: Not applicable    Assessment/Plan    Acute lower extremity edema  Weight gain  Anasarca on admission, weight 107 LBS on admission, dry weight 70-75 LBS  -Continue with IV furosemide  -DVT study 12/26/2019, negative  -Repeat venous studies pending  -Suspect HFpEF versus chronic malnutrition versus venous insufficiency  -Echocardiogram 1/7/2021, reassuring  -Daily standing weight  -Strict intake and output  -Optimize nutritional status    Severe malnutrition  -Patient dry weight 70-75 LBS  -History of anorexia nervosa in teenage years with ongoing eating disorder  -Excessive  exercise  -Psych evaluation  -Nutrition consult      Iron deficiency anemia  -IV iron replacement per pharmacy protocol    Low vitamin D  - vitamin D replacement    History of Common variable immunodeficiency, 2007  -Prior IVIG infusion  -Hematology follow-up as an outpatient    Hypotension  -Continue with midodrine 10 mg 3 times daily  -Asymptomatic      History of rectal prolapse repair with complications  -Now with colostomy  -Query chronic malabsorption  -Follow-up with GI      Recent Covid + 12/10/2020    C. difficile infection  -Was treated September 2020  -C. difficile negative this admission      Cardiology will follow along      Please contact me with any questions.    URIAH Black.   Cardiologist, [facility]  [phone]

## 2021-01-10 ENCOUNTER — TELEPHONE (OUTPATIENT)
Dept: CARDIOLOGY | Facility: MEDICAL CENTER | Age: 61
End: 2021-01-10

## 2021-01-10 VITALS
RESPIRATION RATE: 16 BRPM | WEIGHT: 83.55 LBS | HEART RATE: 56 BPM | OXYGEN SATURATION: 100 % | TEMPERATURE: 96.7 F | HEIGHT: 64 IN | SYSTOLIC BLOOD PRESSURE: 86 MMHG | DIASTOLIC BLOOD PRESSURE: 49 MMHG | BODY MASS INDEX: 14.26 KG/M2

## 2021-01-10 LAB
ANION GAP SERPL CALC-SCNC: 8 MMOL/L (ref 7–16)
BUN SERPL-MCNC: 25 MG/DL (ref 8–22)
CALCIUM SERPL-MCNC: 8.4 MG/DL (ref 8.5–10.5)
CHLORIDE SERPL-SCNC: 102 MMOL/L (ref 96–112)
CO2 SERPL-SCNC: 26 MMOL/L (ref 20–33)
CREAT SERPL-MCNC: 0.66 MG/DL (ref 0.5–1.4)
GLUCOSE SERPL-MCNC: 77 MG/DL (ref 65–99)
MAGNESIUM SERPL-MCNC: 2 MG/DL (ref 1.5–2.5)
NT-PROBNP SERPL IA-MCNC: 121 PG/ML (ref 0–125)
PHOSPHATE SERPL-MCNC: 5.4 MG/DL (ref 2.5–4.5)
POTASSIUM SERPL-SCNC: 4.4 MMOL/L (ref 3.6–5.5)
SODIUM SERPL-SCNC: 136 MMOL/L (ref 135–145)

## 2021-01-10 PROCEDURE — 36415 COLL VENOUS BLD VENIPUNCTURE: CPT

## 2021-01-10 PROCEDURE — 84100 ASSAY OF PHOSPHORUS: CPT

## 2021-01-10 PROCEDURE — A9270 NON-COVERED ITEM OR SERVICE: HCPCS | Performed by: STUDENT IN AN ORGANIZED HEALTH CARE EDUCATION/TRAINING PROGRAM

## 2021-01-10 PROCEDURE — 83735 ASSAY OF MAGNESIUM: CPT

## 2021-01-10 PROCEDURE — 700111 HCHG RX REV CODE 636 W/ 250 OVERRIDE (IP): Performed by: STUDENT IN AN ORGANIZED HEALTH CARE EDUCATION/TRAINING PROGRAM

## 2021-01-10 PROCEDURE — 99239 HOSP IP/OBS DSCHRG MGMT >30: CPT | Mod: GC | Performed by: INTERNAL MEDICINE

## 2021-01-10 PROCEDURE — A9270 NON-COVERED ITEM OR SERVICE: HCPCS | Performed by: INTERNAL MEDICINE

## 2021-01-10 PROCEDURE — 700111 HCHG RX REV CODE 636 W/ 250 OVERRIDE (IP): Performed by: INTERNAL MEDICINE

## 2021-01-10 PROCEDURE — 700102 HCHG RX REV CODE 250 W/ 637 OVERRIDE(OP): Performed by: STUDENT IN AN ORGANIZED HEALTH CARE EDUCATION/TRAINING PROGRAM

## 2021-01-10 PROCEDURE — 99232 SBSQ HOSP IP/OBS MODERATE 35: CPT | Performed by: INTERNAL MEDICINE

## 2021-01-10 PROCEDURE — 83880 ASSAY OF NATRIURETIC PEPTIDE: CPT

## 2021-01-10 PROCEDURE — 80048 BASIC METABOLIC PNL TOTAL CA: CPT

## 2021-01-10 PROCEDURE — 700102 HCHG RX REV CODE 250 W/ 637 OVERRIDE(OP): Performed by: INTERNAL MEDICINE

## 2021-01-10 RX ORDER — ERGOCALCIFEROL 1.25 MG/1
50000 CAPSULE ORAL
Qty: 5 CAP | Refills: 0 | Status: SHIPPED | OUTPATIENT
Start: 2021-01-15 | End: 2021-01-20 | Stop reason: SDUPTHER

## 2021-01-10 RX ORDER — M-VIT,TX,IRON,MINS/CALC/FOLIC 27MG-0.4MG
1 TABLET ORAL DAILY
Qty: 30 TAB | Refills: 11 | Status: SHIPPED | OUTPATIENT
Start: 2021-01-11 | End: 2021-01-19

## 2021-01-10 RX ORDER — LANOLIN ALCOHOL/MO/W.PET/CERES
100 CREAM (GRAM) TOPICAL DAILY
Qty: 30 TAB | Refills: 0 | Status: SHIPPED | OUTPATIENT
Start: 2021-01-11 | End: 2021-01-19

## 2021-01-10 RX ORDER — FUROSEMIDE 20 MG/1
20 TABLET ORAL PRN
Qty: 15 TAB | Refills: 0 | Status: SHIPPED | OUTPATIENT
Start: 2021-01-10 | End: 2021-01-19

## 2021-01-10 RX ORDER — FOLIC ACID 1 MG/1
1 TABLET ORAL DAILY
Qty: 30 TAB | Refills: 0 | Status: SHIPPED | OUTPATIENT
Start: 2021-01-11 | End: 2021-01-19

## 2021-01-10 RX ADMIN — FUROSEMIDE 20 MG: 10 INJECTION, SOLUTION INTRAMUSCULAR; INTRAVENOUS at 09:34

## 2021-01-10 RX ADMIN — HYDROCORTISONE: 5 CREAM TOPICAL at 05:09

## 2021-01-10 RX ADMIN — DIPHENHYDRAMINE HYDROCHLORIDE 25 MG: 50 INJECTION INTRAMUSCULAR; INTRAVENOUS at 05:09

## 2021-01-10 RX ADMIN — Medication 100 MG: at 05:09

## 2021-01-10 RX ADMIN — FAMOTIDINE 20 MG: 20 TABLET ORAL at 05:09

## 2021-01-10 RX ADMIN — LEVOTHYROXINE SODIUM 50 MCG: 0.05 TABLET ORAL at 05:09

## 2021-01-10 RX ADMIN — MULTIPLE VITAMINS W/ MINERALS TAB 1 TABLET: TAB at 05:09

## 2021-01-10 RX ADMIN — MIDODRINE HYDROCHLORIDE 10 MG: 5 TABLET ORAL at 07:30

## 2021-01-10 RX ADMIN — FOLIC ACID 1 MG: 1 TABLET ORAL at 05:09

## 2021-01-10 ASSESSMENT — FIBROSIS 4 INDEX
FIB4 SCORE: 1.18
FIB4 SCORE: 1.18

## 2021-01-10 ASSESSMENT — ENCOUNTER SYMPTOMS
ROS SKIN COMMENTS: ITCHING
SHORTNESS OF BREATH: 0
CHOKING: 0
CHEST TIGHTNESS: 0
APNEA: 0
COUGH: 0
STRIDOR: 0
WHEEZING: 0

## 2021-01-10 NOTE — PROGRESS NOTES
Bedside report received, assumed care of patient. Pt is A+O x 4. No acute distress noted. nformed of patient of safety and call bell system. Bed is low/locked, call bell within reach.  Pt refuses bed alarm and ambulates independently in hallway.

## 2021-01-10 NOTE — PROGRESS NOTES
Daily Progress Note:     Date of Service: 1/9/2021  Primary Team: [facility] IM Red Team    Attending: Vivek Dominique M.D.   Senior Resident: Dr. House   Contact:  [phone]    ID: This is a 60-year-old female with past medical history notable for CVA ID on IVIG infusion in the past, failed rectal prolapse repair which resulted in anatomic leak or abscess and ultimately needing to left upper quadrant colostomy, chronic malnutrition and hypothyroidism who was a direct admission from cardiology clinic for work-up of progressive edema and weight gain.  On presentation patient reported bilateral lower extremity edema since past 1 to 2 months and orthopnea.  Patient was hemodynamically stable. Cardiology on board.       Interval update:  No overnight events, pt upset this morning frustrated by not having a clear answer for her symptoms.  Complaining of itching and says her mouth feels dry. Cardiology and myself addressed her issues in depth. Pt has chronic symptoms that are difficult to manage. Suspect venous stasis and malnutrition to be contributing to her edema given largely unremarkable heart, liver, and kidney tests. UA negative for protein. Will try famotidine, increasing benadryl, and steroid cream for her itching. DVT LE study negative for DVT. Will try compression wraps for her legs to improve the edema. Must be careful with lasix given pt may be becoming dehydrated. She has poor absorption, so will optimize vitamins.  She is back to her baseline weight.     CNS: Alert and oriented x4, no neurological deficits.  CVS: Previous sinus rhythm on telemetry - episode of chest pain last night, with minimal ST changes inferior leads on EKG. Symptoms resolved this morning    RESP: On room air. Intermittent SOB per patient but improving   GI: Ostomy bag in place, no signs of infection, good ostomy output.  No abdominal tenderness, distention.  Per patient takes up to 2000 michael/day.  Nutritionist following.  Kidney: Good  UOP, creatinine stable.  Electrolyte/fluids: K4.3, sodium 139, no IV fluids.  ID: No signs of infection  Heme: Hemoglobin at baseline 9-11, no signs of bleeding.. History of CVID, follows with hematology.    Consultants/Specialty:  Cardiology   Psychiatry     Review of Systems:    Review of Systems   Constitutional: Negative for chills and fever.   HENT: Negative for ear discharge and nosebleeds.    Eyes: Negative for blurred vision, double vision and photophobia.   Respiratory: Positive for shortness of breath. Negative for cough, hemoptysis and sputum production.    Cardiovascular: Positive for orthopnea and leg swelling. Negative for chest pain and palpitations.        Right more than left leg pain.   Gastrointestinal: Negative for abdominal pain, blood in stool, nausea and vomiting.        Increase output from ostomy bag.   Genitourinary: Negative for dysuria and urgency.   Musculoskeletal: Negative for myalgias.   Neurological: Negative for sensory change, speech change and focal weakness.   Psychiatric/Behavioral: Negative for depression.       Objective Data:   Physical Exam:   Vitals:   Temp:  [35.8 °C (96.4 °F)-36.8 °C (98.3 °F)] 36.8 °C (98.3 °F)  Pulse:  [51-66] 51  Resp:  [18-20] 18  BP: (84-99)/(52-64) 84/52  SpO2:  [96 %-100 %] 99 %   Physical Exam  Constitutional:       Comments: Malnutrition, muscle wasting   HENT:      Head: Normocephalic and atraumatic.      Nose: Nose normal.      Mouth/Throat:      Mouth: Mucous membranes are moist.   Eyes:      Extraocular Movements: Extraocular movements intact.      Pupils: Pupils are equal, round, and reactive to light.   Neck:      Musculoskeletal: Normal range of motion and neck supple.   Cardiovascular:      Rate and Rhythm: Normal rate and regular rhythm.      Pulses: Normal pulses.      Heart sounds: Normal heart sounds. No murmur.   Pulmonary:      Effort: Pulmonary effort is normal.      Breath sounds: Normal breath sounds.   Abdominal:      General:  Abdomen is flat. There is no distension.      Palpations: Abdomen is soft.      Tenderness: There is no abdominal tenderness.      Comments: Ostomy bag in place, no signs of infection.   Musculoskeletal:         General: Tenderness present.      Right lower leg: Edema present.      Left lower leg: Edema present.      Comments: Increased swelling RLE and tenderness to palpation RLE    Skin:     General: Skin is warm.      Findings: No rash.   Neurological:      General: No focal deficit present.      Mental Status: She is alert and oriented to person, place, and time. Mental status is at baseline.      Cranial Nerves: No cranial nerve deficit.      Sensory: No sensory deficit.   Psychiatric:         Mood and Affect: Mood normal.           Labs:   Lab Results   Component Value Date/Time    WBC 3.0 (L) 01/09/2021 12:14 AM    RBC 2.75 (L) 01/09/2021 12:14 AM    HEMOGLOBIN 9.0 (L) 01/09/2021 12:14 AM    HEMATOCRIT 29.0 (L) 01/09/2021 12:14 AM    .5 (H) 01/09/2021 12:14 AM    MCH 32.7 01/09/2021 12:14 AM    MCHC 31.0 (L) 01/09/2021 12:14 AM    MPV 12.4 01/09/2021 12:14 AM    NEUTSPOLYS 60.40 01/08/2021 12:35 AM    LYMPHOCYTES 19.20 (L) 01/08/2021 12:35 AM    MONOCYTES 18.00 (H) 01/08/2021 12:35 AM    EOSINOPHILS 1.80 01/08/2021 12:35 AM    BASOPHILS 0.30 01/08/2021 12:35 AM    HYPOCHROMIA 1+ 09/07/2020 02:13 PM    ANISOCYTOSIS 1+ 09/07/2020 02:13 PM        Lab Results   Component Value Date/Time    SODIUM 139 01/09/2021 12:14 AM    POTASSIUM 4.3 01/09/2021 12:14 AM    CHLORIDE 101 01/09/2021 12:14 AM    CO2 26 01/09/2021 12:14 AM    GLUCOSE 74 01/09/2021 12:14 AM    BUN 21 01/09/2021 12:14 AM    CREATININE 0.57 01/09/2021 12:14 AM        Imaging:   US-EXTREMITY VENOUS LOWER UNILAT RIGHT         EC-ECHOCARDIOGRAM COMPLETE W/O CONT   Final Result        Problem Representation:   his is a 60-year-old female with past medical history notable for CVA ID on IVIG infusion in the past, failed rectal prolapse repair which  resulted in anatomic leak or abscess and ultimately needing to left upper quadrant colostomy, chronic malnutrition and hypothyroidism who was a direct admission from cardiology clinic for work-up of progressive edema and weight gain.  On presentation patient reported bilateral lower extremity edema since past 1 to 2 months and orthopnea.  Patient was hemodynamically stable. Cardiology on board.     Anasarca- (present on admission)  Assessment & Plan  - Per patient 30-35 weight gain in past 2 weeks  - Weight on admission 107 lbs, baseline 70-75 pounds.  - On admission EKG 1/6/21 Sinus Rhythm rate 68, ECHO on 12/22/2020 was Unremarkable  - No known Cardiovascular disease, but was recently diagnosed with COVID-19, Patient has malnutrition with albumin 4.0 but depressed prealbumin of 14.9, liver function test normal, urinalysis negative for proteinuria.  - Cardiology on board.  Currently diuresing with IV Lasix.  - Echocardiogram obtained on this admission left ventricular ejection fraction 65%, RVSP 25%.  - DVT study 12/26 negative   - Differential diagnosis include but not limited to venous insufficiency, left heart failure with preserved ejection fraction, malnutrition  - 01/08/21 c/o pain with palpation to RLE, increased swelling RLE - RLE DVT study requested      PLAN:  - Lasix 20 mg BID - Will be monitoring renal function and electrolytes while doing this. Close to euvolemia, will try ACE wraps and consider cutting down or stopping lasix    - Fluid restriction 1.5 L daily  - Daily weights standing scale  - Strict I and O   - Chronic Low BP 80-90, on midodrine, asymptomatic . Can continue lasix unless patient is symptomatic   - RLE DVT study neg  - Plan to optimize nutritional status   -Plan for discharge tomorrow     Severe malnutrition (HCC)- (present on admission)  Assessment & Plan  - Severe protein  chronic malnutrition  - Patient probably weighs around 70-75 lbs. Weight 107 on presentation due to Anasarca.  -  History of anorexia nervosa in teenage.  It appears that patient still has an eating disorder and not just complication from ostomy as patient is over concerned regarding her weight and excessively exercises to up to 5 hours daily.    - Low Pancreatic lipase in the past, celiac disease negative  - Albumin 4.0, prealbumin low  - DDX: Eating disorder versus chronic malabsorption   - Nutritionist following, recommends outpatient follow-up with dietitian.  - Low B12 levels, Vitamin d levels and Iron deficiency anemia noted      PLAN:  - High protein diet  - Monitor for refeeding syndrome   - Nutritionist following  - Psychiatry evaluation requested for evaluation and concerns if any for psychiatric eating disorders  - IM cyanocobalamin replacement    - IV Iron replacement per pharmacy protocol ordered 01/08/21  - Vitamin D replacement, 50,000 units weekly x 8 - PCP to monitor levels in 8 weeks of initiation of therapy    Colostomy care ([facility])- (present on admission)  Assessment & Plan  - History of failed rectal prolapse repair which resulted in anatomic leak or abscess and ultimately needing to left upper quadrant colostomy followed by multiple surgeries in the past.  -On admission, patient mentioned that she had no output in more than 12 hrs, Usually she has very high output from back.    -On admission abdomen mild tenderness deep palpation RLQ, but not signs of peritoneal irritation,   -This morning patient reports good output from colostomy bag with resolution and abdominal distention.  She is eating and tolerating well.    Plan  -Continue to monitor  -Will consider imaging if worrisome symptoms   -Outpatient follow-up with GI.    Pancytopenia ([facility])- (present on admission)  Assessment & Plan  - Patient has Hx of Common variable immunodeficiency diagnosed in 2007, Patient used to have recurrent sinusitis prior to diagnosis  - She was seen in the past by Hem in [place], She received IVIG last maybe 1 year ago  -Seen by  oncology Dr. Chang on previous hospitalization.  IgG was obtained which was low.  Patient is scheduled on 1/8 for IVIG infusion.    -Chronic pancytopenia with hemoglobin 9-11 , WBC 3.8, platelets 146  -Anemia work-up  Shows Vitamin B12 deficiency. Serum iron 28, total iron-binding capacity 327, ferritin 26.8 and vitamin B12 deficiency.  Stool occult in September 2020 negative, repeat stool occult test pending.  -On last admission hemolytic anemia work-up was negative.    Plan  Monitor with daily CBC  IM B12   IV Iron per pharmacy protocol 01/08/21  Obtain intrinsic factor, Gastrin levels, H.pylori.   Hemtology follow up in outpatient setting.     Hypothyroidism- (present on admission)  Assessment & Plan  - Hx of Hypothyroidism  - Currently on Synthroid 50 mcg  - Continue Home dose  - CTM

## 2021-01-10 NOTE — PROGRESS NOTES
Cardiology Follow Up Progress Note    Date of Service  1/10/2021    Attending Physician  Hannah Orourke M.D.          Presented as a direct admit from cardiology clinic secondary to significant volume overload/weight gain (per patient report 30 pounds in the past couple of weeks) lower extremity edema, dyspnea, orthopnea      Past medical history significant for chronic malnutrition, common variable immunodeficiency, hypothyroid, recent Covid infection, colostomy (2013) due to complications from rectal prolapse, recent CAD    Interim Events    Right lower extremity edema improved after  wrapping right leg with ace wrap & elevation.  Doppler studies negative for DVT  Chronic hypotension, on midodrine.  Ambulates in the [place] several times a day without difficulty.  Labs reviewed.  Low vitamin D, low vitamin B12  C-reactive protein normal  Elevated PTH  Serum sodium, potassium, creatinine, Mg stable  NT proBNP 559 on admission  With repeat proBNP prior to discharge  Serum selenium pending   Detailed discussion>25 minutes about cardiac and noncardiac issues.        Review of Systems  Review of Systems   Respiratory: Negative for apnea, cough, choking, chest tightness, shortness of breath, wheezing and stridor.    Cardiovascular: Positive for leg swelling. Negative for chest pain.        Right lower extremity edema, tenderness   Skin:        itching       Vital signs in last 24 hours  Temp:  [36 °C (96.8 °F)-36.8 °C (98.3 °F)] 36 °C (96.8 °F)  Pulse:  [51-69] 52  Resp:  [17-18] 17  BP: (75-99)/(45-58) 90/49  SpO2:  [98 %-100 %] 99 %    Physical Exam  Physical Exam  Constitutional:       Comments: Frail, muscle wasting, malnutrition   Cardiovascular:      Rate and Rhythm: Normal rate and regular rhythm.   Pulmonary:      Effort: Pulmonary effort is normal.   Abdominal:      General: Abdomen is flat.   Skin:     General: Skin is warm.      Comments: colostomy bag in place   Neurological:      General: No focal deficit  present.      Mental Status: She is alert.   Psychiatric:         Mood and Affect: Mood normal.         Lab Review  Lab Results   Component Value Date/Time    WBC 3.0 (L) 01/09/2021 12:14 AM    RBC 2.75 (L) 01/09/2021 12:14 AM    HEMOGLOBIN 9.0 (L) 01/09/2021 12:14 AM    HEMATOCRIT 29.0 (L) 01/09/2021 12:14 AM    .5 (H) 01/09/2021 12:14 AM    MCH 32.7 01/09/2021 12:14 AM    MCHC 31.0 (L) 01/09/2021 12:14 AM    MPV 12.4 01/09/2021 12:14 AM      Lab Results   Component Value Date/Time    SODIUM 136 01/10/2021 12:18 AM    POTASSIUM 4.4 01/10/2021 12:18 AM    CHLORIDE 102 01/10/2021 12:18 AM    CO2 26 01/10/2021 12:18 AM    GLUCOSE 77 01/10/2021 12:18 AM    BUN 25 (H) 01/10/2021 12:18 AM    CREATININE 0.66 01/10/2021 12:18 AM      Lab Results   Component Value Date/Time    ASTSGOT 19 01/07/2021 12:28 AM    ALTSGPT 37 01/07/2021 12:28 AM     Lab Results   Component Value Date/Time    CHOLSTRLTOT 235 (H) 02/05/2020 02:04 PM     (H) 02/05/2020 02:04 PM    HDL 79 02/05/2020 02:04 PM    TRIGLYCERIDE 124 02/05/2020 02:04 PM    TROPONINT 8 12/26/2020 03:40 PM       No results for input(s): NTPROBNP in the last 72 hours.    Cardiac Imaging and Procedures Review  EKG: Sinus rhythm    Echocardiogram:   1/7/2021  LVEF 65%    Cardiac Catheterization: Not applicable    Imaging  Chest X-Ray: No evidence of acute pulmonary process    Stress Test: Not applicable    Assessment/Plan    Acute lower extremity edema/weight gain/anasarca    Anasarca on admission, weight 107 LBS on admission, dry weight 70-75 LBS  -Appears euvolemic  -Stop furosemide  -DVT study 1/9/21, negative  -Suspect HFpEF versus chronic malnutrition versus venous insufficiency  -Echocardiogram 1/7/2021, reassuring  -Daily standing weight  -Strict intake and output  -Optimize nutritional status  -Appreciate psychiatry consult.    Severe malnutrition  -Patient dry weight 70-75 LBS  -History of anorexia nervosa in teenage years with ongoing eating  disorder.  -Excessive exercise.  -Nutrition consult      Iron deficiency anemia  -IV iron replacement per pharmacy protocol  -P.o. iron on discharge    Low vitamin D  - vitamin D replacement    History of Common variable immunodeficiency, 2007  -Prior IVIG infusion  -Hematology follow-up as an outpatient    Hypotension  -Continue with midodrine 10 mg 3 times daily  -Asymptomatic      History of rectal prolapse repair with complications  -Now with colostomy  -Query chronic malabsorption  -Follow-up with GI      Recent Covid + 12/10/2020    C. difficile infection  -Was treated September 2020  -C. difficile negative this admission      Cardiology will sign off and will follow up closely as an outpatient.  Multivitamin on discharge to ensure adequate supply of thiamine and selenium, will recommend l-carnitine at discharge.  Lasix 20 mg p.o. as needed not more than 3 times a week for volume weight gain 3 pounds in 1 day, 5 pounds in 1 week.  We will arrange for ultrasound venous studies for venous insufficiency as an outpatient.      Please contact me with any questions.    URIAH Black.   Cardiologist, [facility]  [phone]

## 2021-01-10 NOTE — DISCHARGE INSTRUCTIONS
Discharge Instructions    Discharged to home by car with friend. Discharged via walking, hospital escort: Refused.  Special equipment needed: Not Applicable    Be sure to schedule a follow-up appointment with your primary care doctor or any specialists as instructed.     Discharge Plan:   Diet Plan: Discussed  Activity Level: Discussed  Confirmed Follow up Appointment: Appointment Scheduled  Confirmed Symptoms Management: Discussed  Medication Reconciliation Updated: Yes  Influenza Vaccine Indication: Patient Refuses    I understand that a diet low in cholesterol, fat, and sodium is recommended for good health. Unless I have been given specific instructions below for another diet, I accept this instruction as my diet prescription.   Other diet: regular    Special Instructions:   HF Patient Discharge Instructions  · Monitor your weight daily, and maintain a weight chart, to track your weight changes.   · Activity as tolerated, unless your Doctor has ordered otherwise. Other activity order: as tolerated.  · Follow a low fat, low cholesterol, low salt diet unless instructed otherwise by your Doctor. Read the labels on the back of food products and track your intake of fat, cholesterol and salt.   · Fluid Restriction No. If a Fluid Restriction has been ordered by your Doctor, measure fluids with a measuring cup to ensure that you are not exceeding the restriction.   · No smoking.  · Oxygen No. If your Doctor has ordered that you wear Oxygen at home, it is important to wear it as ordered.  · Did you receive an explanation from staff on the importance of taking each of your medications and why it is necessary to keep taking them unless your doctor says to stop? Yes  · Were all of your questions answered about how to manage your heart failure and what to do if you have increased signs and symptoms after you go home? Yes  · Do you feel like your heart failure care team involved you in the care treatment plan and allowed you  to make decisions regarding your care while in the hospital and addressed any discharge needs you might have? Yes    See the educational handout provided at discharge for more information on monitoring your daily weight, activity and diet. This also explains more about Heart Failure, symptoms of a flare-up and some of the tests that you have undergone.     Warning Signs of a Flare-Up include:  · Swelling in the ankles or lower legs.  · Shortness of breath, while at rest, or while doing normal activities.   · Shortness of breath at night when in bed, or coughing in bed.   · Requiring more pillows to sleep at night, or needing to sit up at night to sleep.  · Feeling weak, dizzy or fatigued.     When to call your Doctor:  · Call [facility] seven days a week from 8:00 a.m. to 8:00 p.m. for medical questions [phone].  · Call your Primary Care Physician or Cardiologist if:   1. You experience any pain radiating to your jaw or neck.  2. You have any difficulty breathing.  3. You experience weight gain of 3 lbs in a day or 5 lbs in a week.   4. You feel any palpitations or irregular heartbeats.  5. You become dizzy or lose consciousness.   If you have had an angiogram or had a pacemaker or AICD placed, and experience:  1. Bleeding, drainage or swelling at the surgical / puncture site.  2. Fever greater than 100.0 F  3. Shock from internal defibrillator.  4. Cool and / or numb extremities.      · Is patient discharged on Warfarin / Coumadin?   No         Protein-Energy Malnutrition  Protein-energy malnutrition is when a person does not eat enough protein, fat, and calories. When this happens over time, it can lead to severe loss of muscle tissue (muscle wasting). This condition also affects the body's defense system (immune system) and can lead to other health problems.  What are the causes?  This condition may be caused by:  · Not eating enough protein, fat, or calories.  · Having certain chronic medical  conditions.  · Eating too little.  What increases the risk?  The following factors may make you more likely to develop this condition:  · Living in poverty.  · Long-term hospitalization.  · Alcohol or drug dependency. Addiction often leads to a lifestyle in which proper diet is ignored. Dependency can also hurt the metabolism and the body's ability to absorb nutrients.  · Eating disorders, such as anorexia nervosa or bulimia.  · Chewing or swallowing problems. People with these disorders may not eat enough.  · Having certain conditions, such as:  ? Inflammatory bowel disease. Inflammation of the intestines makes it difficult for the body to absorb nutrients.  ? Cancer or AIDS. These diseases can cause a loss of appetite.  ? Chronic heart failure. This interferes with how the body uses nutrients.  ? Cystic fibrosis. This disease can make it difficult for the body to absorb nutrients.  · Eating a diet that extremely restricts protein, fat, or calorie intake.  What are the signs or symptoms?  Symptoms of this condition include:  · Fatigue.  · Weakness.  · Dizziness.  · Fainting.  · Weight loss.  · Loss of muscle tone and muscle mass.  · Poor immune response.  · Lack of menstruation.  · Poor memory.  · Hair loss.  · Skin changes.  How is this diagnosed?  This condition may be diagnosed based on:  · Your medical and dietary history.  · A physical exam. This may include a measurement of your body mass index (BMI).  · Blood tests.  How is this treated?  This condition may be managed with:  · Nutrition therapy. This may include working with a diet and nutrition specialist (dietitian).  · Treatment for underlying conditions.  People with severe protein-energy malnutrition may need to be treated in a hospital. This may involve receiving nutrition and fluids through an IV.  Follow these instructions at home:    · Eat a balanced diet. In each meal, include at least one food that is high in protein. Foods that are high in  protein include:  ? Meat.  ? Poultry.  ? Fish.  ? Eggs.  ? Cheese.  ? Milk.  ? Beans.  ? Nuts.  · Eat nutrient-rich foods that are easy to swallow and digest, such as:  ? Fruit and yogurt smoothies.  ? Oatmeal with nut butter.  · Try to eat six small meals each day instead of three large meals.  · Take vitamin and protein supplements as told by your health care provider or dietitian.  · Follow your health care provider's recommendations about exercise and activity.  · Keep all follow-up visits as told by your health care provider. This is important.  Contact a health care provider if you:  · Have increased weakness or fatigue.  · Faint.  · Are a woman and you stop having your period (menstruating).  · Have rapid hair loss.  · Have unexpected weight loss.  · Have diarrhea.  · Have nausea and vomiting.  Get help right away if you have:  · Difficulty breathing.  · Chest pain.  Summary  · Protein-energy malnutrition is when a person does not eat enough protein, fat, and calories.  · Protein-energy malnutrition can lead to severe loss of muscle tissue (muscle wasting). This condition also affects the body's defense system (immune system) and can lead to other health problems.  · Talk with your health care provider about treatment for this condition. Effective treatment depends on the underlying cause of the malnutrition.  This information is not intended to replace advice given to you by your health care provider. Make sure you discuss any questions you have with your health care provider.  Document Released: 11/03/2006 Document Revised: 01/02/2019 Document Reviewed: 01/02/2019  [employer] Patient Education © 2020 [employer]      Depression / Suicide Risk    As you are discharged from this [facility] facility, it is important to learn how to keep safe from harming yourself.    Recognize the warning signs:  · Abrupt changes in personality, positive or negative- including increase in energy   · Giving away  possessions  · Change in eating patterns- significant weight changes-  positive or negative  · Change in sleeping patterns- unable to sleep or sleeping all the time   · Unwillingness or inability to communicate  · Depression  · Unusual sadness, discouragement and loneliness  · Talk of wanting to die  · Neglect of personal appearance   · Rebelliousness- reckless behavior  · Withdrawal from people/activities they love  · Confusion- inability to concentrate     If you or a loved one observes any of these behaviors or has concerns about self-harm, here's what you can do:  · Talk about it- your feelings and reasons for harming yourself  · Remove any means that you might use to hurt yourself (examples: pills, rope, extension cords, firearm)  · Get professional help from the community (Mental Health, Substance Abuse, psychological counseling)  · Do not be alone:Call your Safe Contact- someone whom you trust who will be there for you.  · Call your local CRISIS HOTLINE 173-8623 or 460-552-9086  · Call your local Children's Mobile Crisis Response Team [place] (554) 687-5662 or www.[facility]  · Call the toll free National Suicide Prevention Hotlines   · National Suicide Prevention Lifeline 761-586-EWAL (5004)  · National Hope Line Network 800-SUICIDE (619-1502)

## 2021-01-11 LAB — GASTRIN SERPL-MCNC: 41 PG/ML (ref 0–100)

## 2021-01-11 NOTE — DISCHARGE SUMMARY
Discharge Summary    Date of Admission: 1/6/2021  Date of Discharge: 1/10/2021  2:00 PM  Discharging Attending: Dr. Dominique   Discharging Senior Resident: Dr. House    CHIEF COMPLAINT ON ADMISSION  Bilateral lower extremity edema, orthopnea     Reason for Admission  Edema and orthopnea     Admission Date  1/6/2021    CODE STATUS  Prior    HPI & HOSPITAL COURSE  Pita is a 61 y/o female with PMHX of Chronic malnutrition, Common variable immunodeficency, and Hypothyroidism, She has an Ostomy bag placed in 2013 due to complications from rectal prolapse.  She presented as a direct admit from Cardiologist Clinic for significant weight gain, patient believes over 30 Lbs in the past couple of weeks (70-75 lbs dry weight). She also expressed SOB, palpitations, lightheadedness with exertion, and orthopnea using 3 pillows to prompt her head up in bed. She was also worried about her ostomy output. She mentioned that usually she empties the bag dozen of times a day, but has not had any output since the previous day. She stated that in the past few-several months has been noticing moderate to severe lower extremity edema she has been seen at Urgent care and Emergency Department multiple times for this complaint.ECHO performed on this admission unremarkable, kidney function unremarkable, and liver function unremarkable. No protein in the urine.  She tested positive for covid on 12/10/2020.Patient has an appointment with Hem/Onc to follow up for CVID, she is relatively new to [place], moved here a year ago and had issues to get referred to Hematologist. In the past treated with IVIG but has not been treated for about a year. Cardiology evaluated patient and given relatively normal ECHO, it is believed her symptoms could be do to venous insufficiency or malnutrition mediated mechanism. Pt may have an underlying eating disorder, mentioning that she would work out 5-6 hrs after eating to compensate for the food. She had a doppler  "study to r/o DVT in R lower ext that was negative. She was diuresed close to euvolemia with lasix. Her legs were wrapped with an ace bandage that helped reduce the fluid in her legs. Her BNP improved and cardiology recommended continued compression at discharge. Per their note, \" Nutritional studies are still pending but in the interim she will empirically take selenium, thiamine and l-carnitine in addition to the needed iron supplementation.  She does follow with GI regarding absorption syndrome.  Following discharge I will assess for venous insufficiency as a potential contributor to the right gaiter edema and ulceration.\"  She is to follow up with her PCP, GI, psychiatry, and cardiology outpatient. Psychiatry saw patient and did not recommend any new medications. Mentioned patient could benefit from talk therapy, which could be arranged per her primary. Could benefit from outpatient eating disorder clinic.     Therefore, she is discharged in fair and stable condition to home with organized home healthcare and close outpatient follow-up.    The patient met 2-midnight criteria for an inpatient stay at the time of discharge.    PHYSICAL EXAM ON DISCHARGE  Temp:  [35.9 °C (96.7 °F)-36.2 °C (97.2 °F)] 35.9 °C (96.7 °F)  Pulse:  [52-69] 56  Resp:  [16-18] 16  BP: (75-90)/(45-53) 86/49  SpO2:  [98 %-100 %] 100 %    Physical Exam  Constitutional:       Comments: Thin    HENT:      Head: Normocephalic and atraumatic.      Nose: Nose normal.      Mouth/Throat:      Mouth: Mucous membranes are moist.   Eyes:      General: No scleral icterus.     Extraocular Movements: Extraocular movements intact.      Conjunctiva/sclera: Conjunctivae normal.      Pupils: Pupils are equal, round, and reactive to light.   Neck:      Musculoskeletal: Normal range of motion and neck supple.   Cardiovascular:      Rate and Rhythm: Normal rate and regular rhythm.      Heart sounds: No murmur.   Pulmonary:      Effort: Pulmonary effort is normal. " No respiratory distress.      Breath sounds: Normal breath sounds. No wheezing, rhonchi or rales.   Abdominal:      General: There is no distension.      Palpations: Abdomen is soft.      Tenderness: There is no abdominal tenderness. There is no guarding or rebound.      Comments: Ostomy bag in place, no signs of infection.   Musculoskeletal:      Right lower leg: Edema (trace, much improved more then left ) present.      Left lower leg: Edema (trace, improved ) present.   Neurological:      General: No focal deficit present.      Mental Status: She is alert and oriented to person, place, and time.      Cranial Nerves: No cranial nerve deficit.      Sensory: No sensory deficit.      Motor: No weakness.      Gait: Gait normal.   Psychiatric:         Mood and Affect: Mood normal.         Behavior: Behavior normal.         Discharge Date  1/10/2021    FOLLOW UP ITEMS POST DISCHARGE  PCP  Cardiology for venous leg study     DISCHARGE DIAGNOSES  Active Problems:    Severe malnutrition (HCC) POA: Yes    Anasarca POA: Yes    Colostomy care (HCC) POA: Yes      Overview:   Patient reported history of rectal prolapse and 2013.  She underwent       surgery which was complicated by recurrent infection requiring 2       additional surgeries.  The patient is now with colostomy bag.  She is       managing this condition reasonably well on her own.            Hypothyroidism POA: Yes      Overview: Pt on Cytomel      T3  Level  And tsh : nl. Cont cytomel      T4 low: start levothyroxine 25>. rck lab 8-10 wks.    Pancytopenia (HCC) POA: Yes      Overview: Referred HEME appointment with Dr. Chang 10.16.20  Resolved Problems:    * No resolved hospital problems. *      FOLLOW UP  Future Appointments   Date Time Provider Department Center   1/21/2021  4:30 PM Clifton Harvey M.D. [facility]   1/22/2021  8:20 AM Abran Calvillo M.D. [facility] None     [address]  [address]  [phone]    Please show up at 8am on Monday for a walk in appointment.       MEDICATIONS ON DISCHARGE     Medication List      START taking these medications      Instructions   folic acid 1 MG Tabs  Start taking on: January 11, 2021  Commonly known as: FOLVITE   Take 1 Tab by mouth every day.  Dose: 1 mg     furosemide 20 MG Tabs  Commonly known as: LASIX   Doctor's comments: Not more than three times a week  Take 1 Tab by mouth as needed.  Dose: 20 mg     therapeutic multivitamin-minerals Tabs  Start taking on: January 11, 2021   Take 1 Tab by mouth every day.  Dose: 1 Tab     thiamine 100 MG tablet  Start taking on: January 11, 2021  Commonly known as: THIAMINE   Take 1 Tab by mouth every day.  Dose: 100 mg     vitamin D (Ergocalciferol) 1.25 MG (51611 UT) Caps capsule  Start taking on: January 15, 2021  Commonly known as: DRISDOL   Take 1 Cap by mouth every 7 days.  Dose: 50,000 Units        CONTINUE taking these medications      Instructions   hydrOXYzine HCl 10 MG Tabs  Commonly known as: ATARAX   Take 1 Tab by mouth at bedtime as needed for Itching.  Dose: 10 mg     levothyroxine 50 MCG Tabs  Commonly known as: SYNTHROID   Take 1 Tab by mouth Every morning on an empty stomach.  Dose: 50 mcg     Linzess 290 MCG Caps  Generic drug: linaCLOtide   Take 290 mcg by mouth every evening.  Dose: 290 mcg     midodrine 10 MG tablet  Commonly known as: PROAMATINE   Take 1 Tab by mouth 3 times a day, with meals.  Dose: 10 mg     sennosides 8.6 MG Tabs  Commonly known as: SENOKOT   Take 8.6 mg by mouth 1 time daily as needed.  Dose: 8.6 mg            Allergies  Allergies   Allergen Reactions   • Sulfa Drugs Hives       DIET  No orders of the defined types were placed in this encounter.      ACTIVITY  As tolerated.  Weight bearing as tolerated    CONSULTATIONS  Dr. Calvillo with Cardiology consulted.  Treatment options were discussed and plan of care agreed upon. and Dr. Estevez with Psychiatry consulted.   Treatment options were discussed and plan of care agreed upon.    PROCEDURES  N/A

## 2021-01-11 NOTE — FACE TO FACE
Face to Face Supporting Documentation - Home Health    The encounter with this patient was in whole or in part the primary reason for home health admission.    Date of encounter:   Patient:                    MRN:                       YOB: 2020  Pita Bueno  [id number]  1960     Home health to see patient for:  Skilled Nursing care for assessment, interventions & education and Registered dietitian consult    Skilled need for:  Medication Management of current medical conditions, complex medical history     Skilled nursing interventions to include:  Comment: ostomy care, venous ulcers care    Homebound status evidenced by:  Needs the assistance of another person in order to leave the home. Leaving home requires a considerable and taxing effort. There is a normal inability to leave the home.    Community Physician to provide follow up care: Clifton Harvey M.D.     Optional Interventions? No      I certify the face to face encounter for this home health care referral meets the CMS requirements and the encounter/clinical assessment with the patient was, in whole, or in part, for the medical condition(s) listed above, which is the primary reason for home health care. Based on my clinical findings: the service(s) are medically necessary, support the need for home health care, and the homebound criteria are met.  I certify that this patient has had a face to face encounter by myself.  Corbin House M.D. - NPI: [id number]

## 2021-01-12 ENCOUNTER — PATIENT MESSAGE (OUTPATIENT)
Dept: CARDIOLOGY | Facility: MEDICAL CENTER | Age: 61
End: 2021-01-12

## 2021-01-12 DIAGNOSIS — R60.0 BILATERAL LEG EDEMA: ICD-10-CM

## 2021-01-12 LAB — VIT B1 BLD-MCNC: 75 NMOL/L (ref 70–180)

## 2021-01-13 LAB
3OH-DODECANOYLCARN SERPL-SCNC: 0 UMOL/L (ref 0–0.02)
3OH-ISOVALERYLCARN SERPL-SCNC: 0.08 UMOL/L (ref 0–0.07)
3OH-LINOLEOYLCARN SERPL-SCNC: 0 UMOL/L (ref 0–0.01)
3OH-OLEOYLCARN SERPL-SCNC: 0 UMOL/L (ref 0–0.01)
3OH-PALMITOLEYLCARN SERPL-SCNC: 0 UMOL/L (ref 0–0.01)
3OH-PALMITOYLCARN SERPL-SCNC: 0 UMOL/L (ref 0–0.01)
3OH-STEAROYLCARN SERPL-SCNC: 0 UMOL/L (ref 0–0.01)
3OH-TDECANOYLCARN SERPL-SCNC: 0 UMOL/L (ref 0–0.02)
3OH-TDECENOYLCARN SERPL-SCNC: 0.01 UMOL/L (ref 0–0.02)
ACETYLCARN SERPL-SCNC: 4.85 UMOL/L (ref 3.74–16.56)
ACYLCARNITINE PATTERN SERPL-IMP: NORMAL
BUTYRYLCARN SERPL-SCNC: 0.17 UMOL/L (ref 0–0.45)
CARN ESTERS SERPL-SCNC: 8 UMOL/L (ref 5–29)
CARN ESTERS/C0 SERPL-SRTO: 0.3 RATIO (ref 0.1–1)
CARNITINE FREE SERPL-SCNC: 25 UMOL/L (ref 25–60)
CARNITINE SERPL-SCNC: 33 UMOL/L (ref 34–86)
DECANOYLCARN SERPL-SCNC: 0.12 UMOL/L (ref 0–0.31)
DECENOYLCARN SERPL-SCNC: 0.12 UMOL/L (ref 0–0.31)
DODECANOYLCARN SERPL-SCNC: 0.04 UMOL/L (ref 0–0.12)
DODECENOYLCARN SERPL-SCNC: 0.04 UMOL/L (ref 0–0.17)
GLUTARYLCARN SERPL-SCNC: 0.06 UMOL/L (ref 0–0.09)
HEXANOYLCARN SERPL-SCNC: 0 UMOL/L (ref 0–0.12)
IF BLOCK AB SER QL RIA: NEGATIVE
ISOVALERYL+MEBUTYRYLCARN SERPL-SCNC: 0.05 UMOL/L (ref 0–0.3)
LINOLEOYLCARN SERPL-SCNC: 0.06 UMOL/L (ref 0–0.1)
OCTANOYLCARN SERPL-SCNC: 0.1 UMOL/L (ref 0–0.23)
OCTENOYLCARN SERPL-SCNC: 0.09 UMOL/L (ref 0–0.61)
OLEOYLCARN SERPL-SCNC: 0.06 UMOL/L (ref 0–0.17)
PALMITOLEYLCARN SERPL-SCNC: 0.01 UMOL/L (ref 0–0.04)
PALMITOYLCARN SERPL-SCNC: 0.09 UMOL/L (ref 0–0.1)
PROPIONYLCARN SERPL-SCNC: 0.24 UMOL/L (ref 0–0.83)
SELENIUM SERPL-MCNC: 97.4 UG/L (ref 23–190)
STEAROYLCARN SERPL-SCNC: 0.04 UMOL/L (ref 0–0.04)
TDECADIENOYLCARN SERPL-SCNC: 0.03 UMOL/L (ref 0–0.12)
TDECANOYLCARN SERPL-SCNC: 0.02 UMOL/L (ref 0–0.05)
TDECENOYLCARN SERPL-SCNC: 0.03 UMOL/L (ref 0–0.16)

## 2021-01-13 NOTE — PATIENT COMMUNICATION
"  ----- Message -----   From: MARC Black    To: Yokasta Jamil R.N.     Good morning Merlin     So sorry to keep bugging you     Would you please forward this to Dr. Calvillo's RN     Dr. Calvillo requested  venous insufficiency studies to bilateral legs, it is only an outpatient study, I called the echo department and they said it comes under reflex study for venous insufficiency, I really appreciate your help thank you so much, sometime next week will do.      Left detailed VM for pt at [phone] requesting a call back. Informed BE had recommended some outpatient imaging, but also our office has tried to contact her several times during the last couple days to schedule an appt this week to further evaluate the symptoms she is describing.    Called vascular department at [phone]. They advised that the order for venous insufficiency is \"US extremity venous lower bilateral,\" with reflux study in the comments, and to include scheduling instructions that it should be for a 90 minute slot. Order placed.   "

## 2021-01-19 ENCOUNTER — TELEPHONE (OUTPATIENT)
Dept: CARDIOLOGY | Facility: MEDICAL CENTER | Age: 61
End: 2021-01-19

## 2021-01-19 ENCOUNTER — PATIENT MESSAGE (OUTPATIENT)
Dept: MEDICAL GROUP | Facility: PHYSICIAN GROUP | Age: 61
End: 2021-01-19

## 2021-01-19 ENCOUNTER — OFFICE VISIT (OUTPATIENT)
Dept: CARDIOLOGY | Facility: MEDICAL CENTER | Age: 61
End: 2021-01-19
Payer: OTHER GOVERNMENT

## 2021-01-19 VITALS
OXYGEN SATURATION: 96 % | DIASTOLIC BLOOD PRESSURE: 60 MMHG | HEART RATE: 67 BPM | SYSTOLIC BLOOD PRESSURE: 96 MMHG | BODY MASS INDEX: 17.42 KG/M2 | HEIGHT: 64 IN | RESPIRATION RATE: 14 BRPM | WEIGHT: 102 LBS

## 2021-01-19 DIAGNOSIS — E71.40 CARNITINE DEFICIENCY (HCC): ICD-10-CM

## 2021-01-19 DIAGNOSIS — I50.33 ACUTE ON CHRONIC HEART FAILURE WITH PRESERVED EJECTION FRACTION (HCC): ICD-10-CM

## 2021-01-19 PROCEDURE — 99214 OFFICE O/P EST MOD 30 MIN: CPT | Performed by: INTERNAL MEDICINE

## 2021-01-19 RX ORDER — LEVOCARNITINE
500 POWDER (GRAM) MISCELLANEOUS
Qty: 90 G | Refills: 3 | Status: SHIPPED | OUTPATIENT
Start: 2021-01-19 | End: 2021-10-07

## 2021-01-19 RX ORDER — TORSEMIDE 10 MG/1
10 TABLET ORAL 2 TIMES DAILY
Qty: 180 TAB | Refills: 3 | Status: SHIPPED | OUTPATIENT
Start: 2021-01-19 | End: 2021-03-11 | Stop reason: SDUPTHER

## 2021-01-19 RX ORDER — DIPHENHYDRAMINE HCL 25 MG
25 TABLET ORAL DAILY
COMMUNITY
End: 2021-06-28

## 2021-01-19 ASSESSMENT — FIBROSIS 4 INDEX: FIB4 SCORE: 1.18

## 2021-01-19 NOTE — Clinical Note
Can you have the patient GI doc call me at [phone] regarding plans for endoscopy and my concerns surrounding malabsorption syndrome

## 2021-01-19 NOTE — LETTER
PROCEDURE/SURGERY CLEARANCE FORM      Encounter Date: 1/22/2021    Patient: Pita Bueno  YOB: 1960    CARDIOLOGIST:  Abran Calvillo MD    REFERRING DOCTOR:  Zhao Starr MD        The above patient is ok to proceed to have the following procedure/surgery: Colonoscopy                                                          MD Signature   Abran Calvillo MD

## 2021-01-20 ENCOUNTER — OFFICE VISIT (OUTPATIENT)
Dept: MEDICAL GROUP | Facility: PHYSICIAN GROUP | Age: 61
End: 2021-01-20
Payer: OTHER GOVERNMENT

## 2021-01-20 ENCOUNTER — PATIENT MESSAGE (OUTPATIENT)
Dept: MEDICAL GROUP | Facility: PHYSICIAN GROUP | Age: 61
End: 2021-01-20

## 2021-01-20 VITALS
OXYGEN SATURATION: 98 % | RESPIRATION RATE: 14 BRPM | WEIGHT: 103.6 LBS | BODY MASS INDEX: 17.69 KG/M2 | HEART RATE: 69 BPM | DIASTOLIC BLOOD PRESSURE: 64 MMHG | SYSTOLIC BLOOD PRESSURE: 92 MMHG | TEMPERATURE: 98 F | HEIGHT: 64 IN

## 2021-01-20 DIAGNOSIS — D61.818 PANCYTOPENIA (HCC): Chronic | ICD-10-CM

## 2021-01-20 DIAGNOSIS — I95.1 ORTHOSTATIC HYPOTENSION: ICD-10-CM

## 2021-01-20 DIAGNOSIS — I50.30 HEART FAILURE WITH PRESERVED EJECTION FRACTION, UNSPECIFIED HF CHRONICITY (HCC): Chronic | ICD-10-CM

## 2021-01-20 DIAGNOSIS — D83.9 COMMON VARIABLE IMMUNODEFICIENCY (HCC): Chronic | ICD-10-CM

## 2021-01-20 DIAGNOSIS — E55.9 VITAMIN D DEFICIENCY: Chronic | ICD-10-CM

## 2021-01-20 DIAGNOSIS — U07.1 COVID-19 VIRUS INFECTION: ICD-10-CM

## 2021-01-20 DIAGNOSIS — E71.40 CARNITINE DEFICIENCY (HCC): ICD-10-CM

## 2021-01-20 DIAGNOSIS — E03.9 HYPOTHYROIDISM, UNSPECIFIED TYPE: Chronic | ICD-10-CM

## 2021-01-20 DIAGNOSIS — E43 SEVERE MALNUTRITION (HCC): Chronic | ICD-10-CM

## 2021-01-20 PROBLEM — L29.9 ITCHING: Status: RESOLVED | Noted: 2020-11-16 | Resolved: 2021-01-20

## 2021-01-20 PROBLEM — K59.00 CONSTIPATION: Status: RESOLVED | Noted: 2020-02-03 | Resolved: 2021-01-20

## 2021-01-20 PROBLEM — R00.2 PALPITATIONS: Status: RESOLVED | Noted: 2020-10-08 | Resolved: 2021-01-20

## 2021-01-20 PROBLEM — F43.10 POSTTRAUMATIC STRESS DISORDER: Status: RESOLVED | Noted: 2020-02-03 | Resolved: 2021-01-20

## 2021-01-20 PROBLEM — I95.89 HYPOTENSION DUE TO HYPOVOLEMIA: Status: RESOLVED | Noted: 2020-09-21 | Resolved: 2021-01-20

## 2021-01-20 PROBLEM — A04.72 C. DIFFICILE COLITIS: Status: RESOLVED | Noted: 2020-09-23 | Resolved: 2021-01-20

## 2021-01-20 PROBLEM — D64.9 ANEMIA: Status: ACTIVE | Noted: 2021-01-20

## 2021-01-20 PROBLEM — E86.1 HYPOTENSION DUE TO HYPOVOLEMIA: Status: RESOLVED | Noted: 2020-09-21 | Resolved: 2021-01-20

## 2021-01-20 PROBLEM — R06.02 SHORTNESS OF BREATH: Status: RESOLVED | Noted: 2020-09-21 | Resolved: 2021-01-20

## 2021-01-20 PROBLEM — D64.9 ANEMIA: Chronic | Status: ACTIVE | Noted: 2021-01-20

## 2021-01-20 PROBLEM — E78.00 PURE HYPERCHOLESTEROLEMIA: Status: RESOLVED | Noted: 2020-02-07 | Resolved: 2021-01-20

## 2021-01-20 PROBLEM — R11.2 NAUSEA & VOMITING: Status: RESOLVED | Noted: 2020-09-14 | Resolved: 2021-01-20

## 2021-01-20 PROCEDURE — 99214 OFFICE O/P EST MOD 30 MIN: CPT | Mod: CS | Performed by: INTERNAL MEDICINE

## 2021-01-20 RX ORDER — LEVOTHYROXINE SODIUM 0.05 MG/1
50 TABLET ORAL
Qty: 90 TAB | Refills: 3 | Status: SHIPPED | OUTPATIENT
Start: 2021-01-20 | End: 2021-06-14 | Stop reason: SDUPTHER

## 2021-01-20 RX ORDER — ERGOCALCIFEROL 1.25 MG/1
50000 CAPSULE ORAL
Qty: 5 CAP | Refills: 0 | Status: SHIPPED | OUTPATIENT
Start: 2021-01-20 | End: 2021-04-12 | Stop reason: SDUPTHER

## 2021-01-20 ASSESSMENT — PATIENT HEALTH QUESTIONNAIRE - PHQ9: CLINICAL INTERPRETATION OF PHQ2 SCORE: 0

## 2021-01-20 ASSESSMENT — FIBROSIS 4 INDEX: FIB4 SCORE: 1.18

## 2021-01-20 NOTE — PROGRESS NOTES
CC: Hospital follow-up  Refill levothyroxine      HPI: This is a 60 y.o. pt.  Pt's medical history is notable for:     CVID (common variable immunodeficiency) (HCC)  This is a chronic condition.  The patient stated that she had to cancel the appointment with a hematologist due to recent Covid 19 infection.  Advised the patient to call to reschedule her appointment.  The patient denies fever chills chest pain or shortness of breath.  No significant cough noted.    Heart failure with preserved ejection fraction (HCC)  Patient was seen by cardiologist recently.  She was noted with fluid retention and that her diuretic was changed to Demadex.  The patient  the prescription and I will start it today.  Patient was also advised to contact cardiologist office if she has not lost at least 2 pounds in the next 2 days.  Patient denies chest pain shortness of breath palpitation or dizziness.    Pancytopenia (HCC)  This is a chronic condition.  Recent lab test showed      Results for SUSAN WILCOX (MRN [id number]) as of 1/20/2021 15:36   Ref. Range 1/9/2021 00:14   WBC Latest Ref Range: 4.8 - 10.8 K/uL 3.0 (L)   RBC Latest Ref Range: 4.20 - 5.40 M/uL 2.75 (L)   Hemoglobin Latest Ref Range: 12.0 - 16.0 g/dL 9.0 (L)   Hematocrit Latest Ref Range: 37.0 - 47.0 % 29.0 (L)   MCV Latest Ref Range: 81.4 - 97.8 fL 105.5 (H)   MCH Latest Ref Range: 27.0 - 33.0 pg 32.7   MCHC Latest Ref Range: 33.6 - 35.0 g/dL 31.0 (L)   RDW Latest Ref Range: 35.9 - 50.0 fL 52.2 (H)   Platelet Count Latest Ref Range: 164 - 446 K/uL 156 (L)   As above advised the patient to contact and reschedule follow-up appointment with hematologist.    Severe malnutrition (HCC)  This is a chronic condition associated with carnitine deficiency which was noted with recent lab test on January 8, 2021.  Patient was advised to  and start taking carnitine replacement    The patient also has pending appointment to see the GI specialist next week for  endoscopy through the colostomy tube.  Advised the patient to keep the appointment.    Hypothyroidism  This is a chronic condition.  Patient is currently taking levothyroxine  Patient had free T4 lab test done.  I am requesting additional lab to check for T3 and TSH.  Patient also requests refill for levothyroxine.    Vitamin D deficiency  Recent lab test showed a low vitamin D level.  Advised the patient to start taking vitamin D 50,000 unit once a week.    COVID-19 virus infection  The patient was diagnosed with Covid 19 infection on December 10, 2020.  Patient presently denies fever chills shortness of breath cough.  We are requesting a follow-up COVID-19 test    Orthostatic hypotension  This is a new condition.  The patient was started on midodrine 3 times daily recently.  Today her blood pressure is 92/64.  Patient denies fever chills chest pain palpitation or dizziness.          REVIEW OF SYSTEMS:     Constitutional:  no fever / chills   Neurologic: no headaches, no numbness/tingling  Eyes: no changes in vision  ENT: no sore throat, no hearing loss  CV:  no chest pain, no palpitations  Pulmonary: no SOB, no cough          Allergies: Sulfa drugs    Current Outpatient Medications Ordered in Epic   Medication Sig Dispense Refill   • vitamin D, Ergocalciferol, (DRISDOL) 1.25 MG (49270 UT) Cap capsule Take 1 Cap by mouth every 7 days. 5 Cap 0   • levothyroxine (SYNTHROID) 50 MCG Tab Take 1 Tab by mouth Every morning on an empty stomach. 90 Tab 3   • diphenhydrAMINE (BENADRYL ALLERGY) 25 MG Tab Take 25 mg by mouth every day. At night     • torsemide (DEMADEX) 10 MG tablet Take 1 Tab by mouth 2 times a day. 180 Tab 3   • levOCARNitine (CARNITINE, L,) Powder 500 mg 2 (two) times a day. 90 g 3   • linaCLOtide (LINZESS) 290 MCG Cap Take 290 mcg by mouth every evening. 90 Cap 0   • midodrine (PROAMATINE) 10 MG tablet Take 1 Tab by mouth 3 times a day, with meals. 60 Tab 0   • sennosides (SENOKOT) 8.6 MG Tab Take 8.6 mg  by mouth 1 time daily as needed.       No current Epic-ordered facility-administered medications on file.        Past Medical History:   Diagnosis Date   • Colostomy in place (HCC)    • Eating disorder    • Hypotension    • Hypothyroid    • Palpitations         Past Surgical History:   Procedure Laterality Date   • OTHER ABDOMINAL SURGERY      rectal prolapse repair, anastomy leak/abcess,colostony        Family History   Problem Relation Age of Onset   • Thyroid Mother    • Alcohol abuse Father    • Heart Disease Father    • Hypertension Father    • Stroke Father         Social History     Tobacco Use   Smoking Status Never Smoker   Smokeless Tobacco Never Used          Social History     Substance and Sexual Activity   Alcohol Use Not Currently         ------------------------------------------------------------------------------     PHYSICAL EXAM:   Vitals:    01/20/21 1450   BP: (!) 92/64   Pulse: 69   Resp: 14   Temp: 36.7 °C (98 °F)   SpO2: 98%      Body mass index is 17.78 kg/m².         Constitutional: no acute distress  Neck: supple, no JVD  CV: heart RRR  Resp: normal effort, no wheezing or rales.  GI: abdomen soft, no obvious mass, no tenderness  Neuro: CN 2-12 grossly intact  Lower extremities showed mild edema from the ankle to the knees bilaterally.  Negative Homans' sign.  No palpable cords noted.      -----------------------------------------------------------------------------    ASSESSMENT:   1. Carnitine deficiency (HCC)     2. COVID-19 virus infection  SARS-CoV-2, PCR (In-House): Collect NP OR nasal swab in [facility]   3. CVID (common variable immunodeficiency) ([facility])     4. Heart failure with preserved ejection fraction, unspecified HF chronicity ([facility]) associated with lower extremity edema    5. Pancytopenia ([facility])     6. Severe malnutrition ([facility])     7. Hypothyroidism, unspecified type  TSH    TRIIDOTHYRONINE   8. Vitamin D deficiency     9. Orthostatic hypotension           MEDICAL DECISION MAKING:  DISCUSSION / STATUS / PLAN:    Advised the patient to start Demadex as above.  She is advised to contact cardiologist if she has not lost 2 pounds the next 2 days.  Advised the patient is important to monitor her weight on daily basis.  Repeat COVID-19 test.  Once that test comes back negative the patient could schedule an appointment to see hematologist  Start carnitine replacement  Advised the patient to schedule follow-up appointment to see hematologist to follow-up on the common variable immunodeficiency and cytopenia.  Advised the patient to keep the appointment to see GI specialist  Continue with levothyroxine.  Lab tests ordered including TSH and T3.  Start vitamin D 50,000 unit once a week.  Continue with midodrine.  Advised the patient to continue monitor her blood pressure on daily basis.     Return in about 3 months (around 4/20/2021).       PATIENT EDUCATION:  -If any problems should arise, patient was advised to contact our office or go to ER to be evaluated.      Please note that this dictation was created using voice recognition software. I have made every reasonable attempt to correct obvious errors, but it is possible there are errors of grammar and possibly content that I did not discover before finalizing the note.

## 2021-01-20 NOTE — ASSESSMENT & PLAN NOTE
This is a chronic condition.  Patient is currently taking levothyroxine  Patient had free T4 lab test done.  I am requesting additional lab to check for T3 and TSH.  Patient also requests refill for levothyroxine.

## 2021-01-20 NOTE — ASSESSMENT & PLAN NOTE
Recent lab test showed a low vitamin D level.  Advised the patient to start taking vitamin D 50,000 unit once a week.

## 2021-01-20 NOTE — ASSESSMENT & PLAN NOTE
This is a chronic condition.  Recent lab test showed      Results for SUSAN WILCOX (MRN [id number]) as of 1/20/2021 15:36   Ref. Range 1/9/2021 00:14   WBC Latest Ref Range: 4.8 - 10.8 K/uL 3.0 (L)   RBC Latest Ref Range: 4.20 - 5.40 M/uL 2.75 (L)   Hemoglobin Latest Ref Range: 12.0 - 16.0 g/dL 9.0 (L)   Hematocrit Latest Ref Range: 37.0 - 47.0 % 29.0 (L)   MCV Latest Ref Range: 81.4 - 97.8 fL 105.5 (H)   MCH Latest Ref Range: 27.0 - 33.0 pg 32.7   MCHC Latest Ref Range: 33.6 - 35.0 g/dL 31.0 (L)   RDW Latest Ref Range: 35.9 - 50.0 fL 52.2 (H)   Platelet Count Latest Ref Range: 164 - 446 K/uL 156 (L)   As above advised the patient to contact and reschedule follow-up appointment with hematologist.

## 2021-01-20 NOTE — ASSESSMENT & PLAN NOTE
This is a chronic condition associated with carnitine deficiency which was noted with recent lab test on January 8, 2021.  Patient was advised to  and start taking carnitine replacement    The patient also has pending appointment to see the GI specialist next week for endoscopy through the colostomy tube.  Advised the patient to keep the appointment.

## 2021-01-20 NOTE — ASSESSMENT & PLAN NOTE
This is a new condition.  The patient was started on midodrine 3 times daily recently.  Today her blood pressure is 92/64.  Patient denies fever chills chest pain palpitation or dizziness.

## 2021-01-20 NOTE — TELEPHONE ENCOUNTER
From: Pita Bueno  To: Clifton Harvey M.D.  Sent: 1/19/2021 10:47 PM PST  Subject: Non-Urgent Medical Question    I have been very serious health condition and doctor Candice doesn't know about it he needs to be updated and I need to see him for follow up since I got out of the hospital because things have gotten worse and the cardiologists and he need to be on the same page. Please get afternoon appt asap per discharge orders. Thanks. Uyen   Dressing: dry sterile dressing

## 2021-01-20 NOTE — TELEPHONE ENCOUNTER
From: Pita Bueno  To: Gita Fleming Med Ass't  Sent: 1/20/2021 9:26 AM PST  Subject: Non-Urgent Medical Question    Yes 3 please!!! Uyen      ----- Message -----   From:Gita Fleming Med Ass't   Sent:1/20/2021 6:49 AM PST   To:Pita Bueno   Subject:RE: Non-Urgent Medical Question    Good Morning Pita,    Would you like to come in today at 2, 230, 3, 330? Please advise.      ----- Message -----   From:Pita Bueno   Sent:1/19/2021 10:47 PM PST   To:Clifton Harvey M.D.   Subject:Non-Urgent Medical Question    I have been very serious health condition and doctor Candice doesn't know about it he needs to be updated and I need to see him for follow up since I got out of the hospital because things have gotten worse and the cardiologists and he need to be on the same page. Please get afternoon appt asap per discharge orders. Thanks. Uyen

## 2021-01-20 NOTE — TELEPHONE ENCOUNTER
Abran Calvillo M.D.  Rajni Sharp R.N.             Can you have the patient GI doc call me at [phone] regarding plans for endoscopy and my concerns surrounding malabsorption syndrome      [facility]ed [facility] at [phone] and s/w Diann. Requested that Dr. Zhao Starr call BE on his cell. Phone number provided.

## 2021-01-20 NOTE — ASSESSMENT & PLAN NOTE
Patient was seen by cardiologist recently.  She was noted with fluid retention and that her diuretic was changed to Demadex.  The patient  the prescription and I will start it today.  Patient was also advised to contact cardiologist office if she has not lost at least 2 pounds in the next 2 days.  Patient denies chest pain shortness of breath palpitation or dizziness.

## 2021-01-20 NOTE — ASSESSMENT & PLAN NOTE
The patient was diagnosed with Covid 19 infection on December 10, 2020.  Patient presently denies fever chills shortness of breath cough.  We are requesting a follow-up COVID-19 test

## 2021-01-20 NOTE — ASSESSMENT & PLAN NOTE
This is a chronic condition.  The patient stated that she had to cancel the appointment with a hematologist due to recent Covid 19 infection.  Advised the patient to call to reschedule her appointment.  The patient denies fever chills chest pain or shortness of breath.  No significant cough noted.

## 2021-01-20 NOTE — PROGRESS NOTES
"CARDIOLOGY OUTPATIENT FOLLOWUP    PCP: Clifton Harvey M.D.    1. Acute on chronic heart failure with preserved ejection fraction (HCC)  Fluid is returned quickly after leaving the hospital.  I suspect this is related to poor absorption of the Lasix.  I discontinued this and prescribed Demadex in its place.  I asked that she contact me if she has not lost 2 pounds in the next 2 days, at which point we can escalate diuretics.  She will obtain laboratory studies on Friday to monitor the safety of diuresis.  Her goal dry weight is 83 pounds at home.    2. Carnitine deficiency (HCC)  I recommended carnitine 1000 mg daily.  I will also discuss her case with Dr. Starr with GI regarding malabsorption syndrome.     3.  Venous insufficiency  She is having poor response to compression therapy.  Venous insufficiency ultrasound is pending    Follow up with Abran Calvillo M.D. in 1 week    Chief Complaint   Patient presents with   • Palpitations   • Shortness of Breath       History: Pita Bueno is a 60 y.o. female with history of malnutrition presenting for follow-up of heart failure with preserved ejection fraction.  She was recently hospitalized with lower extremity edema, elevated BNP and normal echocardiogram/LV function.  The symptoms started shortly after recovering from both Covid as well as C. Difficile.   She is found to have carnitine deficiency and borderline low level of thiamine.  She diuresed nicely in the hospital with corresponding decline in BNP.  Since discharge she has had return of lower extremity edema and unknown weight gain.  She is fearful of looking at the scale, but her daughter, who accompanies her today is able to do this and provides corroborating history regarding weight gain.    ROS:   All other systems reviewed and negative except as per the HPI    PE:  BP (!) 96/60 (BP Location: Left arm, Patient Position: Sitting, BP Cuff Size: Adult)   Pulse 67   Resp 14   Ht 1.626 m (5' 4\")   " Wt 46.3 kg (102 lb)   SpO2 96%   BMI 17.51 kg/m²   Gen: Well appearing  HEENT: Symmetric face. Anicteric sclerae. Moist mucus membranes  NECK: + JVD. No lymphadenopathy  CARDIAC: Normal PMI, regular, normal S1, S2. without murmur  VASCULATURE: Normal carotid amplitude without bruit.   RESP: Clear to auscultation bilaterally  ABD: Soft, non-tender, non-distended  EXT: 2+ lower extremity edema, no clubbing or cyanosis  SKIN: Warm and dry  NEURO: No gross deficits  PSYCH: Appropriate affect, participates in conversation    Past Medical History:   Diagnosis Date   • Colostomy in place (HCC)    • Eating disorder    • Hypotension    • Hypothyroid    • Palpitations      Allergies   Allergen Reactions   • Sulfa Drugs Hives     Outpatient Encounter Medications as of 1/19/2021   Medication Sig Dispense Refill   • diphenhydrAMINE (BENADRYL ALLERGY) 25 MG Tab Take 25 mg by mouth every day. At night     • torsemide (DEMADEX) 10 MG tablet Take 1 Tab by mouth 2 times a day. 180 Tab 3   • levOCARNitine (CARNITINE, L,) Powder 500 mg 2 (two) times a day. 90 g 3   • vitamin D, Ergocalciferol, (DRISDOL) 1.25 MG (42448 UT) Cap capsule Take 1 Cap by mouth every 7 days. 5 Cap 0   • levothyroxine (SYNTHROID) 50 MCG Tab Take 1 Tab by mouth Every morning on an empty stomach. 90 Tab 3   • hydrOXYzine HCl (ATARAX) 10 MG Tab Take 1 Tab by mouth at bedtime as needed for Itching. 30 Tab 1   • linaCLOtide (LINZESS) 290 MCG Cap Take 290 mcg by mouth every evening. 90 Cap 0   • midodrine (PROAMATINE) 10 MG tablet Take 1 Tab by mouth 3 times a day, with meals. 60 Tab 0   • sennosides (SENOKOT) 8.6 MG Tab Take 8.6 mg by mouth 1 time daily as needed.     • [DISCONTINUED] folic acid (FOLVITE) 1 MG Tab Take 1 Tab by mouth every day. (Patient not taking: Reported on 1/19/2021) 30 Tab 0   • [DISCONTINUED] therapeutic multivitamin-minerals (THERAGRAN-M) Tab Take 1 Tab by mouth every day. (Patient not taking: Reported on 1/19/2021) 30 Tab 11   •  [DISCONTINUED] thiamine (THIAMINE) 100 MG tablet Take 1 Tab by mouth every day. (Patient not taking: Reported on 1/19/2021) 30 Tab 0   • [DISCONTINUED] furosemide (LASIX) 20 MG Tab Take 1 Tab by mouth as needed. 15 Tab 0     No facility-administered encounter medications on file as of 1/19/2021.      Social History     Socioeconomic History   • Marital status:      Spouse name: Not on file   • Number of children: Not on file   • Years of education: Not on file   • Highest education level: Not on file   Occupational History   • Not on file   Social Needs   • Financial resource strain: Not on file   • Food insecurity     Worry: Not on file     Inability: Not on file   • Transportation needs     Medical: Not on file     Non-medical: Not on file   Tobacco Use   • Smoking status: Never Smoker   • Smokeless tobacco: Never Used   Substance and Sexual Activity   • Alcohol use: Not Currently   • Drug use: Not Currently   • Sexual activity: Not Currently   Lifestyle   • Physical activity     Days per week: Not on file     Minutes per session: Not on file   • Stress: Not on file   Relationships   • Social connections     Talks on phone: Not on file     Gets together: Not on file     Attends Mu-ism service: Not on file     Active member of club or organization: Not on file     Attends meetings of clubs or organizations: Not on file     Relationship status: Not on file   • Intimate partner violence     Fear of current or ex partner: Not on file     Emotionally abused: Not on file     Physically abused: Not on file     Forced sexual activity: Not on file   Other Topics Concern   • Not on file   Social History Narrative   • Not on file       Studies  Lab Results   Component Value Date/Time    CHOLSTRLTOT 235 (H) 02/05/2020 02:04 PM     (H) 02/05/2020 02:04 PM    HDL 79 02/05/2020 02:04 PM    TRIGLYCERIDE 124 02/05/2020 02:04 PM       Lab Results   Component Value Date/Time    SODIUM 136 01/10/2021 12:18 AM     POTASSIUM 4.4 01/10/2021 12:18 AM    CHLORIDE 102 01/10/2021 12:18 AM    CO2 26 01/10/2021 12:18 AM    GLUCOSE 77 01/10/2021 12:18 AM    BUN 25 (H) 01/10/2021 12:18 AM    CREATININE 0.66 01/10/2021 12:18 AM     Lab Results   Component Value Date/Time    ALKPHOSPHAT 84 01/07/2021 12:28 AM    ASTSGOT 19 01/07/2021 12:28 AM    ALTSGPT 37 01/07/2021 12:28 AM    TBILIRUBIN 0.3 01/07/2021 12:28 AM        For this encounter I reviewed the following medical records Recent H&P or DC summary, CBC, BMP and Nutritional studies   For this encounter I directly reviewed Echo images. I agree with the interpretations in the electronic health record.    For today's encounter the care plan and or test interpretation is to be discussed with The patient's gastroenterologist    Today we discussed decisions regarding minor surgery with identified patient or procedure risk factors  30-39 minutes of physician total time spent on the date of the encounter ([id number])

## 2021-01-22 ENCOUNTER — PATIENT MESSAGE (OUTPATIENT)
Dept: CARDIOLOGY | Facility: MEDICAL CENTER | Age: 61
End: 2021-01-22

## 2021-01-22 ENCOUNTER — PRE-ADMISSION TESTING (OUTPATIENT)
Dept: ADMISSIONS | Facility: MEDICAL CENTER | Age: 61
End: 2021-01-22
Attending: INTERNAL MEDICINE
Payer: OTHER GOVERNMENT

## 2021-01-22 ENCOUNTER — HOSPITAL ENCOUNTER (OUTPATIENT)
Dept: LAB | Facility: MEDICAL CENTER | Age: 61
End: 2021-01-22
Attending: INTERNAL MEDICINE
Payer: OTHER GOVERNMENT

## 2021-01-22 DIAGNOSIS — E03.9 HYPOTHYROIDISM, UNSPECIFIED TYPE: Chronic | ICD-10-CM

## 2021-01-22 LAB
ANION GAP SERPL CALC-SCNC: 9 MMOL/L (ref 7–16)
BUN SERPL-MCNC: 55 MG/DL (ref 8–22)
CALCIUM SERPL-MCNC: 8.8 MG/DL (ref 8.5–10.5)
CHLORIDE SERPL-SCNC: 105 MMOL/L (ref 96–112)
CO2 SERPL-SCNC: 22 MMOL/L (ref 20–33)
CREAT SERPL-MCNC: 0.53 MG/DL (ref 0.5–1.4)
FASTING STATUS PATIENT QL REPORTED: NORMAL
GLUCOSE SERPL-MCNC: 94 MG/DL (ref 65–99)
NT-PROBNP SERPL IA-MCNC: 134 PG/ML (ref 0–125)
POTASSIUM SERPL-SCNC: 4.2 MMOL/L (ref 3.6–5.5)
SODIUM SERPL-SCNC: 136 MMOL/L (ref 135–145)
T3 SERPL-MCNC: 61.9 NG/DL (ref 60–181)
TSH SERPL DL<=0.005 MIU/L-ACNC: 0.98 UIU/ML (ref 0.38–5.33)

## 2021-01-22 PROCEDURE — 80048 BASIC METABOLIC PNL TOTAL CA: CPT

## 2021-01-22 PROCEDURE — 84480 ASSAY TRIIODOTHYRONINE (T3): CPT

## 2021-01-22 PROCEDURE — 84443 ASSAY THYROID STIM HORMONE: CPT

## 2021-01-22 PROCEDURE — 83880 ASSAY OF NATRIURETIC PEPTIDE: CPT

## 2021-01-22 PROCEDURE — 36415 COLL VENOUS BLD VENIPUNCTURE: CPT

## 2021-01-22 NOTE — TELEPHONE ENCOUNTER
Called [facility] and s/w Diann. She apologizes that the call hasn't been returned and will send a message to Dr. Starr requesting that he call BE's cell.

## 2021-01-22 NOTE — PATIENT COMMUNICATION
You  Abran Calvillo M.D. 5 hours ago (8:30 AM)     It looks like next week she's scheduled for the LE US, colonoscopy, and f/u with you. Did GI ever give you a call back?      Abran Calvillo M.D.  You 1 hour ago (12:36 PM)     Havent heard back from [facility]. Lets have her come for a right heart catheterization      D/w BE. He doesn't want to make med changes until getting info from the right heart cath. It's up to pt if she would rather come to appt with BE next week to discuss heart cath further prior to scheduling.   Called [facility] and put in another request that Dr. Starr call BE's cell.

## 2021-01-23 NOTE — OR NURSING
Hx and meds reviewed, pre op instructions given. Pt aware may take the listed meds morning of surgery; synthroid, linzess and midodrine . Anesthesia fasting guidelines reviewed with pt Pt states had recent episode of CHF with hospitalization.Continues to have edema from abd to toes. LLE with painful calf. To have studies done Monday 1/27 to r/o DVT. C/o SOB since edema began. States Dr Calvillo is not aware as yet of worsening edema and that he told her is worsens he would rehospitalize her Pt states she has a lower tooth that is infected and cardiology recommends it not be pulled yet. Pt states that Dr Calvillo and herself have tried to contact Dr Starr regarding colonoscopy. I called and spoke with Opal, Dr Starr's nurse. She finally contacted Dr Starr and he states he spoke with Dr Calvillo and that he feels pt did not truly have CHF. She states Dr Starr said he felt the pt wanted to cx the procedure. I also spoke to Dr Arndt and he states that this pt needs to be worked up prior to having this screening colonoscopy to find source behind edema.And to ensure she is stable for this procedure. I called and and left message at the GI office with Opal and also will fax his response.

## 2021-01-23 NOTE — TELEPHONE ENCOUNTER
D/w BE. Dr. Starr called him and they discussed BE's concerns about malabsorption syndrome. BE informed him that from a cardiac standpoint pt is fine to proceed with the scheduled colonoscopy. Clearance letter drafted and faxed to [phone]. Receipt confirmed.

## 2021-01-23 NOTE — OR NURSING
Opal from Dr Starr's office received message and pt's procedure to be postponed. They will call and notify pt.

## 2021-01-25 ENCOUNTER — HOSPITAL ENCOUNTER (OUTPATIENT)
Dept: RADIOLOGY | Facility: MEDICAL CENTER | Age: 61
End: 2021-01-25
Attending: INTERNAL MEDICINE
Payer: OTHER GOVERNMENT

## 2021-01-25 DIAGNOSIS — R60.0 BILATERAL LEG EDEMA: ICD-10-CM

## 2021-01-25 PROCEDURE — 93970 EXTREMITY STUDY: CPT

## 2021-01-25 PROCEDURE — 93970 EXTREMITY STUDY: CPT | Mod: 26 | Performed by: INTERNAL MEDICINE

## 2021-01-25 NOTE — PATIENT COMMUNICATION
DIEUDONNEM with pt at [phone] requesting call back to discuss. Otherwise informed her we will discuss at 1/27 BE appt.

## 2021-01-27 ENCOUNTER — OFFICE VISIT (OUTPATIENT)
Dept: CARDIOLOGY | Facility: MEDICAL CENTER | Age: 61
End: 2021-01-27
Payer: OTHER GOVERNMENT

## 2021-01-27 ENCOUNTER — TELEPHONE (OUTPATIENT)
Dept: CARDIOLOGY | Facility: MEDICAL CENTER | Age: 61
End: 2021-01-27

## 2021-01-27 VITALS
HEART RATE: 72 BPM | SYSTOLIC BLOOD PRESSURE: 100 MMHG | WEIGHT: 98 LBS | OXYGEN SATURATION: 99 % | BODY MASS INDEX: 16.73 KG/M2 | HEIGHT: 64 IN | RESPIRATION RATE: 14 BRPM | DIASTOLIC BLOOD PRESSURE: 58 MMHG

## 2021-01-27 DIAGNOSIS — E71.40 CARNITINE DEFICIENCY (HCC): ICD-10-CM

## 2021-01-27 DIAGNOSIS — R06.09 DOE (DYSPNEA ON EXERTION): Primary | ICD-10-CM

## 2021-01-27 DIAGNOSIS — I50.32 CHRONIC HEART FAILURE WITH PRESERVED EJECTION FRACTION (HCC): ICD-10-CM

## 2021-01-27 DIAGNOSIS — I87.2 VENOUS INSUFFICIENCY: ICD-10-CM

## 2021-01-27 PROCEDURE — 99214 OFFICE O/P EST MOD 30 MIN: CPT | Performed by: INTERNAL MEDICINE

## 2021-01-27 ASSESSMENT — FIBROSIS 4 INDEX: FIB4 SCORE: 1.18

## 2021-01-27 NOTE — LETTER
[facility]   [address], [facility] [address]  [place] [address]  Phone: [phone]  Fax: [phone]              Pita Bueno  1960    Encounter Date: 1/27/2021    Abran Calvillo M.D.          PROGRESS NOTE:  CARDIOLOGY OUTPATIENT FOLLOWUP    PCP: Clifton Harvey M.D.    1. JOSÉ (dyspnea on exertion)  2. Chronic heart failure with preserved ejection fraction (HCC)  3. Carnitine deficiency (HCC)  4. Venous insufficiency    Pita is euvolemic from an intravascular volume standpoint but does have ongoing edema related to deep venous insufficiency in the bilateral lower extremities.  I again recommended compression therapy and recommended scaling back torsemide to once daily or even as needed.  She is having ongoing dyspnea on exertion and although I doubt ischemic heart disease, given the significant symptoms I recommended obtaining a stress ECG.  She is otherwise encouraged to complete GI evaluation including endoscopy and is at low cardiovascular risk for perioperative complications during planned dental surgery.      Follow up with Abran Calvillo M.D. in 2 months    Chief Complaint   Patient presents with   • Congestive Heart Failure       History: Pita Bueno is a 60 y.o. female with past medical history of ostomy and malnutrition presenting for follow-up of heart failure preserved ejection fraction.  She was recently hospitalized for lower extremity edema, elevated BNP associated with normal echocardiogram and normal cardiac function.  Her condition improved with diuretics, compression therapy and the BNP returned to historical levels.  The symptoms started shortly after recovering from both Covid as well as C. difficile.      She has since battled ongoing lower extremity edema, which has been managed with diuretics.  She is taking Demadex twice daily and using Ace bandage wraps which have made substantial impact on the edema.  She does continue  "to have venous insufficiency and skin changes related to this.     She is planning to have GI evaluation.    Adjunctive assessment was made after consulting with the patient's daughter     ROS:  All other systems reviewed and negative except as per the HPI    PE:  /58 (BP Location: Right arm, Patient Position: Sitting, BP Cuff Size: Adult)   Pulse 72   Resp 14   Ht 1.626 m (5' 4\")   Wt 44.5 kg (98 lb)   SpO2 99%   BMI 16.82 kg/m²   Gen: Well appearing  HEENT: Symmetric face. Anicteric sclerae. Moist mucus membranes  NECK: No JVD. No lymphadenopathy  CARDIAC: Normal PMI, regular, normal S1, S2. without murmur  VASCULATURE: Normal carotid amplitude without bruit.   RESP: Clear to auscultation bilaterally  ABD: Soft, non-tender, non-distended  EXT: 1+ lower extremity edema, no clubbing or cyanosis there are minor changes of lipodermatosclerosis.  SKIN: Warm and dry  NEURO: No gross deficits  PSYCH: Appropriate affect, participates in conversation    Past Medical History:   Diagnosis Date   • Adverse effect of anesthesia    • Anemia    • Arthritis     thumbs   • Bowel habit changes     constipation and diarrhea   • Breath shortness    • Colostomy in place (HCC)    • Congestive heart failure (HCC)     recent   • CVID (common variable immunodeficiency) ([facility])    • Eating disorder    • Edema 01/2021    Bilat LE, Pt states having pain LLE and DVT being ruled out 1/27   • Hemorrhagic disorder (HCC) 01/2021    states she has noticed she is bleeding easier than normal   • High cholesterol    • Hypotension    • Hypothyroid    • Pain     leg pain   • Palpitations      Allergies   Allergen Reactions   • Sulfa Drugs Hives     Outpatient Encounter Medications as of 1/27/2021   Medication Sig Dispense Refill   • vitamin D, Ergocalciferol, (DRISDOL) 1.25 MG (39031 UT) Cap capsule Take 1 Cap by mouth every 7 days. 5 Cap 0   • levothyroxine (SYNTHROID) 50 MCG Tab Take 1 Tab by mouth Every morning on an empty stomach. 90 Tab " 3   • diphenhydrAMINE (BENADRYL ALLERGY) 25 MG Tab Take 25 mg by mouth every day. At night     • torsemide (DEMADEX) 10 MG tablet Take 1 Tab by mouth 2 times a day. 180 Tab 3   • levOCARNitine (CARNITINE, L,) Powder 500 mg 2 (two) times a day. 90 g 3   • linaCLOtide (LINZESS) 290 MCG Cap Take 290 mcg by mouth every evening. 90 Cap 0   • midodrine (PROAMATINE) 10 MG tablet Take 1 Tab by mouth 3 times a day, with meals. 60 Tab 0   • sennosides (SENOKOT) 8.6 MG Tab Take 8.6 mg by mouth 1 time daily as needed.       No facility-administered encounter medications on file as of 1/27/2021.      Social History     Socioeconomic History   • Marital status:      Spouse name: Not on file   • Number of children: Not on file   • Years of education: Not on file   • Highest education level: Not on file   Occupational History   • Not on file   Social Needs   • Financial resource strain: Not on file   • Food insecurity     Worry: Not on file     Inability: Not on file   • Transportation needs     Medical: Not on file     Non-medical: Not on file   Tobacco Use   • Smoking status: Never Smoker   • Smokeless tobacco: Never Used   Substance and Sexual Activity   • Alcohol use: Not Currently   • Drug use: Not Currently   • Sexual activity: Not Currently   Lifestyle   • Physical activity     Days per week: Not on file     Minutes per session: Not on file   • Stress: Not on file   Relationships   • Social connections     Talks on phone: Not on file     Gets together: Not on file     Attends Mu-ism service: Not on file     Active member of club or organization: Not on file     Attends meetings of clubs or organizations: Not on file     Relationship status: Not on file   • Intimate partner violence     Fear of current or ex partner: Not on file     Emotionally abused: Not on file     Physically abused: Not on file     Forced sexual activity: Not on file   Other Topics Concern   • Not on file   Social History Narrative   • Not on  file       Studies  Lab Results   Component Value Date/Time    CHOLSTRLTOT 235 (H) 02/05/2020 02:04 PM     (H) 02/05/2020 02:04 PM    HDL 79 02/05/2020 02:04 PM    TRIGLYCERIDE 124 02/05/2020 02:04 PM       Lab Results   Component Value Date/Time    SODIUM 136 01/22/2021 01:47 PM    POTASSIUM 4.2 01/22/2021 01:47 PM    CHLORIDE 105 01/22/2021 01:47 PM    CO2 22 01/22/2021 01:47 PM    GLUCOSE 94 01/22/2021 01:47 PM    BUN 55 (H) 01/22/2021 01:47 PM    CREATININE 0.53 01/22/2021 01:47 PM     Lab Results   Component Value Date/Time    ALKPHOSPHAT 84 01/07/2021 12:28 AM    ASTSGOT 19 01/07/2021 12:28 AM    ALTSGPT 37 01/07/2021 12:28 AM    TBILIRUBIN 0.3 01/07/2021 12:28 AM        For this encounter I reviewed the following medical records Recent H&P or DC summary and BMP        For today's encounter the care plan and or test interpretation discussed with Gastroenterologist-who will assess her for ostomy dysfunction but also consider malabsorption syndromes.        30-39 minutes of physician total time spent on the date of the encounter (75308)        Zhao Starr M.D.   [address] [address]  [employer] [employer]

## 2021-01-28 NOTE — PROGRESS NOTES
CARDIOLOGY OUTPATIENT FOLLOWUP    PCP: Clifton Harvey M.D.    1. JOSÉ (dyspnea on exertion)  2. Chronic heart failure with preserved ejection fraction (HCC)  3. Carnitine deficiency (HCC)  4. Venous insufficiency    Pita is euvolemic from an intravascular volume standpoint but does have ongoing edema related to deep venous insufficiency in the bilateral lower extremities.  I again recommended compression therapy and recommended scaling back torsemide to once daily or even as needed.  She is having ongoing dyspnea on exertion and although I doubt ischemic heart disease, given the significant symptoms I recommended obtaining a stress ECG.  She is otherwise encouraged to complete GI evaluation including endoscopy and is at low cardiovascular risk for perioperative complications during planned dental surgery.      Follow up with Abran Calvillo M.D. in 2 months    Chief Complaint   Patient presents with   • Congestive Heart Failure       History: Pita Bueno is a 60 y.o. female with past medical history of ostomy and malnutrition presenting for follow-up of heart failure preserved ejection fraction.  She was recently hospitalized for lower extremity edema, elevated BNP associated with normal echocardiogram and normal cardiac function.  Her condition improved with diuretics, compression therapy and the BNP returned to historical levels.  The symptoms started shortly after recovering from both Covid as well as C. difficile.      She has since battled ongoing lower extremity edema, which has been managed with diuretics.  She is taking Demadex twice daily and using Ace bandage wraps which have made substantial impact on the edema.  She does continue to have venous insufficiency and skin changes related to this.     She is planning to have GI evaluation.    Adjunctive assessment was made after consulting with the patient's daughter     ROS:  All other systems reviewed and negative except as per the HPI    PE:  BP  "100/58 (BP Location: Right arm, Patient Position: Sitting, BP Cuff Size: Adult)   Pulse 72   Resp 14   Ht 1.626 m (5' 4\")   Wt 44.5 kg (98 lb)   SpO2 99%   BMI 16.82 kg/m²   Gen: Well appearing  HEENT: Symmetric face. Anicteric sclerae. Moist mucus membranes  NECK: No JVD. No lymphadenopathy  CARDIAC: Normal PMI, regular, normal S1, S2. without murmur  VASCULATURE: Normal carotid amplitude without bruit.   RESP: Clear to auscultation bilaterally  ABD: Soft, non-tender, non-distended  EXT: 1+ lower extremity edema, no clubbing or cyanosis there are minor changes of lipodermatosclerosis.  SKIN: Warm and dry  NEURO: No gross deficits  PSYCH: Appropriate affect, participates in conversation    Past Medical History:   Diagnosis Date   • Adverse effect of anesthesia    • Anemia    • Arthritis     thumbs   • Bowel habit changes     constipation and diarrhea   • Breath shortness    • Colostomy in place ([facility])    • Congestive heart failure (HCC)     recent   • CVID (common variable immunodeficiency) ([facility])    • Eating disorder    • Edema 01/2021    Bilat LE, Pt states having pain LLE and DVT being ruled out 1/27   • Hemorrhagic disorder (HCC) 01/2021    states she has noticed she is bleeding easier than normal   • High cholesterol    • Hypotension    • Hypothyroid    • Pain     leg pain   • Palpitations      Allergies   Allergen Reactions   • Sulfa Drugs Hives     Outpatient Encounter Medications as of 1/27/2021   Medication Sig Dispense Refill   • vitamin D, Ergocalciferol, (DRISDOL) 1.25 MG (10024 UT) Cap capsule Take 1 Cap by mouth every 7 days. 5 Cap 0   • levothyroxine (SYNTHROID) 50 MCG Tab Take 1 Tab by mouth Every morning on an empty stomach. 90 Tab 3   • diphenhydrAMINE (BENADRYL ALLERGY) 25 MG Tab Take 25 mg by mouth every day. At night     • torsemide (DEMADEX) 10 MG tablet Take 1 Tab by mouth 2 times a day. 180 Tab 3   • levOCARNitine (CARNITINE, L,) Powder 500 mg 2 (two) times a day. 90 g 3   • linaCLOtide " (LINZESS) 290 MCG Cap Take 290 mcg by mouth every evening. 90 Cap 0   • midodrine (PROAMATINE) 10 MG tablet Take 1 Tab by mouth 3 times a day, with meals. 60 Tab 0   • sennosides (SENOKOT) 8.6 MG Tab Take 8.6 mg by mouth 1 time daily as needed.       No facility-administered encounter medications on file as of 1/27/2021.      Social History     Socioeconomic History   • Marital status:      Spouse name: Not on file   • Number of children: Not on file   • Years of education: Not on file   • Highest education level: Not on file   Occupational History   • Not on file   Social Needs   • Financial resource strain: Not on file   • Food insecurity     Worry: Not on file     Inability: Not on file   • Transportation needs     Medical: Not on file     Non-medical: Not on file   Tobacco Use   • Smoking status: Never Smoker   • Smokeless tobacco: Never Used   Substance and Sexual Activity   • Alcohol use: Not Currently   • Drug use: Not Currently   • Sexual activity: Not Currently   Lifestyle   • Physical activity     Days per week: Not on file     Minutes per session: Not on file   • Stress: Not on file   Relationships   • Social connections     Talks on phone: Not on file     Gets together: Not on file     Attends Zoroastrianism service: Not on file     Active member of club or organization: Not on file     Attends meetings of clubs or organizations: Not on file     Relationship status: Not on file   • Intimate partner violence     Fear of current or ex partner: Not on file     Emotionally abused: Not on file     Physically abused: Not on file     Forced sexual activity: Not on file   Other Topics Concern   • Not on file   Social History Narrative   • Not on file       Studies  Lab Results   Component Value Date/Time    CHOLSTRLTOT 235 (H) 02/05/2020 02:04 PM     (H) 02/05/2020 02:04 PM    HDL 79 02/05/2020 02:04 PM    TRIGLYCERIDE 124 02/05/2020 02:04 PM       Lab Results   Component Value Date/Time    SODIUM 136  01/22/2021 01:47 PM    POTASSIUM 4.2 01/22/2021 01:47 PM    CHLORIDE 105 01/22/2021 01:47 PM    CO2 22 01/22/2021 01:47 PM    GLUCOSE 94 01/22/2021 01:47 PM    BUN 55 (H) 01/22/2021 01:47 PM    CREATININE 0.53 01/22/2021 01:47 PM     Lab Results   Component Value Date/Time    ALKPHOSPHAT 84 01/07/2021 12:28 AM    ASTSGOT 19 01/07/2021 12:28 AM    ALTSGPT 37 01/07/2021 12:28 AM    TBILIRUBIN 0.3 01/07/2021 12:28 AM        For this encounter I reviewed the following medical records Recent H&P or DC summary and BMP        For today's encounter the care plan and or test interpretation discussed with Gastroenterologist-who will assess her for ostomy dysfunction but also consider malabsorption syndromes.        30-39 minutes of physician total time spent on the date of the encounter ([id number])

## 2021-01-28 NOTE — TELEPHONE ENCOUNTER
Abran Calvillo M.D.  Rajni Sharp R.N.             Please fax clearance for tooth extraction to Dr. Julius Momin at [facility] dentistry     Thanks      Faxed today's office note addressing clearance to Dr. Julius Momin's office at [phone].

## 2021-02-02 ENCOUNTER — PATIENT MESSAGE (OUTPATIENT)
Dept: CARDIOLOGY | Facility: MEDICAL CENTER | Age: 61
End: 2021-02-02

## 2021-02-02 DIAGNOSIS — R60.0 BILATERAL LEG EDEMA: ICD-10-CM

## 2021-02-02 DIAGNOSIS — I87.2 VENOUS INSUFFICIENCY: ICD-10-CM

## 2021-02-02 DIAGNOSIS — S81.809D OPEN WOUND OF LOWER EXTREMITY, UNSPECIFIED LATERALITY, SUBSEQUENT ENCOUNTER: ICD-10-CM

## 2021-02-02 NOTE — PATIENT COMMUNICATION
Abran Calvillo M.D.  You 1 hour ago (10:33 AM)     It appears the wound is worse and is related to deep venous insufficiency.  It does appear there may be surgical treatment for this condition.  I suggest consultation with a vascular surgeon as well as wound care referral.

## 2021-02-06 ENCOUNTER — OFFICE VISIT (OUTPATIENT)
Dept: URGENT CARE | Facility: PHYSICIAN GROUP | Age: 61
End: 2021-02-06
Payer: OTHER GOVERNMENT

## 2021-02-06 ENCOUNTER — HOSPITAL ENCOUNTER (OUTPATIENT)
Dept: RADIOLOGY | Facility: MEDICAL CENTER | Age: 61
End: 2021-02-06
Attending: PHYSICIAN ASSISTANT
Payer: OTHER GOVERNMENT

## 2021-02-06 VITALS
SYSTOLIC BLOOD PRESSURE: 92 MMHG | OXYGEN SATURATION: 98 % | TEMPERATURE: 97.8 F | HEART RATE: 70 BPM | BODY MASS INDEX: 16.94 KG/M2 | RESPIRATION RATE: 16 BRPM | DIASTOLIC BLOOD PRESSURE: 50 MMHG | HEIGHT: 64 IN | WEIGHT: 99.2 LBS

## 2021-02-06 DIAGNOSIS — I50.32 CHRONIC HEART FAILURE WITH PRESERVED EJECTION FRACTION (HCC): ICD-10-CM

## 2021-02-06 DIAGNOSIS — R60.0 BILATERAL LOWER EXTREMITY EDEMA: ICD-10-CM

## 2021-02-06 DIAGNOSIS — L03.116 CELLULITIS OF LEFT LEG WITHOUT FOOT: Primary | ICD-10-CM

## 2021-02-06 DIAGNOSIS — S80.12XA CONTUSION OF LEFT LOWER EXTREMITY, INITIAL ENCOUNTER: ICD-10-CM

## 2021-02-06 DIAGNOSIS — S80.11XA CONTUSION OF RIGHT LEG, INITIAL ENCOUNTER: ICD-10-CM

## 2021-02-06 PROCEDURE — 73590 X-RAY EXAM OF LOWER LEG: CPT | Mod: LT

## 2021-02-06 PROCEDURE — 99214 OFFICE O/P EST MOD 30 MIN: CPT | Performed by: PHYSICIAN ASSISTANT

## 2021-02-06 RX ORDER — DOXYCYCLINE HYCLATE 100 MG
100 TABLET ORAL 2 TIMES DAILY
Qty: 20 TAB | Refills: 0 | Status: SHIPPED | OUTPATIENT
Start: 2021-02-06 | End: 2021-02-16

## 2021-02-06 ASSESSMENT — FIBROSIS 4 INDEX: FIB4 SCORE: 1.18

## 2021-02-06 NOTE — PROGRESS NOTES
Subjective:      Pita uBeno is a 60 y.o. female who presents with Wound Check (x1 week, wound on leg, from gaining 30lbs from swelling off and on.)    Medications:    • Carnitine (L) Powd  • diphenhydrAMINE Tabs  • levothyroxine Tabs  • Linzess Caps  • midodrine  • sennosides Tabs  • torsemide  • vitamin D (Ergocalciferol) Caps    Allergies: Sulfa drugs    Problem List: Pita Bueno has Colostomy care ([facility]); CVID (common variable immunodeficiency) ([facility]); CFS (chronic fatigue syndrome); Severe malnutrition ([facility]); Hypothyroidism; Vitamin D deficiency; Pancytopenia ([facility]); Bowel dysfunction; Constipation by delayed colonic transit; Chronic diarrhea; Anasarca; Heart failure with preserved ejection fraction ([facility]); Carnitine deficiency ([facility]); COVID-19 virus infection; and Orthostatic hypotension on their problem list.    Surgical History:  Past Surgical History:   Procedure Laterality Date   • CARPAL TUNNEL RELEASE      and ulnar nerve    • CYST EXCISION Right     ovarian dermoid   • OTHER ABDOMINAL SURGERY      rectal prolapse repair, anastomy leak/abcess,colostony       Past Social Hx: Pita Bueno  reports that she has never smoked. She has never used smokeless tobacco. She reports previous alcohol use. She reports previous drug use.     Past Family Hx:  Pita Bueno family history includes Alcohol abuse in her father; Heart Disease in her father; Hypertension in her father; Stroke in her father; Thyroid in her mother.     Problem list, medications, and allergies reviewed by myself today in Epic.          Patient presents with:  Wound Check: x1 week, wound on leg, from gaining 30lbs from swelling off and on.  Pt states most of the edema has resolved but her skin is red and tender, a bit warm to the touch in the front of her leg. Pt denies calf or thigh pain, just pain, erythema and infection to front of left shin. Pt thinks the infection may also be from hitting her leg on a  "corner of her furniture as she has one on the other leg too, though no infection on right shin, just a scrape.         Rash  This is a new problem. The current episode started in the past 7 days. The problem has been gradually worsening since onset. The affected locations include the left lower leg. The rash is characterized by burning, pain, redness and swelling. Associated with: BLE and contusion to both legs. Treatments tried: ace wraps, elevation, topical polysporin. The treatment provided mild relief. There is no history of eczema.       Review of Systems   Constitutional: Negative for chills.   Cardiovascular: Positive for leg swelling.   Skin: Positive for rash. Negative for itching.   All other systems reviewed and are negative.         Objective:     BP (!) 92/50   Pulse 70   Temp 36.6 °C (97.8 °F) (Temporal)   Resp 16   Ht 1.626 m (5' 4\")   Wt 45 kg (99 lb 3.2 oz)   SpO2 98%   BMI 17.03 kg/m²      Physical Exam  Vitals and nursing note reviewed.   Constitutional:       General: She is not in acute distress.     Appearance: Normal appearance. She is well-developed and normal weight. She is not ill-appearing or toxic-appearing.   HENT:      Head: Normocephalic and atraumatic.      Nose: Nose normal.      Mouth/Throat:      Lips: Pink.      Mouth: Mucous membranes are moist.   Eyes:      Extraocular Movements: Extraocular movements intact.      Conjunctiva/sclera: Conjunctivae normal.      Pupils: Pupils are equal, round, and reactive to light.   Cardiovascular:      Rate and Rhythm: Normal rate and regular rhythm.      Pulses: Normal pulses.      Heart sounds: Normal heart sounds.   Pulmonary:      Effort: Pulmonary effort is normal.   Abdominal:      General: Abdomen is flat.   Musculoskeletal:      Cervical back: Normal range of motion and neck supple.      Right lower leg: No deformity, tenderness or bony tenderness. 1+ Edema present.      Left lower leg: Tenderness present. No deformity or bony " tenderness. 1+ Pitting Edema present.        Legs:    Skin:     General: Skin is warm and dry.      Capillary Refill: Capillary refill takes less than 2 seconds.   Neurological:      General: No focal deficit present.      Mental Status: She is alert and oriented to person, place, and time.   Psychiatric:         Mood and Affect: Mood normal.              RADIOLOGY RESULTS   DX-TIBIA AND FIBULA LEFT    Result Date: 2/6/2021 2/6/2021 4:10 PM HISTORY/REASON FOR EXAM:  Pain/Deformity Following Trauma; contusion to left anterior leg TECHNIQUE/EXAM DESCRIPTION AND NUMBER OF VIEWS: 2 views of the LEFT tibia and fibula. COMPARISON:  None FINDINGS: There is no evidence of fracture or dislocation. Alignment is maintained. No periosteal new bone formation or osseous erosion is identified. There is soft tissue swelling about the ankle.                  Assessment/Plan:         1. Cellulitis of left leg without foot  doxycycline (VIBRAMYCIN) 100 MG Tab   2. Contusion of left lower extremity, initial encounter  DX-TIBIA AND FIBULA LEFT    doxycycline (VIBRAMYCIN) 100 MG Tab   3. Contusion of right leg, initial encounter  doxycycline (VIBRAMYCIN) 100 MG Tab   4. Chronic heart failure with preserved ejection fraction (HCC)     5. Bilateral lower extremity edema       Patient was evaluated in clinic today while wearing appropriate personal protective equipment.        We discussed hx of chronic BLE edema secondary to HF, pt declined US for DVT evaluation today (low suspicion but wanted to address it with patient who states she is not suspicious of DVT , had negative BLE US 1/25/2021 - 13 days ago) as the  infection started after contusion).     Rx doxycycline for cellulitis, begin today.     PT to continue taking all other current prescription medications as prescribed.     Keep legs elevated as much as possible.     PT should follow up with PCP in 1-2 days for re-evaluation if symptoms have not improved.      Discussed red flags  and reasons to return to UC or ED.      Pt and/or family verbalized understanding of diagnosis and follow up instructions and was offered informational handout on diagnosis.  PT discharged.

## 2021-02-12 ENCOUNTER — HOSPITAL ENCOUNTER (OUTPATIENT)
Dept: RADIOLOGY | Facility: MEDICAL CENTER | Age: 61
End: 2021-02-12
Attending: PHYSICIAN ASSISTANT
Payer: OTHER GOVERNMENT

## 2021-02-12 DIAGNOSIS — R11.0 NAUSEA: ICD-10-CM

## 2021-02-12 DIAGNOSIS — T47.4X5A: ICD-10-CM

## 2021-02-12 DIAGNOSIS — K94.03 COLOSTOMY MALFUNCTION (HCC): ICD-10-CM

## 2021-02-12 PROCEDURE — 76700 US EXAM ABDOM COMPLETE: CPT

## 2021-02-13 ASSESSMENT — ENCOUNTER SYMPTOMS: CHILLS: 0

## 2021-02-18 DIAGNOSIS — Z93.3 COLOSTOMY IN PLACE (HCC): ICD-10-CM

## 2021-02-18 DIAGNOSIS — Z43.3 ATTENTION TO COLOSTOMY (HCC): ICD-10-CM

## 2021-02-18 DIAGNOSIS — K90.9 INTESTINAL MALABSORPTION, UNSPECIFIED TYPE: ICD-10-CM

## 2021-02-19 ENCOUNTER — TELEPHONE (OUTPATIENT)
Dept: MEDICAL GROUP | Facility: PHYSICIAN GROUP | Age: 61
End: 2021-02-19

## 2021-02-19 NOTE — TELEPHONE ENCOUNTER
----- Message from Clfiton Harvey M.D. sent at 2/18/2021  5:29 PM PST -----  Regarding: FW: Non-Urgent Medical Question  Contact: [phone]  Pts chart reviewed.  Cardiologist  note: rec for pt to have endoscopy done  Therefore I have submitted a referral for GI doctor for 2nd opinion  Pls notify  pt. thx  ----- Message -----  From: Sahil Sahu Ass't  Sent: 2/18/2021  10:58 AM PST  To: Clifton Harvey M.D.  Subject: FW: Non-Urgent Medical Question                  Please advise.  ----- Message -----  From: Pita Bueno  Sent: 2/18/2021  10:39 AM PST  To: [facility]  Subject: Non-Urgent Medical Question                      I am not sure what to do the cardiologists sent me to the vascular surgeon She felt that I have a systemic problem that needs to be addressed referring me to [facility] but the source of my problem and the 40 pounds of weight that I just gained she feels has to do with my GI possibly liver or malabsorption of protein and I need colonoscopy GI doctor won't do it what do I do I feel so distressed uncomfortable And I don't know where to turn should I go back to [facility] or get a second opinion here from GI doctor what do you think . I do infusions next week other than that I don't know what to do help??!! Uyen

## 2021-02-21 ENCOUNTER — PATIENT MESSAGE (OUTPATIENT)
Dept: MEDICAL GROUP | Facility: PHYSICIAN GROUP | Age: 61
End: 2021-02-21

## 2021-02-21 ENCOUNTER — PATIENT MESSAGE (OUTPATIENT)
Dept: CARDIOLOGY | Facility: MEDICAL CENTER | Age: 61
End: 2021-02-21

## 2021-02-22 ENCOUNTER — TELEPHONE (OUTPATIENT)
Dept: MEDICAL GROUP | Facility: PHYSICIAN GROUP | Age: 61
End: 2021-02-22

## 2021-02-22 ENCOUNTER — PATIENT MESSAGE (OUTPATIENT)
Dept: MEDICAL GROUP | Facility: PHYSICIAN GROUP | Age: 61
End: 2021-02-22

## 2021-02-22 DIAGNOSIS — Z13.0 SCREENING FOR DEFICIENCY ANEMIA: ICD-10-CM

## 2021-02-22 DIAGNOSIS — Z13.6 SCREENING FOR CARDIOVASCULAR CONDITION: ICD-10-CM

## 2021-02-22 DIAGNOSIS — Z13.29 SCREENING FOR ENDOCRINE DISORDER: ICD-10-CM

## 2021-02-22 NOTE — TELEPHONE ENCOUNTER
pls see note below: and call pt  If possible pls advise pt to try to schedule an appt with Dr Ramirez.      ----- Message from Pita Bueno sent at 2/19/2021  6:36 PM PST -----  Regarding: Non-Urgent Medical Question  Contact: [phone]  For the GI referral I have 2 suggestions.dr ramirez or dr merritt. It Is has to be someone out of the office where doctor marianna is located. Maybe you guys have someone in mind too. I trust dr Harvey.  Thanks Uyen Bueno

## 2021-02-22 NOTE — TELEPHONE ENCOUNTER
----- Message from Clifton Harvey M.D. sent at 2/22/2021 11:04 AM PST -----  Regarding: FW: Non-Urgent Medical Question  Contact: [phone]  Pls call and advise pt to go to ER now to be evaluated  ----- Message -----  From: Sahil Sahu Ass't  Sent: 2/22/2021  10:14 AM PST  To: Clifton Harvey M.D.  Subject: FW: Non-Urgent Medical Question                  Please advise  ----- Message -----  From: Pita Bueno  Sent: 2/21/2021   9:14 PM PST  To: [facility]  Subject: Non-Urgent Medical Question                      My legs are so much worse I don't know what to do or where to turn I believe I have an infection in my stomach but there is no GI doctor I can go to right away what should I do that is why I think I need some blood work to see if I have an infection the stoma is swollen and protruding so much and my stomach is too. Very tender. Whst do I do

## 2021-02-22 NOTE — TELEPHONE ENCOUNTER
----- Message from Pita Bueno sent at 2/20/2021 10:52 PM PST -----  Regarding: Non-Urgent Medical Question  Contact: [phone]  Can you please ask doctor Candice  If he can order some general lab work and thyroid levels something is very wrong the vascular surgeon thought something systemic is going o possibly liver...my stoma is prolapsing  my legs are leaving fingerprints in them my stomach is swollen my weight keeps going up everyday.  I can't get to any specialist anytime soon the only thing I can think of is run some labs and if they are very off I will go to the ER. Thank you or any other ideas he might have

## 2021-02-22 NOTE — TELEPHONE ENCOUNTER
----- Message from Pita Bueno sent at 2/19/2021  6:36 PM PST -----  Regarding: Non-Urgent Medical Question  Contact: [phone]  For the GI referral I have 2 suggestions.dr coe or dr merritt. It Is has to be someone out of the office where doctor marianna is located. Maybe you guys have someone in mind too. I trust dr Harvey.  Thanks Uyen Bueno

## 2021-02-22 NOTE — TELEPHONE ENCOUNTER
1. Caller Name: Pita                          Call Back Number: [phone] (home)       How would the patient prefer to be contacted with a response: Did not specify    2. SPECIFIC Action To Be Taken: Pt requesting complete lab panel    3. Diagnosis/Clinical Reason for Request: Liver Concerns    4. Specialty & Provider Name/Lab/Imaging Location: [facility]    5. Is appointment scheduled for requested order/referral: no    Patient was not informed they will receive a return phone call from the office ONLY if there are any questions before processing their request. Advised to call back if they haven't received a call from the referral department in 5 days.

## 2021-02-22 NOTE — TELEPHONE ENCOUNTER
1. Caller Name: Pita                          Call Back Number: [phone] (home)         How would the patient prefer to be contacted with a response: Did not specify    2. SPECIFIC Action To Be Taken: Referral Update Request    3. Diagnosis/Clinical Reason for Request: Z93.3, K90.9    4. Specialty & Provider Name/Lab/Imaging Location: Dr. Ramirez, or Dr Biggs/Gastroenterology    5. Is appointment scheduled for requested order/referral: no    Patient was not informed they will receive a return phone call from the office ONLY if there are any questions before processing their request. Advised to call back if they haven't received a call from the referral department in 5 days.

## 2021-02-23 ENCOUNTER — HOSPITAL ENCOUNTER (OUTPATIENT)
Dept: LAB | Facility: MEDICAL CENTER | Age: 61
End: 2021-02-23
Attending: INTERNAL MEDICINE
Payer: OTHER GOVERNMENT

## 2021-02-23 DIAGNOSIS — Z13.29 SCREENING FOR ENDOCRINE DISORDER: ICD-10-CM

## 2021-02-23 DIAGNOSIS — Z13.6 SCREENING FOR CARDIOVASCULAR CONDITION: ICD-10-CM

## 2021-02-23 DIAGNOSIS — Z13.0 SCREENING FOR DEFICIENCY ANEMIA: ICD-10-CM

## 2021-02-23 LAB
25(OH)D3 SERPL-MCNC: 17 NG/ML (ref 30–100)
ALBUMIN SERPL BCP-MCNC: 3.8 G/DL (ref 3.2–4.9)
ALP SERPL-CCNC: 137 U/L (ref 30–99)
ALT SERPL-CCNC: 50 U/L (ref 2–50)
ANION GAP SERPL CALC-SCNC: 11 MMOL/L (ref 7–16)
ANISOCYTOSIS BLD QL SMEAR: ABNORMAL
AST SERPL-CCNC: 31 U/L (ref 12–45)
BASOPHILS # BLD AUTO: 0.6 % (ref 0–1.8)
BASOPHILS # BLD: 0.03 K/UL (ref 0–0.12)
BILIRUB CONJ SERPL-MCNC: <0.2 MG/DL (ref 0.1–0.5)
BILIRUB INDIRECT SERPL-MCNC: ABNORMAL MG/DL (ref 0–1)
BILIRUB SERPL-MCNC: <0.2 MG/DL (ref 0.1–1.5)
BUN SERPL-MCNC: 55 MG/DL (ref 8–22)
CALCIUM SERPL-MCNC: 9.2 MG/DL (ref 8.5–10.5)
CHLORIDE SERPL-SCNC: 109 MMOL/L (ref 96–112)
CO2 SERPL-SCNC: 17 MMOL/L (ref 20–33)
COMMENT 1642: NORMAL
CREAT SERPL-MCNC: 0.5 MG/DL (ref 0.5–1.4)
EOSINOPHIL # BLD AUTO: 0.1 K/UL (ref 0–0.51)
EOSINOPHIL NFR BLD: 2 % (ref 0–6.9)
ERYTHROCYTE [DISTWIDTH] IN BLOOD BY AUTOMATED COUNT: 50.8 FL (ref 35.9–50)
EST. AVERAGE GLUCOSE BLD GHB EST-MCNC: 88 MG/DL
GLUCOSE SERPL-MCNC: 93 MG/DL (ref 65–99)
HBA1C MFR BLD: 4.7 % (ref 4–5.6)
HCT VFR BLD AUTO: 32.3 % (ref 37–47)
HGB BLD-MCNC: 9.5 G/DL (ref 12–16)
IMM GRANULOCYTES # BLD AUTO: 0.03 K/UL (ref 0–0.11)
IMM GRANULOCYTES NFR BLD AUTO: 0.6 % (ref 0–0.9)
LYMPHOCYTES # BLD AUTO: 1.01 K/UL (ref 1–4.8)
LYMPHOCYTES NFR BLD: 20.2 % (ref 22–41)
MACROCYTES BLD QL SMEAR: ABNORMAL
MCH RBC QN AUTO: 30.4 PG (ref 27–33)
MCHC RBC AUTO-ENTMCNC: 29.4 G/DL (ref 33.6–35)
MCV RBC AUTO: 103.2 FL (ref 81.4–97.8)
MONOCYTES # BLD AUTO: 0.72 K/UL (ref 0–0.85)
MONOCYTES NFR BLD AUTO: 14.4 % (ref 0–13.4)
MORPHOLOGY BLD-IMP: NORMAL
NEUTROPHILS # BLD AUTO: 3.1 K/UL (ref 2–7.15)
NEUTROPHILS NFR BLD: 62.2 % (ref 44–72)
NRBC # BLD AUTO: 0 K/UL
NRBC BLD-RTO: 0 /100 WBC
PLATELET # BLD AUTO: 153 K/UL (ref 164–446)
PLATELET BLD QL SMEAR: NORMAL
PMV BLD AUTO: 13.4 FL (ref 9–12.9)
POLYCHROMASIA BLD QL SMEAR: NORMAL
POTASSIUM SERPL-SCNC: 4.4 MMOL/L (ref 3.6–5.5)
PROT SERPL-MCNC: 6.1 G/DL (ref 6–8.2)
RBC # BLD AUTO: 3.13 M/UL (ref 4.2–5.4)
RBC BLD AUTO: PRESENT
SODIUM SERPL-SCNC: 137 MMOL/L (ref 135–145)
T3 SERPL-MCNC: 63.9 NG/DL (ref 60–181)
T4 FREE SERPL-MCNC: 0.94 NG/DL (ref 0.93–1.7)
TSH SERPL DL<=0.005 MIU/L-ACNC: 1.3 UIU/ML (ref 0.38–5.33)
VIT B12 SERPL-MCNC: 174 PG/ML (ref 211–911)
WBC # BLD AUTO: 5 K/UL (ref 4.8–10.8)

## 2021-02-23 PROCEDURE — 82306 VITAMIN D 25 HYDROXY: CPT

## 2021-02-23 PROCEDURE — 80048 BASIC METABOLIC PNL TOTAL CA: CPT

## 2021-02-23 PROCEDURE — 82607 VITAMIN B-12: CPT

## 2021-02-23 PROCEDURE — 85025 COMPLETE CBC W/AUTO DIFF WBC: CPT

## 2021-02-23 PROCEDURE — 80076 HEPATIC FUNCTION PANEL: CPT

## 2021-02-23 PROCEDURE — 84439 ASSAY OF FREE THYROXINE: CPT

## 2021-02-23 PROCEDURE — 84443 ASSAY THYROID STIM HORMONE: CPT

## 2021-02-23 PROCEDURE — 36415 COLL VENOUS BLD VENIPUNCTURE: CPT

## 2021-02-23 PROCEDURE — 84480 ASSAY TRIIODOTHYRONINE (T3): CPT

## 2021-02-23 PROCEDURE — 83036 HEMOGLOBIN GLYCOSYLATED A1C: CPT

## 2021-02-23 NOTE — PATIENT COMMUNICATION
You  Abran Calvillo M.D. 6 hours ago (10:00 AM)     Vascular didn't think it was venous insufficiency. Requested records. It looks like PCP re-referred to GI for a 2nd opinion and ordered some labs today. Anything else to add?   Thanks!      Abran Calvillo M.D.  You 5 hours ago (11:47 AM)     Sounds good.     Thanks   BE

## 2021-02-23 NOTE — TELEPHONE ENCOUNTER
From: Pita Bueno  To: Medical Assistant Gita LIPSCOMB  Sent: 2/23/2021 10:57 AM PST  Subject: RE:Referral Update Request    What about the Pulling the tooth in a week with all that's going on      ----- Message -----   From:Medical Assistant Gita LIPSCOMB   Sent:2/23/2021 9:56 AM PST   To:Pita Bueno   Subject:RE:Referral Update Request    Stuart Lema,    You already have a referral for gastroenterology, just try and make an appt with Dr. Ramirez's office and if he does need a referral then we can fax your referral order that has been authorized to him. Just let us know and I will forward the rest of this message to Clifton Harvey M.D.    Thanks,  Gita MENDOZA      ----- Message -----   From:Pita Bueno   Sent:2/22/2021 3:58 PM PST   To:Medical Assistant Gita LIPSCOMB   Subject:RE:Referral Update Request    Yes I will do I need a referral? Also I am supposed to get my tooth pulled March 5th. Is it safe for me with all that's going? What does he think?      ----- Message -----   From:Medical Assistant Gita LIPSCOMB   Sent:2/22/2021 1:58 PM PST   To:Pita Bueno   Subject:Referral Update Request    Stuart Lema,    Clifton Harvey M.D. states if possible to please call and schedule an appt with Dr. Ramirez.    ThanksGita MA

## 2021-02-24 ENCOUNTER — TELEPHONE (OUTPATIENT)
Dept: MEDICAL GROUP | Facility: PHYSICIAN GROUP | Age: 61
End: 2021-02-24

## 2021-02-24 PROBLEM — E53.8 VITAMIN B12 DEFICIENCY: Status: ACTIVE | Noted: 2021-02-24

## 2021-02-24 PROBLEM — U07.1 COVID-19 VIRUS INFECTION: Chronic | Status: RESOLVED | Noted: 2021-01-20 | Resolved: 2021-02-24

## 2021-02-24 RX ORDER — CYANOCOBALAMIN 1000 UG/ML
1000 INJECTION, SOLUTION INTRAMUSCULAR; SUBCUTANEOUS
Status: DISCONTINUED | OUTPATIENT
Start: 2021-02-24 | End: 2021-05-22

## 2021-02-24 NOTE — TELEPHONE ENCOUNTER
----- Message from Clifton Harvey M.D. sent at 2/24/2021  8:41 AM PST -----    Please call patient : labs back    -vitamin d low: pls confirm if pt has been taking vitamin d 91458v once a week RELIGIOUSLY?  If no: pls resume.  If yes: Pls add otc vitamin d 4000 u daily in addition to vitamin [id number] once a week    -vitamin b12 low: pls arrange for b12 clinic injection 1000mcg IM monthly     -liver enzyme [alkaline phosphatase] mildly high>> pls advise pt to [facility][ referral sent recently]    -blood test showed anemia. This was noted before: rec pt to cont [facility] Hematologist Dr Chang.

## 2021-02-24 NOTE — TELEPHONE ENCOUNTER
DIEUDONNEM for pt to return call, advised pt in voicemail that I would call her back again this afternoon

## 2021-02-25 NOTE — TELEPHONE ENCOUNTER
Attempted to call pt to send to ER for evaluation, per Clifton Harvey M.D.     [email or web address] message sent.    Vianney LIPSCOMB RN, BSN

## 2021-02-25 NOTE — TELEPHONE ENCOUNTER
Attempted to contact pt again regarding labs and ER eval, pt has read my message advising her to go to the ER for evaluation. Also sent [employer] message regarding recent lab results due to unsuccessfully reaching the pt by phone.

## 2021-03-03 ENCOUNTER — APPOINTMENT (OUTPATIENT)
Dept: MEDICAL GROUP | Facility: PHYSICIAN GROUP | Age: 61
End: 2021-03-03
Payer: OTHER GOVERNMENT

## 2021-03-03 ENCOUNTER — HOSPITAL ENCOUNTER (OUTPATIENT)
Dept: LAB | Facility: MEDICAL CENTER | Age: 61
End: 2021-03-03
Attending: PHYSICIAN ASSISTANT
Payer: OTHER GOVERNMENT

## 2021-03-03 LAB
ALBUMIN SERPL BCP-MCNC: 3.7 G/DL (ref 3.2–4.9)
ALBUMIN/GLOB SERPL: 1.6 G/DL
ALP SERPL-CCNC: 131 U/L (ref 30–99)
ALT SERPL-CCNC: 63 U/L (ref 2–50)
ANION GAP SERPL CALC-SCNC: 12 MMOL/L (ref 7–16)
AST SERPL-CCNC: 36 U/L (ref 12–45)
BILIRUB SERPL-MCNC: <0.2 MG/DL (ref 0.1–1.5)
BUN SERPL-MCNC: 39 MG/DL (ref 8–22)
CALCIUM SERPL-MCNC: 9.1 MG/DL (ref 8.5–10.5)
CHLORIDE SERPL-SCNC: 107 MMOL/L (ref 96–112)
CO2 SERPL-SCNC: 18 MMOL/L (ref 20–33)
CREAT SERPL-MCNC: 0.43 MG/DL (ref 0.5–1.4)
FASTING STATUS PATIENT QL REPORTED: NORMAL
GGT SERPL-CCNC: 8 U/L (ref 7–34)
GLOBULIN SER CALC-MCNC: 2.3 G/DL (ref 1.9–3.5)
GLUCOSE SERPL-MCNC: 92 MG/DL (ref 65–99)
POTASSIUM SERPL-SCNC: 4.4 MMOL/L (ref 3.6–5.5)
PROT SERPL-MCNC: 6 G/DL (ref 6–8.2)
SODIUM SERPL-SCNC: 137 MMOL/L (ref 135–145)

## 2021-03-03 PROCEDURE — 84080 ASSAY ALKALINE PHOSPHATASES: CPT

## 2021-03-03 PROCEDURE — 36415 COLL VENOUS BLD VENIPUNCTURE: CPT

## 2021-03-03 PROCEDURE — 80053 COMPREHEN METABOLIC PANEL: CPT

## 2021-03-03 PROCEDURE — 82977 ASSAY OF GGT: CPT

## 2021-03-03 PROCEDURE — 84075 ASSAY ALKALINE PHOSPHATASE: CPT | Mod: XU

## 2021-03-03 PROCEDURE — 83516 IMMUNOASSAY NONANTIBODY: CPT

## 2021-03-04 ENCOUNTER — HOSPITAL ENCOUNTER (OUTPATIENT)
Dept: RADIOLOGY | Facility: MEDICAL CENTER | Age: 61
End: 2021-03-04
Attending: FAMILY MEDICINE
Payer: OTHER GOVERNMENT

## 2021-03-04 ENCOUNTER — OFFICE VISIT (OUTPATIENT)
Dept: URGENT CARE | Facility: PHYSICIAN GROUP | Age: 61
End: 2021-03-04
Payer: OTHER GOVERNMENT

## 2021-03-04 ENCOUNTER — PATIENT MESSAGE (OUTPATIENT)
Dept: CARDIOLOGY | Facility: MEDICAL CENTER | Age: 61
End: 2021-03-04

## 2021-03-04 VITALS
HEART RATE: 74 BPM | DIASTOLIC BLOOD PRESSURE: 70 MMHG | RESPIRATION RATE: 18 BRPM | SYSTOLIC BLOOD PRESSURE: 90 MMHG | OXYGEN SATURATION: 96 % | WEIGHT: 108 LBS | BODY MASS INDEX: 18.44 KG/M2 | TEMPERATURE: 97.6 F | HEIGHT: 64 IN

## 2021-03-04 DIAGNOSIS — R07.89 CHEST DISCOMFORT: ICD-10-CM

## 2021-03-04 DIAGNOSIS — R10.9 ABDOMINAL DISCOMFORT: ICD-10-CM

## 2021-03-04 PROCEDURE — 71046 X-RAY EXAM CHEST 2 VIEWS: CPT

## 2021-03-04 PROCEDURE — 93000 ELECTROCARDIOGRAM COMPLETE: CPT | Performed by: FAMILY MEDICINE

## 2021-03-04 PROCEDURE — 74018 RADEX ABDOMEN 1 VIEW: CPT

## 2021-03-04 PROCEDURE — 99215 OFFICE O/P EST HI 40 MIN: CPT | Performed by: FAMILY MEDICINE

## 2021-03-04 ASSESSMENT — ENCOUNTER SYMPTOMS
ABDOMINAL PAIN: 1
SHORTNESS OF BREATH: 1

## 2021-03-04 ASSESSMENT — PAIN SCALES - GENERAL: PAINLEVEL: 10=SEVERE PAIN

## 2021-03-04 ASSESSMENT — FIBROSIS 4 INDEX: FIB4 SCORE: 1.75

## 2021-03-05 LAB
ALP BONE SERPL-CCNC: 82 U/L (ref 0–55)
ALP ISOS SERPL HS-CCNC: 0 U/L
ALP LIVER SERPL-CCNC: 44 U/L (ref 0–94)
ALP SERPL-CCNC: 126 U/L (ref 40–120)
MITOCHONDRIA M2 IGG SER-ACNC: 2.1 UNITS (ref 0–24.9)

## 2021-03-05 NOTE — PATIENT COMMUNICATION
Abran Calvillo M.D.  You 2 hours ago (10:07 AM)     ECG looks good. Can try modified demi protocol with stress      Added modified demi protocol to stress test notes.

## 2021-03-05 NOTE — PROGRESS NOTES
Subjective:      Pita Bueno is a 60 y.o. female who presents with Chest Pain (SOB, stabbing back back px L side, x3 days, wants CHEST XRAY,  ABD swelling  x2 weeks)      - This is a pleasant and nontoxic appearing 60 y.o. female with c/o 3 days ago felt Lt sided chest pain. No specific injury or known cause. Says it radiates to Lt side back. Fairly constant and more worse w/ certain movements or body positions. No nausea, says hard to get a full breath b/c it hurts to breath in properly and sometimes says she feels the pain radiating down the Lt arm. Has had a little dry cough for about 3 weeks and is worried sh emight have pneumonia and is asking for a chest x-ray    - says has been feeling Lt side abd bloating and discomfort at times over past 3 wks and feels she has gained about 30 pounds over past month.             ALLERGIES:  Sulfa drugs     PMH:  Past Medical History:   Diagnosis Date   • Adverse effect of anesthesia    • Anemia    • Arthritis     thumbs   • Bowel habit changes     constipation and diarrhea   • Breath shortness    • Colostomy in place ([facility])    • Congestive heart failure (HCC)     recent   • CVID (common variable immunodeficiency) ([facility])    • Eating disorder    • Edema 01/2021    Bilat LE, Pt states having pain LLE and DVT being ruled out 1/27   • Hemorrhagic disorder (HCC) 01/2021    states she has noticed she is bleeding easier than normal   • High cholesterol    • Hypotension    • Hypothyroid    • Pain     leg pain   • Palpitations         PSH:  Past Surgical History:   Procedure Laterality Date   • CARPAL TUNNEL RELEASE      and ulnar nerve    • CYST EXCISION Right     ovarian dermoid   • OTHER ABDOMINAL SURGERY      rectal prolapse repair, anastomy leak/abcess,colostony       MEDS:    Current Outpatient Medications:   •  vitamin D, Ergocalciferol, (DRISDOL) 1.25 MG (64026 UT) Cap capsule, Take 1 Cap by mouth every 7 days., Disp: 5 Cap, Rfl: 0  •  levothyroxine (SYNTHROID) 50  "MCG Tab, Take 1 Tab by mouth Every morning on an empty stomach., Disp: 90 Tab, Rfl: 3  •  diphenhydrAMINE (BENADRYL ALLERGY) 25 MG Tab, Take 25 mg by mouth every day. At night, Disp: , Rfl:   •  torsemide (DEMADEX) 10 MG tablet, Take 1 Tab by mouth 2 times a day., Disp: 180 Tab, Rfl: 3  •  levOCARNitine (CARNITINE, L,) Powder, 500 mg 2 (two) times a day., Disp: 90 g, Rfl: 3  •  linaCLOtide (LINZESS) 290 MCG Cap, Take 290 mcg by mouth every evening., Disp: 90 Cap, Rfl: 0  •  midodrine (PROAMATINE) 10 MG tablet, Take 1 Tab by mouth 3 times a day, with meals., Disp: 60 Tab, Rfl: 0  •  sennosides (SENOKOT) 8.6 MG Tab, Take 8.6 mg by mouth 1 time daily as needed., Disp: , Rfl:     Current Facility-Administered Medications:   •  cyanocobalamin (VITAMIN B-12) injection 1,000 mcg, 1,000 mcg, Intramuscular, Q30 DAYS, Clifton Harvey M.D.    ** I have documented what I find to be significant in regards to past medical, social, family and surgical history  in my HPI or under PMH/PSH/FH review section, otherwise it is noncontributory **           HPI    Review of Systems   Respiratory: Positive for shortness of breath.    Cardiovascular: Positive for chest pain.   Gastrointestinal: Positive for abdominal pain.   All other systems reviewed and are negative.         Objective:     BP (!) 90/70   Pulse 74   Temp 36.4 °C (97.6 °F) (Temporal)   Resp 18   Ht 1.626 m (5' 4\")   Wt 49 kg (108 lb)   SpO2 96%   BMI 18.54 kg/m²      Physical Exam  Vitals and nursing note reviewed.   Constitutional:       General: She is not in acute distress.     Appearance: She is well-developed. She is not diaphoretic.   HENT:      Head: Normocephalic and atraumatic.   Eyes:      General: No scleral icterus.     Conjunctiva/sclera: Conjunctivae normal.   Cardiovascular:      Heart sounds: Normal heart sounds. No murmur.   Pulmonary:      Effort: Pulmonary effort is normal. No respiratory distress.      Breath sounds: Normal breath sounds. "   Abdominal:      Palpations: Abdomen is soft.      Tenderness: There is abdominal tenderness ( mild LUQ). There is no guarding or rebound.   Skin:     Coloration: Skin is not pale.      Findings: No rash.   Neurological:      Mental Status: She is alert.      Motor: No abnormal muscle tone.   Psychiatric:         Mood and Affect: Mood normal.         Behavior: Behavior normal.         Judgment: Judgment normal.                 Assessment/Plan:            1. Chest discomfort  DX-CHEST-2 VIEWS    [id number]-3 VIEW    EKG - Clinic Performed   2. Abdominal discomfort         KUB/CXR: no acute finding, radiology read pending    EKG: borderline t-wave abnormalities in inferior leads. Abnormal ST wave V1, V2    Have asked patient to go to ER today for further eval

## 2021-03-11 ENCOUNTER — OFFICE VISIT (OUTPATIENT)
Dept: CARDIOLOGY | Facility: MEDICAL CENTER | Age: 61
End: 2021-03-11
Payer: OTHER GOVERNMENT

## 2021-03-11 VITALS
RESPIRATION RATE: 12 BRPM | HEART RATE: 78 BPM | WEIGHT: 113.1 LBS | HEIGHT: 64 IN | BODY MASS INDEX: 19.31 KG/M2 | SYSTOLIC BLOOD PRESSURE: 92 MMHG | OXYGEN SATURATION: 98 % | DIASTOLIC BLOOD PRESSURE: 68 MMHG

## 2021-03-11 DIAGNOSIS — E43 SEVERE MALNUTRITION (HCC): Chronic | ICD-10-CM

## 2021-03-11 DIAGNOSIS — I50.32 CHRONIC HEART FAILURE WITH PRESERVED EJECTION FRACTION (HCC): ICD-10-CM

## 2021-03-11 PROCEDURE — 99213 OFFICE O/P EST LOW 20 MIN: CPT | Performed by: NURSE PRACTITIONER

## 2021-03-11 RX ORDER — ACETAMINOPHEN 325 MG/1
TABLET ORAL EVERY 4 HOURS PRN
COMMUNITY
End: 2021-04-01

## 2021-03-11 RX ORDER — TORSEMIDE 10 MG/1
10 TABLET ORAL 2 TIMES DAILY
Qty: 180 TABLET | Refills: 0 | Status: SHIPPED | OUTPATIENT
Start: 2021-03-11 | End: 2021-03-29

## 2021-03-11 ASSESSMENT — ENCOUNTER SYMPTOMS
COUGH: 1
FOCAL WEAKNESS: 0
DIZZINESS: 0
SHORTNESS OF BREATH: 0
WEAKNESS: 0
BLURRED VISION: 0
ROS GI COMMENTS: ABDOMINAL DISTENTION
FALLS: 0
LOSS OF CONSCIOUSNESS: 0
PALPITATIONS: 0
HEADACHES: 0
ORTHOPNEA: 1
WHEEZING: 0
DOUBLE VISION: 0

## 2021-03-11 ASSESSMENT — FIBROSIS 4 INDEX: FIB4 SCORE: 1.75

## 2021-03-11 NOTE — PROGRESS NOTES
Chief Complaint   Patient presents with   • Congestive Heart Failure     F/V Dx: Heart failure with preserved ejection fraction (HCC)     Chief Complaint   Patient presents with   • Congestive Heart Failure     F/V Dx: Heart failure with preserved ejection fraction (HCC)     Chief Complaint   Patient presents with   • Congestive Heart Failure     F/V Dx: Heart failure with preserved ejection fraction (HCC)       Subjective:   Pita Bueno is a 60 y.o. female who presents today for follow up weight gain.     She is followed by Dr. Calvillo in our clinic, history of palpitations, hypothyroid, Colostomy from complication from abdominal surgery in 2013, low body weight/malnutrition, COVID 12/10/2020, and common variable immunodeficiency.    Last seen by Dr. Calvillo 1/27 with leg swelling. She as started on torsemide 10mg BID and treadmill stress test due to JOSÉ. She is scheduled on 3/15 for treadmill stress test. She sees a lymphedema clinic, recommend ace wrap.     She has been seen by Dr. Starr (GI) plan for endoscopy. 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     Interim events:  Since last visit, she has stopped torsemide since she has not had success per patient. She kept gaining weight per patient. She is now 10lbs up since last OV. Weight goal 80-85lb     Past Medical History:   Diagnosis Date   • Adverse effect of anesthesia    • Anemia    • Arthritis     thumbs   • Bowel habit changes     constipation and diarrhea   • Breath shortness    • Colostomy in place (HCC)    • Congestive heart failure (HCC)     recent   • CVID (common variable immunodeficiency) ([facility])    • Eating disorder    • Edema 01/2021    Bilat LE, Pt states having pain LLE and DVT being ruled out 1/27   • Hemorrhagic disorder (HCC) 01/2021    states she has noticed she is bleeding easier than normal   • High cholesterol    • Hypotension    • Hypothyroid    • Pain     leg pain   • Palpitations      Past Surgical  History:   Procedure Laterality Date   • CARPAL TUNNEL RELEASE      and ulnar nerve    • CYST EXCISION Right     ovarian dermoid   • OTHER ABDOMINAL SURGERY      rectal prolapse repair, anastomy leak/abcess,colostony     Family History   Problem Relation Age of Onset   • Thyroid Mother    • Alcohol abuse Father    • Heart Disease Father    • Hypertension Father    • Stroke Father      Social History     Socioeconomic History   • Marital status:      Spouse name: Not on file   • Number of children: Not on file   • Years of education: Not on file   • Highest education level: Not on file   Occupational History   • Not on file   Tobacco Use   • Smoking status: Never Smoker   • Smokeless tobacco: Never Used   Substance and Sexual Activity   • Alcohol use: Not Currently   • Drug use: Not Currently   • Sexual activity: Not Currently   Other Topics Concern   • Not on file   Social History Narrative   • Not on file     Social Determinants of Health     Financial Resource Strain:    • Difficulty of Paying Living Expenses:    Food Insecurity:    • Worried About Running Out of Food in the Last Year:    • Ran Out of Food in the Last Year:    Transportation Needs:    • Lack of Transportation (Medical):    • Lack of Transportation (Non-Medical):    Physical Activity:    • Days of Exercise per Week:    • Minutes of Exercise per Session:    Stress:    • Feeling of Stress :    Social Connections:    • Frequency of Communication with Friends and Family:    • Frequency of Social Gatherings with Friends and Family:    • Attends Denominational Services:    • Active Member of Clubs or Organizations:    • Attends Club or Organization Meetings:    • Marital Status:    Intimate Partner Violence:    • Fear of Current or Ex-Partner:    • Emotionally Abused:    • Physically Abused:    • Sexually Abused:      Allergies   Allergen Reactions   • Sulfa Drugs Hives     Outpatient Encounter Medications as of 3/11/2021   Medication Sig Dispense  Refill   • acetaminophen (TYLENOL) 325 MG Tab Take  by mouth every four hours as needed.     • torsemide (DEMADEX) 10 MG tablet Take 1 tablet by mouth 2 times a day. 180 tablet 0   • vitamin D, Ergocalciferol, (DRISDOL) 1.25 MG (24566 UT) Cap capsule Take 1 Cap by mouth every 7 days. 5 Cap 0   • levothyroxine (SYNTHROID) 50 MCG Tab Take 1 Tab by mouth Every morning on an empty stomach. 90 Tab 3   • diphenhydrAMINE (BENADRYL ALLERGY) 25 MG Tab Take 25 mg by mouth every day. At night     • linaCLOtide (LINZESS) 290 MCG Cap Take 290 mcg by mouth every evening. 90 Cap 0   • sennosides (SENOKOT) 8.6 MG Tab Take 8.6 mg by mouth 1 time daily as needed.     • levOCARNitine (CARNITINE, L,) Powder 500 mg 2 (two) times a day. 90 g 3   • [DISCONTINUED] torsemide (DEMADEX) 10 MG tablet Take 1 Tab by mouth 2 times a day. (Patient not taking: Reported on 3/11/2021) 180 Tab 3   • [DISCONTINUED] midodrine (PROAMATINE) 10 MG tablet Take 1 Tab by mouth 3 times a day, with meals. (Patient not taking: Reported on 3/11/2021) 60 Tab 0     Facility-Administered Encounter Medications as of 3/11/2021   Medication Dose Route Frequency Provider Last Rate Last Admin   • cyanocobalamin (VITAMIN B-12) injection 1,000 mcg  1,000 mcg Intramuscular Q30 DAYS Clifton Harvey M.D.           Review of Systems   Constitutional: Negative for malaise/fatigue.   Eyes: Negative for blurred vision and double vision.   Respiratory: Positive for cough. Negative for shortness of breath and wheezing.    Cardiovascular: Positive for orthopnea and leg swelling. Negative for chest pain and palpitations.   Gastrointestinal:        Abdominal distention    Musculoskeletal: Negative for falls.   Neurological: Negative for dizziness, focal weakness, loss of consciousness, weakness and headaches.   All other systems reviewed and are negative.      BP (!) 92/68 (BP Location: Left arm, Patient Position: Sitting, BP Cuff Size: Adult)   Pulse 78   Resp 12   Ht 1.626 m (5'  "4\")   Wt 51.3 kg (113 lb 1.6 oz)   SpO2 98%      Physical Exam   Constitutional: She is oriented to person, place, and time and well-developed, well-nourished, and in no distress. No distress.   HENT:   Head: Normocephalic and atraumatic.   Cardiovascular: Normal rate and regular rhythm.   No murmur heard.  Pulmonary/Chest: Effort normal and breath sounds normal. No respiratory distress.   Abdominal: Soft. She exhibits distension.   Colostomy in place   Musculoskeletal:         General: Edema (1-2+ pitting edema bilateral LE ) present.   Neurological: She is alert and oriented to person, place, and time.   Skin: She is not diaphoretic.   Psychiatric: Mood, memory, affect and judgment normal.           ECHO  9/2/2020  No prior study is available for comparison.   Normal left ventricular systolic function.  Left ventricular ejection fraction is visually estimated to be 60%.  Normal regional wall motion.  Normal right ventricular size.  Normal left atrial size.  Trace mitral regurgitation.  Trace aortic insufficiency.  The aortic valve is not well visualized.  Right ventricular systolic pressure is estimated to be 30 mmHg.  Normal pericardium without effusion.      Echocardiography Laboratory  1/7/2021  LV ejection fraction estimated to be 65%.  Right ventricular systolic pressure is estimated to be 25 mmHg.    Venous duplex  1/25/2021   Deep venous reflux is seen in the right common femoral (2917 msec) and    profunda femoral (1158 msec) veins.    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   Proximal right calf incompetent  measuring 0.24 cm and with 1075    msec of reflux.       Deep venous reflux is seen in the left common femoral (933 msec) vein.      No superficial or deep venous thrombosis bilaterally.    Assessment:     1. Severe malnutrition (HCC)     2. Chronic heart failure with preserved ejection fraction (HCC)         Medical Decision Making:  Today's " Assessment / Status / Plan:     1. Acute to chronic heart failure preserved ef:  - restart torsemide 10mg BID   - recommend stefan hose and elevate legs   - as schedule treadmill stress test, if patient is not able to walk on the treadmill we can switch to MPI    2. Carnitine deficiency:  - continue carnitine 1000mg BID     3. Lymphedema:  - continue diuretics as noted above    Follow up in 1 month.

## 2021-03-15 ENCOUNTER — NON-PROVIDER VISIT (OUTPATIENT)
Dept: CARDIOLOGY | Facility: MEDICAL CENTER | Age: 61
End: 2021-03-15
Attending: INTERNAL MEDICINE
Payer: OTHER GOVERNMENT

## 2021-03-15 VITALS
HEIGHT: 64 IN | SYSTOLIC BLOOD PRESSURE: 100 MMHG | OXYGEN SATURATION: 98 % | WEIGHT: 108 LBS | DIASTOLIC BLOOD PRESSURE: 58 MMHG | HEART RATE: 72 BPM | BODY MASS INDEX: 18.44 KG/M2

## 2021-03-15 DIAGNOSIS — I50.32 CHRONIC HEART FAILURE WITH PRESERVED EJECTION FRACTION (HCC): ICD-10-CM

## 2021-03-15 DIAGNOSIS — R06.09 DOE (DYSPNEA ON EXERTION): ICD-10-CM

## 2021-03-15 LAB — TREADMILL STRESS: NORMAL

## 2021-03-15 PROCEDURE — 93015 CV STRESS TEST SUPVJ I&R: CPT | Performed by: INTERNAL MEDICINE

## 2021-03-15 ASSESSMENT — FIBROSIS 4 INDEX: FIB4 SCORE: 1.75

## 2021-03-29 ENCOUNTER — OFFICE VISIT (OUTPATIENT)
Dept: CARDIOLOGY | Facility: MEDICAL CENTER | Age: 61
End: 2021-03-29
Payer: OTHER GOVERNMENT

## 2021-03-29 VITALS
HEIGHT: 64 IN | DIASTOLIC BLOOD PRESSURE: 60 MMHG | BODY MASS INDEX: 16.73 KG/M2 | WEIGHT: 98 LBS | OXYGEN SATURATION: 100 % | RESPIRATION RATE: 15 BRPM | HEART RATE: 82 BPM | SYSTOLIC BLOOD PRESSURE: 100 MMHG

## 2021-03-29 DIAGNOSIS — E71.40 CARNITINE DEFICIENCY (HCC): ICD-10-CM

## 2021-03-29 DIAGNOSIS — I50.32 CHRONIC HEART FAILURE WITH PRESERVED EJECTION FRACTION (HCC): ICD-10-CM

## 2021-03-29 PROCEDURE — 99213 OFFICE O/P EST LOW 20 MIN: CPT | Performed by: NURSE PRACTITIONER

## 2021-03-29 RX ORDER — TORSEMIDE 10 MG/1
10 TABLET ORAL 2 TIMES DAILY
Qty: 180 TABLET | Refills: 0 | Status: SHIPPED | OUTPATIENT
Start: 2021-03-29 | End: 2021-10-07

## 2021-03-29 ASSESSMENT — ENCOUNTER SYMPTOMS
DIZZINESS: 0
COUGH: 0
DOUBLE VISION: 0
SHORTNESS OF BREATH: 0
FALLS: 0
ROS GI COMMENTS: ABDOMINAL DISTENTION
LOSS OF CONSCIOUSNESS: 0
FOCAL WEAKNESS: 0
WHEEZING: 0
PALPITATIONS: 0
ORTHOPNEA: 0
WEAKNESS: 0
BLURRED VISION: 0
HEADACHES: 0

## 2021-03-29 ASSESSMENT — FIBROSIS 4 INDEX: FIB4 SCORE: 1.75

## 2021-03-29 NOTE — PROGRESS NOTES
Chief Complaint   Patient presents with   • Congestive Heart Failure     F/V Dx: Heart failure with preserved ejection fraction (HCC)         Subjective:   Pita Bueno is a 60 y.o. female who presents today for follow up weight gain.     She is followed by Dr. Calvillo in our clinic, history of palpitations, hypothyroid, Colostomy from complication from abdominal surgery in 2013, low body weight/malnutrition, COVID 12/10/2020, and common variable immunodeficiency.    Last seen by Dr. Calvillo 1/27 with leg swelling. She as started on torsemide 10mg BID and treadmill stress test due to JOSÉ. She is scheduled on 3/15 for treadmill stress test. She sees a lymphedema clinic, recommend ace wrap.     She has been seen by Dr. Starr (GI) plan for endoscopy. 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 Plan for colonoscopy on April 6.     Interim events:  Patient is fatigue and leg swelling has resolved. She has 10lbs within two week since last OV on torsemide 20mg BID. She is close to her dry weight.     She has noticed lethargy with activity. hgb 9.5, vitamin D 17 and vitamin b12 174.     Past Medical History:   Diagnosis Date   • Adverse effect of anesthesia    • Anemia    • Arthritis     thumbs   • Bowel habit changes     constipation and diarrhea   • Breath shortness    • Colostomy in place ([facility])    • Congestive heart failure (HCC)     recent   • CVID (common variable immunodeficiency) ([facility])    • Eating disorder    • Edema 01/2021    Bilat LE, Pt states having pain LLE and DVT being ruled out 1/27   • Hemorrhagic disorder (HCC) 01/2021    states she has noticed she is bleeding easier than normal   • High cholesterol    • Hypotension    • Hypothyroid    • Pain     leg pain   • Palpitations      Past Surgical History:   Procedure Laterality Date   • CARPAL TUNNEL RELEASE      and ulnar nerve    • CYST EXCISION Right     ovarian dermoid   • OTHER ABDOMINAL SURGERY      rectal prolapse repair,  anastomy leak/abcess,colostony     Family History   Problem Relation Age of Onset   • Thyroid Mother    • Alcohol abuse Father    • Heart Disease Father    • Hypertension Father    • Stroke Father      Social History     Socioeconomic History   • Marital status:      Spouse name: Not on file   • Number of children: Not on file   • Years of education: Not on file   • Highest education level: Not on file   Occupational History   • Not on file   Tobacco Use   • Smoking status: Never Smoker   • Smokeless tobacco: Never Used   Substance and Sexual Activity   • Alcohol use: Not Currently   • Drug use: Not Currently   • Sexual activity: Not Currently   Other Topics Concern   • Not on file   Social History Narrative   • Not on file     Social Determinants of Health     Financial Resource Strain:    • Difficulty of Paying Living Expenses:    Food Insecurity:    • Worried About Running Out of Food in the Last Year:    • Ran Out of Food in the Last Year:    Transportation Needs:    • Lack of Transportation (Medical):    • Lack of Transportation (Non-Medical):    Physical Activity:    • Days of Exercise per Week:    • Minutes of Exercise per Session:    Stress:    • Feeling of Stress :    Social Connections:    • Frequency of Communication with Friends and Family:    • Frequency of Social Gatherings with Friends and Family:    • Attends [employer] Services:    • Active Member of Clubs or Organizations:    • Attends Club or Organization Meetings:    • Marital Status:    Intimate Partner Violence:    • Fear of Current or Ex-Partner:    • Emotionally Abused:    • Physically Abused:    • Sexually Abused:      Allergies   Allergen Reactions   • Sulfa Drugs Hives     Outpatient Encounter Medications as of 3/29/2021   Medication Sig Dispense Refill   • torsemide (DEMADEX) 10 MG tablet Take 1 tablet by mouth 2 times a day. 180 tablet 0   • acetaminophen (TYLENOL) 325 MG Tab Take  by mouth every four hours as needed.     • vitamin  "D, Ergocalciferol, (DRISDOL) 1.25 MG (91719 UT) Cap capsule Take 1 Cap by mouth every 7 days. 5 Cap 0   • levothyroxine (SYNTHROID) 50 MCG Tab Take 1 Tab by mouth Every morning on an empty stomach. 90 Tab 3   • diphenhydrAMINE (BENADRYL ALLERGY) 25 MG Tab Take 25 mg by mouth every day. At night     • levOCARNitine (CARNITINE, L,) Powder 500 mg 2 (two) times a day. 90 g 3   • linaCLOtide (LINZESS) 290 MCG Cap Take 290 mcg by mouth every evening. 90 Cap 0   • sennosides (SENOKOT) 8.6 MG Tab Take 8.6 mg by mouth 1 time daily as needed.     • [DISCONTINUED] torsemide (DEMADEX) 10 MG tablet Take 1 tablet by mouth 2 times a day. 180 tablet 0     Facility-Administered Encounter Medications as of 3/29/2021   Medication Dose Route Frequency Provider Last Rate Last Admin   • cyanocobalamin (VITAMIN B-12) injection 1,000 mcg  1,000 mcg Intramuscular Q30 DAYS Clifton Harvey M.D.           Review of Systems   Constitutional: Negative for malaise/fatigue.   Eyes: Negative for blurred vision and double vision.   Respiratory: Negative for cough, shortness of breath and wheezing.    Cardiovascular: Negative for chest pain, palpitations, orthopnea and leg swelling.   Gastrointestinal:        Abdominal distention    Musculoskeletal: Negative for falls.   Neurological: Negative for dizziness, focal weakness, loss of consciousness, weakness and headaches.   All other systems reviewed and are negative.    /60 (BP Location: Left arm, Patient Position: Sitting, BP Cuff Size: Adult)   Pulse 82   Resp 15   Ht 1.626 m (5' 4\")   Wt 44.5 kg (98 lb)   SpO2 100%     Physical Exam   Constitutional: She is oriented to person, place, and time and well-developed, well-nourished, and in no distress. No distress.   HENT:   Head: Normocephalic and atraumatic.   Cardiovascular: Normal rate and regular rhythm.   No murmur heard.  Pulmonary/Chest: Effort normal and breath sounds normal. No respiratory distress.   Abdominal: Soft. She exhibits no " distension.   Colostomy in place   Musculoskeletal:         General: No edema.   Neurological: She is alert and oriented to person, place, and time.   Skin: She is not diaphoretic.   Psychiatric: Mood, memory, affect and judgment normal.           ECHO  9/2/2020  No prior study is available for comparison.   Normal left ventricular systolic function.  Left ventricular ejection fraction is visually estimated to be 60%.  Normal regional wall motion.  Normal right ventricular size.  Normal left atrial size.  Trace mitral regurgitation.  Trace aortic insufficiency.  The aortic valve is not well visualized.  Right ventricular systolic pressure is estimated to be 30 mmHg.  Normal pericardium without effusion.      Echocardiography Laboratory  1/7/2021  LV ejection fraction estimated to be 65%.  Right ventricular systolic pressure is estimated to be 25 mmHg.    Venous duplex  1/25/2021   Deep venous reflux is seen in the right common femoral (2917 msec) and    profunda femoral (1158 msec) veins.    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   Proximal right calf incompetent  measuring 0.24 cm and with 1075    msec of reflux.       Deep venous reflux is seen in the left common femoral (933 msec) vein.      No superficial or deep venous thrombosis bilaterally.      Treadmill stress test   3/15/2021  1.  Normal exercise ECG through 68% age-predicted maximum heart rate   2.  Low risk Dudley treadmill score (+9)     Signed   Abran Calvillo MD     Assessment:     1. Chronic heart failure with preserved ejection fraction (HCC)  Comp Metabolic Panel    proBrain Natriuretic Peptide, NT   2. Carnitine deficiency (HCC)         Medical Decision Making:  Today's Assessment / Status / Plan:     1. Acute to chronic heart failure preserved ef:  - continue torsemide 10mg BID   - recommend stfean hose and elevate legs   - treadmill stress test ekg showed normal exercise ECG    2. Carnitine  deficiency:  - continue carnitine 1000mg BID     3. Lymphedema:  - continue diuretics as noted above    Follow up in 3 months, sooner as needed. CMP and proBNP

## 2021-03-30 ENCOUNTER — HOSPITAL ENCOUNTER (OUTPATIENT)
Dept: LAB | Facility: MEDICAL CENTER | Age: 61
End: 2021-03-30
Attending: NURSE PRACTITIONER
Payer: OTHER GOVERNMENT

## 2021-03-30 DIAGNOSIS — I50.32 CHRONIC HEART FAILURE WITH PRESERVED EJECTION FRACTION (HCC): ICD-10-CM

## 2021-03-30 LAB
ALBUMIN SERPL BCP-MCNC: 4.5 G/DL (ref 3.2–4.9)
ALBUMIN/GLOB SERPL: 1.7 G/DL
ALP SERPL-CCNC: 153 U/L (ref 30–99)
ALT SERPL-CCNC: 32 U/L (ref 2–50)
ANION GAP SERPL CALC-SCNC: 12 MMOL/L (ref 7–16)
AST SERPL-CCNC: 24 U/L (ref 12–45)
BILIRUB SERPL-MCNC: 0.4 MG/DL (ref 0.1–1.5)
BUN SERPL-MCNC: 32 MG/DL (ref 8–22)
CALCIUM SERPL-MCNC: 9.6 MG/DL (ref 8.5–10.5)
CHLORIDE SERPL-SCNC: 100 MMOL/L (ref 96–112)
CO2 SERPL-SCNC: 20 MMOL/L (ref 20–33)
CREAT SERPL-MCNC: 0.83 MG/DL (ref 0.5–1.4)
GLOBULIN SER CALC-MCNC: 2.6 G/DL (ref 1.9–3.5)
GLUCOSE SERPL-MCNC: 93 MG/DL (ref 65–99)
POTASSIUM SERPL-SCNC: 4.5 MMOL/L (ref 3.6–5.5)
PROT SERPL-MCNC: 7.1 G/DL (ref 6–8.2)
SODIUM SERPL-SCNC: 132 MMOL/L (ref 135–145)

## 2021-03-30 PROCEDURE — 83880 ASSAY OF NATRIURETIC PEPTIDE: CPT

## 2021-03-30 PROCEDURE — 36415 COLL VENOUS BLD VENIPUNCTURE: CPT

## 2021-03-30 PROCEDURE — 80053 COMPREHEN METABOLIC PANEL: CPT

## 2021-04-01 ENCOUNTER — PRE-ADMISSION TESTING (OUTPATIENT)
Dept: ADMISSIONS | Facility: MEDICAL CENTER | Age: 61
End: 2021-04-01
Attending: INTERNAL MEDICINE
Payer: OTHER GOVERNMENT

## 2021-04-01 DIAGNOSIS — Z01.812 PRE-OPERATIVE LABORATORY EXAMINATION: ICD-10-CM

## 2021-04-01 LAB
ERYTHROCYTE [DISTWIDTH] IN BLOOD BY AUTOMATED COUNT: 46.5 FL (ref 35.9–50)
HCT VFR BLD AUTO: 39.8 % (ref 37–47)
HGB BLD-MCNC: 12.5 G/DL (ref 12–16)
MCH RBC QN AUTO: 29.4 PG (ref 27–33)
MCHC RBC AUTO-ENTMCNC: 31.4 G/DL (ref 33.6–35)
MCV RBC AUTO: 93.6 FL (ref 81.4–97.8)
NT-PROBNP SERPL IA-MCNC: 13 PG/ML (ref 0–125)
PLATELET # BLD AUTO: 176 K/UL (ref 164–446)
PMV BLD AUTO: 12.4 FL (ref 9–12.9)
RBC # BLD AUTO: 4.25 M/UL (ref 4.2–5.4)
WBC # BLD AUTO: 4.1 K/UL (ref 4.8–10.8)

## 2021-04-01 PROCEDURE — 36415 COLL VENOUS BLD VENIPUNCTURE: CPT

## 2021-04-01 PROCEDURE — 85027 COMPLETE CBC AUTOMATED: CPT

## 2021-04-01 RX ORDER — ACETAMINOPHEN 500 MG
500-1000 TABLET ORAL EVERY 6 HOURS PRN
COMMUNITY
End: 2021-11-17

## 2021-04-01 RX ORDER — BISACODYL 5 MG
5 TABLET, DELAYED RELEASE (ENTERIC COATED) ORAL
COMMUNITY
End: 2021-11-17

## 2021-04-01 RX ORDER — BISACODYL 10 MG
10 SUPPOSITORY, RECTAL RECTAL PRN
COMMUNITY
End: 2021-04-01

## 2021-04-01 ASSESSMENT — FIBROSIS 4 INDEX: FIB4 SCORE: 1.64

## 2021-04-01 NOTE — PREPROCEDURE INSTRUCTIONS
"Patient present for preadmit appointment: \"Preparing for your Procedure information,\" pain management, patient safety, covid symptoms, self isolating and fasting protocol per anesthesia sheets given to patient with verbal and written instructions. Patient instructed to continue prescribed medications through the day before surgery, instructed to take the following medications the day of surgery per anesthesia protocol: synthroid, linzess, and sennocot.bisacodyl if needed. Patient states no reaction to previous anesthesia. Pt reports no s/s of urinary tract infection. Covid appointment scheduled 4/3. Patient educated to call MD's office if any symptoms of Covid appear. Patient understands education and has no other questions or concerns at this time.   "

## 2021-04-03 ENCOUNTER — PRE-ADMISSION TESTING (OUTPATIENT)
Dept: ADMISSIONS | Facility: MEDICAL CENTER | Age: 61
End: 2021-04-03
Attending: INTERNAL MEDICINE
Payer: OTHER GOVERNMENT

## 2021-04-03 DIAGNOSIS — Z01.812 PRE-OPERATIVE LABORATORY EXAMINATION: ICD-10-CM

## 2021-04-03 LAB — COVID ORDER STATUS COVID19: NORMAL

## 2021-04-03 PROCEDURE — U0003 INFECTIOUS AGENT DETECTION BY NUCLEIC ACID (DNA OR RNA); SEVERE ACUTE RESPIRATORY SYNDROME CORONAVIRUS 2 (SARS-COV-2) (CORONAVIRUS DISEASE [COVID-19]), AMPLIFIED PROBE TECHNIQUE, MAKING USE OF HIGH THROUGHPUT TECHNOLOGIES AS DESCRIBED BY CMS-2020-01-R: HCPCS

## 2021-04-03 PROCEDURE — C9803 HOPD COVID-19 SPEC COLLECT: HCPCS

## 2021-04-03 PROCEDURE — U0005 INFEC AGEN DETEC AMPLI PROBE: HCPCS

## 2021-04-04 LAB
SARS-COV-2 RNA RESP QL NAA+PROBE: NOTDETECTED
SPECIMEN SOURCE: NORMAL

## 2021-04-05 DIAGNOSIS — R74.8 ALKALINE PHOSPHATASE ELEVATION: ICD-10-CM

## 2021-04-06 ENCOUNTER — TELEPHONE (OUTPATIENT)
Dept: MEDICAL GROUP | Facility: PHYSICIAN GROUP | Age: 61
End: 2021-04-06

## 2021-04-07 ENCOUNTER — ANESTHESIA (OUTPATIENT)
Dept: SURGERY | Facility: MEDICAL CENTER | Age: 61
End: 2021-04-07
Payer: OTHER GOVERNMENT

## 2021-04-07 ENCOUNTER — ANESTHESIA EVENT (OUTPATIENT)
Dept: SURGERY | Facility: MEDICAL CENTER | Age: 61
End: 2021-04-07
Payer: OTHER GOVERNMENT

## 2021-04-07 ENCOUNTER — HOSPITAL ENCOUNTER (OUTPATIENT)
Facility: MEDICAL CENTER | Age: 61
End: 2021-04-07
Attending: INTERNAL MEDICINE | Admitting: INTERNAL MEDICINE
Payer: OTHER GOVERNMENT

## 2021-04-07 VITALS
DIASTOLIC BLOOD PRESSURE: 65 MMHG | HEIGHT: 64 IN | HEART RATE: 61 BPM | SYSTOLIC BLOOD PRESSURE: 101 MMHG | RESPIRATION RATE: 18 BRPM | BODY MASS INDEX: 16.37 KG/M2 | OXYGEN SATURATION: 99 % | TEMPERATURE: 97.5 F | WEIGHT: 95.9 LBS

## 2021-04-07 LAB — PATHOLOGY CONSULT NOTE: NORMAL

## 2021-04-07 PROCEDURE — 700105 HCHG RX REV CODE 258: Performed by: INTERNAL MEDICINE

## 2021-04-07 PROCEDURE — 700102 HCHG RX REV CODE 250 W/ 637 OVERRIDE(OP): Performed by: INTERNAL MEDICINE

## 2021-04-07 PROCEDURE — 88312 SPECIAL STAINS GROUP 1: CPT

## 2021-04-07 PROCEDURE — 700101 HCHG RX REV CODE 250: Performed by: ANESTHESIOLOGY

## 2021-04-07 PROCEDURE — 88305 TISSUE EXAM BY PATHOLOGIST: CPT | Mod: 59

## 2021-04-07 PROCEDURE — 700101 HCHG RX REV CODE 250: Performed by: INTERNAL MEDICINE

## 2021-04-07 PROCEDURE — 160203 HCHG ENDO MINUTES - 1ST 30 MINS LEVEL 4: Performed by: INTERNAL MEDICINE

## 2021-04-07 PROCEDURE — 160035 HCHG PACU - 1ST 60 MINS PHASE I: Performed by: INTERNAL MEDICINE

## 2021-04-07 PROCEDURE — 160002 HCHG RECOVERY MINUTES (STAT): Performed by: INTERNAL MEDICINE

## 2021-04-07 PROCEDURE — 160046 HCHG PACU - 1ST 60 MINS PHASE II: Performed by: INTERNAL MEDICINE

## 2021-04-07 PROCEDURE — 160025 RECOVERY II MINUTES (STATS): Performed by: INTERNAL MEDICINE

## 2021-04-07 PROCEDURE — 700111 HCHG RX REV CODE 636 W/ 250 OVERRIDE (IP): Performed by: ANESTHESIOLOGY

## 2021-04-07 PROCEDURE — 160009 HCHG ANES TIME/MIN: Performed by: INTERNAL MEDICINE

## 2021-04-07 PROCEDURE — 160048 HCHG OR STATISTICAL LEVEL 1-5: Performed by: INTERNAL MEDICINE

## 2021-04-07 PROCEDURE — A9270 NON-COVERED ITEM OR SERVICE: HCPCS | Performed by: INTERNAL MEDICINE

## 2021-04-07 PROCEDURE — 160208 HCHG ENDO MINUTES - EA ADDL 1 MIN LEVEL 4: Performed by: INTERNAL MEDICINE

## 2021-04-07 RX ORDER — ONDANSETRON 2 MG/ML
4 INJECTION INTRAMUSCULAR; INTRAVENOUS
Status: DISCONTINUED | OUTPATIENT
Start: 2021-04-07 | End: 2021-04-07 | Stop reason: HOSPADM

## 2021-04-07 RX ORDER — LIDOCAINE HYDROCHLORIDE 20 MG/ML
INJECTION, SOLUTION EPIDURAL; INFILTRATION; INTRACAUDAL; PERINEURAL PRN
Status: DISCONTINUED | OUTPATIENT
Start: 2021-04-07 | End: 2021-04-07 | Stop reason: SURG

## 2021-04-07 RX ORDER — ROCURONIUM BROMIDE 10 MG/ML
INJECTION, SOLUTION INTRAVENOUS PRN
Status: DISCONTINUED | OUTPATIENT
Start: 2021-04-07 | End: 2021-04-07 | Stop reason: SURG

## 2021-04-07 RX ORDER — SODIUM CHLORIDE, SODIUM LACTATE, POTASSIUM CHLORIDE, CALCIUM CHLORIDE 600; 310; 30; 20 MG/100ML; MG/100ML; MG/100ML; MG/100ML
INJECTION, SOLUTION INTRAVENOUS CONTINUOUS
Status: DISCONTINUED | OUTPATIENT
Start: 2021-04-07 | End: 2021-04-07 | Stop reason: HOSPADM

## 2021-04-07 RX ORDER — HALOPERIDOL 5 MG/ML
1 INJECTION INTRAMUSCULAR
Status: DISCONTINUED | OUTPATIENT
Start: 2021-04-07 | End: 2021-04-07 | Stop reason: HOSPADM

## 2021-04-07 RX ORDER — DIPHENHYDRAMINE HYDROCHLORIDE 50 MG/ML
12.5 INJECTION INTRAMUSCULAR; INTRAVENOUS
Status: DISCONTINUED | OUTPATIENT
Start: 2021-04-07 | End: 2021-04-07 | Stop reason: HOSPADM

## 2021-04-07 RX ADMIN — LIDOCAINE HYDROCHLORIDE 100 MG: 20 INJECTION, SOLUTION EPIDURAL; INFILTRATION; INTRACAUDAL; PERINEURAL at 13:54

## 2021-04-07 RX ADMIN — SODIUM CHLORIDE, POTASSIUM CHLORIDE, SODIUM LACTATE AND CALCIUM CHLORIDE: 600; 310; 30; 20 INJECTION, SOLUTION INTRAVENOUS at 12:19

## 2021-04-07 RX ADMIN — POVIDONE-IODINE 15 ML: 10 SOLUTION TOPICAL at 12:19

## 2021-04-07 RX ADMIN — PROPOFOL 100 MG: 10 INJECTION, EMULSION INTRAVENOUS at 13:54

## 2021-04-07 RX ADMIN — EPHEDRINE SULFATE 10 MG: 50 INJECTION INTRAMUSCULAR; INTRAVENOUS; SUBCUTANEOUS at 13:54

## 2021-04-07 RX ADMIN — SUGAMMADEX 200 MG: 100 INJECTION, SOLUTION INTRAVENOUS at 14:26

## 2021-04-07 RX ADMIN — ROCURONIUM BROMIDE 35 MG: 10 INJECTION, SOLUTION INTRAVENOUS at 13:54

## 2021-04-07 ASSESSMENT — FIBROSIS 4 INDEX: FIB4 SCORE: 1.42

## 2021-04-07 ASSESSMENT — PAIN SCALES - GENERAL: PAIN_LEVEL: 0

## 2021-04-07 NOTE — PROGRESS NOTES
Indication: Abdominal pain, +FIT  Risks, benefits, and alternatives were discussed with consenting person(s). Consenting person(s) were given an opportunity to ask questions and discuss other options. Risks including but not limited to failed or incomplete EGD/Colonoscopy via stoma, ineffective therapy, perforation, infection, bleeding, missed lesion(s), missed diagnosis, cardiac and/or pulmonary event, aspiration, stroke, possible need for surgery, hospitalization possibly prolonged, discomfort, unsuccessful and/or incomplete procedure, possible need for repeat procedures and/or additional testings, damage to adjacent organs and/or vascular structures, medication reaction, disability, death, and other adverse events possibly life-threatening. Discussion was undertaken with Layman's terms in presence of patient's friend Guadalupe. Consenting persons stated understanding and acceptance of these risks, and wished to proceed. Consent was given in clear state of mind.

## 2021-04-07 NOTE — OR NURSING
1430: To PACU post EGD/Colonoscopy. OPA dc'd on arrival. Breathing is spontaneous and unlabored.  1435: Pt denies pain or nausea.  1502: No change. Meets criteria for stage ll.

## 2021-04-07 NOTE — ANESTHESIA PREPROCEDURE EVALUATION
Relevant Problems   ENDO   (+) Hypothyroidism       Physical Exam    Airway   Mallampati: II  TM distance: >3 FB  Neck ROM: full       Cardiovascular - normal exam  Rhythm: regular  Rate: normal  (-) murmur     Dental - normal exam           Pulmonary - normal exam  Breath sounds clear to auscultation     Abdominal    Neurological - normal exam                 Anesthesia Plan    ASA 2       Plan - general       Airway plan will be ETT          Induction: intravenous    Postoperative Plan: Postoperative administration of opioids is intended.    Pertinent diagnostic labs and testing reviewed    Informed Consent:    Anesthetic plan and risks discussed with patient.    Use of blood products discussed with: patient whom consented to blood products.

## 2021-04-07 NOTE — OR NURSING
1541- Patient expressing frustration with inability to complete procedure and need to remain on clear liquids and repeat procedure. Active listening provided. GI office called patient and updated on plan of care and informed patient that procedure is scheduled for tomorrow. Discharge instructions provided to patient and friend. Both verbalized understanding. All questions and concerns addressed. Patient D/Jim to care of family.

## 2021-04-07 NOTE — ANESTHESIA POSTPROCEDURE EVALUATION
Patient: Pita Bueno    Procedure Summary     Date: 04/07/21 Room / Location:  ENDOSCOPIC ULTRASOUND ROOM / SURGERY [facility]    Anesthesia Start: 1349 Anesthesia Stop: 1433    Procedures:       COLONOSCOPY      GASTROSCOPY Diagnosis:       Chronic diarrhea      (CHRONIC DIARRHEA)    Surgeons: Sae Carias M.D. Responsible Provider: Jd East M.D.    Anesthesia Type: general ASA Status: 2          Final Anesthesia Type: general  Last vitals  BP   Blood Pressure: (!) 96/56    Temp   36.5 °C (97.7 °F)    Pulse   96   Resp   18    SpO2   100 %      Anesthesia Post Evaluation    Patient location during evaluation: PACU  Patient participation: complete - patient participated  Level of consciousness: awake and alert  Pain score: 0    Airway patency: patent  Anesthetic complications: no  Cardiovascular status: hemodynamically stable  Respiratory status: acceptable  Hydration status: euvolemic    PONV: none          There were no known complications for this encounter.     Nurse Pain Score: 0 (NPRS)

## 2021-04-07 NOTE — ANESTHESIA PROCEDURE NOTES
Airway    Date/Time: 4/7/2021 1:54 PM  Performed by: Jd East M.D.  Authorized by: Jd East M.D.     Location:  OR  Urgency:  Elective  Indications for Airway Management:  Anesthesia      Spontaneous Ventilation: absent    Sedation Level:  Deep  Preoxygenated: Yes    Patient Position:  Sniffing  Final Airway Type:  Endotracheal airway  Final Endotracheal Airway:  ETT  Cuffed: Yes    Technique Used for Successful ETT Placement:  Direct laryngoscopy    Insertion Site:  Oral  Blade Type:  Emanuel  Laryngoscope Blade/Videolaryngoscope Blade Size:  2  ETT Size (mm):  7.0  Measured from:  Teeth  ETT to Teeth (cm):  21  Placement Verified by: auscultation and capnometry    Cormack-Lehane Classification:  Grade I - full view of glottis  Number of Attempts at Approach:  1

## 2021-04-07 NOTE — OR NURSING
Patient allergies and NPO status verified, home medication reconciliation completed and belongings secured. Patient verbalizes understanding of pain scale, expected course of stay and plan of care. Surgical site verified with patient. Oral and nasal triple aim completed by CNA. IV access established.

## 2021-04-07 NOTE — ANESTHESIA POSTPROCEDURE EVALUATION
Patient: Pita Bueno    Procedure Summary     Date: 04/07/21 Room / Location:  ENDOSCOPIC ULTRASOUND ROOM / SURGERY [facility]    Anesthesia Start: 1349 Anesthesia Stop: 1433    Procedures:       COLONOSCOPY      GASTROSCOPY Diagnosis:       Chronic diarrhea      (CHRONIC DIARRHEA)    Surgeons: Sae Carias M.D. Responsible Provider: Jd East M.D.    Anesthesia Type: general ASA Status: 2          Final Anesthesia Type: general  Last vitals  BP   Blood Pressure: (!) 96/56    Temp   36.5 °C (97.7 °F)    Pulse   96   Resp   18    SpO2   100 %      Anesthesia Post Evaluation    Patient location during evaluation: PACU  Patient participation: complete - patient participated  Level of consciousness: awake and alert  Pain score: 0    Airway patency: patent  Anesthetic complications: no  Cardiovascular status: hemodynamically stable  Respiratory status: acceptable  Hydration status: euvolemic    PONV: none          There were no known complications for this encounter.     Nurse Pain Score: 0 (NPRS)

## 2021-04-07 NOTE — ANESTHESIA TIME REPORT
Anesthesia Start and Stop Event Times     Date Time Event    4/7/2021 1339 Ready for Procedure     1349 Anesthesia Start     1433 Anesthesia Stop        Responsible Staff  04/07/21    Name Role Begin End    Jd East M.D. Anesth 1349 1433        Preop Diagnosis (Free Text):  Pre-op Diagnosis     CHRONIC DIARRHEA        Preop Diagnosis (Codes):  Diagnosis Information     Diagnosis Code(s): Chronic diarrhea [K52.9]        Post op Diagnosis  Chronic diarrhea      Premium Reason  Non-Premium    Comments:

## 2021-04-07 NOTE — DISCHARGE INSTRUCTIONS
ENDOSCOPY HOME CARE INSTRUCTIONS    GASTROSCOPY OR ERCP  1. Don't eat or drink anything for about an hour after the test. Stay on liquid diet until determination of timing of colonoscopy  2. Don't drive or drink alcohol for 24 hours. The medication you received will make you too drowsy.  3. Don't take any coffee, tea, or aspirin products until after you see your doctor. These can harm the lining of your stomach.  4. If you begin to vomit bloody material, or develop black or bloody stools, call your doctor as soon as possible.  5. If you have any neck, chest, abdominal pain or temp of 100 degrees, call your doctor.  6. See your doctor  Osargd    COLONOSCOPY OR FLEXIBLE SIGMOIDOSCOPY  1. If you received a barium enema, take a mild laxative such as dulcolax, Anaya's M.O., or Milk of Magnesia to clean out the barium.  2. Drink plenty of fluids. Eat a diet high in fiber (whole-grain breads, fresh fruit and vegetables).  3. You may notice a few drops of blood with your first bowel movement. If you develop any large amount of bleeding, black stools, a fever, or abdominal pain, call your doctor right away.  4. Call your doctor for test results in 2 week(s) - if you have not received a letter by that time.  5. Don't drive or drink alcohol for 24 hours. The medication you received will make you too drowsy.  6. No heavy lifting, no Aspirin products or Aspirin for 5 days   7. Additional instructions: Stay on clear liquid diet until determination of timing of repeat colonoscopy  8. Prescriptions: Golytely sent from office for re-prep.    Dr. Carias [phone]    Recommendations:   Re-prepp and reschedule for colonoscopy, order sent to office.  Stay on clear liquid diet    You should call 911 if you develop problems with breathing or chest pain.  If any questions arise, call your doctor. If your doctor is not available, please feel free to call [phone]. You can also call the [employer] HOTLINE open 24 hours/day, 7 days/week  and speak to a nurse at [phone], or toll free [phone].    Depression / Suicide Risk    As you are discharged from this [facility] facility, it is important to learn how to keep safe from harming yourself.    Recognize the warning signs:  · Abrupt changes in personality, positive or negative- including increase in energy   · Giving away possessions  · Change in eating patterns- significant weight changes-  positive or negative  · Change in sleeping patterns- unable to sleep or sleeping all the time   · Unwillingness or inability to communicate  · Depression  · Unusual sadness, discouragement and loneliness  · Talk of wanting to die  · Neglect of personal appearance   · Rebelliousness- reckless behavior  · Withdrawal from people/activities they love  · Confusion- inability to concentrate     If you or a loved one observes any of these behaviors or has concerns about self-harm, here's what you can do:  · Talk about it- your feelings and reasons for harming yourself  · Remove any means that you might use to hurt yourself (examples: pills, rope, extension cords, firearm)  · Get professional help from the community (Mental Health, Substance Abuse, psychological counseling)  · Do not be alone:Call your Safe Contact- someone whom you trust who will be there for you.  · Call your local CRISIS HOTLINE 396-4936 or 021-444-7676  · Call your local Children's Mobile Crisis Response Team [place] (146) 108-6450 or www.[employer] [facility]  · Call the toll free National Suicide Prevention Hotlines   · National Suicide Prevention Lifeline 092-664-GIFT (5328)  · National Hope Line Network 800-SUICIDE (705-9237)    I acknowledge receipt and understanding of these Home Care Instructions.

## 2021-04-08 ENCOUNTER — ANESTHESIA (OUTPATIENT)
Dept: SURGERY | Facility: MEDICAL CENTER | Age: 61
End: 2021-04-08
Payer: OTHER GOVERNMENT

## 2021-04-08 ENCOUNTER — HOSPITAL ENCOUNTER (OUTPATIENT)
Facility: MEDICAL CENTER | Age: 61
End: 2021-04-08
Attending: INTERNAL MEDICINE | Admitting: INTERNAL MEDICINE
Payer: OTHER GOVERNMENT

## 2021-04-08 ENCOUNTER — ANESTHESIA EVENT (OUTPATIENT)
Dept: SURGERY | Facility: MEDICAL CENTER | Age: 61
End: 2021-04-08
Payer: OTHER GOVERNMENT

## 2021-04-08 VITALS
DIASTOLIC BLOOD PRESSURE: 50 MMHG | BODY MASS INDEX: 16 KG/M2 | RESPIRATION RATE: 18 BRPM | SYSTOLIC BLOOD PRESSURE: 82 MMHG | WEIGHT: 93.7 LBS | OXYGEN SATURATION: 99 % | TEMPERATURE: 99.3 F | HEART RATE: 62 BPM | HEIGHT: 64 IN

## 2021-04-08 LAB — PATHOLOGY CONSULT NOTE: NORMAL

## 2021-04-08 PROCEDURE — 160035 HCHG PACU - 1ST 60 MINS PHASE I: Performed by: INTERNAL MEDICINE

## 2021-04-08 PROCEDURE — 160009 HCHG ANES TIME/MIN: Performed by: INTERNAL MEDICINE

## 2021-04-08 PROCEDURE — 700105 HCHG RX REV CODE 258: Performed by: INTERNAL MEDICINE

## 2021-04-08 PROCEDURE — 88305 TISSUE EXAM BY PATHOLOGIST: CPT

## 2021-04-08 PROCEDURE — 700111 HCHG RX REV CODE 636 W/ 250 OVERRIDE (IP): Performed by: ANESTHESIOLOGY

## 2021-04-08 PROCEDURE — 160048 HCHG OR STATISTICAL LEVEL 1-5: Performed by: INTERNAL MEDICINE

## 2021-04-08 PROCEDURE — 160025 RECOVERY II MINUTES (STATS): Performed by: INTERNAL MEDICINE

## 2021-04-08 PROCEDURE — 700102 HCHG RX REV CODE 250 W/ 637 OVERRIDE(OP): Performed by: INTERNAL MEDICINE

## 2021-04-08 PROCEDURE — 160002 HCHG RECOVERY MINUTES (STAT): Performed by: INTERNAL MEDICINE

## 2021-04-08 PROCEDURE — 160046 HCHG PACU - 1ST 60 MINS PHASE II: Performed by: INTERNAL MEDICINE

## 2021-04-08 PROCEDURE — 160203 HCHG ENDO MINUTES - 1ST 30 MINS LEVEL 4: Performed by: INTERNAL MEDICINE

## 2021-04-08 PROCEDURE — 700101 HCHG RX REV CODE 250: Performed by: INTERNAL MEDICINE

## 2021-04-08 PROCEDURE — A9270 NON-COVERED ITEM OR SERVICE: HCPCS | Performed by: INTERNAL MEDICINE

## 2021-04-08 PROCEDURE — 700101 HCHG RX REV CODE 250: Performed by: ANESTHESIOLOGY

## 2021-04-08 PROCEDURE — 160208 HCHG ENDO MINUTES - EA ADDL 1 MIN LEVEL 4: Performed by: INTERNAL MEDICINE

## 2021-04-08 RX ORDER — HALOPERIDOL 5 MG/ML
1 INJECTION INTRAMUSCULAR
Status: DISCONTINUED | OUTPATIENT
Start: 2021-04-08 | End: 2021-04-08 | Stop reason: HOSPADM

## 2021-04-08 RX ORDER — ONDANSETRON 2 MG/ML
4 INJECTION INTRAMUSCULAR; INTRAVENOUS
Status: DISCONTINUED | OUTPATIENT
Start: 2021-04-08 | End: 2021-04-08 | Stop reason: HOSPADM

## 2021-04-08 RX ORDER — LIDOCAINE HYDROCHLORIDE 20 MG/ML
INJECTION, SOLUTION EPIDURAL; INFILTRATION; INTRACAUDAL; PERINEURAL PRN
Status: DISCONTINUED | OUTPATIENT
Start: 2021-04-08 | End: 2021-04-08 | Stop reason: SURG

## 2021-04-08 RX ORDER — SODIUM CHLORIDE, SODIUM LACTATE, POTASSIUM CHLORIDE, CALCIUM CHLORIDE 600; 310; 30; 20 MG/100ML; MG/100ML; MG/100ML; MG/100ML
INJECTION, SOLUTION INTRAVENOUS CONTINUOUS
Status: DISCONTINUED | OUTPATIENT
Start: 2021-04-08 | End: 2021-04-08 | Stop reason: HOSPADM

## 2021-04-08 RX ORDER — DIPHENHYDRAMINE HYDROCHLORIDE 50 MG/ML
12.5 INJECTION INTRAMUSCULAR; INTRAVENOUS
Status: DISCONTINUED | OUTPATIENT
Start: 2021-04-08 | End: 2021-04-08 | Stop reason: HOSPADM

## 2021-04-08 RX ADMIN — PROPOFOL 40 MG: 10 INJECTION, EMULSION INTRAVENOUS at 10:56

## 2021-04-08 RX ADMIN — LIDOCAINE HYDROCHLORIDE 40 MG: 20 INJECTION, SOLUTION EPIDURAL; INFILTRATION; INTRACAUDAL; PERINEURAL at 10:56

## 2021-04-08 RX ADMIN — SODIUM CHLORIDE, POTASSIUM CHLORIDE, SODIUM LACTATE AND CALCIUM CHLORIDE: 600; 310; 30; 20 INJECTION, SOLUTION INTRAVENOUS at 10:01

## 2021-04-08 RX ADMIN — POVIDONE-IODINE 15 ML: 10 SOLUTION TOPICAL at 10:21

## 2021-04-08 RX ADMIN — PROPOFOL 200 MCG/KG/MIN: 10 INJECTION, EMULSION INTRAVENOUS at 10:57

## 2021-04-08 ASSESSMENT — FIBROSIS 4 INDEX: FIB4 SCORE: 1.42

## 2021-04-08 NOTE — OR NURSING
1005 Pt allergies and NPO status verified.  Home medications reconciled.  Belongings secured.  Pt verbalizes understanding of pain scale;expected course of stay and plan of care.  Procedure verified with pt.  IV access established.  Triple AIM completed All questions answered.  Bed in low position  Call light in reach.

## 2021-04-08 NOTE — OR SURGEON
Immediate Post OP Note    PreOp Diagnosis: Change in bowel habits    PostOp Diagnosis: Small rectal polyp removed. Poor prep, to cecum through colostomy    Procedure(s):  COLONOSCOPY - Wound Class: None    Surgeon(s):  Zhao Starr M.D.    Anesthesiologist/Type of Anesthesia:  Anesthesiologist: Henry Amaya M.D./* No anesthesia type entered *    Surgical Staff:  Circulator: Sandor Bower R.N.  Endoscopy Technician: Jazz Richards; Kelly Ching; Pradeep Clements    Specimens removed if any:  ID Type Source Tests Collected by Time Destination   A : Biopsy sigmoid polyp Other Other PATHOLOGY SPECIMEN Zhao Starr M.D. 4/8/2021 11:19 AM        Estimated Blood Loss: nonw    Findings: 1) Small rectal polyp otw normal    Complications: None        4/8/2021 11:27 AM Zhao Starr M.D.

## 2021-04-08 NOTE — OR NURSING
1124- To PACU from [facility] via gurney. Awake and drowsy, respirations spontaneous and non-labored on 6L O2 via mask. Reports no pain or nausea. Abdomen soft; BS+. LR infusing via PIV. Sergio warmer applied per patient request.  1139- Reports no pain or nausea. Dr. Starr at bedside to update patient. Friend updated via telephone.  1154- Pt. reports no pain or nausea. Abodmen soft. BS+. Colostomy empty. Meets criteria for transfer to stage 2. Awaiting RN for report.  1200- Report given to DASHAWN Garcia.

## 2021-04-08 NOTE — PROCEDURES
DATE OF PROCEDURE:  2021     PROCEDURE:  Colonoscopy with biopsy.     ENDOSCOPIST:  Zhao Starr MD     INDICATIONS:  Change in bowel habits.     MEDICATIONS:  The patient received general anesthesia by Dr. Thorne.  Please   see anesthesia flow sheets for details.     DESCRIPTION OF PROCEDURE:  The patient was informed in detail of the   indications as well as the risks, the benefits, the alternatives of the   procedure and she indicates understanding of all this and agrees to proceed.    After the patient was placed in the supine position, a standard pediatric   Olympus variable stiffness colonoscope was lubricated and gently placed   through her left-sided colostomy and gently and carefully traversed to the   cecum.  Cecal landmarks identified included the cecal strap and appendiceal   orifice.  Quality of preparation was poor.  Air was insufflated.  The scope   was then slowly and carefully withdrawn looking mucosa in a circumferential   methodic manner.  No abnormalities were noted other than a small, probably   sigmoid polyp that was removed via cold biopsy forceps.  The scope was then   removed and the procedure terminated.     FINDINGS:  1.  Normal colon to cecum except for one small 3-mm sessile polyp in probably   the sigmoid colon.     COMPLICATIONS:  None.     TISSUE, BIOPSIES:  Sigmoid colon polyp.     THERAPY:  None.     IMPRESSION, PLAN, AND MEDICAL DECISION MAKIN.  Change in bowel habits.  Her primary issue revolves around significant   chronic constipation with overflow and various ER visits for complications of   the same.  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  2.  Sigmoid colon polyp.  We will to send her a pathology letter and I have   already asked her to please follow up with Dr. Muniz as I do not believe she   needs a total colectomy with ileostomy formation as there is little else left   to offer her to prevent her episodes  of decompensation.        ______________________________  MD ARTIE GUTIERRES/WILLIS    DD:  04/08/2021 11:34  DT:  04/08/2021 12:29    Job#:  [id number]

## 2021-04-08 NOTE — ANESTHESIA PREPROCEDURE EVALUATION
Occult blood in stool    Relevant Problems   ENDO   (+) Hypothyroidism      Other   (+) CFS (chronic fatigue syndrome)   (+) CVID (common variable immunodeficiency) ([facility])   (+) Colostomy care ([facility])   (+) Heart failure with preserved ejection fraction ([facility])   (+) Severe malnutrition ([facility])       Physical Exam    Airway   Mallampati: II  TM distance: >3 FB  Neck ROM: full       Cardiovascular - normal exam  Rhythm: regular  Rate: normal  (-) murmur     Dental - normal exam           Pulmonary - normal exam  Breath sounds clear to auscultation     Abdominal    Neurological - normal exam                 Anesthesia Plan    ASA 2       Plan - general       Airway plan will be natural airway          Induction: intravenous      Pertinent diagnostic labs and testing reviewed    Informed Consent:    Anesthetic plan and risks discussed with patient.    Use of blood products discussed with: patient whom consented to blood products.          Additional Notes: Discussed light therapy vs topicals. Pt does not wish to pursue tx at this time Detail Level: Simple Medical Necessity Clause: This procedure was medically necessary because the lesions that were treated were: Medical Necessity Information: It is in your best interest to select a reason for this procedure from the list below. All of these items fulfill various CMS LCD requirements except the new and changing color options. Render Note In Bullet Format When Appropriate: No Post-Care Instructions: I reviewed with the patient in detail post-care instructions. Patient is to wear sunprotection, and avoid picking at any of the treated lesions. Pt may apply Vaseline to crusted or scabbing areas. Include Z78.9 (Other Specified Conditions Influencing Health Status) As An Associated Diagnosis?: Yes Detail Level: Zone Consent: The patient's consent was obtained including but not limited to risks of crusting, scabbing, blistering, scarring, darker or lighter pigmentary change, recurrence, incomplete removal and infection.

## 2021-04-08 NOTE — DISCHARGE INSTRUCTIONS
ENDOSCOPY HOME CARE INSTRUCTIONS    COLONOSCOPY OR FLEXIBLE SIGMOIDOSCOPY  1. If you received a barium enema, take a mild laxative such as dulcolax, Anaya's M.O., or Milk of Magnesia to clean out the barium.  2. Drink plenty of fluids. Eat a diet high in fiber (whole-grain breads, fresh fruit and vegetables).  3. You may notice a few drops of blood with your first bowel movement. If you develop any large amount of bleeding, black stools, a fever, or abdominal pain, call your doctor right away.  4. Call your doctor for test results in 2 week(s).  5. Don't drive or drink alcohol for 24 hours. The medication you received will make you too drowsy.  6. No heavy lifting, no Aspirin products or Aspirin for 5 days  7. Additional instructions:     Findings: Small rectal polyp, otherwise normal    8. Prescriptions: none    Dr. Starr's phone number: [phone]    You should call 911 if you develop problems with breathing or chest pain.  If any questions arise, call your doctor. If your doctor is not available, please feel free to call [phone]. You can also call the HEALTH HOTLINE open 24 hours/day, 7 days/week and speak to a nurse at [phone], or toll free [phone].    Depression / Suicide Risk    As you are discharged from this [facility] facility, it is important to learn how to keep safe from harming yourself.    Recognize the warning signs:  · Abrupt changes in personality, positive or negative- including increase in energy   · Giving away possessions  · Change in eating patterns- significant weight changes-  positive or negative  · Change in sleeping patterns- unable to sleep or sleeping all the time   · Unwillingness or inability to communicate  · Depression  · Unusual sadness, discouragement and loneliness  · Talk of wanting to die  · Neglect of personal appearance   · Rebelliousness- reckless behavior  · Withdrawal from people/activities they love  · Confusion- inability to concentrate     If  you or a loved one observes any of these behaviors or has concerns about self-harm, here's what you can do:  · Talk about it- your feelings and reasons for harming yourself  · Remove any means that you might use to hurt yourself (examples: pills, rope, extension cords, firearm)  · Get professional help from the community (Mental Health, Substance Abuse, psychological counseling)  · Do not be alone:Call your Safe Contact- someone whom you trust who will be there for you.  · Call your local CRISIS HOTLINE 132-6471 or 695-394-7049  · Call your local Children's Mobile Crisis Response Team [place] (724) 880-5475 or www.[employer]  · Call the toll free National Suicide Prevention Hotlines   · National Suicide Prevention Lifeline 833-628-SZSO (6981)  · National Hope Line Network 800-SUICIDE (723-6028)    I acknowledge receipt and understanding of these Home Care Instructions.

## 2021-04-08 NOTE — OR NURSING
1217 patient to stage 2  Patient settled in recliner chair post short ambulation from [facility] - pt dressed with assist by CNA. Pt denies pain or nausea.  1230 BP remains baseline per pre-op VS; pt reports baseline 90/60s. Pt denies dizziness or visual changes.   1240 Reviewed discharge instructions with pt/friend; states verbal understanding.   1245 Pt ambulated with RN; steady gait and denies dizziness. BP rechecked upon return to recliner sitting.   1250 BP within baseline for pre-op. Pt states ready for discharge. Denies dizziness or any other problems with ambulation. Meets criteria for discharge home.

## 2021-04-08 NOTE — ANESTHESIA TIME REPORT
Anesthesia Start and Stop Event Times     Date Time Event    4/8/2021 1010 Ready for Procedure     1052 Anesthesia Start     1127 Anesthesia Stop        Responsible Staff  04/08/21    Name Role Begin End    Henry Amaya M.D. Anesth 1052 1127        Preop Diagnosis (Free Text):  Pre-op Diagnosis     OCCULT BLOOD IN STOOLS, CONSTIPATION        Preop Diagnosis (Codes):    Post op Diagnosis  Occult blood in stools      Premium Reason  Non-Premium    Comments:

## 2021-04-09 ENCOUNTER — HOSPITAL ENCOUNTER (OUTPATIENT)
Dept: LAB | Facility: MEDICAL CENTER | Age: 61
End: 2021-04-09
Attending: INTERNAL MEDICINE
Payer: OTHER GOVERNMENT

## 2021-04-09 DIAGNOSIS — R74.8 ALKALINE PHOSPHATASE ELEVATION: ICD-10-CM

## 2021-04-09 LAB
CALCIUM SERPL-MCNC: 9.3 MG/DL (ref 8.5–10.5)
PHOSPHATE SERPL-MCNC: 3.6 MG/DL (ref 2.5–4.5)
PTH-INTACT SERPL-MCNC: 21.8 PG/ML (ref 14–72)

## 2021-04-09 PROCEDURE — 36415 COLL VENOUS BLD VENIPUNCTURE: CPT

## 2021-04-09 PROCEDURE — 84100 ASSAY OF PHOSPHORUS: CPT

## 2021-04-09 PROCEDURE — 82306 VITAMIN D 25 HYDROXY: CPT

## 2021-04-09 PROCEDURE — 83970 ASSAY OF PARATHORMONE: CPT

## 2021-04-09 PROCEDURE — 82310 ASSAY OF CALCIUM: CPT

## 2021-04-12 LAB — 25(OH)D3 SERPL-MCNC: 9 NG/ML (ref 30–80)

## 2021-04-12 RX ORDER — ERGOCALCIFEROL 1.25 MG/1
50000 CAPSULE ORAL
Qty: 15 CAPSULE | Refills: 3 | Status: SHIPPED | OUTPATIENT
Start: 2021-04-12 | End: 2021-10-07

## 2021-04-12 ASSESSMENT — PAIN SCALES - GENERAL: PAIN_LEVEL: 0

## 2021-04-12 NOTE — ANESTHESIA POSTPROCEDURE EVALUATION
Patient: Pita Bueno    Procedure Summary     Date: 04/08/21 Room / Location:  ENDOSCOPIC ULTRASOUND ROOM / SURGERY [facility]    Anesthesia Start: 1052 Anesthesia Stop: 1127    Procedure: COLONOSCOPY (Abdomen) Diagnosis: (OCCULT BLOOD IN STOOLS, CONSTIPATION)    Surgeons: Zhao Starr M.D. Responsible Provider: Henry Amaya M.D.    Anesthesia Type: general ASA Status: 2          Final Anesthesia Type: general  Last vitals  BP WNL      Temp WNL   Pulse WNL   RESP WNL   SpO2 WNL     Anesthesia Post Evaluation    Patient location during evaluation: PACU  Patient participation: complete - patient participated  Level of consciousness: awake and alert  Pain score: 0    Airway patency: patent  Anesthetic complications: no  Cardiovascular status: hemodynamically stable  Respiratory status: acceptable  Hydration status: euvolemic    PONV: none          There were no known complications for this encounter.     Nurse Pain Score: 0 (NPRS)

## 2021-04-13 ENCOUNTER — IMMUNIZATION (OUTPATIENT)
Dept: FAMILY PLANNING/WOMEN'S HEALTH CLINIC | Facility: IMMUNIZATION CENTER | Age: 61
End: 2021-04-13
Payer: OTHER GOVERNMENT

## 2021-04-13 DIAGNOSIS — Z23 ENCOUNTER FOR VACCINATION: Primary | ICD-10-CM

## 2021-04-13 PROCEDURE — 91300 PFIZER SARS-COV-2 VACCINE: CPT

## 2021-04-13 PROCEDURE — 0001A PFIZER SARS-COV-2 VACCINE: CPT

## 2021-04-20 ENCOUNTER — APPOINTMENT (OUTPATIENT)
Dept: RADIOLOGY | Facility: MEDICAL CENTER | Age: 61
End: 2021-04-20
Attending: INTERNAL MEDICINE
Payer: OTHER GOVERNMENT

## 2021-04-28 ENCOUNTER — APPOINTMENT (OUTPATIENT)
Dept: RADIOLOGY | Facility: MEDICAL CENTER | Age: 61
End: 2021-04-28
Attending: INTERNAL MEDICINE
Payer: OTHER GOVERNMENT

## 2021-04-30 ENCOUNTER — IMMUNIZATION (OUTPATIENT)
Dept: FAMILY PLANNING/WOMEN'S HEALTH CLINIC | Facility: IMMUNIZATION CENTER | Age: 61
End: 2021-04-30
Payer: OTHER GOVERNMENT

## 2021-04-30 DIAGNOSIS — Z23 ENCOUNTER FOR VACCINATION: Primary | ICD-10-CM

## 2021-04-30 PROCEDURE — 0002A PFIZER SARS-COV-2 VACCINE: CPT

## 2021-04-30 PROCEDURE — 91300 PFIZER SARS-COV-2 VACCINE: CPT

## 2021-05-19 DIAGNOSIS — E03.9 HYPOTHYROIDISM, UNSPECIFIED TYPE: ICD-10-CM

## 2021-05-19 DIAGNOSIS — E55.9 VITAMIN D DEFICIENCY: ICD-10-CM

## 2021-05-20 ENCOUNTER — HOSPITAL ENCOUNTER (OUTPATIENT)
Dept: LAB | Facility: MEDICAL CENTER | Age: 61
End: 2021-05-20
Attending: INTERNAL MEDICINE
Payer: OTHER GOVERNMENT

## 2021-05-20 DIAGNOSIS — E03.9 HYPOTHYROIDISM, UNSPECIFIED TYPE: ICD-10-CM

## 2021-05-20 DIAGNOSIS — E55.9 VITAMIN D DEFICIENCY: ICD-10-CM

## 2021-05-20 LAB
TSH SERPL DL<=0.005 MIU/L-ACNC: 1.26 UIU/ML (ref 0.38–5.33)
VIT B12 SERPL-MCNC: 162 PG/ML (ref 211–911)

## 2021-05-20 PROCEDURE — 84443 ASSAY THYROID STIM HORMONE: CPT

## 2021-05-20 PROCEDURE — 82306 VITAMIN D 25 HYDROXY: CPT

## 2021-05-20 PROCEDURE — 82607 VITAMIN B-12: CPT

## 2021-05-20 PROCEDURE — 36415 COLL VENOUS BLD VENIPUNCTURE: CPT

## 2021-05-22 DIAGNOSIS — E53.8 VITAMIN B12 DEFICIENCY: ICD-10-CM

## 2021-05-22 LAB — 25(OH)D3 SERPL-MCNC: 25 NG/ML (ref 30–80)

## 2021-05-22 RX ORDER — CYANOCOBALAMIN 1000 UG/ML
1000 INJECTION, SOLUTION INTRAMUSCULAR; SUBCUTANEOUS
Status: DISCONTINUED | OUTPATIENT
Start: 2021-05-24 | End: 2021-06-28

## 2021-05-24 ENCOUNTER — TELEPHONE (OUTPATIENT)
Dept: MEDICAL GROUP | Facility: PHYSICIAN GROUP | Age: 61
End: 2021-05-24

## 2021-05-24 ENCOUNTER — PATIENT MESSAGE (OUTPATIENT)
Dept: MEDICAL GROUP | Facility: PHYSICIAN GROUP | Age: 61
End: 2021-05-24

## 2021-05-24 NOTE — TELEPHONE ENCOUNTER
----- Message from Clifton Harvey M.D. sent at 5/22/2021  8:31 AM PDT -----  Please notify patient : vit b12 level low    Rec vitamin b12 1000mcg IM injection monthly.  Pls kindly arrange.   Thank you.  Rec pt to repeat vitamin B12 lab for f.u 3mo. Lab ordered.    When pt is here >>Pls inquire if pt wishes to do self injection ? If yes pls arrange education.

## 2021-06-01 ENCOUNTER — HOSPITAL ENCOUNTER (OUTPATIENT)
Dept: LAB | Facility: MEDICAL CENTER | Age: 61
End: 2021-06-01
Attending: NURSE PRACTITIONER
Payer: OTHER GOVERNMENT

## 2021-06-01 DIAGNOSIS — D83.9 COMMON VARIABLE IMMUNODEFICIENCY (HCC): Chronic | ICD-10-CM

## 2021-06-01 DIAGNOSIS — R60.1 ANASARCA: ICD-10-CM

## 2021-06-01 DIAGNOSIS — E43 SEVERE MALNUTRITION (HCC): Chronic | ICD-10-CM

## 2021-06-01 LAB
ANION GAP SERPL CALC-SCNC: 11 MMOL/L (ref 7–16)
BUN SERPL-MCNC: 41 MG/DL (ref 8–22)
CALCIUM SERPL-MCNC: 8.7 MG/DL (ref 8.5–10.5)
CHLORIDE SERPL-SCNC: 108 MMOL/L (ref 96–112)
CO2 SERPL-SCNC: 20 MMOL/L (ref 20–33)
CREAT SERPL-MCNC: 0.44 MG/DL (ref 0.5–1.4)
GLUCOSE SERPL-MCNC: 81 MG/DL (ref 65–99)
POTASSIUM SERPL-SCNC: 4 MMOL/L (ref 3.6–5.5)
SODIUM SERPL-SCNC: 139 MMOL/L (ref 135–145)

## 2021-06-01 PROCEDURE — 36415 COLL VENOUS BLD VENIPUNCTURE: CPT

## 2021-06-01 PROCEDURE — 80048 BASIC METABOLIC PNL TOTAL CA: CPT

## 2021-06-02 ENCOUNTER — OFFICE VISIT (OUTPATIENT)
Dept: CARDIOLOGY | Facility: MEDICAL CENTER | Age: 61
End: 2021-06-02
Payer: OTHER GOVERNMENT

## 2021-06-02 VITALS
HEART RATE: 69 BPM | BODY MASS INDEX: 18.44 KG/M2 | OXYGEN SATURATION: 100 % | WEIGHT: 108 LBS | DIASTOLIC BLOOD PRESSURE: 68 MMHG | HEIGHT: 64 IN | SYSTOLIC BLOOD PRESSURE: 98 MMHG | RESPIRATION RATE: 19 BRPM

## 2021-06-02 DIAGNOSIS — I50.32 CHRONIC HEART FAILURE WITH PRESERVED EJECTION FRACTION (HCC): ICD-10-CM

## 2021-06-02 DIAGNOSIS — E71.40 CARNITINE DEFICIENCY (HCC): ICD-10-CM

## 2021-06-02 DIAGNOSIS — E16.2 HYPOGLYCEMIA: ICD-10-CM

## 2021-06-02 PROCEDURE — 99213 OFFICE O/P EST LOW 20 MIN: CPT | Performed by: NURSE PRACTITIONER

## 2021-06-02 RX ORDER — POLYETHYLENE GLYCOL 3350, SODIUM SULFATE ANHYDROUS, SODIUM BICARBONATE, SODIUM CHLORIDE, POTASSIUM CHLORIDE 236; 22.74; 6.74; 5.86; 2.97 G/4L; G/4L; G/4L; G/4L; G/4L
POWDER, FOR SOLUTION ORAL
COMMUNITY
Start: 2021-04-07 | End: 2021-06-28

## 2021-06-02 ASSESSMENT — FIBROSIS 4 INDEX: FIB4 SCORE: 1.4463547796997563

## 2021-06-02 ASSESSMENT — ENCOUNTER SYMPTOMS
ORTHOPNEA: 0
SHORTNESS OF BREATH: 0
LOSS OF CONSCIOUSNESS: 0
ROS GI COMMENTS: ABDOMINAL DISTENTION
COUGH: 0
HEADACHES: 0
FALLS: 0
WHEEZING: 0
WEAKNESS: 0
DOUBLE VISION: 0
BLURRED VISION: 0
DIZZINESS: 0
FOCAL WEAKNESS: 0
PALPITATIONS: 0

## 2021-06-02 NOTE — PROGRESS NOTES
"Chief Complaint   Patient presents with   • Congestive Heart Failure     Chronic heart failure with preserved ejection fraction (HCC)         Subjective:   Pita Bueno is a 61 y.o. female who presents today for follow up.     She is followed by Dr. Calvillo in our clinic, history of palpitations, hypothyroid, Colostomy from complication from abdominal surgery in 2013, low body weight/malnutrition, COVID 12/10/2020, and common variable immunodeficiency.    She has been seen by Dr. Starr (GI) and Dr. Diallo from [facility] for her colostomy. Recommend she gets irrigation of her ostomy.       Interim events:  Patient is fatigue and having complication with constipation. She workouts every day at least 3-4 hrs. She does not eat prior to working out due to fear of filling her ostomy bag. After workout she gets dizzy and feels like she is going to have \"heart attack\".     Left arm is numb after COVID vaccine per patient.    Past Medical History:   Diagnosis Date   • Adverse effect of anesthesia    • Anemia    • Arthritis     thumbs, hands   • Bowel habit changes 04/01/2021    constipation and diarrhea, currently using colostomy bag   • Breath shortness     \"when laying down, comes and goes\"   • Bronchitis     frequent   • C. difficile diarrhea 09/2020   • Colostomy in place (HCC)    • Congestive heart failure (HCC)     recent   • Cough     chronic   • CVID (common variable immunodeficiency) (HCC)    • Dental disorder     infected tooth   • Eating disorder    • Edema 01/2021    Bilat LE, Pt states having pain LLE and DVT being ruled out 1/27   • Fatty liver disease, nonalcoholic    • Hemorrhagic disorder (HCC) 01/2021    states she has noticed she is bleeding easier than normal   • High cholesterol    • Hypotension 04/01/2021    pt reports 70/40 up to 100/60   • Hypothyroid    • Infectious disease 11/22/2020    covid   • Pain     hands and stomach   • Palpitations    • Renal disorder     occaisonal high BUN     Past Surgical " History:   Procedure Laterality Date   • PB COLONOSCOPY,DIAGNOSTIC  4/8/2021    Procedure: COLONOSCOPY;  Surgeon: Zhao Starr M.D.;  Location: SURGERY [facility];  Service: Gastroenterology   • PB COLONOSCOPY,DIAGNOSTIC  4/7/2021    Procedure: COLONOSCOPY;  Surgeon: Sae Carias M.D.;  Location: SURGERY [facility];  Service: Gastroenterology   • PB UPPER GI ENDOSCOPY,DIAGNOSIS  4/7/2021    Procedure: GASTROSCOPY;  Surgeon: Sae Carias M.D.;  Location: SURGERY [facility];  Service: Gastroenterology   • CARPAL TUNNEL RELEASE      and ulnar nerve    • CYST EXCISION Right     ovarian dermoid   • GYN SURGERY      ovary ablation    • OTHER ABDOMINAL SURGERY      rectal prolapse repair, anastomy leak/abcess,colostony     Family History   Problem Relation Age of Onset   • Thyroid Mother    • Alcohol abuse Father    • Heart Disease Father    • Hypertension Father    • Stroke Father      Social History     Socioeconomic History   • Marital status:      Spouse name: Not on file   • Number of children: Not on file   • Years of education: Not on file   • Highest education level: Not on file   Occupational History   • Not on file   Tobacco Use   • Smoking status: Never Smoker   • Smokeless tobacco: Never Used   Vaping Use   • Vaping Use: Never used   Substance and Sexual Activity   • Alcohol use: Not Currently   • Drug use: Not Currently   • Sexual activity: Not Currently   Other Topics Concern   • Not on file   Social History Narrative   • Not on file     Social Determinants of Health     Financial Resource Strain:    • Difficulty of Paying Living Expenses:    Food Insecurity:    • Worried About Running Out of Food in the Last Year:    • Ran Out of Food in the Last Year:    Transportation Needs:    • Lack of Transportation (Medical):    • Lack of Transportation (Non-Medical):    Physical Activity:    • Days of Exercise per Week:    • Minutes of Exercise per Session:    Stress:    • Feeling of Stress :    Social  "Connections:    • Frequency of Communication with Friends and Family:    • Frequency of Social Gatherings with Friends and Family:    • Attends [employer] Services:    • Active Member of Clubs or Organizations:    • Attends Club or Organization Meetings:    • Marital Status:    Intimate Partner Violence:    • Fear of Current or Ex-Partner:    • Emotionally Abused:    • Physically Abused:    • Sexually Abused:      Allergies   Allergen Reactions   • Sulfa Drugs Hives   • Tape Rash     Rash and rips thin skin     Outpatient Encounter Medications as of 6/2/2021   Medication Sig Dispense Refill   • GOLYTELY 236 g Recon Soln      • vitamin D, Ergocalciferol, (DRISDOL) 1.25 MG (47297 UT) Cap capsule Take 1 capsule by mouth every 7 days. 15 capsule 3   • acetaminophen (TYLENOL) 500 MG Tab Take 500-1,000 mg by mouth every 6 hours as needed. Reports use every night     • bisacodyl EC (DULCOLAX) 5 MG Tablet Delayed Response Take 5 mg by mouth 1 time a day as needed for Constipation. Reports \"10-15 pills daily with no help\"     • torsemide (DEMADEX) 10 MG tablet Take 1 tablet by mouth 2 times a day. 180 tablet 0   • levothyroxine (SYNTHROID) 50 MCG Tab Take 1 Tab by mouth Every morning on an empty stomach. 90 Tab 3   • diphenhydrAMINE (BENADRYL ALLERGY) 25 MG Tab Take 25 mg by mouth every day. At night alternating with tylenol     • levOCARNitine (CARNITINE, L,) Powder 500 mg 2 (two) times a day. (Patient taking differently: 500 mg 2 (two) times a day. Reports not taking currently) 90 g 3   • linaCLOtide (LINZESS) 290 MCG Cap Take 290 mcg by mouth every evening. (Patient taking differently: Take 290 mcg by mouth every day.) 90 Cap 0   • sennosides (SENOKOT) 8.6 MG Tab Take 8.6 mg by mouth 1 time a day as needed. Takes 4 pills daily       Facility-Administered Encounter Medications as of 6/2/2021   Medication Dose Route Frequency Provider Last Rate Last Admin   • cyanocobalamin (VITAMIN B-12) injection 1,000 mcg  1,000 mcg " "Intramuscular Q30 DAYS Clifton Harvey M.D.           Review of Systems   Constitutional: Positive for malaise/fatigue.   Eyes: Negative for blurred vision and double vision.   Respiratory: Negative for cough, shortness of breath and wheezing.    Cardiovascular: Negative for chest pain, palpitations, orthopnea and leg swelling.   Gastrointestinal: Positive for constipation.        Abdominal distention    Musculoskeletal: Negative for falls.   Neurological: Negative for dizziness, focal weakness, loss of consciousness, weakness and headaches.   All other systems reviewed and are negative.      BP (!) 98/68 (BP Location: Left arm, Patient Position: Sitting, BP Cuff Size: Adult)   Pulse 69   Resp 19   Ht 1.626 m (5' 4\")   Wt 49 kg (108 lb)   SpO2 100%       Physical Exam  Constitutional:       General: She is not in acute distress.     Appearance: She is not diaphoretic.   HENT:      Head: Normocephalic and atraumatic.   Cardiovascular:      Rate and Rhythm: Normal rate and regular rhythm.      Heart sounds: No murmur heard.     Pulmonary:      Effort: Pulmonary effort is normal. No respiratory distress.      Breath sounds: Normal breath sounds.   Abdominal:      General: There is no distension.      Palpations: Abdomen is soft.      Comments: Colostomy in place   Neurological:      Mental Status: She is alert and oriented to person, place, and time.   Psychiatric:         Mood and Affect: Mood and affect normal.         Cognition and Memory: Memory normal.         Judgment: Judgment normal.             ECHO  9/2/2020  No prior study is available for comparison.   Normal left ventricular systolic function.  Left ventricular ejection fraction is visually estimated to be 60%.  Normal regional wall motion.  Normal right ventricular size.  Normal left atrial size.  Trace mitral regurgitation.  Trace aortic insufficiency.  The aortic valve is not well visualized.  Right ventricular systolic pressure is estimated to be 30 " mmHg.  Normal pericardium without effusion.      Echocardiography Laboratory  1/7/2021  LV ejection fraction estimated to be 65%.  Right ventricular systolic pressure is estimated to be 25 mmHg.    Venous duplex  1/25/2021   Deep venous reflux is seen in the right common femoral (2917 msec) and    profunda femoral (1158 msec) veins.    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   Proximal right calf incompetent  measuring 0.24 cm and with 1075    msec of reflux.       Deep venous reflux is seen in the left common femoral (933 msec) vein.      No superficial or deep venous thrombosis bilaterally.      Treadmill stress test   3/15/2021  1.  Normal exercise ECG through 68% age-predicted maximum heart rate   2.  Low risk Dudley treadmill score (+9)     Signed   Abran Calvillo MD     Assessment:     1. Chronic heart failure with preserved ejection fraction (HCC)     2. Carnitine deficiency (HCC)     3. Hypoglycemia         Medical Decision Making:  Today's Assessment / Status / Plan:     1. Chronic heart failure preserved ef:  - Today, based on physical examination findings, patient is euvolemic. No JVD, lungs are clear to auscultation, no pitting edema in bilateral lower extremities, no ascites.   - prn  torsemide 10mg qd for leg swelling   - recommend stefan hose and elevate legs   - treadmill stress test ekg showed normal exercise ECG    2. Carnitine deficiency:  - continue carnitine 1000mg BID     3. Hypoglycemia:  - recommend she hydrates and have snack prior to working out (whether it is hard candy).     Follow up in 3 months, sooner as needed.

## 2021-06-03 ASSESSMENT — ENCOUNTER SYMPTOMS: CONSTIPATION: 1

## 2021-06-14 DIAGNOSIS — K59.01 CONSTIPATION BY DELAYED COLONIC TRANSIT: ICD-10-CM

## 2021-06-14 DIAGNOSIS — K59.9 BOWEL DYSFUNCTION: ICD-10-CM

## 2021-06-14 DIAGNOSIS — Z43.3 ATTENTION TO COLOSTOMY (HCC): ICD-10-CM

## 2021-06-14 RX ORDER — LEVOTHYROXINE SODIUM 0.05 MG/1
50 TABLET ORAL
Qty: 90 TABLET | Refills: 1 | Status: SHIPPED | OUTPATIENT
Start: 2021-06-14 | End: 2021-11-01 | Stop reason: SDUPTHER

## 2021-06-28 ENCOUNTER — OFFICE VISIT (OUTPATIENT)
Dept: MEDICAL GROUP | Facility: PHYSICIAN GROUP | Age: 61
End: 2021-06-28
Payer: OTHER GOVERNMENT

## 2021-06-28 VITALS
HEIGHT: 64 IN | TEMPERATURE: 98.1 F | OXYGEN SATURATION: 98 % | SYSTOLIC BLOOD PRESSURE: 98 MMHG | HEART RATE: 75 BPM | DIASTOLIC BLOOD PRESSURE: 60 MMHG | WEIGHT: 101 LBS | RESPIRATION RATE: 16 BRPM | BODY MASS INDEX: 17.24 KG/M2

## 2021-06-28 DIAGNOSIS — R51.9 CHRONIC NONINTRACTABLE HEADACHE, UNSPECIFIED HEADACHE TYPE: ICD-10-CM

## 2021-06-28 DIAGNOSIS — D83.9 COMMON VARIABLE IMMUNODEFICIENCY (HCC): Chronic | ICD-10-CM

## 2021-06-28 DIAGNOSIS — I50.32 CHRONIC HEART FAILURE WITH PRESERVED EJECTION FRACTION (HCC): ICD-10-CM

## 2021-06-28 DIAGNOSIS — G89.29 CHRONIC NONINTRACTABLE HEADACHE, UNSPECIFIED HEADACHE TYPE: ICD-10-CM

## 2021-06-28 DIAGNOSIS — E53.8 VITAMIN B12 DEFICIENCY: ICD-10-CM

## 2021-06-28 DIAGNOSIS — E55.9 VITAMIN D DEFICIENCY: Chronic | ICD-10-CM

## 2021-06-28 DIAGNOSIS — Z43.3 ATTENTION TO COLOSTOMY (HCC): Chronic | ICD-10-CM

## 2021-06-28 DIAGNOSIS — D61.818 PANCYTOPENIA (HCC): Chronic | ICD-10-CM

## 2021-06-28 DIAGNOSIS — E71.40 CARNITINE DEFICIENCY (HCC): ICD-10-CM

## 2021-06-28 DIAGNOSIS — K59.9 BOWEL DYSFUNCTION: ICD-10-CM

## 2021-06-28 DIAGNOSIS — E03.9 HYPOTHYROIDISM, UNSPECIFIED TYPE: Chronic | ICD-10-CM

## 2021-06-28 PROCEDURE — 99214 OFFICE O/P EST MOD 30 MIN: CPT | Mod: 25 | Performed by: INTERNAL MEDICINE

## 2021-06-28 RX ORDER — CYANOCOBALAMIN 1000 UG/ML
1000 INJECTION, SOLUTION INTRAMUSCULAR; SUBCUTANEOUS
Status: DISCONTINUED | OUTPATIENT
Start: 2021-07-23 | End: 2021-10-07

## 2021-06-28 RX ADMIN — CYANOCOBALAMIN 1000 MCG: 1000 INJECTION, SOLUTION INTRAMUSCULAR; SUBCUTANEOUS at 16:53

## 2021-06-28 ASSESSMENT — FIBROSIS 4 INDEX: FIB4 SCORE: 1.4463547796997563

## 2021-06-28 NOTE — ASSESSMENT & PLAN NOTE
SurgeryChronic condition.  Patient has been seen by GI specialist Dr. Starr and the patient stated that Dr Starr recommended for the patient to have colon surgery done    However the patient was seen by Dr. Diallo at [facility] recently and surgery was not recommended.  Dr. Diallo recommended for the patient continue to do the colostomy irrigation on daily basis

## 2021-06-28 NOTE — ASSESSMENT & PLAN NOTE
Noted with recent lab test.  Advised the patient to resume vitamin D supplementation 50,000 units once a week

## 2021-06-28 NOTE — PROGRESS NOTES
CC: Follow-up vitamin B12 deficiency      HPI: This is a 61 y.o. pt.  Pt's medical history is notable for:     CVID (common variable immunodeficiency) (HCC)  This is a chronic condition.   Patient has lost follow-up with hematologist Dr. Chang.  Advised the patient is important to contact specialist office and to reschedule the appointment.    Hypothyroidism  This is a chronic condition per the patient currently taking levothyroxine.  No significant side effects reported.  Recent TSH is within the normal range.  Pancytopenia (HCC)  Chronic condition.   As above the patient has lost follow-up with hematologist Dr. Chang.  Stressed importance to reschedule appointment.  The patient voiced understanding.  Present time the patient denies fever chills or any other signs of infection.    Bowel dysfunction  Chronic condition.  Patient has been seen by GI specialist Dr. Strar and the patient stated that Dr Starr recommended for the patient to have colon surgery done    However the patient was seen by Dr. Diallo at [facility] recently and surgery was not recommended.  Dr. Diallo recommended for the patient continue to do the colostomy irrigation on daily basis        Carnitine deficiency (HCC)  Chronic condition patient presently being treated with carnitine 500 mg twice daily.    Colostomy care (HCC)  Patient with history of rectal prolapse 2013 status post surgery which was complicated by recurrent infection requiring  additional surgeries but the patient presently now with colostomy bag.  Presently patient followed by GI service Dr Starr        Vitamin B12 deficiency  This is a chronic condition  I have recommended vitamin B12 injection however the patient stated that she is unable to do the injection as of due to left arm numbness after Covid vaccine.    For home we have order monthly B12 injection to be done in the clinic.  First dose to start today.    Heart failure with preserved ejection fraction (HCC)  Chronic condition.  " The patient presently followed by cardiology service.  Patient is currently taking torsemide as needed.  On clinical examination today the patient is euvolemic.  There is no edema in the lower extremities.  As per cardiologist recommend the patient to continue with using REESE hose.    Vitamin D deficiency  Noted with recent lab test.  Advised the patient to resume vitamin D supplementation 50,000 units once a week          REVIEW OF SYSTEMS:     Constitutional:  no fever / chills   Neurologic: no headaches  Eyes: no changes in vision  ENT: no sore throat, no hearing loss  CV:  no chest pain, no palpitations  Pulmonary: no SOB, no cough          Allergies: Sulfa drugs and Tape    Current Outpatient Medications Ordered in Epic   Medication Sig Dispense Refill   • levothyroxine (SYNTHROID) 50 MCG Tab Take 1 tablet by mouth every morning on an empty stomach. 90 tablet 1   • vitamin D, Ergocalciferol, (DRISDOL) 1.25 MG (25404 UT) Cap capsule Take 1 capsule by mouth every 7 days. 15 capsule 3   • acetaminophen (TYLENOL) 500 MG Tab Take 500-1,000 mg by mouth every 6 hours as needed. Reports use every night     • bisacodyl EC (DULCOLAX) 5 MG Tablet Delayed Response Take 5 mg by mouth 1 time a day as needed for Constipation. Reports \"10-15 pills daily with no help\"     • sennosides (SENOKOT) 8.6 MG Tab Take 8.6 mg by mouth 1 time a day as needed. Takes 4 pills daily     • torsemide (DEMADEX) 10 MG tablet Take 1 tablet by mouth 2 times a day. (Patient not taking: Reported on 6/28/2021) 180 tablet 0   • levOCARNitine (CARNITINE, L,) Powder 500 mg 2 (two) times a day. (Patient not taking: Reported on 6/28/2021) 90 g 3   • linaCLOtide (LINZESS) 290 MCG Cap Take 290 mcg by mouth every evening. (Patient not taking: Reported on 6/28/2021) 90 Cap 0     Current Facility-Administered Medications Ordered in Epic   Medication Dose Route Frequency Provider Last Rate Last Admin   • [START ON 7/23/2021] cyanocobalamin (VITAMIN B-12) " "injection 1,000 mcg  1,000 mcg Intramuscular Q30 DAYS Clifton Harvey M.D.           Past Medical, Social, and Family history reviewed and updated in EPIC     ------------------------------------------------------------------------------     PHYSICAL EXAM:   Vitals:    06/28/21 1626   BP: (!) 98/60   Pulse: 75   Resp: 16   Temp: 36.7 °C (98.1 °F)   SpO2: 98%      Body mass index is 17.34 kg/m².         Vitals:    06/28/21 1626   BP: (!) 98/60   Weight: 45.8 kg (101 lb)   Height: 1.626 m (5' 4\")       Constitutional: no acute distress  CV: heart RRR  Resp: normal effort, no wheezing or rales.  GI: abdomen soft, no obvious mass, no tenderness  Neuro: CN 2-12 grossly intact        -----------------------------------------------------------------------------    ASSESSMENT:   1. CVID (common variable immunodeficiency) ([facility])     2. Hypothyroidism, unspecified type     3. Pancytopenia ([facility])     4. Bowel dysfunction  REFERRAL TO GASTROENTEROLOGY    CANCELED: REFERRAL TO GASTROENTEROLOGY   5. Carnitine deficiency ([facility])     6. Colostomy care ([facility])  REFERRAL TO GASTROENTEROLOGY    CANCELED: REFERRAL TO GASTROENTEROLOGY   7. Vitamin B12 deficiency     8. Chronic nonintractable headache, unspecified headache type  CT-HEAD W/O    REFERRAL TO NEUROLOGY   9. Chronic heart failure with preserved ejection fraction ([facility])     10. Vitamin D deficiency             MEDICAL DECISION MAKING: DISCUSSION / STATUS / PLAN:    Common variable immunodeficiency/pancytopenia  Chronic conditions.  Advised the patient is important to follow-up with hematology service at the patient will contact Dr. Chang to schedule follow-up appointment.    Colostomy/bowel dysfunction.  As above the patient was seen by Dr. Diallo at [facility] and surgery was not recommended.  The patient will follow up with local GI office Dr. Starr    Carnitine deficiency.  Continue with carnitine supplementation.    Vitamin B12 and vitamin D deficiency  Monthly B12 injection to be done " in clinic  Patient will resume vitamin D supplementation    Chronic headaches.  As above his CT scan ordered and refer the patient to neurology.     Return in about 6 months (around 12/28/2021).     -If any problems should arise, patient was advised to contact our office or go to ER to be evaluated.    Please note that this dictation was created using voice recognition software. I have made every reasonable attempt to correct obvious errors, but it is possible there are errors of grammar and possibly content that I did not discover before finalizing the note.

## 2021-06-28 NOTE — ASSESSMENT & PLAN NOTE
Chronic condition.   Above the patient has lost follow-up with hematologist Dr. Chang.  Stressed importance to reschedule appointment.  The patient voiced understanding.  Present time the patient denies fever chills or any other signs of infection.

## 2021-06-28 NOTE — ASSESSMENT & PLAN NOTE
Patient with history of rectal prolapse 2013 status post surgery which was complicated by recurrent infection requiring  additional surgeries but the patient presently now with colostomy bag.  Presently patient followed by GI service Dr Starr

## 2021-06-28 NOTE — ASSESSMENT & PLAN NOTE
This is a chronic condition  I have recommended vitamin B12 injection however the patient stated that she is unable to do the injection as of due to left arm numbness after Covid vaccine.    For home we have order monthly B12 injection to be done in the clinic.  First dose to start today.

## 2021-06-28 NOTE — ASSESSMENT & PLAN NOTE
Chronic condition.  The patient presently followed by cardiology service.  Patient is currently taking torsemide as needed.  On clinical examination today the patient is euvolemic.  There is no edema in the lower extremities.  As per cardiologist recommend the patient to continue with using REESE hose.

## 2021-06-28 NOTE — ASSESSMENT & PLAN NOTE
This is a chronic condition.   Patient has lost follow-up with hematologist Dr. Chang.  Advised the patient is important to contact specialist office and to reschedule the appointment.

## 2021-06-28 NOTE — ASSESSMENT & PLAN NOTE
This is a chronic condition per the patient currently taking levothyroxine.  No significant side effects reported.

## 2021-06-29 NOTE — ASSESSMENT & PLAN NOTE
It has been a chronic condition noted since 2013.  Patient reported previous history of head injury/concussion.  She has not had any follow-up imaging and was not seen by a neurologist.  Patient denies any recent trauma or injury.  She denies motor weakness paresthesia.  She reported intermittent dizziness.  Examination today is unremarkable.  Recommend head CT scan to be done and refer the patient to neurology for further evaluation.

## 2021-07-03 ENCOUNTER — OFFICE VISIT (OUTPATIENT)
Dept: URGENT CARE | Facility: PHYSICIAN GROUP | Age: 61
End: 2021-07-03
Payer: OTHER GOVERNMENT

## 2021-07-03 ENCOUNTER — HOSPITAL ENCOUNTER (OUTPATIENT)
Dept: RADIOLOGY | Facility: MEDICAL CENTER | Age: 61
End: 2021-07-03
Attending: NURSE PRACTITIONER
Payer: OTHER GOVERNMENT

## 2021-07-03 VITALS
BODY MASS INDEX: 17.24 KG/M2 | HEART RATE: 61 BPM | HEIGHT: 64 IN | WEIGHT: 101 LBS | SYSTOLIC BLOOD PRESSURE: 92 MMHG | DIASTOLIC BLOOD PRESSURE: 72 MMHG | TEMPERATURE: 99.1 F | OXYGEN SATURATION: 99 % | RESPIRATION RATE: 16 BRPM

## 2021-07-03 DIAGNOSIS — R07.89 CHEST WALL PAIN: ICD-10-CM

## 2021-07-03 DIAGNOSIS — R42 DIZZINESS: ICD-10-CM

## 2021-07-03 DIAGNOSIS — R06.02 SOB (SHORTNESS OF BREATH): ICD-10-CM

## 2021-07-03 DIAGNOSIS — R05.9 COUGH: ICD-10-CM

## 2021-07-03 DIAGNOSIS — Z86.79 HISTORY OF CHRONIC CHF: ICD-10-CM

## 2021-07-03 PROCEDURE — 99214 OFFICE O/P EST MOD 30 MIN: CPT | Performed by: NURSE PRACTITIONER

## 2021-07-03 PROCEDURE — 71046 X-RAY EXAM CHEST 2 VIEWS: CPT

## 2021-07-03 ASSESSMENT — ENCOUNTER SYMPTOMS
DIZZINESS: 1
SENSORY CHANGE: 0
FEVER: 1
SHORTNESS OF BREATH: 1
COUGH: 1
WEAKNESS: 0
CARDIOVASCULAR NEGATIVE: 1
BACK PAIN: 1

## 2021-07-03 ASSESSMENT — VISUAL ACUITY: OU: 1

## 2021-07-03 ASSESSMENT — FIBROSIS 4 INDEX: FIB4 SCORE: 1.4463547796997563

## 2021-07-03 NOTE — PROGRESS NOTES
Subjective:     Pita Bueno is a 61 y.o. female who presents for Rib Pain (x2 days, pt states ribcage pain, back pain, SOB)       Shortness of Breath  This is a new problem. Associated symptoms include a fever. Pertinent negatives include no leg swelling.     Patient presents 2 days ago, she started to experience an episode of shortness of breath.  Reports she felt like she was drowning.   Reports that she considered calling 911 at the time.  After that, she started to develop right-sided lower chest wall pain which radiates all around and to her back.  Denies injury.  Feels lightheaded.  Also reports a cough and fatigue for the past 2 days.  Temperature today was 99.1 and she reports she normally runs around 97.    Reports a history of CHF ever since she had Covid.    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    Patient was screened prior to rooming and denied COVID-19 diagnosis or contact with a person who has been diagnosed or is suspected to have COVID-19. During this visit, appropriate PPE was worn, hand hygiene was performed, and the patient and any visitors were masked.     PMH:  has a past medical history of Adverse effect of anesthesia, Anemia, Arthritis, Bowel habit changes (04/01/2021), Breath shortness, Bronchitis, C. difficile diarrhea (09/2020), Colostomy in place (HCC), Congestive heart failure (HCC), Cough, CVID (common variable immunodeficiency) (HCC), Dental disorder, Eating disorder, Edema (01/2021), Fatty liver disease, nonalcoholic, Hemorrhagic disorder (HCC) (01/2021), High cholesterol, Hypotension (04/01/2021), Hypothyroid, Infectious disease (11/22/2020), Pain, Palpitations, and Renal disorder.    MEDS:   Current Outpatient Medications:   •  levothyroxine (SYNTHROID) 50 MCG Tab, Take 1 tablet by mouth every morning on an empty stomach., Disp: 90 tablet, Rfl: 1  •  vitamin D, Ergocalciferol, (DRISDOL) 1.25 MG (00988 UT) Cap capsule, Take 1 capsule by mouth every 7 days., Disp: 15  "capsule, Rfl: 3  •  acetaminophen (TYLENOL) 500 MG Tab, Take 500-1,000 mg by mouth every 6 hours as needed. Reports use every night, Disp: , Rfl:   •  bisacodyl EC (DULCOLAX) 5 MG Tablet Delayed Response, Take 5 mg by mouth 1 time a day as needed for Constipation. Reports \"10-15 pills daily with no help\", Disp: , Rfl:   •  torsemide (DEMADEX) 10 MG tablet, Take 1 tablet by mouth 2 times a day., Disp: 180 tablet, Rfl: 0  •  levOCARNitine (CARNITINE, L,) Powder, 500 mg 2 (two) times a day., Disp: 90 g, Rfl: 3  •  linaCLOtide (LINZESS) 290 MCG Cap, Take 290 mcg by mouth every evening., Disp: 90 Cap, Rfl: 0  •  sennosides (SENOKOT) 8.6 MG Tab, Take 8.6 mg by mouth 1 time a day as needed. Takes 4 pills daily, Disp: , Rfl:     Current Facility-Administered Medications:   •  [START ON 7/23/2021] cyanocobalamin (VITAMIN B-12) injection 1,000 mcg, 1,000 mcg, Intramuscular, Q30 DAYS, Clifton Harvey M.D.    ALLERGIES:   Allergies   Allergen Reactions   • Sulfa Drugs Hives   • Tape Rash     Rash and rips thin skin     SURGHX:   Past Surgical History:   Procedure Laterality Date   • PB COLONOSCOPY,DIAGNOSTIC  4/8/2021    Procedure: COLONOSCOPY;  Surgeon: Zhao Starr M.D.;  Location: [facility];  Service: Gastroenterology   • PB COLONOSCOPY,DIAGNOSTIC  4/7/2021    Procedure: COLONOSCOPY;  Surgeon: Sae Carias M.D.;  Location: [facility];  Service: Gastroenterology   • PB UPPER GI ENDOSCOPY,DIAGNOSIS  4/7/2021    Procedure: GASTROSCOPY;  Surgeon: Sae Carias M.D.;  Location: [facility];  Service: Gastroenterology   • CARPAL TUNNEL RELEASE      and ulnar nerve    • CYST EXCISION Right     ovarian dermoid   • GYN SURGERY      ovary ablation    • OTHER ABDOMINAL SURGERY      rectal prolapse repair, anastomy leak/abcess,colostony     SOCHX:  reports that she has never smoked. She has never used smokeless tobacco. She reports previous alcohol use. She reports previous drug use.     FH: Reviewed with " "patient, not pertinent to this visit.    Review of Systems   Constitutional: Positive for fever and malaise/fatigue.   Respiratory: Positive for cough and shortness of breath.    Cardiovascular: Negative.  Negative for leg swelling.   Genitourinary: Negative.  Negative for dysuria.   Musculoskeletal: Positive for back pain.        Right-sided chest wall pain   Neurological: Positive for dizziness. Negative for sensory change and weakness.   All other systems reviewed and are negative.    Additional details per HPI.      Objective:     BP (!) 92/72   Pulse 61   Temp 37.3 °C (99.1 °F) (Temporal)   Resp 16   Ht 1.626 m (5' 4\")   Wt 45.8 kg (101 lb)   LMP  (LMP Unknown)   SpO2 99%   BMI 17.34 kg/m²     Physical Exam  Vitals reviewed.   Constitutional:       General: She is not in acute distress.     Appearance: She is well-developed. She is not ill-appearing or toxic-appearing.   HENT:      Head: Normocephalic.      Right Ear: External ear normal.      Left Ear: External ear normal.      Nose: Nose normal.   Eyes:      General: Vision grossly intact.      Extraocular Movements: Extraocular movements intact.      Conjunctiva/sclera: Conjunctivae normal.   Cardiovascular:      Rate and Rhythm: Normal rate and regular rhythm.      Pulses: Normal pulses.      Heart sounds: Normal heart sounds.   Pulmonary:      Effort: Pulmonary effort is normal. No respiratory distress.      Breath sounds: Normal breath sounds. No decreased breath sounds.   Chest:      Chest wall: Tenderness (R lower chest wall extending laterally) present.   Abdominal:      General: Bowel sounds are normal.      Palpations: Abdomen is soft.      Tenderness: There is abdominal tenderness (RLQ, RUQ).   Musculoskeletal:         General: No deformity. Normal range of motion.      Cervical back: Normal range of motion.      Right lower leg: No edema.      Left lower leg: No edema.   Skin:     General: Skin is warm and dry.      Capillary Refill: " Capillary refill takes less than 2 seconds.      Coloration: Skin is not pale.   Neurological:      Mental Status: She is alert and oriented to person, place, and time.      Sensory: No sensory deficit.      Motor: No weakness.      Coordination: Coordination normal.   Psychiatric:         Behavior: Behavior normal. Behavior is cooperative.     Chest x-ray:    Details    Reading Physician Reading Date Result Priority   Zuleika Thornton M.D.  [phone] 7/3/2021 Ur[facility]      Narrative & Impression     7/3/2021 3:38 PM     HISTORY/REASON FOR EXAM:  Shortness of Breath    TECHNIQUE/EXAM DESCRIPTION AND NUMBER OF VIEWS:  Two views of the chest.     COMPARISON:  3/4/2021     FINDINGS:     The cardiac silhouette  and mediastinal contours are normal.     No discrete opacity, pleural fluid or pneumothorax.     No suspicious bony lesions.    IMPRESSION:     No acute cardiopulmonary findings.         Last Resulted: 07/03/21  3:43 PM        Radiology report and images reviewed by myself. Concur with findings.    EKG: sinus rhythm 60 no acute ST abnormalities      Assessment/Plan:     1. SOB (shortness of breath)  - DX-CHEST-2 VIEWS; Future  - EKG    2. Chest wall pain  - DX-CHEST-2 VIEWS; Future  - EKG    3. Cough  - DX-CHEST-2 VIEWS; Future    4. Dizziness    5. History of chronic CHF    Differential diagnosis, natural history, supportive care, over-the-counter symptom management per 's instructions, close monitoring, and indications for immediate follow-up discussed.     Chest x-ray and EKG unremarkable. Chest wall tenderness suggests musculoskeletal pain. However, report of severe SOB 2 nights ago, persistent pain, with lightheadedness is concerning. History of CHF. Recommend further evaluation in the ER. Patient agreeable.    Discharge summary provided.

## 2021-07-03 NOTE — PATIENT INSTRUCTIONS
Shortness of Breath, Adult  Shortness of breath is when a person has trouble breathing enough air or when a person feels like she or he is having trouble breathing in enough air. Shortness of breath could be a sign of a medical problem.  Follow these instructions at home:    · Pay attention to any changes in your symptoms.  · Do not use any products that contain nicotine or tobacco, such as cigarettes, e-cigarettes, and chewing tobacco.  · Do not smoke. Smoking is a common cause of shortness of breath. If you need help quitting, ask your health care provider.  · Avoid things that can irritate your airways, such as:  ? Mold.  ? Dust.  ? Air pollution.  ? Chemical fumes.  ? Things that can cause allergy symptoms (allergens), if you have allergies.  · Keep your living space clean and free of mold and dust.  · Rest as needed. Slowly return to your usual activities.  · Take over-the-counter and prescription medicines only as told by your health care provider. This includes oxygen therapy and inhaled medicines.  · Keep all follow-up visits as told by your health care provider. This is important.  Contact a health care provider if:  · Your condition does not improve as soon as expected.  · You have a hard time doing your normal activities, even after you rest.  · You have new symptoms.  Get help right away if:  · Your shortness of breath gets worse.  · You have shortness of breath when you are resting.  · You feel light-headed or you faint.  · You have a cough that is not controlled with medicines.  · You cough up blood.  · You have pain with breathing.  · You have pain in your chest, arms, shoulders, or abdomen.  · You have a fever.  · You cannot walk up stairs or exercise the way that you normally do.  These symptoms may represent a serious problem that is an emergency. Do not wait to see if the symptoms will go away. Get medical help right away. Call your local emergency services (911 in the [place] Do not drive yourself  to the hospital.  Summary  · Shortness of breath is when a person has trouble breathing enough air. It can be a sign of a medical problem.  · Avoid things that irritate your lungs, such as smoking, pollution, mold, and dust.  · Pay attention to changes in your symptoms and contact your health care provider if you have a hard time completing daily activities because of shortness of breath.  This information is not intended to replace advice given to you by your health care provider. Make sure you discuss any questions you have with your health care provider.  Document Released: 09/12/2002 Document Revised: 05/20/2019 Document Reviewed: 05/20/2019  Elsevier Patient Education © 2020 [employer]

## 2021-07-08 ENCOUNTER — HOSPITAL ENCOUNTER (OUTPATIENT)
Dept: RADIOLOGY | Facility: MEDICAL CENTER | Age: 61
End: 2021-07-08
Attending: INTERNAL MEDICINE
Payer: OTHER GOVERNMENT

## 2021-07-08 DIAGNOSIS — R51.9 CHRONIC NONINTRACTABLE HEADACHE, UNSPECIFIED HEADACHE TYPE: ICD-10-CM

## 2021-07-08 DIAGNOSIS — G89.29 CHRONIC NONINTRACTABLE HEADACHE, UNSPECIFIED HEADACHE TYPE: ICD-10-CM

## 2021-07-08 PROCEDURE — 70450 CT HEAD/BRAIN W/O DYE: CPT

## 2021-07-09 ENCOUNTER — PATIENT MESSAGE (OUTPATIENT)
Dept: CARDIOLOGY | Facility: MEDICAL CENTER | Age: 61
End: 2021-07-09

## 2021-07-12 ENCOUNTER — TELEPHONE (OUTPATIENT)
Dept: CARDIOLOGY | Facility: MEDICAL CENTER | Age: 61
End: 2021-07-12

## 2021-07-12 NOTE — TELEPHONE ENCOUNTER
----- Message -----  From: Pita Bueno  Sent: 7/9/2021  10:22 PM PDT  To: Neftali Valencia  Subject: Non-Urgent Medical Question                      Redet, I have been having problems breathing more frequently and longer duration. Starts usually when I lay down for bed and goes into morning. Sometimes in the day. I fell like I'm not going to breathe and I can't catch a breath it's scary. Urgent care told to go to Er. Could it be heart failure? I trust you and your opinion  it's impacting my exercise can't do it. Please advice    ----------------------------------------------------------------------    Attempted to call pt for more details on her symptoms but unable to reach. Voice message left to return call.

## 2021-07-13 NOTE — TELEPHONE ENCOUNTER
NIALL Tavarez.  to Pita Bueno      10:01 AM  Steven Lema sorry you are not feeling well. Recommend taking double your torsemide for three days, see if your shortness of breath improves     Thank you,  Uvaldo BRO with pt at [phone] notifying that she can double torsemide dose for 3 days. Also stressed that if her SOB worsens she should go to the ER.

## 2021-07-21 ENCOUNTER — APPOINTMENT (OUTPATIENT)
Dept: RADIOLOGY | Facility: MEDICAL CENTER | Age: 61
End: 2021-07-21
Attending: INTERNAL MEDICINE
Payer: OTHER GOVERNMENT

## 2021-08-03 ENCOUNTER — APPOINTMENT (OUTPATIENT)
Dept: RADIOLOGY | Facility: MEDICAL CENTER | Age: 61
End: 2021-08-03
Attending: INTERNAL MEDICINE
Payer: OTHER GOVERNMENT

## 2021-08-10 PROBLEM — K59.89: Status: ACTIVE | Noted: 2021-08-10

## 2021-08-10 PROBLEM — K59.89: Chronic | Status: ACTIVE | Noted: 2021-08-10

## 2021-08-16 ENCOUNTER — APPOINTMENT (OUTPATIENT)
Dept: RADIOLOGY | Facility: MEDICAL CENTER | Age: 61
End: 2021-08-16
Attending: INTERNAL MEDICINE
Payer: OTHER GOVERNMENT

## 2021-08-26 ENCOUNTER — OFFICE VISIT (OUTPATIENT)
Dept: NEUROLOGY | Facility: MEDICAL CENTER | Age: 61
End: 2021-08-26
Attending: PSYCHIATRY & NEUROLOGY
Payer: OTHER GOVERNMENT

## 2021-08-26 VITALS
BODY MASS INDEX: 17.5 KG/M2 | HEIGHT: 64 IN | DIASTOLIC BLOOD PRESSURE: 72 MMHG | SYSTOLIC BLOOD PRESSURE: 114 MMHG | TEMPERATURE: 97.3 F | WEIGHT: 102.51 LBS | OXYGEN SATURATION: 100 % | HEART RATE: 53 BPM

## 2021-08-26 DIAGNOSIS — H81.11 BENIGN PAROXYSMAL POSITIONAL VERTIGO OF RIGHT EAR: ICD-10-CM

## 2021-08-26 DIAGNOSIS — R20.8 ALLODYNIA: ICD-10-CM

## 2021-08-26 PROCEDURE — 99211 OFF/OP EST MAY X REQ PHY/QHP: CPT | Performed by: PSYCHIATRY & NEUROLOGY

## 2021-08-26 PROCEDURE — 99205 OFFICE O/P NEW HI 60 MIN: CPT | Performed by: PSYCHIATRY & NEUROLOGY

## 2021-08-26 ASSESSMENT — PATIENT HEALTH QUESTIONNAIRE - PHQ9: CLINICAL INTERPRETATION OF PHQ2 SCORE: 0

## 2021-08-26 ASSESSMENT — FIBROSIS 4 INDEX: FIB4 SCORE: 1.4463547796997563

## 2021-08-26 NOTE — PROGRESS NOTES
"[facility] NEUROLOGY  GENERAL NEUROLOGY  NEW PATIENT VISIT    Referral source: Clifton Harvey MD    CC: \"chronic non-intractable headache\"    HISTORY OF ILLNESS:  Pita Bueno is a 61 y.o. woman with a history most notable for common variable immunodeficiency, pancytopenia, hypothyroidism, chronic fatigue syndrome, chronic diarrhea, severe malnutrition, vitamin B12 and D deficiency, COVID-19, heart failure with preserved ejection fraction, and orthostatic hypotension.  Today, she was unaccompanied, and she provided the following history:    2/2013:  Pita experienced a concussion/TBI.  A few months later she developed additional health issues.    Pita has found that when she puts on her swim cap and goggles (or anything on her head, for that matter) she develops headache.  After a few laps in the pool she has to adjust the band, and as she continues to swim she feels \"pressure\" in her head as if it's \"going to explode.\"  The pain is in the same spot where she fell and struck her head in 2013.  She has tried different caps and bands, but she has not found a remedy.    Pita never sleeps on her left side, so she doesn't know if a pillow would aggravate the pain.  Otherwise, she is not a \"headache person.\"    Lately, her balance has become an issue.  She attributes this to weakness in the left lower extremity.    Pita also experiences dizziness.  She describes this as a \"spinning\" sensation.  Lying down in bed can provoke symptoms.    There is no back pain or bowel/bladder symptoms.    MEDICAL AND SURGICAL HISTORY:  Past Medical History:   Diagnosis Date   • Adverse effect of anesthesia    • Anemia    • Arthritis     thumbs, hands   • Bowel habit changes 04/01/2021    constipation and diarrhea, currently using colostomy bag   • Breath shortness     \"when laying down, comes and goes\"   • Bronchitis     frequent   • C. difficile diarrhea 09/2020   • Colostomy in place ([facility])    • Congestive heart failure " "([facility])     recent   • Cough     chronic   • CVID (common variable immunodeficiency) ([facility])    • Dental disorder     infected tooth   • Eating disorder    • Edema 01/2021    Bilat LE, Pt states having pain LLE and DVT being ruled out 1/27   • Fatty liver disease, nonalcoholic    • Hemorrhagic disorder (HCC) 01/2021    states she has noticed she is bleeding easier than normal   • High cholesterol    • Hypotension 04/01/2021    pt reports 70/40 up to 100/60   • Hypothyroid    • Infectious disease 11/22/2020    covid   • Pain     hands and stomach   • Palpitations    • Renal disorder     occaisonal high BUN     Past Surgical History:   Procedure Laterality Date   • PB COLONOSCOPY,DIAGNOSTIC  4/8/2021    Procedure: COLONOSCOPY;  Surgeon: Zhao Starr M.D.;  Location: SURGERY [facility];  Service: Gastroenterology   • PB COLONOSCOPY,DIAGNOSTIC  4/7/2021    Procedure: COLONOSCOPY;  Surgeon: Sae Carias M.D.;  Location: SURGERY [facility];  Service: Gastroenterology   • PB UPPER GI ENDOSCOPY,DIAGNOSIS  4/7/2021    Procedure: GASTROSCOPY;  Surgeon: Sae Carias M.D.;  Location: SURGERY [facility];  Service: Gastroenterology   • CARPAL TUNNEL RELEASE      and ulnar nerve    • CYST EXCISION Right     ovarian dermoid   • GYN SURGERY      ovary ablation    • OTHER ABDOMINAL SURGERY      rectal prolapse repair, anastomy leak/abcess,colostony     MEDICATIONS:  Current Outpatient Medications   Medication Sig   • levothyroxine (SYNTHROID) 50 MCG Tab Take 1 tablet by mouth every morning on an empty stomach.   • vitamin D, Ergocalciferol, (DRISDOL) 1.25 MG (98360 UT) Cap capsule Take 1 capsule by mouth every 7 days.   • acetaminophen (TYLENOL) 500 MG Tab Take 500-1,000 mg by mouth every 6 hours as needed. Reports use every night   • bisacodyl EC (DULCOLAX) 5 MG Tablet Delayed Response Take 5 mg by mouth 1 time a day as needed for Constipation. Reports \"10-15 pills daily with no help\"   • torsemide (DEMADEX) 10 MG tablet Take 1 " tablet by mouth 2 times a day.   • levOCARNitine (CARNITINE, L,) Powder 500 mg 2 (two) times a day.   • linaCLOtide (LINZESS) 290 MCG Cap Take 290 mcg by mouth every evening.   • sennosides (SENOKOT) 8.6 MG Tab Take 8.6 mg by mouth 1 time a day as needed. Takes 4 pills daily     SOCIAL HISTORY:  Social History     Tobacco Use   • Smoking status: Never Smoker   • Smokeless tobacco: Never Used   Substance Use Topics   • Alcohol use: Not Currently     Social History     Social History Narrative   • Not on file     FAMILY HISTORY:  Family History   Problem Relation Age of Onset   • Thyroid Mother    • Alcohol abuse Father    • Heart Disease Father    • Hypertension Father    • Stroke Father      REVIEW OF SYSTEMS:  A ROS was completed.  Pertinent positives and negatives were included in the HPI, above.  All other systems were reviewed and are negative.    PHYSICAL EXAM:  General/Medical:  - NAD  - thin    Neuro:  MENTAL STATUS: awake and alert; no deficits of speech or language; oriented to person, place, and time; affect was appropriate to situation; pleasant, cooperative    CRANIAL NERVES:    II: acuity: J1/J1+ with glasses, fields: intact to confrontation, pupils: 3/3 to 2/2 without a relative afferent pupillary defect, discs: sharp    III/IV/VI: versions: intact without nystagmus    V: facial sensation: symmetric to light touch    VII: facial expression: symmetric    VIII: hearing: intact to finger rub; [place]-Hallpike maneuver to the right reproduced symptoms of dizziness    IX/X: palate: elevates symmetrically    XI: shoulder shrug: symmetric    XII: tongue: midline    MOTOR:  - bulk: reduced throughout  - tone: normal throughout  Upper Extremity Strength  (R/L)    5/5   Elbow flexion 5/5   Elbow extension 5/5   Shoulder abduction 5/5     Lower Extremity Strength  (R/L)   Hip flexion 5/4   Knee extension 5/5   Knee flexion 5/5   Ankle plantarflexion 5/5   Ankle dorsiflexion 5/5     - can walk on toes and heels  -  "pronator drift: absent  - abnormal movements: none    SENSATION:  - light touch: reduced in the left palm and forearm  - vibration (R/L, seconds): 11/11 at the great toes  - pinprick: NT  - proprioception: intact at the great toes  - Romberg: absent    COORDINATION:  - finger to nose: normal, no ataxia on exam  - finger tapping: rapid and accurate, bilaterally    REFLEXES:  Reflex Right Left   BR 2+ 2+   Biceps 2+ 2+   Triceps 2+ 2+   Patellae 3+ 3+   Achilles 2+ 2+   Toes mute down   - Webber's sign: absent, bilaterally    GAIT:  - narrow base and normal  - heel-raised/toe-raised gait: intact/intact  - tandem gait: difficulty with this, could not perform without taking a corrective step    REVIEW OF IMAGING STUDIES: I reviewed the following studies:  CT Head:  Date: 7/8/2021  W/o and w/ contrast?: without  Indication: \"headache, head injury 8 years ago\"  Comparison: none  Impression:  \"1) No acute intracranial abnormality.  2) No focal scalp swelling or calvarial abnormality.\"    REVIEW OF LABORATORY STUDIES:  6/1/2021:  - BMP: WNL    ASSESSMENT:  Pita Bueno is a 61 y.o. woman with headaches, possible BPPV, left hip flexor weakness, and a history otherwise notable for common variable immunodeficiency, pancytopenia, hypothyroidism, chronic fatigue syndrome, chronic diarrhea, severe malnutrition, vitamin B12 and D deficiency, COVID-19, heart failure with preserved ejection fraction, and orthostatic hypotension.  Pita's headaches seem to be provoked by objects (such as her swimming cap and goggles) exerting pressure on her head.  I suppose this is related to allodynia caused by trauma to the back of her head.  I recommended she avoid wearing anything on her head since I am not inclined to treat her with any preventive agent which would expose her to a risk of side effects.  I am not sure why there is mild weakness of the left hip flexor.  Besides the weakness there are no other localizing exam findings " "to guide additional workup, so I recommended observation for now.  The Richard-Hallpike maneuver was positive to the right, so I recommended Modified Epley maneuvers.  We will follow up in a few months.    PLAN:  Allodynia:  - avoid compressive garments on the head    Left Hip Flexor Weakness  - observe for now    Possible BPPV:  - Modified Epley Maneuvers    Follow-Up:  - Return in about 3 months (around 11/26/2021).    Signed: Ammon Rojas M.D.    BILLING DOCUMENTATION:   I spent 60 minutes reviewing the medical record, interviewing and examining the patient, discussing my impression (see \"assessment\" above), and coordinating care.  "

## 2021-09-07 ENCOUNTER — APPOINTMENT (OUTPATIENT)
Dept: RADIOLOGY | Facility: MEDICAL CENTER | Age: 61
End: 2021-09-07
Attending: INTERNAL MEDICINE
Payer: OTHER GOVERNMENT

## 2021-09-13 DIAGNOSIS — T14.8XXA WOUND OF SKIN: Primary | ICD-10-CM

## 2021-09-13 DIAGNOSIS — Z43.3 ATTENTION TO COLOSTOMY (HCC): ICD-10-CM

## 2021-10-06 ASSESSMENT — ENCOUNTER SYMPTOMS
HEADACHES: 0
ORTHOPNEA: 0
COUGH: 0
CONSTIPATION: 1
FALLS: 0
BLURRED VISION: 0
DOUBLE VISION: 0
FOCAL WEAKNESS: 0
DIZZINESS: 0
SHORTNESS OF BREATH: 0
LOSS OF CONSCIOUSNESS: 0
PALPITATIONS: 0
ROS GI COMMENTS: ABDOMINAL DISTENTION
WEAKNESS: 0
WHEEZING: 0

## 2021-10-06 NOTE — PROGRESS NOTES
"Chief Complaint   Patient presents with   • Other     F/V Dx: Heart failure with preserved ejection fraction (HCC)         Subjective:   Pita Bueno is a 61 y.o. female who presents today for 3 months follow up.     She is followed by Dr. Calvillo in our clinic, history of palpitations, hypothyroid, Colostomy from complication from abdominal surgery in 2013, low body weight/malnutrition, COVID 12/10/2020, and common variable immunodeficiency.    She has been seen by Dr. Starr (GI) and Dr. Diallo from [facility] for her colostomy. Recommend she gets irrigation of her ostomy.       Interim events:  Patient is fatigue and having complication with constipation. She workouts every day at least 3-4 hrs in increment .     She is seeing neurologist for headaches and imbalance gait. History of concussion/ TBI. Possible vertigo and instructed to do epley maneuvers.     Past Medical History:   Diagnosis Date   • Adverse effect of anesthesia    • Anemia    • Arthritis     thumbs, hands   • Bowel habit changes 04/01/2021    constipation and diarrhea, currently using colostomy bag   • Breath shortness     \"when laying down, comes and goes\"   • Bronchitis     frequent   • C. difficile diarrhea 09/2020   • Colostomy in place ([facility])    • Congestive heart failure (HCC)     recent   • Cough     chronic   • CVID (common variable immunodeficiency) ([facility])    • Dental disorder     infected tooth   • Eating disorder    • Edema 01/2021    Bilat LE, Pt states having pain LLE and DVT being ruled out 1/27   • Fatty liver disease, nonalcoholic    • Hemorrhagic disorder (HCC) 01/2021    states she has noticed she is bleeding easier than normal   • High cholesterol    • Hypotension 04/01/2021    pt reports 70/40 up to 100/60   • Hypothyroid    • Infectious disease 11/22/2020    covid   • Pain     hands and stomach   • Palpitations    • Renal disorder     occaisonal high BUN     Past Surgical History:   Procedure Laterality Date   • PB " COLONOSCOPY,DIAGNOSTIC  4/8/2021    Procedure: COLONOSCOPY;  Surgeon: Zhao Starr M.D.;  Location: SURGERY [facility];  Service: Gastroenterology   • PB COLONOSCOPY,DIAGNOSTIC  4/7/2021    Procedure: COLONOSCOPY;  Surgeon: Sae Carias M.D.;  Location: SURGERY [facility];  Service: Gastroenterology   • PB UPPER GI ENDOSCOPY,DIAGNOSIS  4/7/2021    Procedure: GASTROSCOPY;  Surgeon: Sae Carias M.D.;  Location: SURGERY [facility];  Service: Gastroenterology   • CARPAL TUNNEL RELEASE      and ulnar nerve    • CYST EXCISION Right     ovarian dermoid   • GYN SURGERY      ovary ablation    • OTHER ABDOMINAL SURGERY      rectal prolapse repair, anastomy leak/abcess,colostony     Family History   Problem Relation Age of Onset   • Thyroid Mother    • Alcohol abuse Father    • Heart Disease Father    • Hypertension Father    • Stroke Father      Social History     Socioeconomic History   • Marital status:      Spouse name: Not on file   • Number of children: Not on file   • Years of education: Not on file   • Highest education level: Not on file   Occupational History   • Not on file   Tobacco Use   • Smoking status: Never Smoker   • Smokeless tobacco: Never Used   Vaping Use   • Vaping Use: Never used   Substance and Sexual Activity   • Alcohol use: Not Currently   • Drug use: Not Currently   • Sexual activity: Not Currently   Other Topics Concern   • Not on file   Social History Narrative   • Not on file     Social Determinants of Health     Financial Resource Strain:    • Difficulty of Paying Living Expenses:    Food Insecurity:    • Worried About Running Out of Food in the Last Year:    • Ran Out of Food in the Last Year:    Transportation Needs:    • Lack of Transportation (Medical):    • Lack of Transportation (Non-Medical):    Physical Activity:    • Days of Exercise per Week:    • Minutes of Exercise per Session:    Stress:    • Feeling of Stress :    Social Connections:    • Frequency of  "Communication with Friends and Family:    • Frequency of Social Gatherings with Friends and Family:    • Attends [employer] Services:    • Active Member of Clubs or Organizations:    • Attends Club or Organization Meetings:    • Marital Status:    Intimate Partner Violence:    • Fear of Current or Ex-Partner:    • Emotionally Abused:    • Physically Abused:    • Sexually Abused:      Allergies   Allergen Reactions   • Sulfa Drugs Hives   • Tape Rash     Rash and rips thin skin     Outpatient Encounter Medications as of 10/7/2021   Medication Sig Dispense Refill   • levothyroxine (SYNTHROID) 50 MCG Tab Take 1 tablet by mouth every morning on an empty stomach. 90 tablet 1   • acetaminophen (TYLENOL) 500 MG Tab Take 500-1,000 mg by mouth every 6 hours as needed. Reports use every night     • bisacodyl EC (DULCOLAX) 5 MG Tablet Delayed Response Take 5 mg by mouth 1 time a day as needed for Constipation. Reports \"10-15 pills daily with no help\"     • linaCLOtide (LINZESS) 290 MCG Cap Take 290 mcg by mouth every evening. 90 Cap 0   • sennosides (SENOKOT) 8.6 MG Tab Take 8.6 mg by mouth 1 time a day as needed. Takes 4 pills daily     • [DISCONTINUED] vitamin D, Ergocalciferol, (DRISDOL) 1.25 MG (04482 UT) Cap capsule Take 1 capsule by mouth every 7 days. (Patient not taking: Reported on 10/7/2021) 15 capsule 3   • [DISCONTINUED] torsemide (DEMADEX) 10 MG tablet Take 1 tablet by mouth 2 times a day. (Patient not taking: Reported on 10/7/2021) 180 tablet 0   • [DISCONTINUED] levOCARNitine (CARNITINE, L,) Powder 500 mg 2 (two) times a day. (Patient not taking: Reported on 8/26/2021) 90 g 3   • [DISCONTINUED] cyanocobalamin (VITAMIN B-12) injection 1,000 mcg        No facility-administered encounter medications on file as of 10/7/2021.       Review of Systems   Constitutional: Positive for malaise/fatigue.   Eyes: Negative for blurred vision and double vision.   Respiratory: Negative for cough, shortness of breath and wheezing. " "   Cardiovascular: Negative for chest pain, palpitations, orthopnea and leg swelling.   Gastrointestinal: Positive for constipation.        Abdominal distention    Musculoskeletal: Negative for falls.   Neurological: Negative for dizziness, focal weakness, loss of consciousness, weakness and headaches.   All other systems reviewed and are negative.      /58 (BP Location: Left arm, Patient Position: Sitting, BP Cuff Size: Small adult)   Pulse 71   Resp 14   Ht 1.626 m (5' 4\")   Wt 43.8 kg (96 lb 9.6 oz)   SpO2 99%       Physical Exam  Constitutional:       General: She is not in acute distress.     Appearance: She is not diaphoretic.   HENT:      Head: Normocephalic and atraumatic.   Cardiovascular:      Rate and Rhythm: Normal rate and regular rhythm.      Heart sounds: No murmur heard.     Pulmonary:      Effort: Pulmonary effort is normal. No respiratory distress.      Breath sounds: Normal breath sounds.   Abdominal:      General: There is no distension.      Palpations: Abdomen is soft.      Comments: Colostomy in place   Neurological:      Mental Status: She is alert and oriented to person, place, and time.   Psychiatric:         Mood and Affect: Mood and affect normal.         Cognition and Memory: Memory normal.         Judgment: Judgment normal.             ECHO  9/2/2020  No prior study is available for comparison.   Normal left ventricular systolic function.  Left ventricular ejection fraction is visually estimated to be 60%.  Normal regional wall motion.  Normal right ventricular size.  Normal left atrial size.  Trace mitral regurgitation.  Trace aortic insufficiency.  The aortic valve is not well visualized.  Right ventricular systolic pressure is estimated to be 30 mmHg.  Normal pericardium without effusion.      Echocardiography Laboratory  1/7/2021  LV ejection fraction estimated to be 65%.  Right ventricular systolic pressure is estimated to be 25 mmHg.    Venous duplex  1/25/2021   Deep " venous reflux is seen in the right common femoral (2917 msec) and    profunda femoral (1158 msec) veins.    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   Proximal right calf incompetent  measuring 0.24 cm and with 1075    msec of reflux.       Deep venous reflux is seen in the left common femoral (933 msec) vein.      No superficial or deep venous thrombosis bilaterally.      Treadmill stress test   3/15/2021  1.  Normal exercise ECG through 68% age-predicted maximum heart rate   2.  Low risk Dudley treadmill score (+9)     Signed   Abran Calvillo MD     Assessment:     1. Chronic heart failure with preserved ejection fraction (HCC)  Comp Metabolic Panel   2. Carnitine deficiency (HCC)     3. Benign paroxysmal positional vertigo, unspecified laterality         Medical Decision Making:  Today's Assessment / Status / Plan:     1. Chronic heart failure preserved ef:  - Today, based on physical examination findings, patient is euvolemic. No JVD, lungs are clear to auscultation, no pitting edema in bilateral lower extremities, no ascites.   - prn  torsemide 10mg qd for leg swelling   - recommend stefan hose and elevate legs   - treadmill stress test ekg showed normal exercise ECG    2. Carnitine deficiency:  - continue carnitine 1000mg BID     3. Vertigo:  - recommend she follow up with neurology for possible PT therapy     Follow up in 3 months, sooner as needed.

## 2021-10-07 ENCOUNTER — OFFICE VISIT (OUTPATIENT)
Dept: CARDIOLOGY | Facility: MEDICAL CENTER | Age: 61
End: 2021-10-07
Payer: OTHER GOVERNMENT

## 2021-10-07 ENCOUNTER — TELEPHONE (OUTPATIENT)
Dept: CARDIOLOGY | Facility: MEDICAL CENTER | Age: 61
End: 2021-10-07

## 2021-10-07 VITALS
HEART RATE: 71 BPM | OXYGEN SATURATION: 99 % | WEIGHT: 96.6 LBS | SYSTOLIC BLOOD PRESSURE: 100 MMHG | HEIGHT: 64 IN | DIASTOLIC BLOOD PRESSURE: 58 MMHG | BODY MASS INDEX: 16.49 KG/M2 | RESPIRATION RATE: 14 BRPM

## 2021-10-07 DIAGNOSIS — H81.10 BENIGN PAROXYSMAL POSITIONAL VERTIGO, UNSPECIFIED LATERALITY: ICD-10-CM

## 2021-10-07 DIAGNOSIS — I50.32 CHRONIC HEART FAILURE WITH PRESERVED EJECTION FRACTION (HCC): ICD-10-CM

## 2021-10-07 DIAGNOSIS — E71.40 CARNITINE DEFICIENCY (HCC): ICD-10-CM

## 2021-10-07 PROCEDURE — 99213 OFFICE O/P EST LOW 20 MIN: CPT | Performed by: NURSE PRACTITIONER

## 2021-10-07 ASSESSMENT — FIBROSIS 4 INDEX: FIB4 SCORE: 1.4463547796997563

## 2021-10-07 NOTE — TELEPHONE ENCOUNTER
Per RT, requested most recent medical records from [facility] P#[phone] Fax#[phone].     Confirmation fax received. Scanned into [employer].     Pending records.

## 2021-10-10 ENCOUNTER — OFFICE VISIT (OUTPATIENT)
Dept: URGENT CARE | Facility: PHYSICIAN GROUP | Age: 61
End: 2021-10-10
Payer: OTHER GOVERNMENT

## 2021-10-10 ENCOUNTER — HOSPITAL ENCOUNTER (OUTPATIENT)
Facility: MEDICAL CENTER | Age: 61
End: 2021-10-10
Attending: PHYSICIAN ASSISTANT
Payer: OTHER GOVERNMENT

## 2021-10-10 VITALS
WEIGHT: 97.2 LBS | DIASTOLIC BLOOD PRESSURE: 56 MMHG | SYSTOLIC BLOOD PRESSURE: 90 MMHG | HEART RATE: 71 BPM | HEIGHT: 65 IN | BODY MASS INDEX: 16.19 KG/M2 | RESPIRATION RATE: 14 BRPM | TEMPERATURE: 99.3 F | OXYGEN SATURATION: 99 %

## 2021-10-10 DIAGNOSIS — J06.9 VIRAL URI WITH COUGH: ICD-10-CM

## 2021-10-10 PROCEDURE — 99213 OFFICE O/P EST LOW 20 MIN: CPT | Performed by: PHYSICIAN ASSISTANT

## 2021-10-10 PROCEDURE — U0005 INFEC AGEN DETEC AMPLI PROBE: HCPCS

## 2021-10-10 PROCEDURE — U0003 INFECTIOUS AGENT DETECTION BY NUCLEIC ACID (DNA OR RNA); SEVERE ACUTE RESPIRATORY SYNDROME CORONAVIRUS 2 (SARS-COV-2) (CORONAVIRUS DISEASE [COVID-19]), AMPLIFIED PROBE TECHNIQUE, MAKING USE OF HIGH THROUGHPUT TECHNOLOGIES AS DESCRIBED BY CMS-2020-01-R: HCPCS

## 2021-10-10 ASSESSMENT — FIBROSIS 4 INDEX: FIB4 SCORE: 1.4463547796997563

## 2021-10-11 DIAGNOSIS — J06.9 VIRAL URI WITH COUGH: ICD-10-CM

## 2021-10-11 LAB — COVID ORDER STATUS COVID19: NORMAL

## 2021-10-11 NOTE — PROGRESS NOTES
Subjective:   Pita Bueno is a 61 y.o. female who presents for Coronavirus Screening (pt is having cough, congestion, runny nose x 3 days)      HPI  Patient is a 61-year-old female who presents the clinic for COVID-19 testing.  Patient's daughter sick with similar symptoms.  Onset of symptoms 3 3 days ago with a dry cough, nasal congestion, runny nose, nausea and feeling tired.  She reports a subjective fever.  She states she usually runs 97 °F.  Denies any chest pain, shortness breath, vomiting.  History of common variable immunodeficiency.    Medications:    • acetaminophen Tabs  • bisacodyl EC Tbec  • levothyroxine Tabs  • Linzess Caps  • sennosides Tabs    Allergies: Sulfa drugs and Tape    Problem List: Pita Bueno does not have any pertinent problems on file.    Surgical History:  Past Surgical History:   Procedure Laterality Date   • PB COLONOSCOPY,DIAGNOSTIC  4/8/2021    Procedure: COLONOSCOPY;  Surgeon: Zhao Starr M.D.;  Location: [facility];  Service: Gastroenterology   • PB COLONOSCOPY,DIAGNOSTIC  4/7/2021    Procedure: COLONOSCOPY;  Surgeon: Sae Carias M.D.;  Location: [facility];  Service: Gastroenterology   • PB UPPER GI ENDOSCOPY,DIAGNOSIS  4/7/2021    Procedure: GASTROSCOPY;  Surgeon: Sae Carias M.D.;  Location: [facility];  Service: Gastroenterology   • CARPAL TUNNEL RELEASE      and ulnar nerve    • CYST EXCISION Right     ovarian dermoid   • GYN SURGERY      ovary ablation    • OTHER ABDOMINAL SURGERY      rectal prolapse repair, anastomy leak/abcess,colostony       Past Social Hx: Pita Bueno  reports that she has never smoked. She has never used smokeless tobacco. She reports previous alcohol use. She reports previous drug use.     Past Family Hx:  Pita Bueno family history includes Alcohol abuse in her father; Heart Disease in her father; Hypertension in her father; Stroke in her father; Thyroid in her mother.  "    Problem list, medications, and allergies reviewed by myself today in Epic.     Objective:     BP (!) 90/56 (BP Location: Right arm, Patient Position: Sitting, BP Cuff Size: Adult)   Pulse 71   Temp 37.4 °C (99.3 °F) (Temporal)   Resp 14   Ht 1.638 m (5' 4.5\")   Wt 44.1 kg (97 lb 3.2 oz)   LMP  (LMP Unknown)   SpO2 99%   BMI 16.43 kg/m²     Physical Exam  Vitals reviewed.   Constitutional:       General: She is not in acute distress.     Appearance: Normal appearance. She is not ill-appearing or toxic-appearing.   HENT:      Mouth/Throat:      Mouth: Mucous membranes are dry.      Pharynx: Oropharynx is clear. No oropharyngeal exudate or posterior oropharyngeal erythema.   Eyes:      Conjunctiva/sclera: Conjunctivae normal.      Pupils: Pupils are equal, round, and reactive to light.   Cardiovascular:      Rate and Rhythm: Normal rate and regular rhythm.      Heart sounds: Normal heart sounds.   Pulmonary:      Effort: Pulmonary effort is normal. No respiratory distress.      Breath sounds: Normal breath sounds. No wheezing, rhonchi or rales.   Musculoskeletal:      Cervical back: Neck supple.   Lymphadenopathy:      Cervical: No cervical adenopathy.   Skin:     General: Skin is warm and dry.   Neurological:      General: No focal deficit present.      Mental Status: She is alert and oriented to person, place, and time.   Psychiatric:         Mood and Affect: Mood normal.         Behavior: Behavior normal.         Diagnosis and associated orders:     1. Viral URI with cough  SARS-CoV-2 PCR (24 hour In-House): Collect NP swab in [facility]        Comments/MDM:     Discussed with patient signs and symptoms most likely are due to a viral etiology.     Test for COVID-19. Result will be reviewed by myself. We will call/message back for results and appropriate further instructions. Instructed to sign up for [employer] if they have not already. Result will be automatically released to [employer] application for patient " review. I will be sending a message with Next [facility] Instructions [facility] soon after resulted.   Symptomatic and supportive care:   Plenty of oral hydration and rest   Over the counter cough suppressant as directed.  Tylenol or ibuprofen for pain and fever as directed.   Warm salt water gargles for sore throat, soft foods, cool liquids.   Saline nasal spray and Flonase as a decongestant.   Infection control measures at home. Stay away from people, Hand washing, covering sneeze/cough, disinfect surfaces.   Remain home from work, school, and other populated environments. Work note provided with information of quarantine measures per CDC guidelines.   Overall, the patient is well-appearing. They are not hypoxic, afebrile, and a normal pulmonary exam.    Patient noted to be hypotensive. She is 97 lbs. She reports she usually has low BP. She also reports she is dehydrated and needs to drink more water. Sometimes lightheadedness but no LOC.        I personally reviewed prior external notes and test results pertinent to today's visit. Red flags discussed and indications to present to the Emergency Department. Supportive care, natural history, differential diagnoses, and indications for immediate follow-up discussed. Patient expresses understanding and agrees to plan. Patient denies any other questions or concerns.     Follow-up with the primary care physician for recheck, reevaluation, and consideration of further management.    Please note that this dictation was created using voice recognition software. I have made a reasonable attempt to correct obvious errors, but I expect that there are errors of grammar and possibly content that I did not discover before finalizing the note.    This note was electronically signed by Francisco Lawrence PA-C

## 2021-10-12 ENCOUNTER — APPOINTMENT (OUTPATIENT)
Dept: WOUND CARE | Facility: MEDICAL CENTER | Age: 61
End: 2021-10-12
Attending: INTERNAL MEDICINE
Payer: OTHER GOVERNMENT

## 2021-10-12 LAB
SARS-COV-2 RNA RESP QL NAA+PROBE: NOTDETECTED
SPECIMEN SOURCE: NORMAL

## 2021-10-15 ENCOUNTER — HOSPITAL ENCOUNTER (OUTPATIENT)
Dept: LAB | Facility: MEDICAL CENTER | Age: 61
End: 2021-10-15
Attending: INTERNAL MEDICINE
Payer: OTHER GOVERNMENT

## 2021-10-15 LAB
ALBUMIN SERPL BCP-MCNC: 5 G/DL (ref 3.2–4.9)
ALBUMIN/GLOB SERPL: 2.3 G/DL
ALP SERPL-CCNC: 84 U/L (ref 30–99)
ALT SERPL-CCNC: 25 U/L (ref 2–50)
ANION GAP SERPL CALC-SCNC: 12 MMOL/L (ref 7–16)
AST SERPL-CCNC: 20 U/L (ref 12–45)
BASOPHILS # BLD AUTO: 1 % (ref 0–1.8)
BASOPHILS # BLD: 0.05 K/UL (ref 0–0.12)
BILIRUB SERPL-MCNC: 0.3 MG/DL (ref 0.1–1.5)
BUN SERPL-MCNC: 27 MG/DL (ref 8–22)
CALCIUM SERPL-MCNC: 9.6 MG/DL (ref 8.5–10.5)
CHLORIDE SERPL-SCNC: 108 MMOL/L (ref 96–112)
CO2 SERPL-SCNC: 15 MMOL/L (ref 20–33)
CREAT SERPL-MCNC: 0.68 MG/DL (ref 0.5–1.4)
EOSINOPHIL # BLD AUTO: 0.07 K/UL (ref 0–0.51)
EOSINOPHIL NFR BLD: 1.4 % (ref 0–6.9)
ERYTHROCYTE [DISTWIDTH] IN BLOOD BY AUTOMATED COUNT: 52.7 FL (ref 35.9–50)
GLOBULIN SER CALC-MCNC: 2.2 G/DL (ref 1.9–3.5)
GLUCOSE SERPL-MCNC: 96 MG/DL (ref 65–99)
HCT VFR BLD AUTO: 43.3 % (ref 37–47)
HGB BLD-MCNC: 13.7 G/DL (ref 12–16)
IMM GRANULOCYTES # BLD AUTO: 0 K/UL (ref 0–0.11)
IMM GRANULOCYTES NFR BLD AUTO: 0 % (ref 0–0.9)
LYMPHOCYTES # BLD AUTO: 1.26 K/UL (ref 1–4.8)
LYMPHOCYTES NFR BLD: 25.7 % (ref 22–41)
MCH RBC QN AUTO: 32.3 PG (ref 27–33)
MCHC RBC AUTO-ENTMCNC: 31.6 G/DL (ref 33.6–35)
MCV RBC AUTO: 102.1 FL (ref 81.4–97.8)
MONOCYTES # BLD AUTO: 0.5 K/UL (ref 0–0.85)
MONOCYTES NFR BLD AUTO: 10.2 % (ref 0–13.4)
NEUTROPHILS # BLD AUTO: 3.03 K/UL (ref 2–7.15)
NEUTROPHILS NFR BLD: 61.7 % (ref 44–72)
NRBC # BLD AUTO: 0 K/UL
NRBC BLD-RTO: 0 /100 WBC
PLATELET # BLD AUTO: 168 K/UL (ref 164–446)
PMV BLD AUTO: 13.2 FL (ref 9–12.9)
POTASSIUM SERPL-SCNC: 4.2 MMOL/L (ref 3.6–5.5)
PROT SERPL-MCNC: 7.2 G/DL (ref 6–8.2)
RBC # BLD AUTO: 4.24 M/UL (ref 4.2–5.4)
SODIUM SERPL-SCNC: 135 MMOL/L (ref 135–145)
WBC # BLD AUTO: 4.9 K/UL (ref 4.8–10.8)

## 2021-10-15 PROCEDURE — 36415 COLL VENOUS BLD VENIPUNCTURE: CPT

## 2021-10-15 PROCEDURE — 82784 ASSAY IGA/IGD/IGG/IGM EACH: CPT

## 2021-10-15 PROCEDURE — 80053 COMPREHEN METABOLIC PANEL: CPT

## 2021-10-15 PROCEDURE — 85025 COMPLETE CBC W/AUTO DIFF WBC: CPT

## 2021-10-17 LAB
IGA SERPL-MCNC: 119 MG/DL (ref 68–408)
IGG SERPL-MCNC: 545 MG/DL (ref 768–1632)
IGM SERPL-MCNC: 64 MG/DL (ref 35–263)

## 2021-10-18 ENCOUNTER — APPOINTMENT (OUTPATIENT)
Dept: RADIOLOGY | Facility: MEDICAL CENTER | Age: 61
End: 2021-10-18
Attending: INTERNAL MEDICINE
Payer: OTHER GOVERNMENT

## 2021-10-18 ENCOUNTER — TELEPHONE (OUTPATIENT)
Dept: MEDICAL GROUP | Facility: PHYSICIAN GROUP | Age: 61
End: 2021-10-18

## 2021-10-18 DIAGNOSIS — E87.8 LOW BICARBONATE LEVEL: ICD-10-CM

## 2021-10-18 NOTE — TELEPHONE ENCOUNTER
"Phone conversation    This provider received a message from the patient today  \"...Dr. Chang ordered labs. My concern is with symptoms  of nausea , vomiting dehydration  breathing issues could it be from the low CO2 level?  What do I do? They prescribed anti nausea meds. They don't really help. Also the endocrinologist was a no show at my appointment.  They never cancelled the appointment.  Then I received  a letter stating that the endocrinologist is no longer there. I give up on that. Please advice on labs. Thanks. Uyen Gonzalez      I personally called and spoke to the patient on the telephone.  The patient reported recurrent nausea vomiting and diarrhea poor p.o. intake consistent with a metabolic acidosis/dehydration.    Strongly recommended the patient to go to ER now to be evaluated.  The patient voiced understanding and agreed to go.      "

## 2021-10-19 ENCOUNTER — HOSPITAL ENCOUNTER (OUTPATIENT)
Dept: LAB | Facility: MEDICAL CENTER | Age: 61
End: 2021-10-19
Attending: NURSE PRACTITIONER
Payer: OTHER GOVERNMENT

## 2021-10-19 DIAGNOSIS — I50.32 CHRONIC HEART FAILURE WITH PRESERVED EJECTION FRACTION (HCC): ICD-10-CM

## 2021-10-19 LAB
ANION GAP SERPL CALC-SCNC: 12 MMOL/L (ref 7–16)
BUN SERPL-MCNC: 29 MG/DL (ref 8–22)
CALCIUM SERPL-MCNC: 8.9 MG/DL (ref 8.5–10.5)
CHLORIDE SERPL-SCNC: 107 MMOL/L (ref 96–112)
CO2 SERPL-SCNC: 16 MMOL/L (ref 20–33)
CREAT SERPL-MCNC: 0.56 MG/DL (ref 0.5–1.4)
FASTING STATUS PATIENT QL REPORTED: NORMAL
GLUCOSE SERPL-MCNC: 85 MG/DL (ref 65–99)
POTASSIUM SERPL-SCNC: 3.9 MMOL/L (ref 3.6–5.5)
SODIUM SERPL-SCNC: 135 MMOL/L (ref 135–145)

## 2021-10-19 PROCEDURE — 36415 COLL VENOUS BLD VENIPUNCTURE: CPT

## 2021-10-19 PROCEDURE — 80048 BASIC METABOLIC PNL TOTAL CA: CPT

## 2021-10-20 ENCOUNTER — TELEPHONE (OUTPATIENT)
Dept: MEDICAL GROUP | Facility: PHYSICIAN GROUP | Age: 61
End: 2021-10-20

## 2021-10-20 ENCOUNTER — PATIENT MESSAGE (OUTPATIENT)
Dept: MEDICAL GROUP | Facility: PHYSICIAN GROUP | Age: 61
End: 2021-10-20

## 2021-10-20 ENCOUNTER — HOSPITAL ENCOUNTER (EMERGENCY)
Facility: MEDICAL CENTER | Age: 61
End: 2021-10-20
Attending: EMERGENCY MEDICINE
Payer: OTHER GOVERNMENT

## 2021-10-20 VITALS
BODY MASS INDEX: 16.37 KG/M2 | OXYGEN SATURATION: 98 % | SYSTOLIC BLOOD PRESSURE: 87 MMHG | RESPIRATION RATE: 17 BRPM | TEMPERATURE: 98.3 F | DIASTOLIC BLOOD PRESSURE: 58 MMHG | HEART RATE: 53 BPM | WEIGHT: 95.9 LBS | HEIGHT: 64 IN

## 2021-10-20 DIAGNOSIS — R19.7 DIARRHEA DUE TO MALABSORPTION: ICD-10-CM

## 2021-10-20 DIAGNOSIS — K90.9 DIARRHEA DUE TO MALABSORPTION: ICD-10-CM

## 2021-10-20 DIAGNOSIS — E87.20 METABOLIC ACIDOSIS: ICD-10-CM

## 2021-10-20 LAB
ALBUMIN SERPL BCP-MCNC: 4.1 G/DL (ref 3.2–4.9)
ALBUMIN/GLOB SERPL: 2.2 G/DL
ALP SERPL-CCNC: 78 U/L (ref 30–99)
ALT SERPL-CCNC: 22 U/L (ref 2–50)
ANION GAP SERPL CALC-SCNC: 9 MMOL/L (ref 7–16)
APPEARANCE UR: CLEAR
AST SERPL-CCNC: 20 U/L (ref 12–45)
BASOPHILS # BLD AUTO: 1 % (ref 0–1.8)
BASOPHILS # BLD: 0.04 K/UL (ref 0–0.12)
BILIRUB SERPL-MCNC: 0.2 MG/DL (ref 0.1–1.5)
BILIRUB UR QL STRIP.AUTO: NEGATIVE
BUN SERPL-MCNC: 25 MG/DL (ref 8–22)
CALCIUM SERPL-MCNC: 8.9 MG/DL (ref 8.4–10.2)
CHLORIDE SERPL-SCNC: 109 MMOL/L (ref 96–112)
CO2 SERPL-SCNC: 17 MMOL/L (ref 20–33)
COLOR UR: YELLOW
CREAT SERPL-MCNC: 0.63 MG/DL (ref 0.5–1.4)
EOSINOPHIL # BLD AUTO: 0.1 K/UL (ref 0–0.51)
EOSINOPHIL NFR BLD: 2.4 % (ref 0–6.9)
ERYTHROCYTE [DISTWIDTH] IN BLOOD BY AUTOMATED COUNT: 52.6 FL (ref 35.9–50)
GLOBULIN SER CALC-MCNC: 1.9 G/DL (ref 1.9–3.5)
GLUCOSE SERPL-MCNC: 91 MG/DL (ref 65–99)
GLUCOSE UR STRIP.AUTO-MCNC: NEGATIVE MG/DL
HCT VFR BLD AUTO: 40.5 % (ref 37–47)
HGB BLD-MCNC: 12.8 G/DL (ref 12–16)
IMM GRANULOCYTES # BLD AUTO: 0.01 K/UL (ref 0–0.11)
IMM GRANULOCYTES NFR BLD AUTO: 0.2 % (ref 0–0.9)
KETONES UR STRIP.AUTO-MCNC: NEGATIVE MG/DL
LEUKOCYTE ESTERASE UR QL STRIP.AUTO: NEGATIVE
LIPASE SERPL-CCNC: 63 U/L (ref 7–58)
LYMPHOCYTES # BLD AUTO: 1.11 K/UL (ref 1–4.8)
LYMPHOCYTES NFR BLD: 26.4 % (ref 22–41)
MCH RBC QN AUTO: 32.6 PG (ref 27–33)
MCHC RBC AUTO-ENTMCNC: 31.6 G/DL (ref 33.6–35)
MCV RBC AUTO: 103.1 FL (ref 81.4–97.8)
MICRO URNS: NORMAL
MONOCYTES # BLD AUTO: 0.58 K/UL (ref 0–0.85)
MONOCYTES NFR BLD AUTO: 13.8 % (ref 0–13.4)
NEUTROPHILS # BLD AUTO: 2.37 K/UL (ref 2–7.15)
NEUTROPHILS NFR BLD: 56.2 % (ref 44–72)
NITRITE UR QL STRIP.AUTO: NEGATIVE
NRBC # BLD AUTO: 0 K/UL
NRBC BLD-RTO: 0 /100 WBC
PH UR STRIP.AUTO: 6 [PH] (ref 5–8)
PLATELET # BLD AUTO: 142 K/UL (ref 164–446)
PMV BLD AUTO: 11.9 FL (ref 9–12.9)
POTASSIUM SERPL-SCNC: 4.3 MMOL/L (ref 3.6–5.5)
PROT SERPL-MCNC: 6 G/DL (ref 6–8.2)
PROT UR QL STRIP: NEGATIVE MG/DL
RBC # BLD AUTO: 3.93 M/UL (ref 4.2–5.4)
RBC UR QL AUTO: NEGATIVE
SODIUM SERPL-SCNC: 135 MMOL/L (ref 135–145)
SP GR UR STRIP.AUTO: 1.02
WBC # BLD AUTO: 4.2 K/UL (ref 4.8–10.8)

## 2021-10-20 PROCEDURE — 83690 ASSAY OF LIPASE: CPT

## 2021-10-20 PROCEDURE — 96374 THER/PROPH/DIAG INJ IV PUSH: CPT

## 2021-10-20 PROCEDURE — 81003 URINALYSIS AUTO W/O SCOPE: CPT

## 2021-10-20 PROCEDURE — 80053 COMPREHEN METABOLIC PANEL: CPT

## 2021-10-20 PROCEDURE — 99284 EMERGENCY DEPT VISIT MOD MDM: CPT

## 2021-10-20 PROCEDURE — 700105 HCHG RX REV CODE 258: Performed by: EMERGENCY MEDICINE

## 2021-10-20 PROCEDURE — 85025 COMPLETE CBC W/AUTO DIFF WBC: CPT

## 2021-10-20 PROCEDURE — 700111 HCHG RX REV CODE 636 W/ 250 OVERRIDE (IP): Performed by: EMERGENCY MEDICINE

## 2021-10-20 RX ORDER — ONDANSETRON 2 MG/ML
4 INJECTION INTRAMUSCULAR; INTRAVENOUS ONCE
Status: COMPLETED | OUTPATIENT
Start: 2021-10-20 | End: 2021-10-20

## 2021-10-20 RX ORDER — SODIUM CHLORIDE, SODIUM LACTATE, POTASSIUM CHLORIDE, CALCIUM CHLORIDE 600; 310; 30; 20 MG/100ML; MG/100ML; MG/100ML; MG/100ML
1000 INJECTION, SOLUTION INTRAVENOUS ONCE
Status: COMPLETED | OUTPATIENT
Start: 2021-10-20 | End: 2021-10-20

## 2021-10-20 RX ORDER — DIPHENHYDRAMINE HYDROCHLORIDE 50 MG/ML
25 INJECTION INTRAMUSCULAR; INTRAVENOUS ONCE
Status: DISCONTINUED | OUTPATIENT
Start: 2021-10-20 | End: 2021-10-20 | Stop reason: HOSPADM

## 2021-10-20 RX ADMIN — SODIUM CHLORIDE, POTASSIUM CHLORIDE, SODIUM LACTATE AND CALCIUM CHLORIDE 1000 ML: 600; 310; 30; 20 INJECTION, SOLUTION INTRAVENOUS at 16:53

## 2021-10-20 RX ADMIN — ONDANSETRON 4 MG: 2 INJECTION INTRAMUSCULAR; INTRAVENOUS at 16:53

## 2021-10-20 ASSESSMENT — PAIN DESCRIPTION - PAIN TYPE: TYPE: ACUTE PAIN

## 2021-10-20 ASSESSMENT — FIBROSIS 4 INDEX: FIB4 SCORE: 1.428571428571428571

## 2021-10-20 NOTE — ED TRIAGE NOTES
"Chief Complaint   Patient presents with   • Sent by MD     for low CO2 levels   • Nausea     \"constantly\"       "

## 2021-10-20 NOTE — ED PROVIDER NOTES
"ED Provider Note    CHIEF COMPLAINT  Chief Complaint   Patient presents with   • Sent by MD     for low CO2 levels   • Nausea     \"constantly\"       HPI  Pita Bueno is a 61 y.o. female who presents for evaluation of low CO2 levels nausea and voluminous nonbloody diarrhea for her colostomy.  The patient has a complex medical history.  She is followed by hematology.  She has some basic laboratory studies done demonstrating a low CO2 level at 17.  She also reports that she has had nonbloody diarrhea described as high output through her colostomy which has been present for nearly a decade.  She denies any fever or chills.  She apparently was tested for Covid last week and was negative.  She has not been on any antibiotics.  She specifically denies hematemesis or hematochezia no high fever or productive cough currently    REVIEW OF SYSTEMS  See HPI for further details.  No high fever productive cough night sweats weight loss all other systems are negative.     PAST MEDICAL HISTORY  Past Medical History:   Diagnosis Date   • Hypotension 04/01/2021    pt reports 70/40 up to 100/60   • Bowel habit changes 04/01/2021    constipation and diarrhea, currently using colostomy bag   • Edema 01/2021    Bilat LE, Pt states having pain LLE and DVT being ruled out 1/27   • Hemorrhagic disorder (HCC) 01/2021    states she has noticed she is bleeding easier than normal   • Infectious disease 11/22/2020    covid   • C. difficile diarrhea 09/2020   • Adverse effect of anesthesia    • Anemia    • Arthritis     thumbs, hands   • Breath shortness     \"when laying down, comes and goes\"   • Bronchitis     frequent   • Colostomy in place ([facility])    • Congestive heart failure (HCC)     recent   • Cough     chronic   • CVID (common variable immunodeficiency) ([facility])    • Dental disorder     infected tooth   • Eating disorder    • Fatty liver disease, nonalcoholic    • High cholesterol    • Hypothyroid    • Pain     hands and stomach   • " Palpitations    • Renal disorder     occaisonal high BUN       FAMILY HISTORY  Noncontributory    SOCIAL HISTORY  Social History     Socioeconomic History   • Marital status:      Spouse name: Not on file   • Number of children: Not on file   • Years of education: Not on file   • Highest education level: Not on file   Occupational History   • Not on file   Tobacco Use   • Smoking status: Never Smoker   • Smokeless tobacco: Never Used   Vaping Use   • Vaping Use: Never used   Substance and Sexual Activity   • Alcohol use: Not Currently   • Drug use: Not Currently   • Sexual activity: Not Currently   Other Topics Concern   • Not on file   Social History Narrative   • Not on file     Social Determinants of Health     Financial Resource Strain:    • Difficulty of Paying Living Expenses:    Food Insecurity:    • Worried About Running Out of Food in the Last Year:    • Ran Out of Food in the Last Year:    Transportation Needs:    • Lack of Transportation (Medical):    • Lack of Transportation (Non-Medical):    Physical Activity:    • Days of Exercise per Week:    • Minutes of Exercise per Session:    Stress:    • Feeling of Stress :    Social Connections:    • Frequency of Communication with Friends and Family:    • Frequency of Social Gatherings with Friends and Family:    • Attends [employer] Services:    • Active Member of Clubs or Organizations:    • Attends Club or Organization Meetings:    • Marital Status:    Intimate Partner Violence:    • Fear of Current or Ex-Partner:    • Emotionally Abused:    • Physically Abused:    • Sexually Abused:        SURGICAL HISTORY  Past Surgical History:   Procedure Laterality Date   • PB COLONOSCOPY,DIAGNOSTIC  4/8/2021    Procedure: COLONOSCOPY;  Surgeon: Zhao Starr M.D.;  Location: [facility];  Service: Gastroenterology   • PB COLONOSCOPY,DIAGNOSTIC  4/7/2021    Procedure: COLONOSCOPY;  Surgeon: Sae Carias M.D.;  Location: SURGERY [facility];  Service:  "Gastroenterology   • PB UPPER GI ENDOSCOPY,DIAGNOSIS  4/7/2021    Procedure: GASTROSCOPY;  Surgeon: Sae Carias M.D.;  Location: SURGERY [facility];  Service: Gastroenterology   • CARPAL TUNNEL RELEASE      and ulnar nerve    • CYST EXCISION Right     ovarian dermoid   • GYN SURGERY      ovary ablation    • OTHER ABDOMINAL SURGERY      rectal prolapse repair, anastomy leak/abcess,colostony       CURRENT MEDICATIONS  Home Medications    **Home medications have not yet been reviewed for this encounter**         Current Facility-Administered Medications:   •  diphenhydrAMINE (BENADRYL) injection 25 mg, 25 mg, Intravenous, Once, Zhao Tran M.D.    Current Outpatient Medications:   •  levothyroxine (SYNTHROID) 50 MCG Tab, Take 1 tablet by mouth every morning on an empty stomach., Disp: 90 tablet, Rfl: 1  •  acetaminophen (TYLENOL) 500 MG Tab, Take 500-1,000 mg by mouth every 6 hours as needed. Reports use every night, Disp: , Rfl:   •  bisacodyl EC (DULCOLAX) 5 MG Tablet Delayed Response, Take 5 mg by mouth 1 time a day as needed for Constipation. Reports \"10-15 pills daily with no help\", Disp: , Rfl:   •  linaCLOtide (LINZESS) 290 MCG Cap, Take 290 mcg by mouth every evening., Disp: 90 Cap, Rfl: 0  •  sennosides (SENOKOT) 8.6 MG Tab, Take 8.6 mg by mouth 1 time a day as needed. Takes 4 pills daily, Disp: , Rfl:     ALLERGIES  Allergies   Allergen Reactions   • Sulfa Drugs Hives   • Tape Rash     Rash and rips thin skin       PHYSICAL EXAM  VITAL SIGNS: BP (!) 88/63   Pulse 60   Temp 37.1 °C (98.7 °F) (Temporal)   Resp 15   Ht 1.626 m (5' 4\")   Wt 43.5 kg (95 lb 14.4 oz)   LMP  (LMP Unknown)   SpO2 97%   BMI 16.46 kg/m²       Constitutional: Well developed, Well nourished, No acute distress, Non-toxic appearance.   HENT: Normocephalic, Atraumatic, Bilateral external ears normal, dry mucous membranes no oral exudates, Nose normal.   Eyes: PERRLA, EOMI, Conjunctiva normal, No discharge.   Neck: Normal " range of motion, No tenderness, Supple, No stridor.   Cardiovascular: Normal heart rate, Normal rhythm, No murmurs, No rubs, No gallops.   Thorax & Lungs: Normal breath sounds, No respiratory distress, No wheezing, No chest tenderness.   Abdomen: Bowel sounds normal, Sof colostomy in the left mid quadrant has a pink stoma with nonbloody stool.   Skin: Warm, Dry, No erythema, No rash.   Back: No tenderness, No CVA tenderness.   Extremities: Intact distal pulses, No edema, No tenderness, No cyanosis, No clubbing.   Musculoskeletal: Good range of motion in all major joints. No tenderness to palpation or major deformities noted.   Neurologic: Alert & oriented x 3, Normal motor function, Normal sensory function, No focal deficits noted.   Psychiatric: Affect normal, Judgment normal, Mood normal.     Results for orders placed or performed during the hospital encounter of 10/20/21   CBC WITH DIFFERENTIAL   Result Value Ref Range    WBC 4.2 (L) 4.8 - 10.8 K/uL    RBC 3.93 (L) 4.20 - 5.40 M/uL    Hemoglobin 12.8 12.0 - 16.0 g/dL    Hematocrit 40.5 37.0 - 47.0 %    .1 (H) 81.4 - 97.8 fL    MCH 32.6 27.0 - 33.0 pg    MCHC 31.6 (L) 33.6 - 35.0 g/dL    RDW 52.6 (H) 35.9 - 50.0 fL    Platelet Count 142 (L) 164 - 446 K/uL    MPV 11.9 9.0 - 12.9 fL    Neutrophils-Polys 56.20 44.00 - 72.00 %    Lymphocytes 26.40 22.00 - 41.00 %    Monocytes 13.80 (H) 0.00 - 13.40 %    Eosinophils 2.40 0.00 - 6.90 %    Basophils 1.00 0.00 - 1.80 %    Immature Granulocytes 0.20 0.00 - 0.90 %    Nucleated RBC 0.00 /100 WBC    Neutrophils (Absolute) 2.37 2.00 - 7.15 K/uL    Lymphs (Absolute) 1.11 1.00 - 4.80 K/uL    Monos (Absolute) 0.58 0.00 - 0.85 K/uL    Eos (Absolute) 0.10 0.00 - 0.51 K/uL    Baso (Absolute) 0.04 0.00 - 0.12 K/uL    Immature Granulocytes (abs) 0.01 0.00 - 0.11 K/uL    NRBC (Absolute) 0.00 K/uL   COMP METABOLIC PANEL   Result Value Ref Range    Sodium 135 135 - 145 mmol/L    Potassium 4.3 3.6 - 5.5 mmol/L    Chloride 109 96 -  112 mmol/L    Co2 17 (L) 20 - 33 mmol/L    Anion Gap 9.0 7.0 - 16.0    Glucose 91 65 - 99 mg/dL    Bun 25 (H) 8 - 22 mg/dL    Creatinine 0.63 0.50 - 1.40 mg/dL    Calcium 8.9 8.4 - 10.2 mg/dL    AST(SGOT) 20 12 - 45 U/L    ALT(SGPT) 22 2 - 50 U/L    Alkaline Phosphatase 78 30 - 99 U/L    Total Bilirubin 0.2 0.1 - 1.5 mg/dL    Albumin 4.1 3.2 - 4.9 g/dL    Total Protein 6.0 6.0 - 8.2 g/dL    Globulin 1.9 1.9 - 3.5 g/dL    A-G Ratio 2.2 g/dL   LIPASE   Result Value Ref Range    Lipase 63 (H) 7 - 58 U/L   URINALYSIS    Specimen: Urine   Result Value Ref Range    Color Yellow     Character Clear     Specific Gravity 1.020 <1.035    Ph 6.0 5.0 - 8.0    Glucose Negative Negative mg/dL    Ketones Negative Negative mg/dL    Protein Negative Negative mg/dL    Bilirubin Negative Negative    Nitrite Negative Negative    Leukocyte Esterase Negative Negative    Occult Blood Negative Negative    Micro Urine Req see below    ESTIMATED GFR   Result Value Ref Range    GFR If African American >60 >60 mL/min/1.73 m 2    GFR If Non African American >60 >60 mL/min/1.73 m 2         COURSE & MEDICAL DECISION MAKING  Pertinent Labs & Imaging studies reviewed. (See chart for details)  An IV is established.  I reviewed the patient's laboratory studies from this morning.  She has mild depression of CO2 likely consistent with acute enteritis and she has no leukocytosis or bandemia.  She has no abnormality of her glucose.  After treatment with a liter of lactated Ringer's she feels better.  I suspect that she likely has some self-limiting viral enteritis.  She reports other family members had the same thing last week.  No indication for hospitalization.    FINAL IMPRESSION  1.  Diarrhea  2.  Metabolic acidosis, mild secondary to #1      Electronically signed by: Zhao Tran M.D., 10/20/2021 4:44 PM

## 2021-10-20 NOTE — TELEPHONE ENCOUNTER
Pt called and left a message stating that she got our [place] message regarding the low CO2 level. She feeling little better but not enough. She appreciate us calling her and letting her know. She stated she will be going to the ER, she just doesn't know if she should go to [facility] or [facility].

## 2021-10-20 NOTE — ED TRIAGE NOTES
"Chief Complaint   Patient presents with   • Sent by MD     for low oxygen levels   • Nausea     \"constantly\"     BP (!) 97/52   Pulse 63   Temp 36.7 °C (98 °F) (Temporal)   Resp 16   Ht 1.626 m (5' 4\")   Wt 43.5 kg (95 lb 14.4 oz)   LMP  (LMP Unknown)   SpO2 98%   BMI 16.46 kg/m²     Pt ambulated to ED by self, states had blood work twice over the last week and was told CO2 levels are low.  Pt states has felt increasing nauseated over the last couple of days, denies feeling short of breath.  Pt states was sent to ED by Dr. Harvey to \"find out why my CO2 levels are low.\"    Has this patient been vaccinated for COVID YES  If not, would they like to be vaccinated while in the ER if eligible?  NA  Would the patient like to speak with the ERP about the possibility of receiving the COVID vaccine today before making a decision? NA    "

## 2021-10-20 NOTE — TELEPHONE ENCOUNTER
----- Message from Clifton Harvey M.D. sent at 10/20/2021  8:51 AM PDT -----  Please notify patient about their result(s):    The repeat blood test again showed low Co2 level at 16   BUN level high suggestive of dehydration    During the last phone conversation 10.18.21: pt indicated that she has been feeling poorly especially with recurrent nausea, vomiting, breathing issues..    Strongly rec pt to go to ER for evaluation

## 2021-10-21 DIAGNOSIS — D83.9 COMMON VARIABLE IMMUNODEFICIENCY (HCC): ICD-10-CM

## 2021-10-21 RX ORDER — DIPHENHYDRAMINE HCL 25 MG
25 TABLET ORAL ONCE
Status: CANCELLED | OUTPATIENT
Start: 2021-10-25 | End: 2021-10-25

## 2021-10-21 RX ORDER — ACETAMINOPHEN 325 MG/1
650 TABLET ORAL ONCE
Status: CANCELLED | OUTPATIENT
Start: 2021-10-25 | End: 2021-10-25

## 2021-10-27 ENCOUNTER — HOSPITAL ENCOUNTER (OUTPATIENT)
Facility: MEDICAL CENTER | Age: 61
End: 2021-10-27
Attending: INTERNAL MEDICINE
Payer: OTHER GOVERNMENT

## 2021-10-27 PROCEDURE — 80048 BASIC METABOLIC PNL TOTAL CA: CPT

## 2021-10-27 PROCEDURE — 84443 ASSAY THYROID STIM HORMONE: CPT

## 2021-10-28 DIAGNOSIS — E03.9 HYPOTHYROIDISM, UNSPECIFIED TYPE: ICD-10-CM

## 2021-10-28 LAB
ANION GAP SERPL CALC-SCNC: 11 MMOL/L (ref 7–16)
BUN SERPL-MCNC: 23 MG/DL (ref 8–22)
CALCIUM SERPL-MCNC: 9 MG/DL (ref 8.5–10.5)
CHLORIDE SERPL-SCNC: 106 MMOL/L (ref 96–112)
CO2 SERPL-SCNC: 21 MMOL/L (ref 20–33)
CREAT SERPL-MCNC: 0.58 MG/DL (ref 0.5–1.4)
GLUCOSE SERPL-MCNC: 92 MG/DL (ref 65–99)
POTASSIUM SERPL-SCNC: 4.3 MMOL/L (ref 3.6–5.5)
SODIUM SERPL-SCNC: 138 MMOL/L (ref 135–145)
TSH SERPL DL<=0.005 MIU/L-ACNC: 1.58 UIU/ML (ref 0.38–5.33)

## 2021-11-01 ENCOUNTER — HOSPITAL ENCOUNTER (OUTPATIENT)
Dept: RADIOLOGY | Facility: MEDICAL CENTER | Age: 61
End: 2021-11-01
Attending: INTERNAL MEDICINE
Payer: OTHER GOVERNMENT

## 2021-11-01 DIAGNOSIS — R74.8 ALKALINE PHOSPHATASE ELEVATION: ICD-10-CM

## 2021-11-01 PROCEDURE — A9503 TC99M MEDRONATE: HCPCS

## 2021-11-02 RX ORDER — LEVOTHYROXINE SODIUM 0.05 MG/1
50 TABLET ORAL
Qty: 90 TABLET | Refills: 1 | Status: SHIPPED | OUTPATIENT
Start: 2021-11-02 | End: 2022-05-05 | Stop reason: SDUPTHER

## 2021-11-14 ENCOUNTER — OFFICE VISIT (OUTPATIENT)
Dept: URGENT CARE | Facility: PHYSICIAN GROUP | Age: 61
End: 2021-11-14
Payer: OTHER GOVERNMENT

## 2021-11-14 VITALS
WEIGHT: 95 LBS | BODY MASS INDEX: 15.83 KG/M2 | OXYGEN SATURATION: 97 % | TEMPERATURE: 97.7 F | DIASTOLIC BLOOD PRESSURE: 64 MMHG | SYSTOLIC BLOOD PRESSURE: 92 MMHG | RESPIRATION RATE: 16 BRPM | HEIGHT: 65 IN | HEART RATE: 70 BPM

## 2021-11-14 DIAGNOSIS — Z20.828 EXPOSURE TO RESPIRATORY SYNCYTIAL VIRUS (RSV): ICD-10-CM

## 2021-11-14 DIAGNOSIS — R11.10 NON-INTRACTABLE VOMITING, PRESENCE OF NAUSEA NOT SPECIFIED, UNSPECIFIED VOMITING TYPE: ICD-10-CM

## 2021-11-14 DIAGNOSIS — K59.9 BOWEL DYSFUNCTION: ICD-10-CM

## 2021-11-14 DIAGNOSIS — R11.0 NAUSEA: ICD-10-CM

## 2021-11-14 DIAGNOSIS — R05.9 COUGH: ICD-10-CM

## 2021-11-14 PROCEDURE — 99213 OFFICE O/P EST LOW 20 MIN: CPT | Performed by: PHYSICIAN ASSISTANT

## 2021-11-14 RX ORDER — SCOLOPAMINE TRANSDERMAL SYSTEM 1 MG/1
1 PATCH, EXTENDED RELEASE TRANSDERMAL
Qty: 4 PATCH | Refills: 0 | Status: SHIPPED | OUTPATIENT
Start: 2021-11-14 | End: 2022-01-19

## 2021-11-14 RX ORDER — AMOXICILLIN 500 MG/1
500 CAPSULE ORAL 3 TIMES DAILY
COMMUNITY
Start: 2021-11-09 | End: 2021-11-17

## 2021-11-14 RX ORDER — IBUPROFEN 800 MG/1
TABLET ORAL
COMMUNITY
Start: 2021-11-09 | End: 2021-11-17

## 2021-11-14 RX ORDER — PROMETHAZINE HYDROCHLORIDE 25 MG/1
TABLET ORAL
COMMUNITY
Start: 2021-10-15 | End: 2021-11-17

## 2021-11-14 ASSESSMENT — ENCOUNTER SYMPTOMS: COUGH: 1

## 2021-11-14 ASSESSMENT — FIBROSIS 4 INDEX: FIB4 SCORE: 1.8

## 2021-11-15 ENCOUNTER — HOSPITAL ENCOUNTER (OUTPATIENT)
Dept: LAB | Facility: MEDICAL CENTER | Age: 61
End: 2021-11-15
Attending: PHYSICIAN ASSISTANT
Payer: OTHER GOVERNMENT

## 2021-11-15 DIAGNOSIS — R11.10 NON-INTRACTABLE VOMITING, PRESENCE OF NAUSEA NOT SPECIFIED, UNSPECIFIED VOMITING TYPE: ICD-10-CM

## 2021-11-15 DIAGNOSIS — R05.9 COUGH: ICD-10-CM

## 2021-11-15 DIAGNOSIS — K59.9 BOWEL DYSFUNCTION: ICD-10-CM

## 2021-11-15 DIAGNOSIS — R11.0 NAUSEA: ICD-10-CM

## 2021-11-15 DIAGNOSIS — Z20.828 EXPOSURE TO RESPIRATORY SYNCYTIAL VIRUS (RSV): ICD-10-CM

## 2021-11-15 LAB
ALBUMIN SERPL BCP-MCNC: 4.6 G/DL (ref 3.2–4.9)
ALBUMIN/GLOB SERPL: 2.2 G/DL
ALP SERPL-CCNC: 73 U/L (ref 30–99)
ALT SERPL-CCNC: 22 U/L (ref 2–50)
ANION GAP SERPL CALC-SCNC: 10 MMOL/L (ref 7–16)
AST SERPL-CCNC: 18 U/L (ref 12–45)
BASOPHILS # BLD AUTO: 0.7 % (ref 0–1.8)
BASOPHILS # BLD: 0.03 K/UL (ref 0–0.12)
BILIRUB SERPL-MCNC: 0.2 MG/DL (ref 0.1–1.5)
BUN SERPL-MCNC: 26 MG/DL (ref 8–22)
CALCIUM SERPL-MCNC: 9.2 MG/DL (ref 8.5–10.5)
CHLORIDE SERPL-SCNC: 107 MMOL/L (ref 96–112)
CO2 SERPL-SCNC: 17 MMOL/L (ref 20–33)
CREAT SERPL-MCNC: 0.71 MG/DL (ref 0.5–1.4)
EOSINOPHIL # BLD AUTO: 0.07 K/UL (ref 0–0.51)
EOSINOPHIL NFR BLD: 1.6 % (ref 0–6.9)
ERYTHROCYTE [DISTWIDTH] IN BLOOD BY AUTOMATED COUNT: 54.3 FL (ref 35.9–50)
FASTING STATUS PATIENT QL REPORTED: NORMAL
GLOBULIN SER CALC-MCNC: 2.1 G/DL (ref 1.9–3.5)
GLUCOSE SERPL-MCNC: 101 MG/DL (ref 65–99)
HCT VFR BLD AUTO: 43.1 % (ref 37–47)
HGB BLD-MCNC: 13.5 G/DL (ref 12–16)
IMM GRANULOCYTES # BLD AUTO: 0.01 K/UL (ref 0–0.11)
IMM GRANULOCYTES NFR BLD AUTO: 0.2 % (ref 0–0.9)
LYMPHOCYTES # BLD AUTO: 0.68 K/UL (ref 1–4.8)
LYMPHOCYTES NFR BLD: 15.6 % (ref 22–41)
MCH RBC QN AUTO: 33 PG (ref 27–33)
MCHC RBC AUTO-ENTMCNC: 31.3 G/DL (ref 33.6–35)
MCV RBC AUTO: 105.4 FL (ref 81.4–97.8)
MONOCYTES # BLD AUTO: 0.55 K/UL (ref 0–0.85)
MONOCYTES NFR BLD AUTO: 12.6 % (ref 0–13.4)
NEUTROPHILS # BLD AUTO: 3.01 K/UL (ref 2–7.15)
NEUTROPHILS NFR BLD: 69.3 % (ref 44–72)
NRBC # BLD AUTO: 0 K/UL
NRBC BLD-RTO: 0 /100 WBC
PLATELET # BLD AUTO: 153 K/UL (ref 164–446)
PMV BLD AUTO: 13 FL (ref 9–12.9)
POTASSIUM SERPL-SCNC: 4.8 MMOL/L (ref 3.6–5.5)
PROT SERPL-MCNC: 6.7 G/DL (ref 6–8.2)
RBC # BLD AUTO: 4.09 M/UL (ref 4.2–5.4)
SODIUM SERPL-SCNC: 134 MMOL/L (ref 135–145)
WBC # BLD AUTO: 4.4 K/UL (ref 4.8–10.8)

## 2021-11-15 PROCEDURE — 80053 COMPREHEN METABOLIC PANEL: CPT

## 2021-11-15 PROCEDURE — 36415 COLL VENOUS BLD VENIPUNCTURE: CPT

## 2021-11-15 PROCEDURE — 85025 COMPLETE CBC W/AUTO DIFF WBC: CPT

## 2021-11-15 NOTE — PROGRESS NOTES
Subjective     Pita Bueno is a 61 y.o. female who presents with Cough (sore throat, fatigue, bodyaches, headache, fever, congestion,nausea x1 week )    Medications:    • cyanocobalamin  • doxycycline Caps  • levothyroxine Tabs  • Linzess Caps  • scopolamine Pt72    Allergies: Sulfa drugs and Tape    Problem List: Pita Bueno does not have any pertinent problems on file.    Surgical History:  Past Surgical History:   Procedure Laterality Date   • PB COLONOSCOPY,DIAGNOSTIC  4/8/2021    Procedure: COLONOSCOPY;  Surgeon: Zhao Starr M.D.;  Location: SURGERY [facility];  Service: Gastroenterology   • PB COLONOSCOPY,DIAGNOSTIC  4/7/2021    Procedure: COLONOSCOPY;  Surgeon: Sea Carias M.D.;  Location: SURGERY [facility];  Service: Gastroenterology   • PB UPPER GI ENDOSCOPY,DIAGNOSIS  4/7/2021    Procedure: GASTROSCOPY;  Surgeon: Sae Carias M.D.;  Location: SURGERY [facility];  Service: Gastroenterology   • CARPAL TUNNEL RELEASE      and ulnar nerve    • CYST EXCISION Right     ovarian dermoid   • GYN SURGERY      ovary ablation    • OTHER ABDOMINAL SURGERY      rectal prolapse repair, anastomy leak/abcess,colostony       Past Social Hx: Pita Bueno  reports that she has never smoked. She has never used smokeless tobacco. She reports that she does not drink alcohol and does not use drugs.     Past Family Hx:  Pita Bueno family history includes Alcohol abuse in her father; Heart Disease in her father; Hypertension in her father; Stroke in her father; Thyroid in her mother.     Problem list, medications, and allergies reviewed by myself today in Epic.          Patient presents with:  Cough: sore throat, fatigue, bodyaches, headache, fever, congestion,nausea x1 week.  RSV exposure from family members who work in day care.      Pt also has history of chronic constipation and other GI issues that can result in diarrhea , nausea and vomiting. Pt states she has had issues  "with this is the past, often needs IV fluids due to change in anion gap if GI symptoms persist.  Pt would like lab tests today to make sure she does not need to be treated in ER.          Other  This is a new problem. The current episode started in the past 7 days. The problem occurs constantly. The problem has been gradually worsening. Associated symptoms include anorexia, congestion, coughing, a fever, headaches, nausea and a sore throat. Pertinent negatives include no change in bowel habit, chills, urinary symptoms, visual change or vomiting. The symptoms are aggravated by eating, drinking, exertion and coughing. She has tried drinking and rest for the symptoms. The treatment provided no relief.       Review of Systems   Constitutional: Positive for fever. Negative for chills and weight loss.   HENT: Positive for congestion and sore throat.    Respiratory: Positive for cough. Negative for sputum production, shortness of breath and wheezing.    Gastrointestinal: Positive for anorexia, constipation (chronic) and nausea. Negative for change in bowel habit and vomiting.   Neurological: Positive for headaches.   All other systems reviewed and are negative.             Objective     BP (!) 92/64 (BP Location: Right arm, Patient Position: Sitting, BP Cuff Size: Adult)   Pulse 70   Temp 36.5 °C (97.7 °F) (Temporal)   Resp 16   Ht 1.638 m (5' 4.5\")   Wt 43.1 kg (95 lb)   LMP  (LMP Unknown)   SpO2 97%   BMI 16.05 kg/m²      Physical Exam  Vitals and nursing note reviewed.   Constitutional:       General: She is not in acute distress.     Appearance: Normal appearance. She is well-developed, well-groomed and underweight. She is not ill-appearing or toxic-appearing.   HENT:      Head: Normocephalic and atraumatic.      Right Ear: Tympanic membrane normal.      Left Ear: Tympanic membrane normal.      Nose: Nose normal.      Mouth/Throat:      Lips: Pink.      Mouth: Mucous membranes are moist.      Pharynx: " Oropharynx is clear. Uvula midline.   Eyes:      Extraocular Movements: Extraocular movements intact.      Conjunctiva/sclera: Conjunctivae normal.      Pupils: Pupils are equal, round, and reactive to light.   Cardiovascular:      Rate and Rhythm: Normal rate and regular rhythm.      Pulses: Normal pulses.      Heart sounds: Normal heart sounds.   Pulmonary:      Effort: Pulmonary effort is normal.      Breath sounds: Normal breath sounds.   Abdominal:      General: Bowel sounds are normal.      Palpations: Abdomen is soft.   Musculoskeletal:         General: Normal range of motion.      Cervical back: Normal range of motion and neck supple.   Skin:     General: Skin is warm and dry.      Capillary Refill: Capillary refill takes less than 2 seconds.   Neurological:      General: No focal deficit present.      Mental Status: She is alert and oriented to person, place, and time.      Cranial Nerves: No cranial nerve deficit.      Motor: Motor function is intact.      Coordination: Coordination is intact.      Gait: Gait normal.   Psychiatric:         Mood and Affect: Mood normal.         Behavior: Behavior is cooperative.                             Assessment & Plan        1. Nausea  scopolamine (TRANSDERM-SCOP) 1 mg/72hr PATCH 72 HR    CBC WITH DIFFERENTIAL    Comp Metabolic Panel   2. Cough  CBC WITH DIFFERENTIAL    Comp Metabolic Panel   3. Non-intractable vomiting, presence of nausea not specified, unspecified vomiting type  CBC WITH DIFFERENTIAL    Comp Metabolic Panel   4. Bowel dysfunction  CBC WITH DIFFERENTIAL    Comp Metabolic Panel   5. Exposure to respiratory syncytial virus (RSV)  CBC WITH DIFFERENTIAL    Comp Metabolic Panel     Patient was evaluated in clinic today while wearing appropriate personal protective equipment.      Pt offered COVID test, declined at this time. Had covid in January, feels different.     RSV test unavailable in this clinic today.  Offered lab testing, declined.     Blood work  ordered at pt request.  PT states she will have PCP review and decide if she needs to go to the ER.     PT should follow up with PCP in 1-2 days for re-evaluation if symptoms have not improved.      Discussed red flags and reasons to return to UC or ED.      Pt and/or family verbalized understanding of diagnosis and follow up instructions and was offered informational handout on diagnosis.  PT discharged.

## 2021-11-17 ENCOUNTER — OFFICE VISIT (OUTPATIENT)
Dept: URGENT CARE | Facility: PHYSICIAN GROUP | Age: 61
End: 2021-11-17
Payer: OTHER GOVERNMENT

## 2021-11-17 ENCOUNTER — TELEPHONE (OUTPATIENT)
Dept: MEDICAL GROUP | Facility: PHYSICIAN GROUP | Age: 61
End: 2021-11-17

## 2021-11-17 VITALS
TEMPERATURE: 97.3 F | RESPIRATION RATE: 20 BRPM | HEIGHT: 65 IN | WEIGHT: 95 LBS | HEART RATE: 79 BPM | BODY MASS INDEX: 15.83 KG/M2 | OXYGEN SATURATION: 98 % | SYSTOLIC BLOOD PRESSURE: 100 MMHG | DIASTOLIC BLOOD PRESSURE: 72 MMHG

## 2021-11-17 DIAGNOSIS — J20.8 ACUTE BRONCHITIS DUE TO OTHER SPECIFIED ORGANISMS: ICD-10-CM

## 2021-11-17 PROCEDURE — 99213 OFFICE O/P EST LOW 20 MIN: CPT | Performed by: NURSE PRACTITIONER

## 2021-11-17 RX ORDER — DOXYCYCLINE HYCLATE 100 MG/1
100 CAPSULE ORAL 2 TIMES DAILY
Qty: 14 CAPSULE | Refills: 0 | Status: SHIPPED | OUTPATIENT
Start: 2021-11-17 | End: 2021-11-24

## 2021-11-17 ASSESSMENT — ENCOUNTER SYMPTOMS
COUGH: 1
SORE THROAT: 0
NAUSEA: 1
FEVER: 1
WHEEZING: 0
SPUTUM PRODUCTION: 1
EYE DISCHARGE: 0
HEADACHES: 0
DIARRHEA: 0
ORTHOPNEA: 0
CHILLS: 0
MYALGIAS: 1
SHORTNESS OF BREATH: 0

## 2021-11-17 ASSESSMENT — FIBROSIS 4 INDEX: FIB4 SCORE: 1.504946233098426595

## 2021-11-17 NOTE — TELEPHONE ENCOUNTER
"----- Message from Clifton Harvey M.D. sent at 11/17/2021 10:23 AM PST -----  Regarding: FW: Labs  Pt reported \"it seems to be going deep into my chest with a bad cough and pain on breathing and coughing \". I am concerned pt may develop pneumonia vs covid vs congestive heart vs blood clots in lungs etc..>> rec pt to go to to ER now to be evaluated  ----- Message -----  From: Chantel L Magill, R.N.  Sent: 11/17/2021   8:47 AM PST  To: Clifton Harvey M.D.  Subject: FW: Labs                                           ----- Message -----  From: Pita Bueno  Sent: 11/16/2021   7:35 PM PST  To: [facility]  Subject: Labs                                             I saw the lab results my biggest concern is the fact I've gotten worse since urgent care it seems to be going deep into my chest with a bad cough and pain on breathing and coughing if if I have a virtual appointment he can't listen to my chest to tell me if I've developed pneumonia should I be concerned about the low lymphocytes lower than normal for me? Please advise before I get worse. Thanks. Uyen"

## 2021-11-17 NOTE — TELEPHONE ENCOUNTER
Spoke to pt on the phone and advised pt to be evaluated in the ED per Dr. Harvey. Pt verbalized understanding.

## 2021-11-18 NOTE — PROGRESS NOTES
Subjective     Pita Bueno is a 61 y.o. female who presents with Congestion (chest; 8x days), Headache (8x days), Nausea (8x days), Pharyngitis (8x days), and Cough (wet; productive)            HPI   Recurrent problem.  Patient is a 7-year-old female who presents with chest congestion, cough, headache, nausea, sore throat, nasal congestion x8 days.  She reports fever of 101 last night.  She was seen several days ago in the clinic and prescribed scopolamine for her symptoms due to her nausea and her inability to process oral medications She reports today that her symptoms have worsened since she was seen initially.  She did put in a call to her primary care who suggested that she report to the nearest emergency department for further evaluation however she elected to come to urgent care instead.  She has a significant history of severe and malnutrition and digestive issues.  Patient reports family members + for RSV  Sulfa drugs and Tape  Current Outpatient Medications on File Prior to Visit   Medication Sig Dispense Refill   • scopolamine (TRANSDERM-SCOP) 1 mg/72hr PATCH 72 HR Place 1 Patch on the skin every 72 hours. 4 Patch 0   • levothyroxine (SYNTHROID) 50 MCG Tab Take 1 Tablet by mouth every morning on an empty stomach. 90 Tablet 1   • linaCLOtide (LINZESS) 290 MCG Cap Take 290 mcg by mouth every evening. 90 Cap 0     Current Facility-Administered Medications on File Prior to Visit   Medication Dose Route Frequency Provider Last Rate Last Admin   • cyanocobalamin (VITAMIN B-12) injection 1,000 mcg  1,000 mcg Intramuscular Q30 DAYS Clifton Harvey M.D.   1,000 mcg at 10/27/21 1700     Social History     Socioeconomic History   • Marital status:      Spouse name: Not on file   • Number of children: Not on file   • Years of education: Not on file   • Highest education level: Not on file   Occupational History   • Not on file   Tobacco Use   • Smoking status: Never Smoker   • Smokeless tobacco: Never  Used   Vaping Use   • Vaping Use: Never used   Substance and Sexual Activity   • Alcohol use: Never   • Drug use: Never   • Sexual activity: Not Currently   Other Topics Concern   • Not on file   Social History Narrative   • Not on file     Social Determinants of Health     Financial Resource Strain:    • Difficulty of Paying Living Expenses: Not on file   Food Insecurity:    • Worried About Running Out of Food in the Last Year: Not on file   • Ran Out of Food in the Last Year: Not on file   Transportation Needs:    • Lack of Transportation (Medical): Not on file   • Lack of Transportation (Non-Medical): Not on file   Physical Activity:    • Days of Exercise per Week: Not on file   • Minutes of Exercise per Session: Not on file   Stress:    • Feeling of Stress : Not on file   Social Connections:    • Frequency of Communication with Friends and Family: Not on file   • Frequency of Social Gatherings with Friends and Family: Not on file   • Attends Rastafari Services: Not on file   • Active Member of Clubs or Organizations: Not on file   • Attends Club or Organization Meetings: Not on file   • Marital Status: Not on file   Intimate Partner Violence:    • Fear of Current or Ex-Partner: Not on file   • Emotionally Abused: Not on file   • Physically Abused: Not on file   • Sexually Abused: Not on file   Housing Stability:    • Unable to Pay for Housing in the Last Year: Not on file   • Number of Places Lived in the Last Year: Not on file   • Unstable Housing in the Last Year: Not on file     Breast Cancer-related family history is not on file.      Review of Systems   Constitutional: Positive for fever and malaise/fatigue. Negative for chills.   HENT: Positive for congestion. Negative for sore throat.    Eyes: Negative for discharge.   Respiratory: Positive for cough and sputum production. Negative for shortness of breath and wheezing.    Cardiovascular: Negative for chest pain and orthopnea.   Gastrointestinal: Positive  "for nausea. Negative for diarrhea.   Musculoskeletal: Positive for myalgias.   Neurological: Negative for headaches.   Endo/Heme/Allergies: Negative for environmental allergies.              Objective     /72 (BP Location: Right arm, Patient Position: Sitting, BP Cuff Size: Adult)   Pulse 79   Temp 36.3 °C (97.3 °F) (Temporal)   Resp 20   Ht 1.651 m (5' 5\")   Wt 43.1 kg (95 lb)   LMP  (LMP Unknown)   SpO2 98%   BMI 15.81 kg/m²      Physical Exam  Vitals and nursing note reviewed.   Constitutional:       General: She is not in acute distress.     Appearance: She is well-developed. She is ill-appearing.   HENT:      Head: Normocephalic and atraumatic.      Right Ear: Tympanic membrane, ear canal and external ear normal. No middle ear effusion. Tympanic membrane is not injected or perforated.      Left Ear: Tympanic membrane, ear canal and external ear normal.  No middle ear effusion. Tympanic membrane is not injected or perforated.      Nose: Mucosal edema present.      Mouth/Throat:      Pharynx: No posterior oropharyngeal erythema.   Eyes:      General:         Right eye: No discharge.         Left eye: No discharge.      Conjunctiva/sclera: Conjunctivae normal.   Cardiovascular:      Rate and Rhythm: Normal rate and regular rhythm.      Heart sounds: Normal heart sounds. No murmur heard.      Pulmonary:      Effort: Pulmonary effort is normal. No respiratory distress.      Breath sounds: Normal breath sounds. No wheezing or rales.   Chest:   Breasts:      Right: No supraclavicular adenopathy.      Left: No supraclavicular adenopathy.       Musculoskeletal:         General: Normal range of motion.      Cervical back: Normal range of motion and neck supple.      Comments: Normal movement of all 4 extremities.   Lymphadenopathy:      Cervical: No cervical adenopathy.      Upper Body:      Right upper body: No supraclavicular adenopathy.      Left upper body: No supraclavicular adenopathy.   Skin:     " General: Skin is warm and dry.   Neurological:      Mental Status: She is alert and oriented to person, place, and time.      Gait: Gait normal.   Psychiatric:         Behavior: Behavior normal.         Thought Content: Thought content normal.                             Assessment & Plan        1. Acute bronchitis due to other specified organisms  doxycycline (VIBRAMYCIN) 100 MG Cap     Patient is a cachectic, ill-appearing 61-year-old female who presents with ongoing cough and upper respiratory symptoms for 8 days.  She is fully vaccinated for COVID-19 and has had COVID-19 infection.  Her vital signs today are stable and I have reviewed her labs that she had done 2 days ago showing white count of 4.4.  Advised her that I believe that this illness is viral in etiology however we will cover her with doxycycline.  She is informed that this may not be curative of her symptoms but will prevent any secondary bacterial infection from setting in and she is content with this.  She is advised on emergency room precautions as well as follow-up to urgent care precautions.

## 2021-11-24 ASSESSMENT — ENCOUNTER SYMPTOMS
SORE THROAT: 1
CHANGE IN BOWEL HABIT: 0
WEIGHT LOSS: 0
CHILLS: 0
SPUTUM PRODUCTION: 0
FEVER: 1
SHORTNESS OF BREATH: 0
VISUAL CHANGE: 0
HEADACHES: 1
NAUSEA: 1
ANOREXIA: 1
CONSTIPATION: 1
WHEEZING: 0
VOMITING: 0

## 2021-11-30 ENCOUNTER — OFFICE VISIT (OUTPATIENT)
Dept: NEUROLOGY | Facility: MEDICAL CENTER | Age: 61
End: 2021-11-30
Attending: PSYCHIATRY & NEUROLOGY
Payer: OTHER GOVERNMENT

## 2021-11-30 VITALS
DIASTOLIC BLOOD PRESSURE: 78 MMHG | WEIGHT: 95.9 LBS | SYSTOLIC BLOOD PRESSURE: 116 MMHG | HEIGHT: 65 IN | OXYGEN SATURATION: 98 % | HEART RATE: 67 BPM | BODY MASS INDEX: 15.98 KG/M2

## 2021-11-30 DIAGNOSIS — R27.0 ATAXIA: Primary | ICD-10-CM

## 2021-11-30 PROCEDURE — 99214 OFFICE O/P EST MOD 30 MIN: CPT | Performed by: PSYCHIATRY & NEUROLOGY

## 2021-11-30 PROCEDURE — 99211 OFF/OP EST MAY X REQ PHY/QHP: CPT | Performed by: PSYCHIATRY & NEUROLOGY

## 2021-11-30 RX ORDER — DIAZEPAM 2 MG/1
TABLET ORAL
Qty: 3 TABLET | Refills: 0 | Status: SHIPPED | OUTPATIENT
Start: 2021-11-30 | End: 2021-12-30

## 2021-11-30 ASSESSMENT — FIBROSIS 4 INDEX: FIB4 SCORE: 1.504946233098426595

## 2021-11-30 NOTE — PROGRESS NOTES
[facility]  GENERAL NEUROLOGY  FOLLOW-UP VISIT    CC: allodynia, left hip flexor weakness, possible BPPV    INTERVAL HISTORY:  Pita Bueno is a 61 y.o. woman with headaches, possible BPPV, left hip flexor weakness, and a history otherwise notable for common variable immunodeficiency, pancytopenia, hypothyroidism, chronic fatigue syndrome, chronic diarrhea, severe malnutrition, vitamin B12 and D deficiency, COVID-19, heart failure with preserved ejection fraction, and orthostatic hypotension.  I last saw her in the clinic on 8/26/2021.  At that time I recommended she avoid compressive garments on the head and modified Epley maneuvers.  Today, she was unaccompanied, and she provided the following interval history:    Pita has been wearing the swimming cap and the goggles less, and the headaches are better.  This is somewhat disappointing to her.    Pita's balance is worse.  Even strangers comment on her poor gait.  She feels like she has to hold onto a support at all times in order to keep upright.    MEDICATIONS:  Current Outpatient Medications   Medication Sig   • diazePAM (VALIUM) 2 MG Tab Take 1 tablet ~1 hour before scan; take a second tablet ~15 minutes before scan; may repeat x1 as needed   • levothyroxine (SYNTHROID) 50 MCG Tab Take 1 Tablet by mouth every morning on an empty stomach.   • linaCLOtide (LINZESS) 290 MCG Cap Take 290 mcg by mouth every evening.   • scopolamine (TRANSDERM-SCOP) 1 mg/72hr PATCH 72 HR Place 1 Patch on the skin every 72 hours. (Patient not taking: Reported on 11/30/2021)     MEDICAL, SOCIAL, AND FAMILY HISTORY:  There is no change in the patient's ROS or medical, social, or family histories since the previous visit on 8/26/2021.    REVIEW OF SYSTEMS:  A ROS was completed.  Pertinent positives and negatives were included in the HPI, above.  All other systems were reviewed and are negative.    PHYSICAL EXAM:  General/Medical:  - NAD  - thin  - hair, skin, nails,  and joints were normal    Neuro:  MENTAL STATUS: awake and alert; no deficits of speech or language; oriented to person, place, and time; affect was appropriate to situation; pleasant, cooperative    CRANIAL NERVES:    II: acuity: JNT/JNT, fields: NT, pupils: NT, discs: NT    III/IV/VI: versions: grossly intact without nystagmus    V: facial sensation: NT    VII: facial expression: NT    VIII: hearing: intact to voice    IX/X: palate: NT    XI: shoulder shrug: NT    XII: tongue: NT    MOTOR:  - bulk: reduced throughout  - tone: NT  Upper Extremity Strength  (R/L)    NT   Elbow flexion NT   Elbow extension NT   Shoulder abduction NT     Lower Extremity Strength  (R/L)   Hip flexion NT   Knee extension NT   Knee flexion NT   Ankle plantarflexion NT   Ankle dorsiflexion NT     - can walk on toes and heels  - pronator drift: absent  - abnormal movements: none    SENSATION:  - light touch: grossly intact over the upper and lower extremities  - vibration (R/L, seconds): NT/NT at the great toes  - pinprick: NT  - proprioception: NT  - Romberg: positive    COORDINATION:  - finger to nose: slightly ataxic on the right  - finger tapping: more difficulty on the right side  - heel-to-shin: fairly symmetric    REFLEXES:  Reflex Right Left   BR NT NT   Biceps NT NT   Triceps NT NT   Patellae NT NT   Achilles NT NT   Toes NT NT     GAIT:  - wide-based, ataxic  - heel-raised/toe-raised gait: intact/intact  - tandem gait: intact    REVIEW OF IMAGING STUDIES:  No additional head imaging since the last visit.    REVIEW OF LABORATORY STUDIES:  11/15/2021:  - CBC w/ diff: notable for leukopenia  - CMP: notable for Na: 134    ASSESSMENT:  Pita Bueno is a 61 y.o. woman with ataxia, headaches, possible BPPV, left hip flexor weakness, and a history otherwise notable for common variable immunodeficiency, pancytopenia, hypothyroidism, chronic fatigue syndrome, chronic diarrhea, severe malnutrition, vitamin B12 and D  "deficiency, COVID-19, heart failure with preserved ejection fraction, and orthostatic hypotension.  Pita's headaches are improved with avoidance of compressive devices (swimming caps, goggles) on the head.  The focus of today's visit was on her gait.  Pita appears to have an ataxic gait.  Physical exam today was notable for mild right-sided ataxia.  Plan for referral to PT for balance training as well as MRI of the brain.    PLAN:  Ataxia:  - referral to PT for balance training  - MRI brain w/o and w/ contrast    Follow-Up:  - Return in about 2 months (around 1/30/2022).    Signed: Ammon Rojas M.D.    BILLING DOCUMENTATION:   I spent 30 minutes reviewing the medical record, interviewing and examining the patient, discussing my impression (see \"assessment\" above), and coordinating care.  "

## 2021-12-06 DIAGNOSIS — M25.531 RIGHT WRIST PAIN: ICD-10-CM

## 2021-12-14 ENCOUNTER — APPOINTMENT (OUTPATIENT)
Dept: ONCOLOGY | Facility: MEDICAL CENTER | Age: 61
End: 2021-12-14
Attending: INTERNAL MEDICINE
Payer: OTHER GOVERNMENT

## 2021-12-17 RX ORDER — DIPHENHYDRAMINE HYDROCHLORIDE 50 MG/ML
25 INJECTION INTRAMUSCULAR; INTRAVENOUS
Status: CANCELLED
Start: 2021-12-17

## 2021-12-18 ENCOUNTER — OUTPATIENT INFUSION SERVICES (OUTPATIENT)
Dept: ONCOLOGY | Facility: MEDICAL CENTER | Age: 61
End: 2021-12-18
Attending: INTERNAL MEDICINE
Payer: OTHER GOVERNMENT

## 2021-12-18 VITALS
BODY MASS INDEX: 15.7 KG/M2 | OXYGEN SATURATION: 98 % | HEART RATE: 54 BPM | DIASTOLIC BLOOD PRESSURE: 52 MMHG | SYSTOLIC BLOOD PRESSURE: 90 MMHG | WEIGHT: 88.63 LBS | RESPIRATION RATE: 18 BRPM | HEIGHT: 63 IN | TEMPERATURE: 97.1 F

## 2021-12-18 DIAGNOSIS — D83.9 COMMON VARIABLE IMMUNODEFICIENCY (HCC): ICD-10-CM

## 2021-12-18 PROCEDURE — 96366 THER/PROPH/DIAG IV INF ADDON: CPT

## 2021-12-18 PROCEDURE — 96375 TX/PRO/DX INJ NEW DRUG ADDON: CPT

## 2021-12-18 PROCEDURE — 96365 THER/PROPH/DIAG IV INF INIT: CPT

## 2021-12-18 PROCEDURE — 700111 HCHG RX REV CODE 636 W/ 250 OVERRIDE (IP): Mod: JG | Performed by: INTERNAL MEDICINE

## 2021-12-18 PROCEDURE — 700111 HCHG RX REV CODE 636 W/ 250 OVERRIDE (IP): Performed by: NURSE PRACTITIONER

## 2021-12-18 RX ORDER — DIPHENHYDRAMINE HYDROCHLORIDE 50 MG/ML
12.5-25 INJECTION INTRAMUSCULAR; INTRAVENOUS ONCE
Status: CANCELLED
Start: 2021-12-18 | End: 2021-12-18

## 2021-12-18 RX ORDER — DIPHENHYDRAMINE HYDROCHLORIDE 50 MG/ML
12.5-25 INJECTION INTRAMUSCULAR; INTRAVENOUS ONCE
Status: COMPLETED | OUTPATIENT
Start: 2021-12-18 | End: 2021-12-18

## 2021-12-18 RX ORDER — ACETAMINOPHEN 325 MG/1
650 TABLET ORAL ONCE
Status: DISCONTINUED | OUTPATIENT
Start: 2021-12-18 | End: 2021-12-18 | Stop reason: HOSPADM

## 2021-12-18 RX ORDER — DIPHENHYDRAMINE HYDROCHLORIDE 50 MG/ML
25 INJECTION INTRAMUSCULAR; INTRAVENOUS
Status: CANCELLED
Start: 2021-12-18

## 2021-12-18 RX ORDER — ACETAMINOPHEN 325 MG/1
650 TABLET ORAL ONCE
Status: CANCELLED | OUTPATIENT
Start: 2021-12-18 | End: 2021-12-18

## 2021-12-18 RX ADMIN — IMMUNE GLOBULIN INFUSION (HUMAN) 15 G: 100 INJECTION, SOLUTION INTRAVENOUS; SUBCUTANEOUS at 15:15

## 2021-12-18 RX ADMIN — DIPHENHYDRAMINE HYDROCHLORIDE 25 MG: 50 INJECTION INTRAMUSCULAR; INTRAVENOUS at 15:12

## 2021-12-18 ASSESSMENT — FIBROSIS 4 INDEX: FIB4 SCORE: 1.504946233098426595

## 2021-12-18 NOTE — PROGRESS NOTES
Uyen arrives to [facility] for q 4 week IVIG for CVID. Per patient, she was receiving IVIG in [place]. Her last tx was about 2 years ago. Patient denies active infections. 24g PIV placed to LFA, which flushes easily and has brisk blood return. Premedicated with IV benadryl. Patient refused PO tylenol. IVIG infused, at a starting rate of 20ml/h and titrated to 160ml/h, without adverse s/s. Patient slept throughout the infusion/tolerated infusion well. Emailed scheduling to arrange next appt. Discharged home to self care in no apparent distress.

## 2021-12-21 DIAGNOSIS — R27.0 ATAXIA: Primary | ICD-10-CM

## 2021-12-23 VITALS — HEIGHT: 64 IN | BODY MASS INDEX: 16.71 KG/M2 | WEIGHT: 97.88 LBS

## 2021-12-23 RX ORDER — SODIUM CHLORIDE 9 MG/ML
INJECTION, SOLUTION INTRAVENOUS CONTINUOUS
Status: CANCELLED | OUTPATIENT
Start: 2021-12-25

## 2021-12-23 ASSESSMENT — FIBROSIS 4 INDEX: FIB4 SCORE: 1.504946233098426595

## 2022-01-07 ENCOUNTER — PATIENT MESSAGE (OUTPATIENT)
Dept: MEDICAL GROUP | Facility: PHYSICIAN GROUP | Age: 62
End: 2022-01-07

## 2022-01-07 ENCOUNTER — TELEPHONE (OUTPATIENT)
Dept: MEDICAL GROUP | Facility: PHYSICIAN GROUP | Age: 62
End: 2022-01-07

## 2022-01-08 NOTE — TELEPHONE ENCOUNTER
----- Message from Pita Bueno sent at 1/7/2022  1:54 AM PST -----  Regarding: Blood test response from before  Still waiting on blood level tests to order. Also been very sick. Dizzy  nauseated  stomach cramps,shallow breathing..bp was 74/44. I think I should retest for cdiff. I had it last year and I feel like i did then . If not that maybe some general blood work. Thanks. Uyen Bueno

## 2022-01-08 NOTE — TELEPHONE ENCOUNTER
Spoke to pt on the phone, pt states that she has been having these episodes since New Year's Malissa. Pt feels fine at the moment, pt is driving. Advised pt to go to the ER for evaluation. Pt states that she will go to the ER to get checked out.

## 2022-01-10 ENCOUNTER — HOSPITAL ENCOUNTER (OUTPATIENT)
Facility: MEDICAL CENTER | Age: 62
End: 2022-01-10
Attending: INTERNAL MEDICINE
Payer: OTHER GOVERNMENT

## 2022-01-10 DIAGNOSIS — R19.7 DIARRHEA, UNSPECIFIED TYPE: ICD-10-CM

## 2022-01-10 PROCEDURE — 87493 C DIFF AMPLIFIED PROBE: CPT

## 2022-01-11 DIAGNOSIS — R19.7 DIARRHEA, UNSPECIFIED TYPE: ICD-10-CM

## 2022-01-11 LAB
C DIFF DNA SPEC QL NAA+PROBE: NEGATIVE
C DIFF TOX GENS STL QL NAA+PROBE: NEGATIVE

## 2022-01-14 ENCOUNTER — PRE-ADMISSION TESTING (OUTPATIENT)
Dept: ADMISSIONS | Facility: MEDICAL CENTER | Age: 62
End: 2022-01-14
Attending: PSYCHIATRY & NEUROLOGY
Payer: OTHER GOVERNMENT

## 2022-01-14 DIAGNOSIS — Z01.811 PRE-OPERATIVE RESPIRATORY EXAMINATION: ICD-10-CM

## 2022-01-14 LAB — COVID ORDER STATUS COVID19: NORMAL

## 2022-01-14 PROCEDURE — U0003 INFECTIOUS AGENT DETECTION BY NUCLEIC ACID (DNA OR RNA); SEVERE ACUTE RESPIRATORY SYNDROME CORONAVIRUS 2 (SARS-COV-2) (CORONAVIRUS DISEASE [COVID-19]), AMPLIFIED PROBE TECHNIQUE, MAKING USE OF HIGH THROUGHPUT TECHNOLOGIES AS DESCRIBED BY CMS-2020-01-R: HCPCS

## 2022-01-14 PROCEDURE — U0005 INFEC AGEN DETEC AMPLI PROBE: HCPCS

## 2022-01-16 LAB
SARS-COV-2 RNA RESP QL NAA+PROBE: NOTDETECTED
SPECIMEN SOURCE: NORMAL

## 2022-01-17 ENCOUNTER — HOSPITAL ENCOUNTER (OUTPATIENT)
Dept: RADIOLOGY | Facility: MEDICAL CENTER | Age: 62
End: 2022-01-17
Attending: PSYCHIATRY & NEUROLOGY
Payer: OTHER GOVERNMENT

## 2022-01-17 ENCOUNTER — ANESTHESIA EVENT (OUTPATIENT)
Dept: RADIOLOGY | Facility: MEDICAL CENTER | Age: 62
End: 2022-01-17
Payer: OTHER GOVERNMENT

## 2022-01-17 ENCOUNTER — ANESTHESIA (OUTPATIENT)
Dept: RADIOLOGY | Facility: MEDICAL CENTER | Age: 62
End: 2022-01-17
Payer: OTHER GOVERNMENT

## 2022-01-17 VITALS
WEIGHT: 82.89 LBS | HEIGHT: 65 IN | TEMPERATURE: 97 F | OXYGEN SATURATION: 99 % | SYSTOLIC BLOOD PRESSURE: 99 MMHG | HEART RATE: 64 BPM | DIASTOLIC BLOOD PRESSURE: 55 MMHG | BODY MASS INDEX: 13.81 KG/M2 | RESPIRATION RATE: 16 BRPM

## 2022-01-17 DIAGNOSIS — R27.0 ATAXIA: ICD-10-CM

## 2022-01-17 PROCEDURE — 700105 HCHG RX REV CODE 258: Performed by: ANESTHESIOLOGY

## 2022-01-17 PROCEDURE — A9576 INJ PROHANCE MULTIPACK: HCPCS | Performed by: PSYCHIATRY & NEUROLOGY

## 2022-01-17 PROCEDURE — 700101 HCHG RX REV CODE 250: Performed by: ANESTHESIOLOGY

## 2022-01-17 PROCEDURE — 70553 MRI BRAIN STEM W/O & W/DYE: CPT

## 2022-01-17 PROCEDURE — 700111 HCHG RX REV CODE 636 W/ 250 OVERRIDE (IP): Performed by: ANESTHESIOLOGY

## 2022-01-17 PROCEDURE — 700117 HCHG RX CONTRAST REV CODE 255: Performed by: PSYCHIATRY & NEUROLOGY

## 2022-01-17 RX ORDER — ONDANSETRON 2 MG/ML
INJECTION INTRAMUSCULAR; INTRAVENOUS PRN
Status: DISCONTINUED | OUTPATIENT
Start: 2022-01-17 | End: 2022-01-17 | Stop reason: SURG

## 2022-01-17 RX ORDER — SODIUM CHLORIDE, SODIUM LACTATE, POTASSIUM CHLORIDE, CALCIUM CHLORIDE 600; 310; 30; 20 MG/100ML; MG/100ML; MG/100ML; MG/100ML
INJECTION, SOLUTION INTRAVENOUS CONTINUOUS
Status: DISCONTINUED | OUTPATIENT
Start: 2022-01-17 | End: 2022-01-18 | Stop reason: HOSPADM

## 2022-01-17 RX ORDER — SODIUM CHLORIDE, SODIUM LACTATE, POTASSIUM CHLORIDE, CALCIUM CHLORIDE 600; 310; 30; 20 MG/100ML; MG/100ML; MG/100ML; MG/100ML
INJECTION, SOLUTION INTRAVENOUS
Status: DISCONTINUED | OUTPATIENT
Start: 2022-01-17 | End: 2022-01-17 | Stop reason: SURG

## 2022-01-17 RX ORDER — ONDANSETRON 2 MG/ML
4 INJECTION INTRAMUSCULAR; INTRAVENOUS
Status: DISCONTINUED | OUTPATIENT
Start: 2022-01-17 | End: 2022-01-18 | Stop reason: HOSPADM

## 2022-01-17 RX ORDER — DIPHENHYDRAMINE HYDROCHLORIDE 50 MG/ML
12.5 INJECTION INTRAMUSCULAR; INTRAVENOUS
Status: DISCONTINUED | OUTPATIENT
Start: 2022-01-17 | End: 2022-01-18 | Stop reason: HOSPADM

## 2022-01-17 RX ORDER — MEPERIDINE HYDROCHLORIDE 25 MG/ML
12.5 INJECTION INTRAMUSCULAR; INTRAVENOUS; SUBCUTANEOUS
Status: DISCONTINUED | OUTPATIENT
Start: 2022-01-17 | End: 2022-01-18 | Stop reason: HOSPADM

## 2022-01-17 RX ORDER — HALOPERIDOL 5 MG/ML
1 INJECTION INTRAMUSCULAR
Status: DISCONTINUED | OUTPATIENT
Start: 2022-01-17 | End: 2022-01-18 | Stop reason: HOSPADM

## 2022-01-17 RX ADMIN — EPHEDRINE SULFATE 5 MG: 50 INJECTION INTRAMUSCULAR; INTRAVENOUS; SUBCUTANEOUS at 14:22

## 2022-01-17 RX ADMIN — GLYCOPYRROLATE 0.2 MG: 0.2 INJECTION INTRAMUSCULAR; INTRAVENOUS at 14:20

## 2022-01-17 RX ADMIN — SODIUM CHLORIDE, POTASSIUM CHLORIDE, SODIUM LACTATE AND CALCIUM CHLORIDE: 600; 310; 30; 20 INJECTION, SOLUTION INTRAVENOUS at 14:19

## 2022-01-17 RX ADMIN — PROPOFOL 150 MG: 10 INJECTION, EMULSION INTRAVENOUS at 14:19

## 2022-01-17 RX ADMIN — ONDANSETRON 4 MG: 2 INJECTION INTRAMUSCULAR; INTRAVENOUS at 14:22

## 2022-01-17 RX ADMIN — GADOTERIDOL 10 ML: 279.3 INJECTION, SOLUTION INTRAVENOUS at 15:00

## 2022-01-17 ASSESSMENT — PAIN SCALES - GENERAL: PAIN_LEVEL: 0

## 2022-01-17 ASSESSMENT — FIBROSIS 4 INDEX
FIB4 SCORE: 1.504946233098426595
FIB4 SCORE: 1.504946233098426595

## 2022-01-17 NOTE — ANESTHESIA PROCEDURE NOTES
Airway    Date/Time: 1/17/2022 2:30 PM  Performed by: Robert Perez M.D.  Authorized by: Robert Perez M.D.     Location:  OR  Urgency:  Elective  Indications for Airway Management:  Anesthesia      Spontaneous Ventilation: absent    Sedation Level:  Deep  Preoxygenated: Yes    Final Airway Type:  Supraglottic airway  Final Supraglottic Airway:  Standard LMA    SGA Size:  3  Number of Attempts at Approach:  1

## 2022-01-17 NOTE — DISCHARGE INSTRUCTIONS
MRI ADULT DISCHARGE INSTRUCTIONS    You have been medicated today for your scan. Please follow the instructions below to ensure your safe recovery. If you have any questions or problems, feel free to call us at [phone] or [phone].     1.   Have someone stay with you to assist you as needed.    2.   Do not drive or operate any mechanical devices.    3.   Do not perform any activity that requires concentration. Make no major decisions over the next 24 hours.     4.   Be careful changing positions from laying down to sitting or standing, as you may become dizzy.     5.   Do not drink alcohol for 48 hours.    6.   There are no restrictions for eating your normal meals. Drink fluids.    7.   You may continue your usual medications for pain, tranquilizers, muscle relaxants or sedatives when awake.     8.   Tomorrow, you may continue your normal daily activities.     9.   Pressure dressing on 10 - 15 minutes. If swelling or bleeding occurs when removed, continue placing direct pressure on injection site for another 5 minutes, or until bleeding stops.     I have been informed of and understand the above discharge instructions.

## 2022-01-17 NOTE — ANESTHESIA PREPROCEDURE EVALUATION
Date/Time: 01/17/22 1415    Procedure: MR-BRAIN-WITH & W/O    Diagnosis:       Ataxia [R27.0]          Location: [facility] - MRI - [address]          Relevant Problems   NEURO   (positive) Chronic headache      ENDO   (positive) Hypothyroidism       Physical Exam    Airway   Mallampati: II  TM distance: >3 FB  Neck ROM: full       Cardiovascular - normal exam  Rhythm: regular  Rate: normal  (-) murmur     Dental - normal exam           Pulmonary - normal exam  Breath sounds clear to auscultation     Abdominal    Neurological - normal exam                 Anesthesia Plan    ASA 3       Plan - general       Airway plan will be LMA          Induction: intravenous    Postoperative Plan: Postoperative administration of opioids is intended.    Pertinent diagnostic labs and testing reviewed    Informed Consent:    Anesthetic plan and risks discussed with patient.    Use of blood products discussed with: patient whom consented to blood products.

## 2022-01-17 NOTE — ANESTHESIA POSTPROCEDURE EVALUATION
Patient: Pita Bueno    Procedure Summary     Date: 01/17/22 Room / Location: [facility] MRI - [address]    Anesthesia Start: 1419 Anesthesia Stop: 1509    Procedure: MR-BRAIN-WITH & W/O Diagnosis:       Ataxia          Scheduled Providers:  Responsible Provider: Robert Perez M.D.    Anesthesia Type: general ASA Status: 3          Final Anesthesia Type: general  Last vitals  BP   Blood Pressure: (!) 85/54    Temp   36.9 °C (98.4 °F)    Pulse   (!) 56   Resp   16    SpO2   98 %      Anesthesia Post Evaluation    Patient location during evaluation: PACU  Patient participation: complete - patient participated  Level of consciousness: awake and alert  Pain score: 0    Airway patency: patent  Anesthetic complications: no  Cardiovascular status: adequate and hemodynamically stable  Respiratory status: acceptable  Hydration status: acceptable    PONV: none          No complications documented.     Nurse Pain Score: 0 (NPRS)

## 2022-01-17 NOTE — ANESTHESIA TIME REPORT
Anesthesia Start and Stop Event Times     Date Time Event    1/17/2022 1357 Ready for Procedure     1419 Anesthesia Start     1509 Anesthesia Stop        Responsible Staff  01/17/22    Name Role Begin End    Robert Perez M.D. Anesth 1419 1509        Preop Diagnosis (Free Text):  Pre-op Diagnosis             Preop Diagnosis (Codes):    Premium Reason  A. 3PM - 7AM    Comments:                                                                       
 shunt

## 2022-01-18 NOTE — PROGRESS NOTES
Patient to MRI Outpatient Dept.  Patient informed process and plan of care during this visit.  Anesthesiologist, Dr Perez spoke with patient and discussed provider's plan of care.  MRI brain with and without contrast completed.  Patient taken to recovery. VSS.  No c/o pain or discomfort. DC instructions discussed, all questions answered.  Patient discharged in stable condition with responsible adult,  Pablo.

## 2022-01-19 ENCOUNTER — OFFICE VISIT (OUTPATIENT)
Dept: MEDICAL GROUP | Facility: PHYSICIAN GROUP | Age: 62
End: 2022-01-19
Payer: OTHER GOVERNMENT

## 2022-01-19 VITALS
TEMPERATURE: 98.5 F | DIASTOLIC BLOOD PRESSURE: 60 MMHG | BODY MASS INDEX: 15.52 KG/M2 | SYSTOLIC BLOOD PRESSURE: 92 MMHG | HEIGHT: 63 IN | RESPIRATION RATE: 18 BRPM | WEIGHT: 87.6 LBS | OXYGEN SATURATION: 97 % | HEART RATE: 64 BPM

## 2022-01-19 DIAGNOSIS — Z12.31 ENCOUNTER FOR SCREENING MAMMOGRAM FOR MALIGNANT NEOPLASM OF BREAST: ICD-10-CM

## 2022-01-19 DIAGNOSIS — I95.1 ORTHOSTATIC HYPOTENSION: ICD-10-CM

## 2022-01-19 DIAGNOSIS — Z02.9 ADMINISTRATIVE ENCOUNTER: ICD-10-CM

## 2022-01-19 DIAGNOSIS — Z01.419 WELL WOMAN EXAM: ICD-10-CM

## 2022-01-19 DIAGNOSIS — D83.9 COMMON VARIABLE IMMUNODEFICIENCY (HCC): ICD-10-CM

## 2022-01-19 DIAGNOSIS — E03.9 HYPOTHYROIDISM, UNSPECIFIED TYPE: Chronic | ICD-10-CM

## 2022-01-19 PROCEDURE — 99214 OFFICE O/P EST MOD 30 MIN: CPT | Performed by: INTERNAL MEDICINE

## 2022-01-19 ASSESSMENT — FIBROSIS 4 INDEX: FIB4 SCORE: 1.504946233098426595

## 2022-01-19 ASSESSMENT — PATIENT HEALTH QUESTIONNAIRE - PHQ9: CLINICAL INTERPRETATION OF PHQ2 SCORE: 0

## 2022-01-19 NOTE — ASSESSMENT & PLAN NOTE
Chronic condition.  The patient is currently taking levothyroxine.  Recent TSH completed October 2021Results for HUGH WILCOX JAMES (MRN [id number]) as of 1/19/2022 15:18   Ref. Range 10/27/2021 15:23   TSH Latest Ref Range: 0.380 - 5.330 uIU/mL 1.580

## 2022-01-19 NOTE — ASSESSMENT & PLAN NOTE
This is a chronic condition.  The patient was on midodrine previously.  Systolic currently in the 90s.  Discussed with the patient regarding to make midodrine.  The patient however would like to discuss further with her cardiologist Dr. Calvillo before making this decision

## 2022-01-19 NOTE — PROGRESS NOTES
PRIMARY CARE CLINIC VISIT  Chief Complaint   Patient presents with   • Follow-Up     Disability form  Discuss medical conditions      History of Present Illness     Administrative encounter  Patient presented today requesting disability form to be filled out.    CVID (common variable immunodeficiency) (HCC)  This is a chronic condition.  The patient presently followed by hematologist.  Patient denies fever or chills chest pain shortness of breath night sweats.    Hypothyroidism  Chronic condition.  The patient is currently taking levothyroxine.  Recent TSH completed October 2021Results for HUGH WILCOX (MRN [id number]) as of 1/19/2022 15:18   Ref. Range 10/27/2021 15:23   TSH Latest Ref Range: 0.380 - 5.330 uIU/mL 1.580       Orthostatic hypotension  This is a chronic condition.  The patient was on midodrine previously.  Systolic currently in the 90s.  Discussed with the patient regarding to make midodrine.  The patient however would like to discuss further with her cardiologist Dr. Calvillo before making this decision      Current Outpatient Medications on File Prior to Visit   Medication Sig Dispense Refill   • levothyroxine (SYNTHROID) 50 MCG Tab Take 1 Tablet by mouth every morning on an empty stomach. 90 Tablet 1   • linaCLOtide (LINZESS) 290 MCG Cap Take 290 mcg by mouth every evening. 90 Cap 0     Current Facility-Administered Medications on File Prior to Visit   Medication Dose Route Frequency Provider Last Rate Last Admin   • cyanocobalamin (VITAMIN B-12) injection 1,000 mcg  1,000 mcg Intramuscular Q30 DAYS Clifton Harvey M.D.   1,000 mcg at 10/27/21 1700        Allergies: Sulfa drugs and Tape    ROS  As per HPI above. All other systems reviewed and negative.      Past Medical, Social, and Family history reviewed and updated in EPIC     Objective     BP (!) 92/60 (BP Location: Right arm, Patient Position: Sitting, BP Cuff Size: Adult)   Pulse 64   Temp 36.9 °C (98.5 °F) (Temporal)   Resp 18   Ht 1.6  "m (5' 3\")   Wt 39.7 kg (87 lb 9.6 oz)   SpO2 97%    Body mass index is 15.52 kg/m².    General: alert and oriented  Cardiovascular: regular rate and rhythm  Pulmonary: lungs : no wheezing   Gastrointestinal: BS present. No obvious mass noted  Neuro cranial nerves II to XII grossly intact.    Recent lab test result discussed with the patient.  Results for HUGH WILCOX (MRN [id number]) as of 1/19/2022 15:18   Ref. Range 11/15/2021 13:45   WBC Latest Ref Range: 4.8 - 10.8 K/uL 4.4 (L)   RBC Latest Ref Range: 4.20 - 5.40 M/uL 4.09 (L)   Hemoglobin Latest Ref Range: 12.0 - 16.0 g/dL 13.5   Hematocrit Latest Ref Range: 37.0 - 47.0 % 43.1   MCV Latest Ref Range: 81.4 - 97.8 fL 105.4 (H)   MCH Latest Ref Range: 27.0 - 33.0 pg 33.0   MCHC Latest Ref Range: 33.6 - 35.0 g/dL 31.3 (L)   RDW Latest Ref Range: 35.9 - 50.0 fL 54.3 (H)   Platelet Count Latest Ref Range: 164 - 446 K/uL 153 (L)   MPV Latest Ref Range: 9.0 - 12.9 fL 13.0 (H)   Neutrophils-Polys Latest Ref Range: 44.00 - 72.00 % 69.30   Neutrophils (Absolute) Latest Ref Range: 2.00 - 7.15 K/uL 3.01   Lymphocytes Latest Ref Range: 22.00 - 41.00 % 15.60 (L)   Lymphs (Absolute) Latest Ref Range: 1.00 - 4.80 K/uL 0.68 (L)   Monocytes Latest Ref Range: 0.00 - 13.40 % 12.60   Monos (Absolute) Latest Ref Range: 0.00 - 0.85 K/uL 0.55   Eosinophils Latest Ref Range: 0.00 - 6.90 % 1.60   Eos (Absolute) Latest Ref Range: 0.00 - 0.51 K/uL 0.07   Basophils Latest Ref Range: 0.00 - 1.80 % 0.70   Baso (Absolute) Latest Ref Range: 0.00 - 0.12 K/uL 0.03   Immature Granulocytes Latest Ref Range: 0.00 - 0.90 % 0.20   Immature Granulocytes (abs) Latest Ref Range: 0.00 - 0.11 K/uL 0.01   Nucleated RBC Latest Units: /100 WBC 0.00   NRBC (Absolute) Latest Units: K/uL 0.00   Sodium Latest Ref Range: 135 - 145 mmol/L 134 (L)   Potassium Latest Ref Range: 3.6 - 5.5 mmol/L 4.8   Chloride Latest Ref Range: 96 - 112 mmol/L 107   Co2 Latest Ref Range: 20 - 33 mmol/L 17 (L)   Anion Gap " Latest Ref Range: 7.0 - 16.0  10.0   Glucose Latest Ref Range: 65 - 99 mg/dL 101 (H)   Bun Latest Ref Range: 8 - 22 mg/dL 26 (H)   Creatinine Latest Ref Range: 0.50 - 1.40 mg/dL 0.71   GFR If  Latest Ref Range: >60 mL/min/1.73 m 2 >60   GFR If Non  Latest Ref Range: >60 mL/min/1.73 m 2 >60   Calcium Latest Ref Range: 8.5 - 10.5 mg/dL 9.2   AST(SGOT) Latest Ref Range: 12 - 45 U/L 18   ALT(SGPT) Latest Ref Range: 2 - 50 U/L 22   Alkaline Phosphatase Latest Ref Range: 30 - 99 U/L 73   Total Bilirubin Latest Ref Range: 0.1 - 1.5 mg/dL 0.2   Albumin Latest Ref Range: 3.2 - 4.9 g/dL 4.6   Total Protein Latest Ref Range: 6.0 - 8.2 g/dL 6.7   Globulin Latest Ref Range: 1.9 - 3.5 g/dL 2.1   A-G Ratio Latest Units: g/dL 2.2   Fasting Status Unknown Non-Fasting       Assessment and Plan     1. Administrative encounter  Disability form filled out today.    2. CVID (common variable immunodeficiency) (HCC)  Chronic condition.  Advised the patient to continue follow-up with hematologist as directed.    3. Hypothyroidism, unspecified type  Chronic condition.  Stable.  Continue with levothyroxine.    4. Orthostatic hypotension  As above discussed with the patient regarding midodrine.  The patient stated that she will schedule an appointment to discuss further with her cardiologist.            Rec pt to follow up: Approximately 3-4 months    -If any problems should arise, patient was advised to contact our office for followup or go to ER to be evaluated.      Healthcare maintenance     Mammogram ordered.  Referred The patient to GYN for well woman exam.           Please note that this dictation was created using voice recognition software. I have made every reasonable attempt to correct obvious errors but there may be errors of grammar and content that I may have overlooked prior to finalization of this note.      Clifton Harvey MD  Internal Medicine  [facility]

## 2022-01-19 NOTE — ASSESSMENT & PLAN NOTE
This is a chronic condition.  The patient presently followed by hematologist.  Patient denies fever or chills chest pain shortness of breath night sweats.

## 2022-01-28 ENCOUNTER — HOSPITAL ENCOUNTER (OUTPATIENT)
Dept: RADIOLOGY | Facility: MEDICAL CENTER | Age: 62
End: 2022-01-28
Payer: OTHER GOVERNMENT

## 2022-01-30 ENCOUNTER — APPOINTMENT (OUTPATIENT)
Dept: ONCOLOGY | Facility: MEDICAL CENTER | Age: 62
End: 2022-01-30
Attending: INTERNAL MEDICINE
Payer: OTHER GOVERNMENT

## 2022-02-02 ENCOUNTER — HOSPITAL ENCOUNTER (OUTPATIENT)
Facility: MEDICAL CENTER | Age: 62
End: 2022-02-02
Attending: INTERNAL MEDICINE
Payer: OTHER GOVERNMENT

## 2022-02-02 ENCOUNTER — HOSPITAL ENCOUNTER (OUTPATIENT)
Dept: LAB | Facility: MEDICAL CENTER | Age: 62
End: 2022-02-02
Attending: INTERNAL MEDICINE
Payer: OTHER GOVERNMENT

## 2022-02-02 LAB
CRP SERPL HS-MCNC: <0.3 MG/DL (ref 0–0.75)
ERYTHROCYTE [SEDIMENTATION RATE] IN BLOOD BY WESTERGREN METHOD: 7 MM/HOUR (ref 0–25)
FOLATE SERPL-MCNC: 19.9 NG/ML
PTH-INTACT SERPL-MCNC: 28.8 PG/ML (ref 14–72)
VIT B12 SERPL-MCNC: 330 PG/ML (ref 211–911)

## 2022-02-02 PROCEDURE — 83970 ASSAY OF PARATHORMONE: CPT

## 2022-02-02 PROCEDURE — 83993 ASSAY FOR CALPROTECTIN FECAL: CPT

## 2022-02-02 PROCEDURE — 82103 ALPHA-1-ANTITRYPSIN TOTAL: CPT

## 2022-02-02 PROCEDURE — 82705 FATS/LIPIDS FECES QUAL: CPT

## 2022-02-02 PROCEDURE — 83630 LACTOFERRIN FECAL (QUAL): CPT

## 2022-02-02 PROCEDURE — 86340 INTRINSIC FACTOR ANTIBODY: CPT

## 2022-02-02 PROCEDURE — 82607 VITAMIN B-12: CPT

## 2022-02-02 PROCEDURE — 36415 COLL VENOUS BLD VENIPUNCTURE: CPT

## 2022-02-02 PROCEDURE — 82941 ASSAY OF GASTRIN: CPT

## 2022-02-02 PROCEDURE — 82653 EL-1 FECAL QUANTITATIVE: CPT

## 2022-02-02 PROCEDURE — 82746 ASSAY OF FOLIC ACID SERUM: CPT

## 2022-02-02 PROCEDURE — 83516 IMMUNOASSAY NONANTIBODY: CPT

## 2022-02-02 PROCEDURE — 86140 C-REACTIVE PROTEIN: CPT

## 2022-02-02 PROCEDURE — 85652 RBC SED RATE AUTOMATED: CPT

## 2022-02-04 LAB — PCA IGG SER-ACNC: 1.6 UNITS (ref 0–24.9)

## 2022-02-05 LAB
FAT STL QL: NORMAL
GASTRIN SERPL-MCNC: 21 PG/ML (ref 0–100)
IF BLOCK AB SER QL RIA: NEGATIVE
NEUTRAL FAT STL QL: NORMAL

## 2022-02-06 LAB — LACTOFERRIN STL QL IA: NEGATIVE

## 2022-02-07 ENCOUNTER — OFFICE VISIT (OUTPATIENT)
Dept: NEUROLOGY | Facility: MEDICAL CENTER | Age: 62
End: 2022-02-07
Attending: PSYCHIATRY & NEUROLOGY
Payer: OTHER GOVERNMENT

## 2022-02-07 ENCOUNTER — HOSPITAL ENCOUNTER (OUTPATIENT)
Facility: MEDICAL CENTER | Age: 62
End: 2022-02-07
Attending: INTERNAL MEDICINE
Payer: OTHER GOVERNMENT

## 2022-02-07 VITALS
BODY MASS INDEX: 14.9 KG/M2 | HEART RATE: 53 BPM | RESPIRATION RATE: 15 BRPM | DIASTOLIC BLOOD PRESSURE: 64 MMHG | TEMPERATURE: 98.6 F | HEIGHT: 64 IN | SYSTOLIC BLOOD PRESSURE: 122 MMHG | OXYGEN SATURATION: 97 % | WEIGHT: 87.3 LBS

## 2022-02-07 DIAGNOSIS — R27.0 ATAXIA: ICD-10-CM

## 2022-02-07 LAB
CALPROTECTIN STL-MCNT: 8 UG/G
ELASTASE PANC STL-MCNT: 174 UG/G
MISCELLANEOUS LAB RESULT MISCLAB: NORMAL

## 2022-02-07 PROCEDURE — 83497 ASSAY OF 5-HIAA: CPT

## 2022-02-07 PROCEDURE — 99215 OFFICE O/P EST HI 40 MIN: CPT | Performed by: PSYCHIATRY & NEUROLOGY

## 2022-02-07 PROCEDURE — 99211 OFF/OP EST MAY X REQ PHY/QHP: CPT | Performed by: PSYCHIATRY & NEUROLOGY

## 2022-02-07 PROCEDURE — 84586 ASSAY OF VIP: CPT

## 2022-02-07 RX ORDER — PRUCALOPRIDE 2 MG/1
TABLET, FILM COATED ORAL
COMMUNITY
End: 2022-05-05

## 2022-02-07 ASSESSMENT — FIBROSIS 4 INDEX: FIB4 SCORE: 1.504946233098426595

## 2022-02-08 NOTE — PROGRESS NOTES
[facility]  GENERAL NEUROLOGY  FOLLOW-UP VISIT    CC: allodynia, left hip flexor weakness, possible BPPV    INTERVAL HISTORY:  Pita Bueno is a 61 y.o. woman with headaches, possible BPPV, left hip flexor weakness, and a history otherwise notable for common variable immunodeficiency, pancytopenia, hypothyroidism, chronic fatigue syndrome, chronic diarrhea, severe malnutrition, vitamin B12 and D deficiency, COVID-19, heart failure with preserved ejection fraction, and orthostatic hypotension.  I last saw her in the clinic on 11/30/2021.  At that time I recommended MRI of the brain and referral to PT for balance training.  Today, she was unaccompanied, and she provided the following interval history:    Uyen has noticed worsening balance.  It has become harder for her to walk.  She has a very hard time walking upstairs, and she hasn't gone to the second floor of her home for ~6 weeks.  She did not receive any physical therapy because the sessions were prohibitively expensive.    MEDICATIONS:  Current Outpatient Medications   Medication Sig   • Prucalopride Succinate (MOTEGRITY) 2 MG Tab Take  by mouth.   • ACETAMINOPHEN PO Take  by mouth.   • Sennosides-Docusate Sodium (SENNA-DOCUSATE SODIUM PO) Take  by mouth.   • levothyroxine (SYNTHROID) 50 MCG Tab Take 1 Tablet by mouth every morning on an empty stomach.   • linaCLOtide (LINZESS) 290 MCG Cap Take 290 mcg by mouth every evening. (Patient not taking: Reported on 2/7/2022)     MEDICAL, SOCIAL, AND FAMILY HISTORY:  There is no change in the patient's ROS or medical, social, or family histories since the previous visit on 11/30/2021.    REVIEW OF SYSTEMS:  A ROS was completed.  Pertinent positives and negatives were included in the HPI, above.  All other systems were reviewed and are negative.    PHYSICAL EXAM:  General/Medical:  - NAD  - very thin  - hair, skin, nails, and joints were normal    Neuro:  MENTAL STATUS: awake and alert; no deficits of  "speech or language; oriented to person, place, and time; affect was appropriate to situation; pleasant, cooperative    CRANIAL NERVES:    II: acuity: JNT/JNT, fields: NT, pupils: equal and reactive, no APD, discs: sharp    III/IV/VI: versions: grossly intact without nystagmus; pursuits were smooth    V: facial sensation: NT    VII: facial expression: symmetric    VIII: hearing: intact to voice    IX/X: palate: NT    XI: shoulder shrug: symmetric    XII: tongue: NT    MOTOR:  - bulk: reduced throughout  - tone: normal in the upper and lower extremities  Upper Extremity Strength  (R/L)    5/5   Elbow flexion 5/5   Elbow extension 5/5   Shoulder abduction 5/5     Lower Extremity Strength  (R/L)   Hip flexion 4/4   Knee extension 5/5   Knee flexion 5/5   Ankle plantarflexion NT   Ankle dorsiflexion NT     - can walk on toes and heels  - pronator drift: absent  - abnormal movements: none    SENSATION:  - light touch: grossly intact over the upper and lower extremities  - vibration (R/L, seconds): NT/NT at the great toes  - pinprick: NT  - proprioception: NT  - Romberg: positive    COORDINATION:  - finger to nose: slightly ataxic on the right  - finger tapping: more difficulty on the right side  - heel-to-shin: fairly symmetric    REFLEXES:  Reflex Right Left   BR 2+ 2+   Biceps 2+ 2+   Triceps 2+ 2+   Patellae 2+ 2+   Achilles 1+ 1+   Toes NT NT     GAIT:  - wide-based, ataxic  - heel-raised/toe-raised gait: intact/intact  - tandem gait: attempted, unsuccessful    REVIEW OF IMAGING STUDIES:  MRI Brain:  Date: 1/17/2022  W/o and w/ contrast?: yes  Indication: \"ataxia\"  Comparison: none  Impression:  \"1) No acute abnormality.  2) Mild cerebral volume loss.  3) Mild chronic microvascular ischemic disease.\"    REVIEW OF LABORATORY STUDIES:  2/2/2022:  - folate: 19.9  - vitamin B12: 330    ASSESSMENT:  Pita Bueno is a 61 y.o. woman with ataxia, headaches, possible BPPV, left hip flexor weakness, and a history " "otherwise notable for common variable immunodeficiency, pancytopenia, hypothyroidism, chronic fatigue syndrome, chronic diarrhea, severe malnutrition, vitamin B12 and D deficiency, COVID-19, heart failure with preserved ejection fraction, and orthostatic hypotension.    I do not have a good explanation for Uyen's ataxia/gait difficulty.  Neurologic exam is fairly normal with the exception of gait.  MRI of the head was unremarkable.  Vitamin studies (folate, vitamin B12) were normal.  At this point I don't have any suggestions for additional workup.  Uyen will contact the clinic if she develops additional symptoms which might provide more clues regarding an underlying disease process.    PLAN:  Ataxia:  - no additional workup at this time    Follow-Up:  - Return if symptoms worsen or fail to improve.    Signed: Ammon Rojas M.D.    BILLING DOCUMENTATION:   I spent 40 minutes reviewing the medical record, interviewing and examining the patient, discussing my impression (see \"assessment\" above), and coordinating care.  "

## 2022-02-10 ENCOUNTER — OFFICE VISIT (OUTPATIENT)
Dept: CARDIOLOGY | Facility: MEDICAL CENTER | Age: 62
End: 2022-02-10
Payer: OTHER GOVERNMENT

## 2022-02-10 VITALS
RESPIRATION RATE: 14 BRPM | SYSTOLIC BLOOD PRESSURE: 90 MMHG | HEIGHT: 64 IN | WEIGHT: 88 LBS | BODY MASS INDEX: 15.03 KG/M2 | DIASTOLIC BLOOD PRESSURE: 60 MMHG | OXYGEN SATURATION: 99 % | HEART RATE: 52 BPM

## 2022-02-10 DIAGNOSIS — E71.40 CARNITINE DEFICIENCY (HCC): ICD-10-CM

## 2022-02-10 DIAGNOSIS — R53.1 WEAKNESS: ICD-10-CM

## 2022-02-10 DIAGNOSIS — I50.32 CHRONIC HEART FAILURE WITH PRESERVED EJECTION FRACTION (HCC): ICD-10-CM

## 2022-02-10 DIAGNOSIS — I95.9 HYPOTENSION, UNSPECIFIED HYPOTENSION TYPE: ICD-10-CM

## 2022-02-10 PROCEDURE — 99214 OFFICE O/P EST MOD 30 MIN: CPT | Performed by: INTERNAL MEDICINE

## 2022-02-10 ASSESSMENT — FIBROSIS 4 INDEX: FIB4 SCORE: 1.504946233098426595

## 2022-02-11 ENCOUNTER — HOSPITAL ENCOUNTER (OUTPATIENT)
Dept: LAB | Facility: MEDICAL CENTER | Age: 62
End: 2022-02-11
Attending: INTERNAL MEDICINE
Payer: OTHER GOVERNMENT

## 2022-02-11 DIAGNOSIS — R53.1 WEAKNESS: ICD-10-CM

## 2022-02-11 DIAGNOSIS — I50.32 CHRONIC HEART FAILURE WITH PRESERVED EJECTION FRACTION (HCC): ICD-10-CM

## 2022-02-11 LAB
5HIAA & CREATININE UR-IMP: NORMAL
5OH-INDOLEACETATE 24H UR-MCNC: 1.9 MG/L
5OH-INDOLEACETATE 24H UR-MRATE: 6 MG/D (ref 0–15)
5OH-INDOLEACETATE/CREAT 24H UR: 11 MG/GCR (ref 0–14)
ANION GAP SERPL CALC-SCNC: 9 MMOL/L (ref 7–16)
BUN SERPL-MCNC: 21 MG/DL (ref 8–22)
CALCIUM SERPL-MCNC: 8.9 MG/DL (ref 8.5–10.5)
CHLORIDE SERPL-SCNC: 102 MMOL/L (ref 96–112)
CO2 SERPL-SCNC: 20 MMOL/L (ref 20–33)
COLLECT DURATION TIME SPEC: 24 HRS
CORTIS SERPL-MCNC: 14.5 UG/DL (ref 0–23)
CREAT 24H UR-MCNC: 17 MG/DL
CREAT 24H UR-MRATE: 527 MG/D (ref 500–1400)
CREAT SERPL-MCNC: 0.56 MG/DL (ref 0.5–1.4)
FASTING STATUS PATIENT QL REPORTED: NORMAL
GLUCOSE SERPL-MCNC: 89 MG/DL (ref 65–99)
NT-PROBNP SERPL IA-MCNC: 54 PG/ML (ref 0–125)
POTASSIUM SERPL-SCNC: 4.6 MMOL/L (ref 3.6–5.5)
SODIUM SERPL-SCNC: 131 MMOL/L (ref 135–145)
SPECIMEN VOL ?TM UR: 3100 ML

## 2022-02-11 PROCEDURE — 80048 BASIC METABOLIC PNL TOTAL CA: CPT

## 2022-02-11 PROCEDURE — 82533 TOTAL CORTISOL: CPT

## 2022-02-11 PROCEDURE — 83880 ASSAY OF NATRIURETIC PEPTIDE: CPT

## 2022-02-11 PROCEDURE — 36415 COLL VENOUS BLD VENIPUNCTURE: CPT

## 2022-02-11 NOTE — PROGRESS NOTES
CARDIOLOGY OUTPATIENT FOLLOWUP    PCP: Clifton Harvey M.D.    1. Chronic heart failure with preserved ejection fraction (HCC)    2. Weakness    3. Carnitine deficiency (HCC)    4. Hypotension, unspecified hypotension type        Pita Bueno is experiencing worsening fatigue and weakness without other overt signs of heart failure.  Nonetheless given her prior history there is concern and given the severity of symptoms prompt evaluation is warranted.  Advised updating the echocardiogram laboratory profile including the carnitine panel in the morning cortisol.  In the meantime I advised continued sodium supplementation, but did not prescribe midodrine given her prior intolerance of this therapy.    Follow up: 1 year    Chief Complaint   Patient presents with   • Other     F/V Dx: JOSÉ (dyspnea on exertion)   • Congestive Heart Failure     F/V Dx: Acute on chronic heart failure with preserved ejection fraction (HCC)   • Palpitations       History: Pita Bueno is a 61 y.o. female with history of malnutrition, common variable immunodeficiency, Colostomy presenting for follow-up.  The past 2 months she has experienced gradual decline in her exertional capacity and stamina.  She can no longer climb the stairs in her home and she has been losing weight again.  She was found to have a moderately reduced stool elastase level, negative stool fat and intolerance of Creon.  She also has chronic nausea.  The concern has been raised that her symptoms may be cardiac in origin and she had been advised to seek out evaluation again.     In January 2021 she was hospitalized with edema following a infection with Covid and C. difficile with subsequent weight loss.  She had a rise in the BNP.  That along with edema resolved with diuretic therapy.  Carnitine deficiency was diagnosed and treated.  She had been stable without recurrent cardiac symptoms since that time.          ROS:   All other systems reviewed and  "negative except as per the HPI    PE:  BP (!) 90/60 (BP Location: Left arm, Patient Position: Sitting, BP Cuff Size: Adult)   Pulse (!) 52   Resp 14   Ht 1.626 m (5' 4\")   Wt 39.9 kg (88 lb)   LMP  (LMP Unknown)   SpO2 99%   BMI 15.11 kg/m²   Gen: no acute distress  HEENT: Symmetric face. Anicteric sclerae. Moist mucus membranes  NECK: No JVD. No lymphadenopathy  CARDIAC: Regular, Normal S1, S2, No murmur  VASCULATURE: carotids are normal bilaterally without bruit  RESP: Clear to auscultation bilaterally  ABD: Soft, non-tender, non-distended  EXT: No edema, no clubbing or cyanosis  SKIN: Warm and dry  NEURO: No gross deficits  PSYCH: Appropriate affect, participates in conversation    The 10-year ASCVD risk score (Drake GOODE Jr., et al., 2013) is: 1.5%    Past Medical History:   Diagnosis Date   • Adverse effect of anesthesia    • Anemia    • Arthritis     thumbs, hands   • Bowel habit changes 04/01/2021    constipation and diarrhea, currently using colostomy bag   • Breath shortness     \"when laying down, comes and goes\"   • Bronchitis     frequent   • C. difficile diarrhea 09/2020   • Colostomy in place (HCC)    • Congestive heart failure (HCC)     recent   • Cough     chronic   • CVID (common variable immunodeficiency) (HCC)    • Dental disorder     infected tooth   • Eating disorder    • Edema 01/2021    Bilat LE, Pt states having pain LLE and DVT being ruled out 1/27   • Fatty liver disease, nonalcoholic    • Hemorrhagic disorder (HCC) 01/2021    states she has noticed she is bleeding easier than normal   • High cholesterol    • Hypotension 04/01/2021    pt reports 70/40 up to 100/60   • Hypothyroid    • Infectious disease 11/22/2020    covid   • Pain     hands and stomach   • Palpitations    • Renal disorder     occaisonal high BUN     Allergies   Allergen Reactions   • Sulfa Drugs Hives   • Tape Rash     Rash and rips thin skin     Outpatient Encounter Medications as of 2/10/2022   Medication Sig Dispense " Refill   • Prucalopride Succinate (MOTEGRITY) 2 MG Tab Take  by mouth.     • ACETAMINOPHEN PO Take  by mouth.     • Sennosides-Docusate Sodium (SENNA-DOCUSATE SODIUM PO) Take  by mouth.     • levothyroxine (SYNTHROID) 50 MCG Tab Take 1 Tablet by mouth every morning on an empty stomach. 90 Tablet 1   • [DISCONTINUED] linaCLOtide (LINZESS) 290 MCG Cap Take 290 mcg by mouth every evening. (Patient not taking: Reported on 2/7/2022) 90 Cap 0     Facility-Administered Encounter Medications as of 2/10/2022   Medication Dose Route Frequency Provider Last Rate Last Admin   • cyanocobalamin (VITAMIN B-12) injection 1,000 mcg  1,000 mcg Intramuscular Q30 DAYS Clifton Harvey M.D.   1,000 mcg at 10/27/21 1700     Social History     Socioeconomic History   • Marital status:      Spouse name: Not on file   • Number of children: Not on file   • Years of education: Not on file   • Highest education level: Not on file   Occupational History   • Not on file   Tobacco Use   • Smoking status: Never Smoker   • Smokeless tobacco: Never Used   Vaping Use   • Vaping Use: Never used   Substance and Sexual Activity   • Alcohol use: Never   • Drug use: Never   • Sexual activity: Not Currently   Other Topics Concern   • Not on file   Social History Narrative   • Not on file     Social Determinants of Health     Financial Resource Strain:    • Difficulty of Paying Living Expenses: Not on file   Food Insecurity:    • Worried About Running Out of Food in the Last Year: Not on file   • Ran Out of Food in the Last Year: Not on file   Transportation Needs:    • Lack of Transportation (Medical): Not on file   • Lack of Transportation (Non-Medical): Not on file   Physical Activity:    • Days of Exercise per Week: Not on file   • Minutes of Exercise per Session: Not on file   Stress:    • Feeling of Stress : Not on file   Social Connections:    • Frequency of Communication with Friends and Family: Not on file   • Frequency of Social Gatherings  with Friends and Family: Not on file   • Attends Anabaptist Services: Not on file   • Active Member of Clubs or Organizations: Not on file   • Attends Club or Organization Meetings: Not on file   • Marital Status: Not on file   Intimate Partner Violence:    • Fear of Current or Ex-Partner: Not on file   • Emotionally Abused: Not on file   • Physically Abused: Not on file   • Sexually Abused: Not on file   Housing Stability:    • Unable to Pay for Housing in the Last Year: Not on file   • Number of Places Lived in the Last Year: Not on file   • Unstable Housing in the Last Year: Not on file       Studies  Lab Results   Component Value Date/Time    CHOLSTRLTOT 235 (H) 02/05/2020 02:04 PM     (H) 02/05/2020 02:04 PM    HDL 79 02/05/2020 02:04 PM    TRIGLYCERIDE 124 02/05/2020 02:04 PM       Lab Results   Component Value Date/Time    SODIUM 134 (L) 11/15/2021 01:45 PM    POTASSIUM 4.8 11/15/2021 01:45 PM    CHLORIDE 107 11/15/2021 01:45 PM    CO2 17 (L) 11/15/2021 01:45 PM    GLUCOSE 101 (H) 11/15/2021 01:45 PM    BUN 26 (H) 11/15/2021 01:45 PM    CREATININE 0.71 11/15/2021 01:45 PM     Lab Results   Component Value Date/Time    ALKPHOSPHAT 73 11/15/2021 01:45 PM    ASTSGOT 18 11/15/2021 01:45 PM    ALTSGPT 22 11/15/2021 01:45 PM    TBILIRUBIN 0.2 11/15/2021 01:45 PM        For this encounter I reviewed the following medical records BMP, Lipid profile and CBC

## 2022-02-12 ENCOUNTER — OUTPATIENT INFUSION SERVICES (OUTPATIENT)
Dept: ONCOLOGY | Facility: MEDICAL CENTER | Age: 62
End: 2022-02-12
Attending: INTERNAL MEDICINE
Payer: OTHER GOVERNMENT

## 2022-02-12 VITALS
RESPIRATION RATE: 18 BRPM | DIASTOLIC BLOOD PRESSURE: 56 MMHG | TEMPERATURE: 97.4 F | BODY MASS INDEX: 15.51 KG/M2 | HEIGHT: 63 IN | WEIGHT: 87.52 LBS | OXYGEN SATURATION: 100 % | HEART RATE: 55 BPM | SYSTOLIC BLOOD PRESSURE: 84 MMHG

## 2022-02-12 DIAGNOSIS — D83.9 COMMON VARIABLE IMMUNODEFICIENCY (HCC): ICD-10-CM

## 2022-02-12 PROCEDURE — 96365 THER/PROPH/DIAG IV INF INIT: CPT

## 2022-02-12 PROCEDURE — A9270 NON-COVERED ITEM OR SERVICE: HCPCS | Performed by: INTERNAL MEDICINE

## 2022-02-12 PROCEDURE — 700102 HCHG RX REV CODE 250 W/ 637 OVERRIDE(OP): Performed by: INTERNAL MEDICINE

## 2022-02-12 PROCEDURE — 96367 TX/PROPH/DG ADDL SEQ IV INF: CPT

## 2022-02-12 PROCEDURE — 700111 HCHG RX REV CODE 636 W/ 250 OVERRIDE (IP): Performed by: INTERNAL MEDICINE

## 2022-02-12 PROCEDURE — 96366 THER/PROPH/DIAG IV INF ADDON: CPT

## 2022-02-12 RX ORDER — ACETAMINOPHEN 325 MG/1
650 TABLET ORAL ONCE
Status: CANCELLED | OUTPATIENT
Start: 2022-02-19 | End: 2022-02-19

## 2022-02-12 RX ORDER — DIPHENHYDRAMINE HYDROCHLORIDE 50 MG/ML
25 INJECTION INTRAMUSCULAR; INTRAVENOUS
Status: CANCELLED
Start: 2022-02-19

## 2022-02-12 RX ORDER — ACETAMINOPHEN 325 MG/1
650 TABLET ORAL ONCE
Status: COMPLETED | OUTPATIENT
Start: 2022-02-12 | End: 2022-02-12

## 2022-02-12 RX ORDER — SODIUM CHLORIDE 9 MG/ML
INJECTION, SOLUTION INTRAVENOUS CONTINUOUS
Status: CANCELLED | OUTPATIENT
Start: 2022-02-19

## 2022-02-12 RX ADMIN — ACETAMINOPHEN 650 MG: 325 TABLET ORAL at 15:56

## 2022-02-12 RX ADMIN — IMMUNE GLOBULIN INFUSION (HUMAN) 15 G: 100 INJECTION, SOLUTION INTRAVENOUS; SUBCUTANEOUS at 16:20

## 2022-02-12 RX ADMIN — DIPHENHYDRAMINE HYDROCHLORIDE 50 MG: 50 INJECTION INTRAMUSCULAR; INTRAVENOUS at 15:57

## 2022-02-12 ASSESSMENT — FIBROSIS 4 INDEX: FIB4 SCORE: 1.504946233098426595

## 2022-02-13 ENCOUNTER — APPOINTMENT (OUTPATIENT)
Dept: ONCOLOGY | Facility: MEDICAL CENTER | Age: 62
End: 2022-02-13
Attending: INTERNAL MEDICINE
Payer: OTHER GOVERNMENT

## 2022-02-13 NOTE — PROGRESS NOTES
Uyen arrived ambulatory to [facility] for IVIG infusion. PIV established, brisk blood return noted. Pt medicated per MAR. IVIG titrated, starting at 20ml/hr titrated to 160ml/hr. Pt tolerated treatment without s/s adverse reaction. PIV dc'd catheter tip intact, gauze and coban dressing applied. Pt discharged to self care, NAD. Pt's next appt confirmed for 3/13/2022.

## 2022-02-14 LAB — VIP SERPL-MCNC: <13 PG/ML (ref 0–60)

## 2022-02-15 ENCOUNTER — APPOINTMENT (OUTPATIENT)
Dept: RADIOLOGY | Facility: MEDICAL CENTER | Age: 62
End: 2022-02-15
Attending: INTERNAL MEDICINE
Payer: OTHER GOVERNMENT

## 2022-02-18 LAB
3OH-DODECANOYLCARN SERPL-SCNC: 0.01 UMOL/L
3OH-ISOVALERYLCARN SERPL-SCNC: 0.06 UMOL/L
3OH-LINOLEOYLCARN SERPL-SCNC: <0.01 UMOL/L
3OH-OLEOYLCARN SERPL-SCNC: <0.01 UMOL/L
3OH-PALMITOLEYLCARN SERPL-SCNC: 0.01 UMOL/L
3OH-PALMITOYLCARN SERPL-SCNC: <0.01 UMOL/L
3OH-STEAROYLCARN SERPL-SCNC: <0.01 UMOL/L
3OH-TDECANOYLCARN SERPL-SCNC: <0.01 UMOL/L
3OH-TDECENOYLCARN SERPL-SCNC: 0.01 UMOL/L
ACETYLCARN SERPL-SCNC: 11.08 UMOL/L (ref 2.93–15.06)
ACYLCARNITINE PATTERN SERPL-IMP: NORMAL
BUTYRYLCARN SERPL-SCNC: 0.22 UMOL/L
CARN ESTERS SERPL-SCNC: 12 UMOL/L (ref 5–29)
CARN ESTERS/C0 SERPL-SRTO: 0.3 RATIO (ref 0.1–1)
CARNITINE FREE SERPL-SCNC: 40 UMOL/L (ref 25–60)
CARNITINE SERPL-SCNC: 52 UMOL/L (ref 34–86)
DECANOYLCARN SERPL-SCNC: 0.16 UMOL/L
DECENOYLCARN SERPL-SCNC: 0.17 UMOL/L
DODECANOYLCARN SERPL-SCNC: 0.04 UMOL/L
DODECENOYLCARN SERPL-SCNC: 0.05 UMOL/L
GLUTARYLCARN SERPL-SCNC: 0.12 UMOL/L
HEXANOYLCARN SERPL-SCNC: 0.05 UMOL/L
ISOVALERYL+MEBUTYRYLCARN SERPL-SCNC: 0.07 UMOL/L
LINOLEOYLCARN SERPL-SCNC: 0.04 UMOL/L
OCTANOYLCARN SERPL-SCNC: 0.12 UMOL/L
OCTENOYLCARN SERPL-SCNC: 0.13 UMOL/L
OLEOYLCARN SERPL-SCNC: 0.08 UMOL/L
PALMITOLEYLCARN SERPL-SCNC: 0.02 UMOL/L
PALMITOYLCARN SERPL-SCNC: 0.07 UMOL/L
PROPIONYLCARN SERPL-SCNC: 0.28 UMOL/L
STEAROYLCARN SERPL-SCNC: 0.02 UMOL/L
TDECADIENOYLCARN SERPL-SCNC: 0.02 UMOL/L
TDECANOYLCARN SERPL-SCNC: 0.02 UMOL/L
TDECENOYLCARN SERPL-SCNC: 0.05 UMOL/L

## 2022-02-22 ENCOUNTER — PATIENT MESSAGE (OUTPATIENT)
Dept: CARDIOLOGY | Facility: MEDICAL CENTER | Age: 62
End: 2022-02-22
Payer: OTHER GOVERNMENT

## 2022-02-24 ENCOUNTER — HOSPITAL ENCOUNTER (OUTPATIENT)
Dept: RADIOLOGY | Facility: MEDICAL CENTER | Age: 62
End: 2022-02-24
Attending: INTERNAL MEDICINE
Payer: OTHER GOVERNMENT

## 2022-02-24 DIAGNOSIS — R10.32 ABDOMINAL PAIN, LEFT LOWER QUADRANT: ICD-10-CM

## 2022-02-24 PROCEDURE — 700117 HCHG RX CONTRAST REV CODE 255: Performed by: INTERNAL MEDICINE

## 2022-02-24 PROCEDURE — 74177 CT ABD & PELVIS W/CONTRAST: CPT

## 2022-02-24 RX ADMIN — IOHEXOL 85 ML: 350 INJECTION, SOLUTION INTRAVENOUS at 15:13

## 2022-02-25 ENCOUNTER — HOSPITAL ENCOUNTER (OUTPATIENT)
Dept: CARDIOLOGY | Facility: MEDICAL CENTER | Age: 62
End: 2022-02-25
Attending: INTERNAL MEDICINE
Payer: OTHER GOVERNMENT

## 2022-02-25 DIAGNOSIS — I50.32 CHRONIC HEART FAILURE WITH PRESERVED EJECTION FRACTION (HCC): ICD-10-CM

## 2022-02-25 PROCEDURE — 93306 TTE W/DOPPLER COMPLETE: CPT

## 2022-02-28 ENCOUNTER — HOSPITAL ENCOUNTER (OUTPATIENT)
Facility: MEDICAL CENTER | Age: 62
End: 2022-02-28
Attending: INTERNAL MEDICINE | Admitting: INTERNAL MEDICINE
Payer: OTHER GOVERNMENT

## 2022-02-28 ENCOUNTER — PATIENT MESSAGE (OUTPATIENT)
Dept: CARDIOLOGY | Facility: MEDICAL CENTER | Age: 62
End: 2022-02-28

## 2022-02-28 LAB
LV EJECT FRACT  99904: 65
LV EJECT FRACT MOD 4C 99902: 64.94

## 2022-02-28 PROCEDURE — 93306 TTE W/DOPPLER COMPLETE: CPT | Mod: 26 | Performed by: INTERNAL MEDICINE

## 2022-03-01 ENCOUNTER — PATIENT MESSAGE (OUTPATIENT)
Dept: CARDIOLOGY | Facility: MEDICAL CENTER | Age: 62
End: 2022-03-01
Payer: OTHER GOVERNMENT

## 2022-03-01 NOTE — PATIENT COMMUNICATION
Echo results   Abran Calvillo M.D.  You 16 hours ago (5:26 PM)       The mitral valve prolapse is mild with barely detectable mitral regurgitation. Given it is so mild, Im guessing it was present previously but just escaped detection on the prior echocardiogram - which did see this same mitral regurgitation (just not the prolapsing valve).  This is not something that is expected to produce any symptoms but does have the potential to progress over time.  If she is concerned, we could certainly arrange another echocardiogram sooner than the 5-year brandi saying 1 or 2 years which ever she is more comfortable with..     Thanks   BE    Message text      You  Abran Calvillo M.D. 18 hours ago (3:42 PM)   KG  Hi! Please advise. Patient concerned she should be in the hospital. Thanks!    Message text      ____________________________________________________    MyChart message sent to patient.

## 2022-03-01 NOTE — PATIENT COMMUNICATION
: Echo results   Received: Today  Abran Calvillo M.D.  Shauna Lyons R.N.  I dont see anything with the heart that would account for this. We did discuss repeating a stress test or empirically trying carnitine supplementation again but I doubt these would be useful.     Thanks   BE      Previous Messages     ----- Message -----   From: Shauna Lyons R.N.   Sent: 3/1/2022  11:26 AM PST   To: Abran Calvillo M.D.   Subject: FW: Echo results                                 Hi! Any recommendations for patient's symptoms besides reaching out to PCP for follow up? Thanks!   ___________________________________________    MyChart message sent to patient.

## 2022-03-02 NOTE — PROGRESS NOTES
Blood collected and sent to lab.    Patient fell face first onto a metal bar then fell 6 feet on his back.  He got up and ran.  His mouth is bleeding, fell asleep and wouldn't wake up with shaking.

## 2022-03-03 ENCOUNTER — HOSPITAL ENCOUNTER (OUTPATIENT)
Dept: RADIOLOGY | Facility: MEDICAL CENTER | Age: 62
End: 2022-03-03
Attending: INTERNAL MEDICINE
Payer: OTHER GOVERNMENT

## 2022-03-03 DIAGNOSIS — Z12.31 ENCOUNTER FOR SCREENING MAMMOGRAM FOR MALIGNANT NEOPLASM OF BREAST: ICD-10-CM

## 2022-03-03 PROCEDURE — 77063 BREAST TOMOSYNTHESIS BI: CPT

## 2022-03-04 ENCOUNTER — PRE-ADMISSION TESTING (OUTPATIENT)
Dept: ADMISSIONS | Facility: MEDICAL CENTER | Age: 62
End: 2022-03-04
Attending: INTERNAL MEDICINE
Payer: OTHER GOVERNMENT

## 2022-03-04 VITALS — BODY MASS INDEX: 13.78 KG/M2 | HEIGHT: 64 IN | WEIGHT: 80.69 LBS

## 2022-03-04 DIAGNOSIS — Z01.810 PRE-OPERATIVE CARDIOVASCULAR EXAMINATION: ICD-10-CM

## 2022-03-04 DIAGNOSIS — Z01.812 PRE-OPERATIVE LABORATORY EXAMINATION: ICD-10-CM

## 2022-03-04 LAB
ALBUMIN SERPL BCP-MCNC: 4.6 G/DL (ref 3.2–4.9)
ALBUMIN/GLOB SERPL: 2.3 G/DL
ALP SERPL-CCNC: 49 U/L (ref 30–99)
ALT SERPL-CCNC: 77 U/L (ref 2–50)
ANION GAP SERPL CALC-SCNC: 12 MMOL/L (ref 7–16)
AST SERPL-CCNC: 38 U/L (ref 12–45)
BILIRUB SERPL-MCNC: 0.5 MG/DL (ref 0.1–1.5)
BUN SERPL-MCNC: 22 MG/DL (ref 8–22)
CALCIUM SERPL-MCNC: 9.3 MG/DL (ref 8.5–10.5)
CHLORIDE SERPL-SCNC: 99 MMOL/L (ref 96–112)
CO2 SERPL-SCNC: 18 MMOL/L (ref 20–33)
CREAT SERPL-MCNC: 0.58 MG/DL (ref 0.5–1.4)
EKG IMPRESSION: NORMAL
ERYTHROCYTE [DISTWIDTH] IN BLOOD BY AUTOMATED COUNT: 50.5 FL (ref 35.9–50)
GLOBULIN SER CALC-MCNC: 2 G/DL (ref 1.9–3.5)
GLUCOSE SERPL-MCNC: 81 MG/DL (ref 65–99)
HCT VFR BLD AUTO: 39.7 % (ref 37–47)
HGB BLD-MCNC: 13.1 G/DL (ref 12–16)
MCH RBC QN AUTO: 33.5 PG (ref 27–33)
MCHC RBC AUTO-ENTMCNC: 33 G/DL (ref 33.6–35)
MCV RBC AUTO: 101.5 FL (ref 81.4–97.8)
PLATELET # BLD AUTO: 147 K/UL (ref 164–446)
PMV BLD AUTO: 12.4 FL (ref 9–12.9)
POTASSIUM SERPL-SCNC: 4.4 MMOL/L (ref 3.6–5.5)
PROT SERPL-MCNC: 6.6 G/DL (ref 6–8.2)
RBC # BLD AUTO: 3.91 M/UL (ref 4.2–5.4)
SARS-COV-2 RNA RESP QL NAA+PROBE: NOTDETECTED
SODIUM SERPL-SCNC: 129 MMOL/L (ref 135–145)
SPECIMEN SOURCE: NORMAL
WBC # BLD AUTO: 2.7 K/UL (ref 4.8–10.8)

## 2022-03-04 PROCEDURE — 93005 ELECTROCARDIOGRAM TRACING: CPT

## 2022-03-04 PROCEDURE — 85027 COMPLETE CBC AUTOMATED: CPT

## 2022-03-04 PROCEDURE — U0005 INFEC AGEN DETEC AMPLI PROBE: HCPCS

## 2022-03-04 PROCEDURE — 93010 ELECTROCARDIOGRAM REPORT: CPT | Performed by: INTERNAL MEDICINE

## 2022-03-04 PROCEDURE — 36415 COLL VENOUS BLD VENIPUNCTURE: CPT

## 2022-03-04 PROCEDURE — 80053 COMPREHEN METABOLIC PANEL: CPT

## 2022-03-04 PROCEDURE — U0003 INFECTIOUS AGENT DETECTION BY NUCLEIC ACID (DNA OR RNA); SEVERE ACUTE RESPIRATORY SYNDROME CORONAVIRUS 2 (SARS-COV-2) (CORONAVIRUS DISEASE [COVID-19]), AMPLIFIED PROBE TECHNIQUE, MAKING USE OF HIGH THROUGHPUT TECHNOLOGIES AS DESCRIBED BY CMS-2020-01-R: HCPCS

## 2022-03-04 PROCEDURE — C9803 HOPD COVID-19 SPEC COLLECT: HCPCS

## 2022-03-04 RX ORDER — LINACLOTIDE 290 UG/1
CAPSULE, GELATIN COATED ORAL DAILY
Status: ON HOLD | COMMUNITY
End: 2022-03-09

## 2022-03-04 ASSESSMENT — FIBROSIS 4 INDEX: FIB4 SCORE: 1.504946233098426595

## 2022-03-08 ENCOUNTER — APPOINTMENT (OUTPATIENT)
Dept: RADIOLOGY | Facility: MEDICAL CENTER | Age: 62
End: 2022-03-08
Attending: EMERGENCY MEDICINE
Payer: OTHER GOVERNMENT

## 2022-03-08 ENCOUNTER — HOSPITAL ENCOUNTER (EMERGENCY)
Facility: MEDICAL CENTER | Age: 62
End: 2022-03-08
Attending: EMERGENCY MEDICINE
Payer: OTHER GOVERNMENT

## 2022-03-08 VITALS
BODY MASS INDEX: 13.66 KG/M2 | WEIGHT: 80 LBS | DIASTOLIC BLOOD PRESSURE: 53 MMHG | RESPIRATION RATE: 27 BRPM | HEIGHT: 64 IN | SYSTOLIC BLOOD PRESSURE: 90 MMHG | TEMPERATURE: 96.8 F | OXYGEN SATURATION: 96 % | HEART RATE: 62 BPM

## 2022-03-08 DIAGNOSIS — E86.0 DEHYDRATION: ICD-10-CM

## 2022-03-08 DIAGNOSIS — R11.0 NAUSEA: ICD-10-CM

## 2022-03-08 LAB
ALBUMIN SERPL BCP-MCNC: 4.5 G/DL (ref 3.2–4.9)
ALBUMIN/GLOB SERPL: 2.6 G/DL
ALP SERPL-CCNC: 46 U/L (ref 30–99)
ALT SERPL-CCNC: 55 U/L (ref 2–50)
ANION GAP SERPL CALC-SCNC: 12 MMOL/L (ref 7–16)
AST SERPL-CCNC: 38 U/L (ref 12–45)
BASOPHILS # BLD AUTO: 0.6 % (ref 0–1.8)
BASOPHILS # BLD: 0.02 K/UL (ref 0–0.12)
BILIRUB SERPL-MCNC: 0.3 MG/DL (ref 0.1–1.5)
BUN SERPL-MCNC: 50 MG/DL (ref 8–22)
CALCIUM SERPL-MCNC: 8.4 MG/DL (ref 8.5–10.5)
CHLORIDE SERPL-SCNC: 105 MMOL/L (ref 96–112)
CO2 SERPL-SCNC: 17 MMOL/L (ref 20–33)
CREAT SERPL-MCNC: 0.79 MG/DL (ref 0.5–1.4)
EKG IMPRESSION: NORMAL
EOSINOPHIL # BLD AUTO: 0.02 K/UL (ref 0–0.51)
EOSINOPHIL NFR BLD: 0.6 % (ref 0–6.9)
ERYTHROCYTE [DISTWIDTH] IN BLOOD BY AUTOMATED COUNT: 51.4 FL (ref 35.9–50)
GLOBULIN SER CALC-MCNC: 1.7 G/DL (ref 1.9–3.5)
GLUCOSE SERPL-MCNC: 83 MG/DL (ref 65–99)
HCT VFR BLD AUTO: 37.4 % (ref 37–47)
HGB BLD-MCNC: 12.2 G/DL (ref 12–16)
IMM GRANULOCYTES # BLD AUTO: 0.01 K/UL (ref 0–0.11)
IMM GRANULOCYTES NFR BLD AUTO: 0.3 % (ref 0–0.9)
LYMPHOCYTES # BLD AUTO: 0.61 K/UL (ref 1–4.8)
LYMPHOCYTES NFR BLD: 19.3 % (ref 22–41)
MCH RBC QN AUTO: 33.6 PG (ref 27–33)
MCHC RBC AUTO-ENTMCNC: 32.6 G/DL (ref 33.6–35)
MCV RBC AUTO: 103 FL (ref 81.4–97.8)
MONOCYTES # BLD AUTO: 0.27 K/UL (ref 0–0.85)
MONOCYTES NFR BLD AUTO: 8.5 % (ref 0–13.4)
NEUTROPHILS # BLD AUTO: 2.23 K/UL (ref 2–7.15)
NEUTROPHILS NFR BLD: 70.7 % (ref 44–72)
NRBC # BLD AUTO: 0 K/UL
NRBC BLD-RTO: 0 /100 WBC
PLATELET # BLD AUTO: 128 K/UL (ref 164–446)
PMV BLD AUTO: 12.1 FL (ref 9–12.9)
POTASSIUM SERPL-SCNC: 4.1 MMOL/L (ref 3.6–5.5)
PROT SERPL-MCNC: 6.2 G/DL (ref 6–8.2)
RBC # BLD AUTO: 3.63 M/UL (ref 4.2–5.4)
SODIUM SERPL-SCNC: 134 MMOL/L (ref 135–145)
TROPONIN T SERPL-MCNC: 6 NG/L (ref 6–19)
WBC # BLD AUTO: 3.2 K/UL (ref 4.8–10.8)

## 2022-03-08 PROCEDURE — 84484 ASSAY OF TROPONIN QUANT: CPT

## 2022-03-08 PROCEDURE — 93005 ELECTROCARDIOGRAM TRACING: CPT | Performed by: EMERGENCY MEDICINE

## 2022-03-08 PROCEDURE — 36415 COLL VENOUS BLD VENIPUNCTURE: CPT

## 2022-03-08 PROCEDURE — 85025 COMPLETE CBC W/AUTO DIFF WBC: CPT

## 2022-03-08 PROCEDURE — 700105 HCHG RX REV CODE 258: Performed by: EMERGENCY MEDICINE

## 2022-03-08 PROCEDURE — 71045 X-RAY EXAM CHEST 1 VIEW: CPT

## 2022-03-08 PROCEDURE — 96374 THER/PROPH/DIAG INJ IV PUSH: CPT

## 2022-03-08 PROCEDURE — 700111 HCHG RX REV CODE 636 W/ 250 OVERRIDE (IP): Performed by: EMERGENCY MEDICINE

## 2022-03-08 PROCEDURE — 80053 COMPREHEN METABOLIC PANEL: CPT

## 2022-03-08 PROCEDURE — 99285 EMERGENCY DEPT VISIT HI MDM: CPT

## 2022-03-08 PROCEDURE — 93005 ELECTROCARDIOGRAM TRACING: CPT

## 2022-03-08 RX ORDER — ONDANSETRON 2 MG/ML
4 INJECTION INTRAMUSCULAR; INTRAVENOUS ONCE
Status: COMPLETED | OUTPATIENT
Start: 2022-03-08 | End: 2022-03-08

## 2022-03-08 RX ORDER — METOCLOPRAMIDE 10 MG/1
10 TABLET ORAL
Qty: 30 TABLET | Refills: 0 | Status: SHIPPED | OUTPATIENT
Start: 2022-03-08 | End: 2022-03-16

## 2022-03-08 RX ORDER — SODIUM CHLORIDE 9 MG/ML
1000 INJECTION, SOLUTION INTRAVENOUS ONCE
Status: COMPLETED | OUTPATIENT
Start: 2022-03-08 | End: 2022-03-08

## 2022-03-08 RX ADMIN — SODIUM CHLORIDE 1000 ML: 9 INJECTION, SOLUTION INTRAVENOUS at 14:54

## 2022-03-08 RX ADMIN — ONDANSETRON 4 MG: 2 INJECTION INTRAMUSCULAR; INTRAVENOUS at 14:51

## 2022-03-08 RX ADMIN — SODIUM CHLORIDE 1000 ML: 9 INJECTION, SOLUTION INTRAVENOUS at 14:14

## 2022-03-08 ASSESSMENT — FIBROSIS 4 INDEX: FIB4 SCORE: 1.77

## 2022-03-08 NOTE — ED PROVIDER NOTES
ED Provider Note    CHIEF COMPLAINT  Chief Complaint   Patient presents with   • Nausea     Pt reports she became dizzy and nauseous while walking today.  H/O chronic GI and cardiac issues and multiple events of hypotension.  Found to be 70/50 by EMS.   • Shortness of Breath     Pt states she became SOB and dizzy while walking       HPI  Pita Bueno is a 61 y.o. female who presents with a past medical history significant for anemia, colostomy, CHF, hypotension, she presents stating that she has been having nausea with decreased p.o. intake and had some dizziness.  She walks every day she says she is very active and felt dizzy while walking.  She denies chest pain or focal neurologic deficits.  She reports her colostomy has had increased output and this could have contributed to her feeling generally weak and dizzy.  She is scheduled for endoscopy tomorrow to help determine the cause of her malabsorption.    REVIEW OF SYSTEMS  See [facility] for further details. All other systems are negative.     PAST MEDICAL HISTORY   has a past medical history of Adverse effect of anesthesia, Anemia, Arthritis, Bowel habit changes (04/01/2021), Breath shortness, Bronchitis, C. difficile diarrhea (09/2020), Colostomy in place ([facility]), Congestive heart failure (HCC), Cough, CVID (common variable immunodeficiency) ([facility]), Dental disorder, Eating disorder, Edema (01/2021), Fatty liver disease, nonalcoholic, Heart valve disease, Hemorrhagic disorder (HCC) (01/2021), High cholesterol, Hypotension (04/01/2021), Hypothyroid, Infectious disease (11/22/2020), Pain, Palpitations, and Renal disorder.    SOCIAL HISTORY  Social History     Tobacco Use   • Smoking status: Never Smoker   • Smokeless tobacco: Never Used   Vaping Use   • Vaping Use: Never used   Substance and Sexual Activity   • Alcohol use: Never   • Drug use: Never   • Sexual activity: Not Currently       SURGICAL HISTORY   has a past surgical history that includes carpal tunnel  "release; cyst excision (Right); gyn surgery; colonoscopy,diagnostic (4/7/2021); upper gi endoscopy,diagnosis (4/7/2021); colonoscopy,diagnostic (4/8/2021); and other abdominal surgery (2011).    CURRENT MEDICATIONS  Home Medications     Reviewed by Ling Colvin R.N. (Registered Nurse) on 03/08/22 at 1309  Med List Status: Partial   Medication Last Dose Status   ACETAMINOPHEN PO  Active   cyanocobalamin (VITAMIN B-12) injection 1,000 mcg  Active   levothyroxine (SYNTHROID) 50 MCG Tab  Active   linaCLOtide (LINZESS) 290 MCG Cap  Active   Prucalopride Succinate (MOTEGRITY) 2 MG Tab  Active   Sennosides-Docusate Sodium (SENNA-DOCUSATE SODIUM PO)  Active                ALLERGIES  Allergies   Allergen Reactions   • Sulfa Drugs Hives   • Tape Rash     Rash and rips thin skin, paper tape ok       FAMILY HISTORY  No pertinent family history    PHYSICAL EXAM  VITAL SIGNS: BP (!) 80/53   Pulse 77   Temp 36.5 °C (97.7 °F) (Temporal)   Resp 16   Ht 1.626 m (5' 4\")   Wt 36.3 kg (80 lb)   LMP  (LMP Unknown)   SpO2 96%   BMI 13.73 kg/m²  @DEO[[id number]::@   Pulse ox interpretation: I interpret this pulse ox as normal.  Constitutional: Alert, appears dehydrated with dry mucous membranes.  HENT: No signs of trauma, Bilateral external ears normal, Nose normal.   Eyes: Pupils are equal and reactive, Conjunctiva normal, Non-icteric.   Neck: Normal range of motion, No tenderness, Supple, No stridor.   Lymphatic: No lymphadenopathy noted.   Cardiovascular: Regular rate and rhythm, no murmurs.   Thorax & Lungs: Normal breath sounds, No respiratory distress, No wheezing, No chest tenderness.   Abdomen: Bowel sounds normal, Soft, ostomy in place.  No output at this time.  Skin: Warm, Dry, No erythema, No rash.   Extremities: Intact distal pulses, No edema, No tenderness, No cyanosis.  Musculoskeletal: Good range of motion in all major joints. No tenderness to palpation or major deformities noted.   Neurologic: Alert , Normal motor " function, Normal sensory function, No focal deficits noted.   Psychiatric: Affect normal, Judgment normal, Mood normal.       DIAGNOSTIC STUDIES / PROCEDURES    EKG  Is a 12-lead EKG interpretation by myself.  It is normal sinus rhythm at a rate of 74.  The axis is normal, the intervals are normal, there is no ST elevation or depression.  Compared to ECG 03/04/2022 15:09:26  Sinus bradycardia no longer present  My impression of this EKG, it does not indicate ischemia nor arrhythmia at this time.    LABS  Labs Reviewed   COMP METABOLIC PANEL - Abnormal; Notable for the following components:       Result Value    Sodium 134 (*)     Co2 17 (*)     Bun 50 (*)     Calcium 8.4 (*)     ALT(SGPT) 55 (*)     Globulin 1.7 (*)     All other components within normal limits   CBC WITH DIFFERENTIAL - Abnormal; Notable for the following components:    WBC 3.2 (*)     RBC 3.63 (*)     .0 (*)     MCH 33.6 (*)     MCHC 32.6 (*)     RDW 51.4 (*)     Platelet Count 128 (*)     Lymphocytes 19.30 (*)     Lymphs (Absolute) 0.61 (*)     All other components within normal limits   TROPONIN   ESTIMATED GFR         RADIOLOGY  DX-CHEST-PORTABLE (1 VIEW)   Final Result      No acute cardiopulmonary disease evident.              COURSE & MEDICAL DECISION MAKING  Pertinent Labs & Imaging studies reviewed. (See chart for details)    The patient presents with generalized weakness, likely dehydrated with dry mucous membranes.  She was given 1 L IV LR, she feels too nauseous to take p.o. at this time.  Her labs indicate mild hyponatremia and dehydration.    After IV fluids the patient feels much better.  She is discharged with a prescription for Reglan, she already has Zofran and Phenergan at home.    The patient will return for new or worsening symptoms and is stable at the time of discharge.    The patient is referred to a primary physician for blood pressure management, diabetic screening, and for all other preventative health  concerns.        DISPOSITION:  Patient will be discharged home in stable condition.    FOLLOW UP:  [facility], Emergency Dept  [address] [facility] [address] [phone]    If symptoms worsen    Clifton Harvey M.D.  [address] [place] [address] [address]  [facility] [id number] [phone]      As needed          endoscopy tomorrow as scheduled      OUTPATIENT MEDICATIONS:  New Prescriptions    METOCLOPRAMIDE (REGLAN) 10 MG TAB    Take 1 Tablet by mouth 4 Times a Day,Before Meals and at Bedtime for 30 doses.          The patient will return for worsening symptoms and is stable at the time of discharge. The patient verbalizes understanding and will comply.    FINAL IMPRESSION  1. Dehydration     2. Nausea  metoclopramide (REGLAN) 10 MG Tab              Electronically signed by: Ernesto Killian M.D., 3/8/2022 1:48 PM

## 2022-03-08 NOTE — ED TRIAGE NOTES
Chief Complaint   Patient presents with   • Nausea     Pt reports she became dizzy and nauseous while walking today.  H/O chronic GI and cardiac issues and multiple events of hypotension.  Found to be 70/50 by EMS.   • Shortness of Breath     Pt states she became SOB and dizzy while walking     Chest pain protocol ordered due to c/o SOB while ambulating accompanied with nausea.  All labs sent.      Pt has scheduled endoscopy for tomorrow.  H/O colostomy, pt reports she has had excessive stool from colostomy for last 24 hours.  Colostomy bag currently empty.      Received 500 ml IV from EMS.

## 2022-03-08 NOTE — ED NOTES
Report received from Ling TAMEZ.Pt is A/Ox3, speaking in full sentences, follows commands and responds appropriately to questions. Resp are even and unlabored. Two bed rails up, bed locked and in lowest position for safety. Call be within reach.      This RN masked and in appropriate PPE during encounter.

## 2022-03-08 NOTE — DISCHARGE INSTRUCTIONS
Nausea, Adult  Nausea is the feeling that you have an upset stomach or that you are about to vomit. Nausea on its own is not usually a serious concern, but it may be an early sign of a more serious medical problem. As nausea gets worse, it can lead to vomiting. If vomiting develops, or if you are not able to drink enough fluids, you are at risk of becoming dehydrated. Dehydration can make you tired and thirsty, cause you to have a dry mouth, and decrease how often you urinate. Older adults and people with other diseases or a weak disease-fighting system (immune system) are at higher risk for dehydration. The main goals of treating your nausea are:  · To relieve your nausea.  · To limit repeated nausea episodes.  · To prevent vomiting and dehydration.  Follow these instructions at home:  Watch your symptoms for any changes. Tell your health care provider about them. Follow these instructions as told by your health care provider.  Eating and drinking         · Take an oral rehydration solution (ORS). This is a drink that is sold at pharmacies and retail stores.  · Drink clear fluids slowly and in small amounts as you are able. Clear fluids include water, ice chips, low-calorie sports drinks, and fruit juice that has water added (diluted fruit juice).  · Eat bland, easy-to-digest foods in small amounts as you are able. These foods include bananas, applesauce, rice, lean meats, toast, and crackers.  · Avoid drinking fluids that contain a lot of sugar or caffeine, such as energy drinks, sports drinks, and soda.  · Avoid alcohol.  · Avoid spicy or fatty foods.  General instructions  · Take over-the-counter and prescription medicines only as told by your health care provider.  · Rest at home while you recover.  · Drink enough fluid to keep your urine pale yellow.  · Breathe slowly and deeply when you feel nauseous.  · Avoid smelling things that have strong odors.  · Wash your hands often using soap and water. If soap and  water are not available, use hand .  · Make sure that all people in your household wash their hands well and often.  · Keep all follow-up visits as told by your health care provider. This is important.  Contact a health care provider if:  · Your nausea gets worse.  · Your nausea does not go away after two days.  · You vomit.  · You cannot drink fluids without vomiting.  · You have any of the following:  ? New symptoms.  ? A fever.  ? A headache.  ? Muscle cramps.  ? A rash.  ? Pain while urinating.  · You feel light-headed or dizzy.  Get help right away if:  · You have pain in your chest, neck, arm, or jaw.  · You feel extremely weak or you faint.  · You have vomit that is bright red or looks like coffee grounds.  · You have bloody or black stools or stools that look like tar.  · You have a severe headache, a stiff neck, or both.  · You have severe pain, cramping, or bloating in your abdomen.  · You have difficulty breathing or are breathing very quickly.  · Your heart is beating very quickly.  · Your skin feels cold and clammy.  · You feel confused.  · You have signs of dehydration, such as:  ? Dark urine, very little urine, or no urine.  ? Cracked lips.  ? Dry mouth.  ? Sunken eyes.  ? Sleepiness.  ? Weakness.  These symptoms may represent a serious problem that is an emergency. Do not wait to see if the symptoms will go away. Get medical help right away. Call your local emergency services (911 in the [place] Do not drive yourself to the hospital.  Summary  · Nausea is the feeling that you have an upset stomach or that you are about to vomit. Nausea on its own is not usually a serious concern, but it may be an early sign of a more serious medical problem.  · If vomiting develops, or if you are not able to drink enough fluids, you are at risk of becoming dehydrated.  · Follow recommendations for eating and drinking and take over-the-counter and prescription medicines only as told by your health care  provider.  · Contact a health care provider right away if your symptoms worsen or you have new symptoms.  · Keep all follow-up visits as told by your health care provider. This is important.  This information is not intended to replace advice given to you by your health care provider. Make sure you discuss any questions you have with your health care provider.  Document Released: 01/25/2006 Document Revised: 05/28/2019 Document Reviewed: 05/28/2019  [employer] Patient Education © 2020 [employer]      Dehydration, Adult    Dehydration is when there is not enough fluid or water in your body. This happens when you lose more fluids than you take in. Dehydration can range from mild to very bad. It should be treated right away to keep it from getting very bad.  Symptoms of mild dehydration may include:  · Thirst.  · Dry lips.  · Slightly dry mouth.  · Dry, warm skin.  · Dizziness.  Symptoms of moderate dehydration may include:  · Very dry mouth.  · Muscle cramps.  · Dark pee (urine). Pee may be the color of tea.  · Your body making less pee.  · Your eyes making fewer tears.  · Heartbeat that is uneven or faster than normal (palpitations).  · Headache.  · Light-headedness, especially when you stand up from sitting.  · Fainting (syncope).  Symptoms of very bad dehydration may include:  · Changes in skin, such as:  ? Cold and clammy skin.  ? Blotchy (mottled) or pale skin.  ? Skin that does not quickly return to normal after being lightly pinched and let go (poor skin turgor).  · Changes in body fluids, such as:  ? Feeling very thirsty.  ? Your eyes making fewer tears.  ? Not sweating when body temperature is high, such as in hot weather.  ? Your body making very little pee.  · Changes in vital signs, such as:  ? Weak pulse.  ? Pulse that is more than 100 beats a minute when you are sitting still.  ? Fast breathing.  ? Low blood pressure.  · Other changes, such as:  ? Sunken eyes.  ? Cold hands and  feet.  ? Confusion.  ? Lack of energy (lethargy).  ? Trouble waking up from sleep.  ? Short-term weight loss.  ? Unconsciousness.  Follow these instructions at home:    · If told by your doctor, drink an ORS:  ? Make an ORS by using instructions on the package.  ? Start by drinking small amounts, about ½ cup (120 mL) every 5-10 minutes.  ? Slowly drink more until you have had the amount that your doctor said to have.  · Drink enough clear fluid to keep your pee clear or pale yellow. If you were told to drink an ORS, finish the ORS first, then start slowly drinking clear fluids. Drink fluids such as:  ? Water. Do not drink only water by itself. Doing that can make the salt (sodium) level in your body get too low (hyponatremia).  ? Ice chips.  ? Fruit juice that you have added water to (diluted).  ? Low-calorie sports drinks.  · Avoid:  ? Alcohol.  ? Drinks that have a lot of sugar. These include high-calorie sports drinks, fruit juice that does not have water added, and soda.  ? Caffeine.  ? Foods that are greasy or have a lot of fat or sugar.  · Take over-the-counter and prescription medicines only as told by your doctor.  · Do not take salt tablets. Doing that can make the salt level in your body get too high (hypernatremia).  · Eat foods that have minerals (electrolytes). Examples include bananas, oranges, potatoes, tomatoes, and spinach.  · Keep all follow-up visits as told by your doctor. This is important.  Contact a doctor if:  · You have belly (abdominal) pain that:  ? Gets worse.  ? Stays in one area (localizes).  · You have a rash.  · You have a stiff neck.  · You get angry or annoyed more easily than normal (irritability).  · You are more sleepy than normal.  · You have a harder time waking up than normal.  · You feel:  ? Weak.  ? Dizzy.  ? Very thirsty.  · You have peed (urinated) only a small amount of very dark pee during 6-8 hours.  Get help right away if:  · You have symptoms of very bad  dehydration.  · You cannot drink fluids without throwing up (vomiting).  · Your symptoms get worse with treatment.  · You have a fever.  · You have a very bad headache.  · You are throwing up or having watery poop (diarrhea) and it:  ? Gets worse.  ? Does not go away.  · You have blood or something green (bile) in your throw-up.  · You have blood in your poop (stool). This may cause poop to look black and tarry.  · You have not peed in 6-8 hours.  · You pass out (faint).  · Your heart rate when you are sitting still is more than 100 beats a minute.  · You have trouble breathing.  This information is not intended to replace advice given to you by your health care provider. Make sure you discuss any questions you have with your health care provider.  Document Released: 10/14/2010 Document Revised: 11/30/2018 Document Reviewed: 02/10/2017  [employer] Patient Education © 2020 [employer]

## 2022-03-09 ENCOUNTER — ANESTHESIA EVENT (OUTPATIENT)
Dept: SURGERY | Facility: MEDICAL CENTER | Age: 62
End: 2022-03-09
Payer: OTHER GOVERNMENT

## 2022-03-09 ENCOUNTER — HOSPITAL ENCOUNTER (OUTPATIENT)
Facility: MEDICAL CENTER | Age: 62
End: 2022-03-09
Attending: INTERNAL MEDICINE | Admitting: INTERNAL MEDICINE
Payer: OTHER GOVERNMENT

## 2022-03-09 ENCOUNTER — ANESTHESIA (OUTPATIENT)
Dept: SURGERY | Facility: MEDICAL CENTER | Age: 62
End: 2022-03-09
Payer: OTHER GOVERNMENT

## 2022-03-09 VITALS
WEIGHT: 77.6 LBS | RESPIRATION RATE: 16 BRPM | HEIGHT: 64 IN | OXYGEN SATURATION: 99 % | SYSTOLIC BLOOD PRESSURE: 91 MMHG | BODY MASS INDEX: 13.25 KG/M2 | HEART RATE: 56 BPM | DIASTOLIC BLOOD PRESSURE: 63 MMHG | TEMPERATURE: 98 F

## 2022-03-09 LAB — PATHOLOGY CONSULT NOTE: NORMAL

## 2022-03-09 PROCEDURE — C1726 CATH, BAL DIL, NON-VASCULAR: HCPCS | Performed by: INTERNAL MEDICINE

## 2022-03-09 PROCEDURE — 160009 HCHG ANES TIME/MIN: Performed by: INTERNAL MEDICINE

## 2022-03-09 PROCEDURE — 160025 RECOVERY II MINUTES (STATS): Performed by: INTERNAL MEDICINE

## 2022-03-09 PROCEDURE — 160035 HCHG PACU - 1ST 60 MINS PHASE I: Performed by: INTERNAL MEDICINE

## 2022-03-09 PROCEDURE — 160046 HCHG PACU - 1ST 60 MINS PHASE II: Performed by: INTERNAL MEDICINE

## 2022-03-09 PROCEDURE — 700111 HCHG RX REV CODE 636 W/ 250 OVERRIDE (IP): Performed by: ANESTHESIOLOGY

## 2022-03-09 PROCEDURE — 160002 HCHG RECOVERY MINUTES (STAT): Performed by: INTERNAL MEDICINE

## 2022-03-09 PROCEDURE — 88312 SPECIAL STAINS GROUP 1: CPT

## 2022-03-09 PROCEDURE — 700105 HCHG RX REV CODE 258: Performed by: INTERNAL MEDICINE

## 2022-03-09 PROCEDURE — 160048 HCHG OR STATISTICAL LEVEL 1-5: Performed by: INTERNAL MEDICINE

## 2022-03-09 PROCEDURE — 160203 HCHG ENDO MINUTES - 1ST 30 MINS LEVEL 4: Performed by: INTERNAL MEDICINE

## 2022-03-09 PROCEDURE — 88305 TISSUE EXAM BY PATHOLOGIST: CPT | Mod: 59

## 2022-03-09 RX ORDER — MEPERIDINE HYDROCHLORIDE 25 MG/ML
6.25 INJECTION INTRAMUSCULAR; INTRAVENOUS; SUBCUTANEOUS
Status: DISCONTINUED | OUTPATIENT
Start: 2022-03-09 | End: 2022-03-09 | Stop reason: HOSPADM

## 2022-03-09 RX ORDER — HALOPERIDOL 5 MG/ML
1 INJECTION INTRAMUSCULAR
Status: DISCONTINUED | OUTPATIENT
Start: 2022-03-09 | End: 2022-03-09 | Stop reason: HOSPADM

## 2022-03-09 RX ORDER — DIPHENHYDRAMINE HYDROCHLORIDE 50 MG/ML
12.5 INJECTION INTRAMUSCULAR; INTRAVENOUS
Status: DISCONTINUED | OUTPATIENT
Start: 2022-03-09 | End: 2022-03-09 | Stop reason: HOSPADM

## 2022-03-09 RX ORDER — SODIUM CHLORIDE, SODIUM LACTATE, POTASSIUM CHLORIDE, CALCIUM CHLORIDE 600; 310; 30; 20 MG/100ML; MG/100ML; MG/100ML; MG/100ML
INJECTION, SOLUTION INTRAVENOUS CONTINUOUS
Status: DISCONTINUED | OUTPATIENT
Start: 2022-03-09 | End: 2022-03-09 | Stop reason: HOSPADM

## 2022-03-09 RX ORDER — ONDANSETRON 2 MG/ML
4 INJECTION INTRAMUSCULAR; INTRAVENOUS
Status: DISCONTINUED | OUTPATIENT
Start: 2022-03-09 | End: 2022-03-09 | Stop reason: HOSPADM

## 2022-03-09 RX ORDER — PHENYLEPHRINE HYDROCHLORIDE 10 MG/ML
INJECTION, SOLUTION INTRAMUSCULAR; INTRAVENOUS; SUBCUTANEOUS PRN
Status: DISCONTINUED | OUTPATIENT
Start: 2022-03-09 | End: 2022-03-09 | Stop reason: SURG

## 2022-03-09 RX ADMIN — PHENYLEPHRINE HYDROCHLORIDE 200 MCG: 10 INJECTION INTRAVENOUS at 09:58

## 2022-03-09 RX ADMIN — SODIUM CHLORIDE, POTASSIUM CHLORIDE, SODIUM LACTATE AND CALCIUM CHLORIDE: 600; 310; 30; 20 INJECTION, SOLUTION INTRAVENOUS at 08:49

## 2022-03-09 RX ADMIN — Medication 100 MG: at 09:58

## 2022-03-09 RX ADMIN — PROPOFOL 200 MG: 10 INJECTION, EMULSION INTRAVENOUS at 09:58

## 2022-03-09 ASSESSMENT — FIBROSIS 4 INDEX: FIB4 SCORE: 2.4

## 2022-03-09 ASSESSMENT — PAIN DESCRIPTION - PAIN TYPE
TYPE: SURGICAL PAIN
TYPE: SURGICAL PAIN

## 2022-03-09 NOTE — ANESTHESIA PROCEDURE NOTES
Airway    Date/Time: 3/9/2022 9:58 AM  Performed by: Sandor Thorne M.D.  Authorized by: Sandor Thorne M.D.     Location:  OR  Urgency:  Elective  Indications for Airway Management:  Anesthesia      Spontaneous Ventilation: absent    Sedation Level:  Deep  Preoxygenated: Yes    Patient Position:  Sniffing  Final Airway Type:  Endotracheal airway  Final Endotracheal Airway:  ETT  Cuffed: Yes    Technique Used for Successful ETT Placement:  Direct laryngoscopy    Insertion Site:  Oral  Blade Type:  Cecil  Laryngoscope Blade/Videolaryngoscope Blade Size:  3  ETT Size (mm):  7.0  Measured from:  Teeth  ETT to Teeth (cm):  22  Placement Verified by: auscultation and capnometry    Cormack-Lehane Classification:  Grade I - full view of glottis  Number of Attempts at Approach:  1

## 2022-03-09 NOTE — ED NOTES
Pt A/Ox3 unlaboured breathing VSS. Agrees and understands d/c teaching. Ambulated to waiting room. Friend picking pt up.

## 2022-03-09 NOTE — PROGRESS NOTES
1057- Report received from DASHAWN [facility]. Patient is alert and reports no pain or nausea. Vital signs are stable.     1110 Patient feels ready for discharge and meets criteria set by Dr. Carias. Patient is going to get dressed.     1115- PIV removed.     1120- Patient discharged with friend Alison via wheelchair.

## 2022-03-09 NOTE — ANESTHESIA POSTPROCEDURE EVALUATION
Patient: Pita Bueno    Procedure Summary     Date: 03/09/22 Room / Location: [facility] ROOM 26 / SURGERY SAME DAY [facility]    Anesthesia Start: 0955 Anesthesia Stop: 1038    Procedures:       GASTROSCOPY, WITH BIOPSY (N/A Esophagus)      GASTROSCOPY (N/A Esophagus)      GASTROSCOPY, WITH BALLOON DILATION (N/A Esophagus) Diagnosis: (Food in stomach)    Surgeons: Sae Carias M.D. Responsible Provider: Sandor Thorne M.D.    Anesthesia Type: general ASA Status: 3          Final Anesthesia Type: general  Last vitals  BP   Blood Pressure: (!) 99/66    Temp   36.6 °C (97.8 °F)    Pulse   (!) 54   Resp   20    SpO2   99 %      Anesthesia Post Evaluation    Patient location during evaluation: PACU  Patient participation: complete - patient participated  Level of consciousness: awake and alert    Airway patency: patent  Anesthetic complications: no  Cardiovascular status: hemodynamically stable  Respiratory status: acceptable  Hydration status: euvolemic    PONV: none          No complications documented.     Nurse Pain Score: 0 (NPRS)

## 2022-03-09 NOTE — ANESTHESIA PREPROCEDURE EVALUATION
Case: [id number] Anesthesia Start Date/Time: 03/09/22 0955    Procedures:       GASTROSCOPY, WITH BIOPSY (N/A Esophagus)      GASTROSCOPY (N/A Esophagus)      GASTROSCOPY, WITH BALLOON DILATION (N/A Esophagus)    Anesthesia type: general    Pre-op diagnosis: ABNORMAL CT SCAN, CACHEXIA    Location: [facility] ROOM 26 / SURGERY SAME DAY [facility]    Surgeons: Sae Carias M.D.          Relevant Problems   NEURO   (positive) Chronic headache      ENDO   (positive) Hypothyroidism       Physical Exam    Airway   Mallampati: II  TM distance: >3 FB  Neck ROM: full       Cardiovascular - normal exam  Rhythm: regular  Rate: normal  (-) murmur     Dental - normal exam           Pulmonary - normal exam  Breath sounds clear to auscultation     Abdominal    Neurological - normal exam                 Anesthesia Plan    ASA 3   ASA physical status 3 criteria: other (comment)    Plan - general       Airway plan will be ETT          Induction: intravenous    Postoperative Plan: Postoperative administration of opioids is intended.    Pertinent diagnostic labs and testing reviewed    Informed Consent:    Anesthetic plan and risks discussed with patient.    Use of blood products discussed with: patient whom consented to blood products.

## 2022-03-09 NOTE — PROCEDURES
Esophagogastroduodenoscopy  Date of Procedure: 3/9/2022  Attending Physician: Sae Carias MD    Indications: Abnormal CT scan  Instrument: Olympus Flexible Endoscope  Sedation:    Anesthesiologist/Type of Anesthesia:  Anesthesiologist: Sandor Thorne M.D./General    Surgical Staff:  Endoscopy Technician: Xiao Mcdonald  Endoscopy Nurse: Camille Mayen R.N.; Haris Santoyo RMANDI; Torrie Orozco RMANDI    Specimens removed if any:  ID Type Source Tests Collected by Time Destination   A :  Tissue Duodenum PATHOLOGY SPECIMEN Sae Carias M.D. 3/9/2022 10:03 AM    B :  Tissue Gastric PATHOLOGY SPECIMEN Sae Carias M.D. 3/9/2022 10:03 AM    C :  Tissue Esophagus PATHOLOGY SPECIMEN Sae Carias M.D. 3/9/2022 10:03 AM    D : GE Junction Tissue Esophagus PATHOLOGY SPECIMEN Sae Carias M.D. 3/9/2022 10:08 AM          Pre-Anesthesia Assessment:  Prior to the procedure, a History and Physical was performed, and patient medications and allergies were reviewed. The patient’s tolerance of previous anesthesia was also reviewed. The risks and benefits of the procedure and the sedation options and risks were discussed with the patient including but not limited to infection, bleeding, aspiration, perforation, adverse medication reaction, missed diagnosis, and missed lesions. The patient verbalized understanding. All questions were answered, and informed consent was obtained.     After I obtained informed consent from the patient, the patient was placed in the left lateral position. Appropriate time-out protocol was followed: the correct patient, the correct procedure, and the correct equipment in the room were confirmed. Throughout the procedure, the patient’s blood pressure, pulse, and oxygen saturations were monitored continuously. The Olympus flexible gastroscope was gently passed through the incisoral orifice into the oral cavity and under direct visualization the esophagus was intubated. The endoscope was passed down the esophagus,  through the stomach and into the 2nd portion of the duodenum. Retroflexion was performed at the gastroesophageal junction. Color, texture, mucosa and anatomy of esophagus, stomach, and duodenum were carefully examined with the scope.  After completion of the examination, the endoscope as removed. The patient tolerated the procedure well. There were no immediate postoperative complications.       Findings:   Esophagus: Normal-appearing.  Biopsies taken of the esophagus to rule out eosinophilic disease.  GE junction irregular at 40 cm from incisor.  Biopsies taken.  Empirical dilation utilizing 18-20mm CRE through-the-scope balloon utilizing standard technique with guidewire left in the stomach, balloon inflated from 18, 19, then 20mm did not cause any mucosal rent or tear.  GE junction was widely patent.  Relook demonstrated no perforation    Stomach: appears dilated with significant amount of retained fibrous food debris hindering visualization of mucosa.  Patient was done under general anesthesia so decision was proceed with the procedure.  Biopsies taken of the visualized mucosa to ensure no H. pylori.  Pylorus appeared normal.  Widely patent.  Retroflexion also appeared normal.  There was a slight J-shaped stomach with advancement of the scope.  Pylorus was at 84 cm from incisor. It was widely patent. Empirical dilation utilizing standard technique with guidewire left in the duodenal bulb with a 18-20mm CRE through-the-scope balloon was performed with stepwise fashion from 18 to 20 mm in size with widely patent pylorus without mucosal rent or tear.    Duodenum: duodenal bulb and second portion duodenum  hindered by food debris.  Unable to visualize papilla.  Biopsies taken.  Telescope view of third portion of duodenum appeared normal    Impressions:   1.  No gastric outlet obstruction of the pylorus.  Pylorus widely patent empirical dilation and sizing to 20 mm TTS CRE balloon without mucosal rent  2.   Significant amount of retained food material fibrous debris in the stomach possibly gastroparesis.  Biopsies taken of the stomach mucosa to rule out H. Pylori  3.  Duodenum first and second portion appeared normal but hindered visualization due to fibrous food debris.  Biopsies taken  4.  Normal-appearing esophagus with biopsy taken  5.  Irregular Z-line at 40 cm four-quadrant biopsies.  Empirical dilation to 20 mm in size with TTS CRE balloon without mucosal rent.    Recommendations:   1.  Monitor for postprocedure complication including perforation bleeding  2.  Await biopsy report  3.  Follow-up in clinic  4.  Discussed referral to tertiary hospital for further evaluation.      This note was generated using voice recognition software which has a small chance of producing errors of grammar and possibly content. I have made every reasonable attempt to find and correct any obvious errors, but expect that some may not be found prior to finalization of this note

## 2022-03-09 NOTE — DISCHARGE INSTRUCTIONS
GASTROSCOPY   1. Don't eat or drink anything for about an hour after the test. You can then resume your regular diet.   2. Don't drive or drink alcohol for 24 hours. The medication you received will make you too drowsy.  3. Don't take any coffee, tea, or aspirin products until after you see your doctor. These can harm the lining of your stomach.  4. If you begin to vomit bloody material, or develop black or bloody stools, call your doctor as soon as possible.   5. If you have any neck, chest, abdominal pain or temp over 100 degrees call your doctor.         ACTIVITY: Rest and take it easy for the first 24 hours.  A responsible adult is recommended to remain with you during that time.  It is normal to feel sleepy.  We encourage you to not do anything that requires balance, judgment or coordination.    MILD FLU-LIKE SYMPTOMS ARE NORMAL. YOU MAY EXPERIENCE GENERALIZED MUSCLE ACHES, THROAT IRRITATION, HEADACHE AND/OR SOME NAUSEA.    FOR 24 HOURS DO NOT:  Drive, operate machinery or run household appliances.  Drink beer or alcoholic beverages.   Make important decisions or sign legal documents.    SPECIAL INSTRUCTIONS:     DIET: To avoid nausea, slowly advance diet as tolerated, avoiding spicy or greasy foods for the first day.  Add more substantial food to your diet according to your physician's instructions.  Babies can be fed formula or breast milk as soon as they are hungry.  INCREASE FLUIDS AND FIBER TO AVOID CONSTIPATION.    FOLLOW-UP APPOINTMENT:  A follow-up appointment should be arranged with your doctor in [phone]; call to schedule.    You should CALL YOUR PHYSICIAN if you develop:  Fever greater than 101 degrees F.  Pain not relieved by medication, or persistent nausea or vomiting.  Excessive bleeding (blood soaking through dressing) or unexpected drainage from the wound.  Extreme redness or swelling around the incision site, drainage of pus or foul smelling drainage.  Inability to urinate or empty your  bladder within 8 hours.  Problems with breathing or chest pain.    You should call 911 if you develop problems with breathing or chest pain.  If you are unable to contact your doctor or surgical center, you should go to the nearest emergency room or urgent care center.  Physician's telephone #: [phone]    If any questions arise, call your doctor.  If your doctor is not available, please feel free to call the Surgical Center at [phone].     A registered nurse may call you a few days after your surgery to see how you are doing after your procedure.    MEDICATIONS: Resume taking daily medication.  Take prescribed pain medication with food.  If no medication is prescribed, you may take non-aspirin pain medication if needed.  PAIN MEDICATION CAN BE VERY CONSTIPATING.  Take a stool softener or laxative such as senokot, pericolace, or milk of magnesia if needed.    Prescription given for none.  Last pain medication given at none.    If your physician has prescribed pain medication that includes Acetaminophen (Tylenol), do not take additional Acetaminophen (Tylenol) while taking the prescribed medication.    Depression / Suicide Risk    As you are discharged from this [facility] facility, it is important to learn how to keep safe from harming yourself.    Recognize the warning signs:  · Abrupt changes in personality, positive or negative- including increase in energy   · Giving away possessions  · Change in eating patterns- significant weight changes-  positive or negative  · Change in sleeping patterns- unable to sleep or sleeping all the time   · Unwillingness or inability to communicate  · Depression  · Unusual sadness, discouragement and loneliness  · Talk of wanting to die  · Neglect of personal appearance   · Rebelliousness- reckless behavior  · Withdrawal from people/activities they love  · Confusion- inability to concentrate     If you or a loved one observes any of these behaviors or has concerns about  self-harm, here's what you can do:  · Talk about it- your feelings and reasons for harming yourself  · Remove any means that you might use to hurt yourself (examples: pills, rope, extension cords, firearm)  · Get professional help from the community (Mental Health, Substance Abuse, psychological counseling)  · Do not be alone:Call your Safe Contact- someone whom you trust who will be there for you.  · Call your local CRISIS HOTLINE 709-5654 or 809-433-0741  · Call your local Children's Mobile Crisis Response Team [place] (656) 833-8711 or www.[employer]  · Call the toll free National Suicide Prevention Hotlines   · National Suicide Prevention Lifeline 187-835-HNUA (5629)  · National Hope Line Network 800-SUICIDE (563-1589)

## 2022-03-09 NOTE — OR NURSING
1027 Patient arrived from OR. ID verified. Attached to monitors. Patient sleep easy to arouse. No signs or symptoms of distress. Vital signs stable.   1037 Patient fully awake.   1057 Handoff to Jenny TAMEZ

## 2022-03-09 NOTE — ANESTHESIA TIME REPORT
Anesthesia Start and Stop Event Times     Date Time Event    3/9/2022 0955 Anesthesia Start     1016 Ready for Procedure     1038 Anesthesia Stop        Responsible Staff  03/09/22    Name Role Begin End    Sandor Thorne M.D. Anesth 0955 1038        Preop Diagnosis (Free Text):  Pre-op Diagnosis     ABNORMAL CT SCAN, [place]HEX[place]        Preop Diagnosis (Codes):    Premium Reason  Non-Premium    Comments:

## 2022-03-10 DIAGNOSIS — K52.9 CHRONIC DIARRHEA: ICD-10-CM

## 2022-03-10 DIAGNOSIS — K59.89: ICD-10-CM

## 2022-03-10 DIAGNOSIS — K31.89: ICD-10-CM

## 2022-03-10 DIAGNOSIS — R11.2 NAUSEA AND VOMITING, INTRACTABILITY OF VOMITING NOT SPECIFIED, UNSPECIFIED VOMITING TYPE: ICD-10-CM

## 2022-03-10 DIAGNOSIS — R10.32 ABDOMINAL PAIN, LLQ: ICD-10-CM

## 2022-03-10 DIAGNOSIS — K59.00 CONSTIPATION, UNSPECIFIED CONSTIPATION TYPE: ICD-10-CM

## 2022-03-10 DIAGNOSIS — K94.29: ICD-10-CM

## 2022-03-16 ENCOUNTER — OFFICE VISIT (OUTPATIENT)
Dept: CARDIOLOGY | Facility: MEDICAL CENTER | Age: 62
End: 2022-03-16
Payer: OTHER GOVERNMENT

## 2022-03-16 ENCOUNTER — PATIENT MESSAGE (OUTPATIENT)
Dept: CARDIOLOGY | Facility: MEDICAL CENTER | Age: 62
End: 2022-03-16
Payer: OTHER GOVERNMENT

## 2022-03-16 VITALS
RESPIRATION RATE: 14 BRPM | OXYGEN SATURATION: 99 % | WEIGHT: 75 LBS | SYSTOLIC BLOOD PRESSURE: 100 MMHG | HEIGHT: 63 IN | BODY MASS INDEX: 13.29 KG/M2 | DIASTOLIC BLOOD PRESSURE: 60 MMHG | HEART RATE: 62 BPM

## 2022-03-16 DIAGNOSIS — I50.32 CHRONIC HEART FAILURE WITH PRESERVED EJECTION FRACTION (HCC): ICD-10-CM

## 2022-03-16 DIAGNOSIS — I34.1 MITRAL VALVE PROLAPSE: ICD-10-CM

## 2022-03-16 DIAGNOSIS — E46 MALNUTRITION, UNSPECIFIED TYPE (HCC): ICD-10-CM

## 2022-03-16 DIAGNOSIS — I95.9 HYPOTENSION, UNSPECIFIED HYPOTENSION TYPE: ICD-10-CM

## 2022-03-16 PROCEDURE — 99214 OFFICE O/P EST MOD 30 MIN: CPT | Performed by: INTERNAL MEDICINE

## 2022-03-16 ASSESSMENT — FIBROSIS 4 INDEX: FIB4 SCORE: 2.4

## 2022-03-16 NOTE — Clinical Note
I saw Uyen again in the cardiology clinic today and it looks like she is severely malnourished an I suspect this is causing her hypotension. Do you think it would be appropriate to prescribe TPN or some sort of elemental diet.   Thanks Nicola Calvillo MD

## 2022-03-16 NOTE — PATIENT COMMUNICATION
STAT request for records faxed to [facility] at [phone]. Receipt confirmed. [employer] message sent to patient with update. BE notified.

## 2022-03-16 NOTE — PROGRESS NOTES
"CARDIOLOGY OUTPATIENT FOLLOWUP    PCP: Clifton Harvey M.D.    1. Chronic heart failure with preserved ejection fraction (HCC)    2. Hypotension, unspecified hypotension type    3. Malnutrition, unspecified type (HCC)    4. Mitral valve prolapse        Pita Bueno is continuing to suffer weight loss and symptoms of malnourishment.  Fortunately, there have been no identifiable cardiovascular consequences to this.  Nonetheless, I believe her body is quite ill and will inquire with GI whether alternate forms of nourishment may be appropriate.  She will use midodrine as needed.    Follow up: 3 months    Chief Complaint   Patient presents with   • Orthostatic Hypotension   • Other     F/V Dx: Chronic heart failure with preserved ejection fraction (HCC)       History: Pita Bueno is a 61 y.o. female with history of malnutrition, common variable immunodeficiency, colostomy presenting for follow-up.  She continues to experience gradual weight loss with a weight down in the 60s.  This is followed along with cold weather.  She feels worse after eating.  Attempts to hydrate through IV have done nothing more than fill her ostomy.     An echo was unremarkable.    Is accompanied by her daughter today    ROS:   All other systems reviewed and negative except as per the HPI    PE:  /60 (BP Location: Left arm, Patient Position: Sitting, BP Cuff Size: Small adult)   Pulse 62   Resp 14   Ht 1.6 m (5' 2.99\")   Wt 34 kg (75 lb)   LMP  (LMP Unknown)   SpO2 99%   BMI 13.29 kg/m²   Gen: no acute distress  HEENT: Symmetric face. Anicteric sclerae. Moist mucus membranes  NECK: No JVD. No lymphadenopathy  CARDIAC: Regular, Normal S1, S2, No murmur  VASCULATURE: carotids are normal bilaterally without bruit  RESP: Clear to auscultation bilaterally  ABD: Soft, non-tender, non-distended  EXT: No edema, no clubbing or cyanosis  SKIN: Warm and dry  NEURO: No gross deficits  PSYCH: Appropriate affect, participates in " "conversation    The 10-year ASCVD risk score (Drakechelo GOODE Jr., et al., 2013) is: 1.8%    Past Medical History:   Diagnosis Date   • Adverse effect of anesthesia    • Anemia    • Arthritis     thumbs, hands   • Bowel habit changes 04/01/2021    constipation and diarrhea, currently using colostomy bag   • Breath shortness     \"when laying down, comes and goes\"   • Bronchitis     frequent   • C. difficile diarrhea 09/2020   • Colostomy in place ([facility])    • Congestive heart failure ([facility])     recent   • Cough     chronic   • CVID (common variable immunodeficiency) ([facility])    • Dental disorder     infected tooth   • Eating disorder    • Edema 01/2021    Bilat LE, Pt states having pain LLE and DVT being ruled out 1/27   • Fatty liver disease, nonalcoholic    • Heart valve disease     MVP   • Hemorrhagic disorder (HCC) 01/2021    states she has noticed she is bleeding easier than normal   • High cholesterol    • Hypotension 04/01/2021    pt reports 70/40 up to 100/60   • Hypothyroid    • Infectious disease 11/22/2020    covid   • Pain     hands and stomach   • Palpitations    • Renal disorder     occaisonal high BUN     Allergies   Allergen Reactions   • Sulfa Drugs Hives   • Tape Rash     Rash and rips thin skin, paper tape ok     Outpatient Encounter Medications as of 3/16/2022   Medication Sig Dispense Refill   • Prucalopride Succinate (MOTEGRITY) 2 MG Tab Take  by mouth.     • ACETAMINOPHEN PO Take  by mouth.     • Sennosides-Docusate Sodium (SENNA-DOCUSATE SODIUM PO) Take  by mouth.     • levothyroxine (SYNTHROID) 50 MCG Tab Take 1 Tablet by mouth every morning on an empty stomach. 90 Tablet 1   • metoclopramide (REGLAN) 10 MG Tab Take 1 Tablet by mouth 4 Times a Day,Before Meals and at Bedtime for 30 doses. 30 Tablet 0     Facility-Administered Encounter Medications as of 3/16/2022   Medication Dose Route Frequency Provider Last Rate Last Admin   • cyanocobalamin (VITAMIN B-12) injection 1,000 mcg  1,000 mcg Intramuscular " Q30 DAYS Clifton Harvey M.D.   1,000 mcg at 10/27/21 1700     Social History     Socioeconomic History   • Marital status:      Spouse name: Not on file   • Number of children: Not on file   • Years of education: Not on file   • Highest education level: Not on file   Occupational History   • Not on file   Tobacco Use   • Smoking status: Never Smoker   • Smokeless tobacco: Never Used   Vaping Use   • Vaping Use: Never used   Substance and Sexual Activity   • Alcohol use: Never   • Drug use: Never   • Sexual activity: Not Currently   Other Topics Concern   • Not on file   Social History Narrative   • Not on file     Social Determinants of Health     Financial Resource Strain: Not on file   Food Insecurity: Not on file   Transportation Needs: Not on file   Physical Activity: Not on file   Stress: Not on file   Social Connections: Not on file   Intimate Partner Violence: Not on file   Housing Stability: Not on file       Studies  Lab Results   Component Value Date/Time    CHOLSTRLTOT 235 (H) 02/05/2020 02:04 PM     (H) 02/05/2020 02:04 PM    HDL 79 02/05/2020 02:04 PM    TRIGLYCERIDE 124 02/05/2020 02:04 PM       Lab Results   Component Value Date/Time    SODIUM 134 (L) 03/08/2022 01:15 PM    POTASSIUM 4.1 03/08/2022 01:15 PM    CHLORIDE 105 03/08/2022 01:15 PM    CO2 17 (L) 03/08/2022 01:15 PM    GLUCOSE 83 03/08/2022 01:15 PM    BUN 50 (H) 03/08/2022 01:15 PM    CREATININE 0.79 03/08/2022 01:15 PM     Lab Results   Component Value Date/Time    ALKPHOSPHAT 46 03/08/2022 01:15 PM    ASTSGOT 38 03/08/2022 01:15 PM    ALTSGPT 55 (H) 03/08/2022 01:15 PM    TBILIRUBIN 0.3 03/08/2022 01:15 PM        For this encounter I reviewed the following medical records BMP, Lipid profile and Echo

## 2022-03-20 ENCOUNTER — APPOINTMENT (OUTPATIENT)
Dept: ONCOLOGY | Facility: MEDICAL CENTER | Age: 62
End: 2022-03-20
Attending: INTERNAL MEDICINE
Payer: OTHER GOVERNMENT

## 2022-03-22 ENCOUNTER — OFFICE VISIT (OUTPATIENT)
Dept: MEDICAL GROUP | Facility: PHYSICIAN GROUP | Age: 62
End: 2022-03-22
Payer: OTHER GOVERNMENT

## 2022-03-22 VITALS
BODY MASS INDEX: 1.22 KG/M2 | HEART RATE: 61 BPM | HEIGHT: 64 IN | WEIGHT: 7.13 LBS | OXYGEN SATURATION: 100 % | SYSTOLIC BLOOD PRESSURE: 92 MMHG | DIASTOLIC BLOOD PRESSURE: 62 MMHG | TEMPERATURE: 97.3 F | RESPIRATION RATE: 16 BRPM

## 2022-03-22 DIAGNOSIS — K94.03 COLOSTOMY DYSFUNCTION (HCC): ICD-10-CM

## 2022-03-22 DIAGNOSIS — K52.9 CHRONIC DIARRHEA: ICD-10-CM

## 2022-03-22 DIAGNOSIS — D83.9 COMMON VARIABLE IMMUNODEFICIENCY (HCC): ICD-10-CM

## 2022-03-22 DIAGNOSIS — K59.89: ICD-10-CM

## 2022-03-22 DIAGNOSIS — E03.9 HYPOTHYROIDISM, UNSPECIFIED TYPE: ICD-10-CM

## 2022-03-22 DIAGNOSIS — E53.8 VITAMIN B12 DEFICIENCY: Chronic | ICD-10-CM

## 2022-03-22 PROCEDURE — 96372 THER/PROPH/DIAG INJ SC/IM: CPT | Performed by: INTERNAL MEDICINE

## 2022-03-22 PROCEDURE — 99214 OFFICE O/P EST MOD 30 MIN: CPT | Mod: 25 | Performed by: INTERNAL MEDICINE

## 2022-03-22 RX ADMIN — CYANOCOBALAMIN 1000 MCG: 1000 INJECTION, SOLUTION INTRAMUSCULAR; SUBCUTANEOUS at 14:47

## 2022-03-22 ASSESSMENT — FIBROSIS 4 INDEX: FIB4 SCORE: 2.4

## 2022-03-22 NOTE — ASSESSMENT & PLAN NOTE
Patient is due for her monthly B12 injection.  Requesting the injection to be given in clinic today.

## 2022-03-22 NOTE — ASSESSMENT & PLAN NOTE
This is a chronic condition.  The patient is currently taking levothyroxine.  TSH ordered for follow-up.

## 2022-03-22 NOTE — PROGRESS NOTES
PRIMARY CARE CLINIC VISIT  Chief Complaint   Patient presents with   • Follow-Up     Follow up ER visit.         History of Present Illness     Colostomy dysfunction (HCC)  This is a very complex condition.  Patient is presently established with Dr. Starr, GI specialist.  Chart review showed patient with a very complicated rectal prolapse surgery in [place] in 2013.  There was an anastomotic leak and subsequent abscess with sepsis and need for diverting colostomy.  Ever since then she has had issue with stooling and frequent diarrhea requiring emptying her backed up to 20 times per day.  The patient has been seen by multiple physicians here in [place] for management includingDr. Nguyen, Dr. Muniz, Dr. Cruz  and most recently  Dr. Sae Carias [GI specialist]  According to chart review diagnosis of cathartic colon was considered with discussion about possible total colectomy.  The patient report Dr. Curz is hesitant due to concern for worsening diarrhea.  The patient's major concern is obstruction leading to perforation.  Patient report recently worsening symptoms associated with nausea.  She is very fearful to eat and she typically does not eat in the morning or noon but only eats at dinnertime.  Periods soon as she eats she would feel weak and with note multiple loose stools requiring her to empty her colostomy bag.  Patient has had multiple ER visits for the above conditions.    Hypothyroidism  This is a chronic condition.  The patient is currently taking levothyroxine.  TSH ordered for follow-up.    Vitamin B12 deficiency  Patient is due for her monthly B12 injection.  Requesting the injection to be given in clinic today.      Current Outpatient Medications on File Prior to Visit   Medication Sig Dispense Refill   • Prucalopride Succinate (MOTEGRITY) 2 MG Tab Take  by mouth.     • ACETAMINOPHEN PO Take  by mouth.     • Sennosides-Docusate Sodium (SENNA-DOCUSATE SODIUM PO) Take  by mouth.     • levothyroxine  "(SYNTHROID) 50 MCG Tab Take 1 Tablet by mouth every morning on an empty stomach. 90 Tablet 1     Current Facility-Administered Medications on File Prior to Visit   Medication Dose Route Frequency Provider Last Rate Last Admin   • cyanocobalamin (VITAMIN B-12) injection 1,000 mcg  1,000 mcg Intramuscular Q30 DAYS Clifton Harvey M.D.   1,000 mcg at 10/27/21 1700        Allergies: Sulfa drugs and Tape    ROS  As per HPI above. All other systems reviewed and negative.      Past Medical, Social, and Family history reviewed and updated in EPIC     Objective     BP (!) 92/62 (BP Location: Right arm, Patient Position: Sitting, BP Cuff Size: Child)   Pulse 61   Temp 36.3 °C (97.3 °F) (Temporal)   Resp 16   Ht 1.626 m (5' 4\")   Wt (!) 3.232 kg (7 lb 2 oz)   SpO2 100%    Body mass index is 1.22 kg/m².    General: alert and oriented  Cardiovascular: regular rate and rhythm  Pulmonary: lungs : no wheezing   Gastrointestinal: BS present. No obvious mass noted.  Colostomy bag in place.  Skin without any redness discharge or any signs of infection.          Assessment and Plan     1. Colostomy dysfunction (HCC)  2.chronic diarrhea  This is a very complex condition as noted above.  Patient has been seen by multiple specialists [ including gastroenterologists and surgeons here in [place] without improvement.  At this time, I have submitted the referral requesting for pt to be seen by [place] gastroenterologist for further evaluation/treatment.  The patient and  however are here stating that they are moving to [facility] as the patient's  is excepting a job there starting next week.  Patient is requesting to be seen by gastroenterologist at [facility] in [facility].  New referral submitted today.    3. Hypothyroidism, unspecified type  Patient will continue with levothyroxine.  Thyroid lab tests ordered today.      4. Vitamin B12 deficiency  B12 injection 1000 mg IM to be given in clinic " today.      As above the patient will be moving to [facility].  Advised the patient to request medical record and to deliver and establish with physicians there.         Please note that this dictation was created using voice recognition software. I have made every reasonable attempt to correct obvious errors but there may be errors of grammar and content that I may have overlooked prior to finalization of this note.      Clifton Harvey MD  Internal Medicine  [facility]

## 2022-03-22 NOTE — ASSESSMENT & PLAN NOTE
This is a very complex condition.  Patient is presently established with Dr. Starr, GI specialist.  Chart review showed patient with a very complicated rectal prolapse surgery in [place] in 2013.  There was an anastomotic leak and subsequent abscess with sepsis and need for diverting colostomy.  Ever since then she has had issue with stooling and frequent diarrhea requiring emptying her backed up to 20 times per day.  The patient has been seen by multiple physicians here in renal for management includingDr. Nguyen, Dr. Muniz, Dr. Cruz  and most recently  Dr. Sae Carias [GI specialist]  According to chart review diagnosis of cathartic colon was considered with discussion about possible total colectomy.  The patient report Dr. Cruz is hesitant due to concern for worsening diarrhea.  The patient's major concern is obstruction leading to perforation.  Patient report recently worsening symptoms associated with nausea.  She is very fearful to eat and she typically does not eat in the morning or noon but only eats at dinnertime.  Periods soon as she eats she would feel weak and with note multiple loose stool requiring her to empty her colostomy bag.

## 2022-03-23 ENCOUNTER — TELEPHONE (OUTPATIENT)
Dept: MEDICAL GROUP | Facility: PHYSICIAN GROUP | Age: 62
End: 2022-03-23
Payer: OTHER GOVERNMENT

## 2022-03-23 NOTE — TELEPHONE ENCOUNTER
[facility] Faxed in stating that they have received your referral and have forwarded it to [facility].     Letter scanned into Chart.

## 2022-03-30 ENCOUNTER — OFFICE VISIT (OUTPATIENT)
Dept: MEDICAL GROUP | Facility: PHYSICIAN GROUP | Age: 62
End: 2022-03-30
Payer: OTHER GOVERNMENT

## 2022-03-30 VITALS
BODY MASS INDEX: 13.25 KG/M2 | WEIGHT: 72 LBS | DIASTOLIC BLOOD PRESSURE: 52 MMHG | OXYGEN SATURATION: 100 % | RESPIRATION RATE: 18 BRPM | HEART RATE: 74 BPM | SYSTOLIC BLOOD PRESSURE: 86 MMHG | HEIGHT: 62 IN | TEMPERATURE: 98.8 F

## 2022-03-30 DIAGNOSIS — I95.9 HYPOTENSION, UNSPECIFIED HYPOTENSION TYPE: ICD-10-CM

## 2022-03-30 DIAGNOSIS — K94.03 COLOSTOMY DYSFUNCTION (HCC): Chronic | ICD-10-CM

## 2022-03-30 DIAGNOSIS — E86.0 DEHYDRATION: ICD-10-CM

## 2022-03-30 DIAGNOSIS — R74.8 LIVER ENZYME ELEVATION: ICD-10-CM

## 2022-03-30 DIAGNOSIS — K52.9 CHRONIC DIARRHEA: ICD-10-CM

## 2022-03-30 PROCEDURE — 99214 OFFICE O/P EST MOD 30 MIN: CPT | Performed by: INTERNAL MEDICINE

## 2022-03-30 ASSESSMENT — FIBROSIS 4 INDEX: FIB4 SCORE: 2.4

## 2022-03-30 NOTE — ASSESSMENT & PLAN NOTE
Cording to the hospital record at [facility] blood test show  AST 59   She has CT scan of the abdomen on February 24, 2022 which showed normal liver.  Gallbladder no calcified gallstones.  Biliary nondilated

## 2022-03-30 NOTE — ASSESSMENT & PLAN NOTE
As in previous note the patient has a very complicated course.  Patient reported history of rectal prolapse surgery in 2013.  There was an and a stomach leak and subsequent abscess with sepsis needing for diverting colostomy.  Ever since the patient has had recurrent issues with frequent stooling up to 20 times watery diarrhea requiring frequent back changing.  Patient has been seen by multiple physicians here in [place] including Dr. Nguyen, Dr. Muniz, Dr Cruz , Dr Starr and Dr Carias.  The patient was here during the last visit requesting to be seen by GI specialist at [facility] in [place].

## 2022-03-30 NOTE — PROGRESS NOTES
PRIMARY CARE CLINIC VISIT  Chief Complaint   Patient presents with   • Hospital Follow-up         History of Present Illness     Chronic diarrhea  Patient was seen at [facility] emergency room recently due to recurrent diarrhea.  The above note patient presently followed by different GI specialist here in [place] including Dr Starr and Dr Carias  The patient appeared to cycle between severe obstipation and high-volume liquid ostomy output leading to recurrent dehydration/hypovolemia.  Patient has had several ER visit frequently due to severe dehydration.    Colostomy dysfunction (HCC)  As in previous note the patient has a very complicated course.  Patient reported history of rectal prolapse surgery in 2013.  There was an and a stomach leak and subsequent abscess with sepsis needing for diverting colostomy.  Ever since the patient has had recurrent issues with frequent stooling up to 20 times watery diarrhea requiring frequent back changing.  Patient has been seen by multiple physicians here in [place] including Dr. Nguyen, Dr. Muniz, Dr Cruz , Dr Starr and Dr Carias.  The patient was here during the last visit requesting to be seen by GI specialist at [facility] in [place].    Liver enzyme elevation  Cording to the hospital record at [facility] blood test show  AST 59   She has CT scan of the abdomen on February 24, 2022 which showed normal liver.  Gallbladder no calcified gallstones.  Biliary nondilated        Current Outpatient Medications on File Prior to Visit   Medication Sig Dispense Refill   • Prucalopride Succinate (MOTEGRITY) 2 MG Tab Take  by mouth.     • ACETAMINOPHEN PO Take  by mouth.     • Sennosides-Docusate Sodium (SENNA-DOCUSATE SODIUM PO) Take  by mouth.     • levothyroxine (SYNTHROID) 50 MCG Tab Take 1 Tablet by mouth every morning on an empty stomach. 90 Tablet 1     Current Facility-Administered Medications on File Prior to Visit   Medication Dose Route Frequency Provider Last Rate  "Last Admin   • cyanocobalamin (VITAMIN B-12) injection 1,000 mcg  1,000 mcg Intramuscular Q30 DAYS Clifton Harvey M.D.   1,000 mcg at 03/22/22 1447        Allergies: Sulfa drugs and Tape    ROS  As per HPI above. All other systems reviewed and negative.      Past Medical, Social, and Family history reviewed and updated in EPIC     Objective     BP (!) 86/52 (BP Location: Right arm, Patient Position: Sitting, BP Cuff Size: Small adult)   Pulse 74   Temp 37.1 °C (98.8 °F) (Temporal)   Resp 18   Ht 1.575 m (5' 2\")   Wt 32.7 kg (72 lb)   SpO2 100%    Body mass index is 13.17 kg/m².    General: alert and oriented  Cardiovascular: regular rate and rhythm  Pulmonary: lungs : no wheezing   Gastrointestinal: BS present. No obvious mass noted          Assessment and Plan     1. Dehydration  2. Chronic diarrhea  3. Colostomy dysfunction (HCC)    Due to patient frequent visit to ER due to severe dehydration>> recommend home health to arrange home IV fluid rehydration 2 times a week specifically Mondays and Thursday up to 2 L each visit.  For the next few weeks and to the patient is relocated to [place].  Zofran also order IV to help with nausea and vomiting as patient stated that the oral Zofran does not seem to work      4. Liver enzyme elevation  Ordered to repeat liver panel and hepatitis serology.    5.  Severe hypotension  Patient blood pressure is very low today.  I strongly recommended and would like to contact paramedics  to bring the patient to the ER for evaluation/treatment.  The patient however refused.  The patient stated that she had to go home now to take care of her family issues/and arrange transportation.  Patient stated that her neighbor will take her to the ER.  I have advised the patient that serious consequences may occur if the patient leaves  my office now on her own due to severely low blood pressure readings.  The patient once again refused my recommendation.  Patient stated that she understood my " concerns.  Patient stated that she would take full responsibility for her actions.           Please note that this dictation was created using voice recognition software. I have made every reasonable attempt to correct obvious errors but there may be errors of grammar and content that I may have overlooked prior to finalization of this note.      Clifton Harvey MD  Internal Medicine  [facility]

## 2022-03-30 NOTE — ASSESSMENT & PLAN NOTE
Patient was seen at [facility] emergency room recently due to recurrent diarrhea.  The above note patient presently followed by different GI specialist here in [place] including Dr Starr and Dr Carias  The patient appeared to cycle between severe obstipation and high-volume liquid ostomy output leading to recurrent dehydration/hypovolemia.  Patient has had several ER visit frequently due to severe dehydration.

## 2022-04-04 ENCOUNTER — TELEPHONE (OUTPATIENT)
Dept: MEDICAL GROUP | Facility: PHYSICIAN GROUP | Age: 62
End: 2022-04-04
Payer: OTHER GOVERNMENT

## 2022-04-04 NOTE — TELEPHONE ENCOUNTER
Home health will only go out if pt has a pic line which she does not want.  Advised to try [facility].  [phone]  Open 9-4

## 2022-04-04 NOTE — TELEPHONE ENCOUNTER
VOICEMAIL  1. Caller Name: Linsey                      Call Back Number: [phone]    2. Message: [address] [place] [address] health to decline home infusions.  State pt must be set up first.  She suggests outpatient infusion such as [facility]    3. Patient approves office to leave a detailed voicemail/MyChart message: N\A

## 2022-04-05 NOTE — TELEPHONE ENCOUNTER
Called [facility] and they stated they would take a look at referral but that there is a national shortage on rehydration supplies.  They are willing to see if they can help.

## 2022-04-05 NOTE — TELEPHONE ENCOUNTER
Called pt and she is back in the hospital.  Informed her we were still working on therapy for her.

## 2022-04-06 ENCOUNTER — TELEPHONE (OUTPATIENT)
Dept: MEDICAL GROUP | Facility: PHYSICIAN GROUP | Age: 62
End: 2022-04-06
Payer: OTHER GOVERNMENT

## 2022-04-06 NOTE — TELEPHONE ENCOUNTER
Spoke with [facility].  Still have gotten approval from their pharmacists.  States they will call pt if approved to schedule.    Tried calling pt-left message to call back

## 2022-04-06 NOTE — TELEPHONE ENCOUNTER
Chart note:    4/6/2022 at 7:15 AM     Pt's name: Pita Bueno [phone] (home) [phone] (work)   /  PCP: Clifton Harvey M.D..    Please call pt.  Yesterday our office was informed that both Home health agencies declined to accept our referral for pt to get home IV fluid treatment at home.    I have filled out the form with the request for pt to go to [facility]fusion center to receive IV fluid on Monday and Thursday. Form given to Ms Henry on 4.5.22 to forward to Infusion center.  Awaiting for approval. Pls notify pt    Thank you        Clifton Harvey M.D.

## 2022-04-07 ENCOUNTER — TELEPHONE (OUTPATIENT)
Dept: MEDICAL GROUP | Facility: PHYSICIAN GROUP | Age: 62
End: 2022-04-07
Payer: OTHER GOVERNMENT

## 2022-04-07 PROBLEM — D61.818 PANCYTOPENIA (HCC): Status: RESOLVED | Noted: 2020-07-10 | Resolved: 2022-04-07

## 2022-04-07 RX ORDER — ONDANSETRON 2 MG/ML
4 INJECTION INTRAMUSCULAR; INTRAVENOUS ONCE
Status: CANCELLED
Start: 2022-04-07 | End: 2022-04-07

## 2022-04-07 RX ORDER — SODIUM CHLORIDE 9 MG/ML
2000 INJECTION, SOLUTION INTRAVENOUS ONCE
Status: CANCELLED
Start: 2022-04-07 | End: 2022-04-07

## 2022-04-07 NOTE — TELEPHONE ENCOUNTER
Chart note      Pls call and let pt know that I just got off the phone with [facility] pharmacist  Dr Jadiel Rodriguez.    .  Good news the order for iv fluid has been approved to be done at [facility] to be done on Monday and Thursday.  The infusion center staff will reach out and contact the patient to arrange          ===View-only below this line===  ----- Message -----  From: Jadiel Rodriguez PharmD  Sent: 4/7/2022   9:12 AM PDT  To: Clifton Harvey M.D.  Subject: Hydration                                        Hi Dr. Harvey,    My name is Jadiel and I am a pharmacist in the infusion center. Will you please give me a call regarding this patient when you have a chance, I need to go over the orders with you.    Thanks,  Jadiel Rodriguez, PharmD, [facility]  Infusion Therapy Pharmacy Clinical Specialist  [phone]

## 2022-04-10 ENCOUNTER — OUTPATIENT INFUSION SERVICES (OUTPATIENT)
Dept: ONCOLOGY | Facility: MEDICAL CENTER | Age: 62
End: 2022-04-10
Attending: INTERNAL MEDICINE
Payer: OTHER GOVERNMENT

## 2022-04-10 VITALS
DIASTOLIC BLOOD PRESSURE: 51 MMHG | HEART RATE: 84 BPM | BODY MASS INDEX: 12.46 KG/M2 | HEIGHT: 63 IN | WEIGHT: 70.33 LBS | OXYGEN SATURATION: 99 % | TEMPERATURE: 96.6 F | SYSTOLIC BLOOD PRESSURE: 75 MMHG

## 2022-04-10 DIAGNOSIS — K52.9 CHRONIC DIARRHEA: ICD-10-CM

## 2022-04-10 DIAGNOSIS — K59.89: ICD-10-CM

## 2022-04-10 DIAGNOSIS — K94.03 COLOSTOMY DYSFUNCTION (HCC): ICD-10-CM

## 2022-04-10 PROCEDURE — 700105 HCHG RX REV CODE 258: Performed by: INTERNAL MEDICINE

## 2022-04-10 PROCEDURE — 96375 TX/PRO/DX INJ NEW DRUG ADDON: CPT

## 2022-04-10 PROCEDURE — 96361 HYDRATE IV INFUSION ADD-ON: CPT

## 2022-04-10 PROCEDURE — 700111 HCHG RX REV CODE 636 W/ 250 OVERRIDE (IP)

## 2022-04-10 PROCEDURE — 96374 THER/PROPH/DIAG INJ IV PUSH: CPT

## 2022-04-10 PROCEDURE — 700111 HCHG RX REV CODE 636 W/ 250 OVERRIDE (IP): Performed by: INTERNAL MEDICINE

## 2022-04-10 RX ORDER — ACETAMINOPHEN 325 MG/1
650 TABLET ORAL ONCE
Status: CANCELLED | OUTPATIENT
Start: 2022-05-01 | End: 2022-05-01

## 2022-04-10 RX ORDER — DIPHENHYDRAMINE HYDROCHLORIDE 50 MG/ML
12.5 INJECTION INTRAMUSCULAR; INTRAVENOUS ONCE
Status: COMPLETED | OUTPATIENT
Start: 2022-04-10 | End: 2022-04-10

## 2022-04-10 RX ORDER — SODIUM CHLORIDE 9 MG/ML
2000 INJECTION, SOLUTION INTRAVENOUS ONCE
Status: CANCELLED
Start: 2022-04-11 | End: 2022-04-11

## 2022-04-10 RX ORDER — SODIUM CHLORIDE 9 MG/ML
2000 INJECTION, SOLUTION INTRAVENOUS ONCE
Status: COMPLETED | OUTPATIENT
Start: 2022-04-10 | End: 2022-04-10

## 2022-04-10 RX ORDER — ONDANSETRON 2 MG/ML
4 INJECTION INTRAMUSCULAR; INTRAVENOUS ONCE
Status: CANCELLED
Start: 2022-04-11 | End: 2022-04-11

## 2022-04-10 RX ORDER — ONDANSETRON 2 MG/ML
4 INJECTION INTRAMUSCULAR; INTRAVENOUS ONCE
Status: COMPLETED | OUTPATIENT
Start: 2022-04-10 | End: 2022-04-10

## 2022-04-10 RX ORDER — SODIUM CHLORIDE 9 MG/ML
INJECTION, SOLUTION INTRAVENOUS CONTINUOUS
Status: CANCELLED | OUTPATIENT
Start: 2022-05-01

## 2022-04-10 RX ORDER — DIPHENHYDRAMINE HYDROCHLORIDE 50 MG/ML
25 INJECTION INTRAMUSCULAR; INTRAVENOUS
Status: CANCELLED
Start: 2022-05-01

## 2022-04-10 RX ORDER — LINACLOTIDE 290 UG/1
CAPSULE, GELATIN COATED ORAL
COMMUNITY
End: 2022-05-05 | Stop reason: SDUPTHER

## 2022-04-10 RX ADMIN — SODIUM CHLORIDE 2000 ML: 9 INJECTION, SOLUTION INTRAVENOUS at 14:52

## 2022-04-10 RX ADMIN — ONDANSETRON 4 MG: 2 INJECTION INTRAMUSCULAR; INTRAVENOUS at 14:47

## 2022-04-10 RX ADMIN — DIPHENHYDRAMINE HYDROCHLORIDE 12.5 MG: 50 INJECTION INTRAMUSCULAR; INTRAVENOUS at 14:47

## 2022-04-10 ASSESSMENT — FIBROSIS 4 INDEX: FIB4 SCORE: 2.4

## 2022-04-10 ASSESSMENT — PAIN DESCRIPTION - PAIN TYPE: TYPE: ACUTE PAIN

## 2022-04-10 NOTE — PROGRESS NOTES
"Uyen arrives ambulatory to IS for hydration.  Uyen reports feeling terrible nausea and extreme weakness.  She appears emaciated and weak.  BP 75/51.  POC discussed with Uyen, she verbalizes understanding and agreement, but is requesting IV benadryl prior to hydration. She reports she does not absorb anything taken PO and that she has \"reactions\" to everything given IV.   Order received from Dr Hao Bunch MD on call for Dr Clifton Harvey) for benadryl 12.5 mg IV x 1.      PIV placed (x 2 attempts) to left AC, + blood return observed (sluggish), flushes easily.  Zofran and benadryl administered per MAR.  NS bolus of 2L administered per MAR over 2 hours, Uyen tolerated well.  Uyen reports relief from nausea.  Encouraged Uyen to take home zofran as needed.  PIV flushed and removed.  Uyen discharged on foot, copy of appointments provided, next appointment confirmed.    "

## 2022-04-13 ENCOUNTER — APPOINTMENT (OUTPATIENT)
Dept: ONCOLOGY | Facility: MEDICAL CENTER | Age: 62
End: 2022-04-13
Attending: INTERNAL MEDICINE
Payer: OTHER GOVERNMENT

## 2022-04-15 PROBLEM — Z93.2 HIGH OUTPUT ILEOSTOMY (HCC): Status: ACTIVE | Noted: 2022-03-22

## 2022-04-15 PROBLEM — R19.8 HIGH OUTPUT ILEOSTOMY (HCC): Chronic | Status: ACTIVE | Noted: 2022-03-22

## 2022-04-15 PROBLEM — Z93.2 HIGH OUTPUT ILEOSTOMY (HCC): Chronic | Status: ACTIVE | Noted: 2022-03-22

## 2022-04-15 PROBLEM — R11.0 CHRONIC NAUSEA: Status: ACTIVE | Noted: 2022-04-15

## 2022-04-15 PROBLEM — R19.8 HIGH OUTPUT ILEOSTOMY (HCC): Status: ACTIVE | Noted: 2022-03-22

## 2022-04-16 ENCOUNTER — APPOINTMENT (OUTPATIENT)
Dept: ONCOLOGY | Facility: MEDICAL CENTER | Age: 62
End: 2022-04-16
Attending: INTERNAL MEDICINE
Payer: OTHER GOVERNMENT

## 2022-04-18 DIAGNOSIS — R19.8 HIGH OUTPUT ILEOSTOMY (HCC): ICD-10-CM

## 2022-04-18 DIAGNOSIS — Z93.2 HIGH OUTPUT ILEOSTOMY (HCC): ICD-10-CM

## 2022-04-19 RX ORDER — DIPHENHYDRAMINE HYDROCHLORIDE 50 MG/ML
12.5 INJECTION INTRAMUSCULAR; INTRAVENOUS ONCE
Status: CANCELLED
Start: 2022-04-19 | End: 2022-04-19

## 2022-04-19 RX ORDER — SODIUM CHLORIDE, SODIUM LACTATE, POTASSIUM CHLORIDE, AND CALCIUM CHLORIDE .6; .31; .03; .02 G/100ML; G/100ML; G/100ML; G/100ML
2000 INJECTION, SOLUTION INTRAVENOUS ONCE
Status: CANCELLED
Start: 2022-04-19 | End: 2022-04-19

## 2022-04-20 ENCOUNTER — OUTPATIENT INFUSION SERVICES (OUTPATIENT)
Dept: ONCOLOGY | Facility: MEDICAL CENTER | Age: 62
End: 2022-04-20
Attending: INTERNAL MEDICINE
Payer: OTHER GOVERNMENT

## 2022-04-20 VITALS
BODY MASS INDEX: 12.89 KG/M2 | HEIGHT: 63 IN | WEIGHT: 72.75 LBS | SYSTOLIC BLOOD PRESSURE: 84 MMHG | HEART RATE: 64 BPM | OXYGEN SATURATION: 98 % | RESPIRATION RATE: 18 BRPM | DIASTOLIC BLOOD PRESSURE: 53 MMHG | TEMPERATURE: 96.8 F

## 2022-04-20 DIAGNOSIS — Z93.2 HIGH OUTPUT ILEOSTOMY (HCC): ICD-10-CM

## 2022-04-20 DIAGNOSIS — K59.89: ICD-10-CM

## 2022-04-20 DIAGNOSIS — K52.9 CHRONIC DIARRHEA: ICD-10-CM

## 2022-04-20 DIAGNOSIS — R19.8 HIGH OUTPUT ILEOSTOMY (HCC): ICD-10-CM

## 2022-04-20 PROCEDURE — 96360 HYDRATION IV INFUSION INIT: CPT

## 2022-04-20 PROCEDURE — 96375 TX/PRO/DX INJ NEW DRUG ADDON: CPT

## 2022-04-20 PROCEDURE — 700111 HCHG RX REV CODE 636 W/ 250 OVERRIDE (IP): Performed by: INTERNAL MEDICINE

## 2022-04-20 PROCEDURE — 96374 THER/PROPH/DIAG INJ IV PUSH: CPT

## 2022-04-20 PROCEDURE — 700105 HCHG RX REV CODE 258: Performed by: INTERNAL MEDICINE

## 2022-04-20 PROCEDURE — 96361 HYDRATE IV INFUSION ADD-ON: CPT

## 2022-04-20 RX ORDER — DIPHENHYDRAMINE HYDROCHLORIDE 50 MG/ML
25 INJECTION INTRAMUSCULAR; INTRAVENOUS
Status: CANCELLED
Start: 2022-05-01

## 2022-04-20 RX ORDER — DIPHENHYDRAMINE HYDROCHLORIDE 50 MG/ML
12.5 INJECTION INTRAMUSCULAR; INTRAVENOUS ONCE
Status: CANCELLED
Start: 2022-04-21 | End: 2022-04-21

## 2022-04-20 RX ORDER — SODIUM CHLORIDE, SODIUM LACTATE, POTASSIUM CHLORIDE, AND CALCIUM CHLORIDE .6; .31; .03; .02 G/100ML; G/100ML; G/100ML; G/100ML
2000 INJECTION, SOLUTION INTRAVENOUS ONCE
Status: CANCELLED
Start: 2022-04-21 | End: 2022-04-21

## 2022-04-20 RX ORDER — SODIUM CHLORIDE, SODIUM LACTATE, POTASSIUM CHLORIDE, AND CALCIUM CHLORIDE .6; .31; .03; .02 G/100ML; G/100ML; G/100ML; G/100ML
2000 INJECTION, SOLUTION INTRAVENOUS ONCE
Status: COMPLETED | OUTPATIENT
Start: 2022-04-20 | End: 2022-04-20

## 2022-04-20 RX ORDER — ONDANSETRON 2 MG/ML
4 INJECTION INTRAMUSCULAR; INTRAVENOUS ONCE
Status: COMPLETED | OUTPATIENT
Start: 2022-04-20 | End: 2022-04-20

## 2022-04-20 RX ORDER — ONDANSETRON 2 MG/ML
4 INJECTION INTRAMUSCULAR; INTRAVENOUS ONCE
Status: CANCELLED
Start: 2022-04-21 | End: 2022-04-21

## 2022-04-20 RX ORDER — DIPHENHYDRAMINE HYDROCHLORIDE 50 MG/ML
12.5 INJECTION INTRAMUSCULAR; INTRAVENOUS ONCE
Status: COMPLETED | OUTPATIENT
Start: 2022-04-20 | End: 2022-04-20

## 2022-04-20 RX ORDER — ACETAMINOPHEN 325 MG/1
650 TABLET ORAL ONCE
Status: CANCELLED | OUTPATIENT
Start: 2022-05-01 | End: 2022-05-01

## 2022-04-20 RX ORDER — SODIUM CHLORIDE 9 MG/ML
INJECTION, SOLUTION INTRAVENOUS CONTINUOUS
Status: CANCELLED | OUTPATIENT
Start: 2022-05-01

## 2022-04-20 RX ADMIN — SODIUM CHLORIDE, POTASSIUM CHLORIDE, SODIUM LACTATE AND CALCIUM CHLORIDE 2000 ML: 600; 310; 30; 20 INJECTION, SOLUTION INTRAVENOUS at 16:07

## 2022-04-20 RX ADMIN — DIPHENHYDRAMINE HYDROCHLORIDE 12.5 MG: 50 INJECTION INTRAMUSCULAR; INTRAVENOUS at 16:14

## 2022-04-20 RX ADMIN — ONDANSETRON 4 MG: 2 INJECTION INTRAMUSCULAR; INTRAVENOUS at 16:13

## 2022-04-20 ASSESSMENT — FIBROSIS 4 INDEX: FIB4 SCORE: 2.4

## 2022-04-20 ASSESSMENT — PAIN DESCRIPTION - PAIN TYPE: TYPE: ACUTE PAIN

## 2022-04-20 NOTE — PROGRESS NOTES
Ms. Bueno is here today for hydration 2000 ml of LR. She reports she was seen in the ED, given hydration and and discharged.  PIV to left AC, flushed well with good blood return. Pre meds administered per MAR orders. 1L of LR infused without issue, 2nd Liter of LR PIV became infiltrated at approximately 500ml and patient declined a new PIV to be placed, stating she had hydration last night in the ED. Removed PIV intact, discharged home in stable condition. Confirmed next appointment.

## 2022-04-22 ENCOUNTER — HOSPITAL ENCOUNTER (OUTPATIENT)
Dept: LAB | Facility: MEDICAL CENTER | Age: 62
End: 2022-04-22
Attending: INTERNAL MEDICINE
Payer: OTHER GOVERNMENT

## 2022-04-22 DIAGNOSIS — R19.8 HIGH OUTPUT ILEOSTOMY (HCC): ICD-10-CM

## 2022-04-22 DIAGNOSIS — E03.9 HYPOTHYROIDISM, UNSPECIFIED TYPE: ICD-10-CM

## 2022-04-22 DIAGNOSIS — R74.8 LIVER ENZYME ELEVATION: ICD-10-CM

## 2022-04-22 DIAGNOSIS — Z93.2 HIGH OUTPUT ILEOSTOMY (HCC): ICD-10-CM

## 2022-04-22 LAB
ALBUMIN SERPL BCP-MCNC: 4.9 G/DL (ref 3.2–4.9)
ALBUMIN/GLOB SERPL: 2.3 G/DL
ALP SERPL-CCNC: 65 U/L (ref 30–99)
ALT SERPL-CCNC: 89 U/L (ref 2–50)
ANION GAP SERPL CALC-SCNC: 15 MMOL/L (ref 7–16)
APTT PPP: 33.3 SEC (ref 24.7–36)
AST SERPL-CCNC: 38 U/L (ref 12–45)
BILIRUB CONJ SERPL-MCNC: <0.2 MG/DL (ref 0.1–0.5)
BILIRUB INDIRECT SERPL-MCNC: NORMAL MG/DL (ref 0–1)
BILIRUB SERPL-MCNC: 0.4 MG/DL (ref 0.1–1.5)
BUN SERPL-MCNC: 26 MG/DL (ref 8–22)
CALCIUM SERPL-MCNC: 9.3 MG/DL (ref 8.5–10.5)
CANCER AG125 SERPL-ACNC: 10.3 U/ML (ref 0–35)
CANCER AG19-9 SERPL-ACNC: 15.2 U/ML (ref 0–35)
CEA SERPL-MCNC: 4.8 NG/ML (ref 0–3)
CHLORIDE SERPL-SCNC: 105 MMOL/L (ref 96–112)
CO2 SERPL-SCNC: 11 MMOL/L (ref 20–33)
CORTIS SERPL-MCNC: 19.7 UG/DL (ref 0–23)
CREAT SERPL-MCNC: 0.7 MG/DL (ref 0.5–1.4)
FASTING STATUS PATIENT QL REPORTED: NORMAL
FERRITIN SERPL-MCNC: 326 NG/ML (ref 10–291)
GFR SERPLBLD CREATININE-BSD FMLA CKD-EPI: 98 ML/MIN/1.73 M 2
GLOBULIN SER CALC-MCNC: 2.1 G/DL (ref 1.9–3.5)
GLUCOSE SERPL-MCNC: 88 MG/DL (ref 65–99)
HBV CORE AB SERPL QL IA: NONREACTIVE
HBV CORE IGM SER QL: NORMAL
HBV SURFACE AB SERPL IA-ACNC: 73.2 MIU/ML (ref 0–10)
HBV SURFACE AB SERPL IA-ACNC: 73.4 MIU/ML (ref 0–10)
HBV SURFACE AG SER QL: NORMAL
HBV SURFACE AG SER QL: NORMAL
HCV AB SER QL: NORMAL
HCV AB SER QL: NORMAL
INR PPP: 1.05 (ref 0.87–1.13)
IRON SATN MFR SERPL: 41 % (ref 15–55)
IRON SERPL-MCNC: 111 UG/DL (ref 40–170)
POTASSIUM SERPL-SCNC: 3.3 MMOL/L (ref 3.6–5.5)
PROT SERPL-MCNC: 7 G/DL (ref 6–8.2)
PROTHROMBIN TIME: 13.4 SEC (ref 12–14.6)
SODIUM SERPL-SCNC: 131 MMOL/L (ref 135–145)
TIBC SERPL-MCNC: 271 UG/DL (ref 250–450)
TSH SERPL DL<=0.005 MIU/L-ACNC: 2.2 UIU/ML (ref 0.38–5.33)
UIBC SERPL-MCNC: 160 UG/DL (ref 110–370)
VIT B12 SERPL-MCNC: 484 PG/ML (ref 211–911)

## 2022-04-22 PROCEDURE — 87340 HEPATITIS B SURFACE AG IA: CPT

## 2022-04-22 PROCEDURE — 86709 HEPATITIS A IGM ANTIBODY: CPT

## 2022-04-22 PROCEDURE — 86038 ANTINUCLEAR ANTIBODIES: CPT

## 2022-04-22 PROCEDURE — 86803 HEPATITIS C AB TEST: CPT | Mod: 91

## 2022-04-22 PROCEDURE — 82784 ASSAY IGA/IGD/IGG/IGM EACH: CPT

## 2022-04-22 PROCEDURE — 82248 BILIRUBIN DIRECT: CPT

## 2022-04-22 PROCEDURE — 85730 THROMBOPLASTIN TIME PARTIAL: CPT

## 2022-04-22 PROCEDURE — 86708 HEPATITIS A ANTIBODY: CPT

## 2022-04-22 PROCEDURE — 86148 ANTI-PHOSPHOLIPID ANTIBODY: CPT

## 2022-04-22 PROCEDURE — 82607 VITAMIN B-12: CPT

## 2022-04-22 PROCEDURE — 83540 ASSAY OF IRON: CPT

## 2022-04-22 PROCEDURE — 83550 IRON BINDING TEST: CPT

## 2022-04-22 PROCEDURE — 82105 ALPHA-FETOPROTEIN SERUM: CPT

## 2022-04-22 PROCEDURE — 86706 HEP B SURFACE ANTIBODY: CPT

## 2022-04-22 PROCEDURE — 83516 IMMUNOASSAY NONANTIBODY: CPT

## 2022-04-22 PROCEDURE — 82533 TOTAL CORTISOL: CPT

## 2022-04-22 PROCEDURE — 86706 HEP B SURFACE ANTIBODY: CPT | Mod: 91

## 2022-04-22 PROCEDURE — 86705 HEP B CORE ANTIBODY IGM: CPT

## 2022-04-22 PROCEDURE — 84165 PROTEIN E-PHORESIS SERUM: CPT

## 2022-04-22 PROCEDURE — 87340 HEPATITIS B SURFACE AG IA: CPT | Mod: 91

## 2022-04-22 PROCEDURE — 86665 EPSTEIN-BARR CAPSID VCA: CPT

## 2022-04-22 PROCEDURE — 86803 HEPATITIS C AB TEST: CPT

## 2022-04-22 PROCEDURE — 82378 CARCINOEMBRYONIC ANTIGEN: CPT

## 2022-04-22 PROCEDURE — 84155 ASSAY OF PROTEIN SERUM: CPT

## 2022-04-22 PROCEDURE — 80053 COMPREHEN METABOLIC PANEL: CPT

## 2022-04-22 PROCEDURE — 36415 COLL VENOUS BLD VENIPUNCTURE: CPT

## 2022-04-22 PROCEDURE — 86304 IMMUNOASSAY TUMOR CA 125: CPT

## 2022-04-22 PROCEDURE — 84443 ASSAY THYROID STIM HORMONE: CPT

## 2022-04-22 PROCEDURE — 86015 ACTIN ANTIBODY EACH: CPT

## 2022-04-22 PROCEDURE — 82728 ASSAY OF FERRITIN: CPT

## 2022-04-22 PROCEDURE — 86301 IMMUNOASSAY TUMOR CA 19-9: CPT

## 2022-04-22 PROCEDURE — 86790 VIRUS ANTIBODY NOS: CPT

## 2022-04-22 PROCEDURE — 86376 MICROSOMAL ANTIBODY EACH: CPT

## 2022-04-22 PROCEDURE — 85610 PROTHROMBIN TIME: CPT

## 2022-04-22 PROCEDURE — 86704 HEP B CORE ANTIBODY TOTAL: CPT

## 2022-04-22 PROCEDURE — 86381 MITOCHONDRIAL ANTIBODY EACH: CPT

## 2022-04-22 PROCEDURE — 86663 EPSTEIN-BARR ANTIBODY: CPT

## 2022-04-22 PROCEDURE — 86664 EPSTEIN-BARR NUCLEAR ANTIGEN: CPT

## 2022-04-22 PROCEDURE — 82390 ASSAY OF CERULOPLASMIN: CPT

## 2022-04-22 PROCEDURE — 86694 HERPES SIMPLEX NES ANTBDY: CPT

## 2022-04-23 DIAGNOSIS — E87.6 HYPOKALEMIA: ICD-10-CM

## 2022-04-23 RX ORDER — POTASSIUM CHLORIDE 20 MEQ/1
20 TABLET, EXTENDED RELEASE ORAL DAILY
Qty: 10 TABLET | Refills: 0 | Status: SHIPPED | OUTPATIENT
Start: 2022-04-23

## 2022-04-24 ENCOUNTER — APPOINTMENT (OUTPATIENT)
Dept: ONCOLOGY | Facility: MEDICAL CENTER | Age: 62
End: 2022-04-24
Attending: INTERNAL MEDICINE
Payer: OTHER GOVERNMENT

## 2022-04-24 LAB
AFP-TM SERPL-MCNC: 4 NG/ML (ref 0–9)
EBV EA-D IGG SER-ACNC: 30 U/ML (ref 0–10.9)
EBV NA IGG SER IA-ACNC: 70.2 U/ML (ref 0–21.9)
EBV VCA IGG SER IA-ACNC: >750 U/ML (ref 0–21.9)
EBV VCA IGM SER IA-ACNC: <10 U/ML (ref 0–43.9)

## 2022-04-25 LAB
HSV1+2 IGG SER IA-ACNC: >22.4 IV
HSV1+2 IGM SER IA-ACNC: 0.64 IV
IGG SERPL-MCNC: 503 MG/DL (ref 768–1632)
IGM SERPL-MCNC: 59 MG/DL (ref 35–263)
LKM AB TITR SER IF: NORMAL {TITER}
MITOCHONDRIA M2 IGG SER-ACNC: 1.6 UNITS (ref 0–24.9)
NUCLEAR IGG SER QL IA: NORMAL
SMA IGG SER-ACNC: 7 UNITS (ref 0–19)

## 2022-04-26 LAB
CERULOPLASMIN SERPL-MCNC: 19 MG/DL (ref 16–45)
HEV IGM SER QL: NEGATIVE

## 2022-04-27 ENCOUNTER — APPOINTMENT (OUTPATIENT)
Dept: ONCOLOGY | Facility: MEDICAL CENTER | Age: 62
End: 2022-04-27
Attending: INTERNAL MEDICINE
Payer: OTHER GOVERNMENT

## 2022-04-27 LAB
ALBUMIN SERPL ELPH-MCNC: 4.42 G/DL (ref 3.75–5.01)
ALPHA1 GLOB SERPL ELPH-MCNC: 0.35 G/DL (ref 0.19–0.46)
ALPHA2 GLOB SERPL ELPH-MCNC: 0.8 G/DL (ref 0.48–1.05)
B-GLOBULIN SERPL ELPH-MCNC: 0.58 G/DL (ref 0.48–1.1)
CMV DNA # SERPL NAA+PROBE: <390 CPY/ML
CMV DNA SERPL NAA+PROBE-ACNC: <227 IU/ML
CMV DNA SERPL NAA+PROBE-LOG IU: <2.4 LOG IU/ML
CMV DNA SERPL NAA+PROBE-LOG#: <2.6 LOG CPY/ML
CMV GENE MUT DET ISLT: NOT DETECTED
CMV PCR SOURCE Q4398: NORMAL
EER PROT ELECT SER Q1092: ABNORMAL
GAMMA GLOB SERPL ELPH-MCNC: 0.56 G/DL (ref 0.62–1.51)
INTERPRETATION SERPL IFE-IMP: ABNORMAL
PROT SERPL-MCNC: 6.7 G/DL (ref 6.3–8.2)

## 2022-04-28 ENCOUNTER — PATIENT MESSAGE (OUTPATIENT)
Dept: CARDIOLOGY | Facility: MEDICAL CENTER | Age: 62
End: 2022-04-28
Payer: OTHER GOVERNMENT

## 2022-04-28 DIAGNOSIS — R06.09 DOE (DYSPNEA ON EXERTION): ICD-10-CM

## 2022-04-28 DIAGNOSIS — I50.32 CHRONIC HEART FAILURE WITH PRESERVED EJECTION FRACTION (HCC): ICD-10-CM

## 2022-04-28 LAB
HAV AB SER QL IA: POSITIVE
HAV IGM SERPL QL IA: NEGATIVE
PS IGA SER IA-ACNC: 0 APS (ref 0–19)
PS IGG SER IA-ACNC: 0 GPS (ref 0–15)
PS IGM SER IA-ACNC: 5 MPS (ref 0–21)

## 2022-04-29 LAB — TEST NAME 95000: NORMAL

## 2022-04-29 RX ORDER — FUROSEMIDE 20 MG/1
20 TABLET ORAL DAILY
Qty: 90 TABLET | Refills: 3 | Status: SHIPPED | OUTPATIENT
Start: 2022-04-29

## 2022-04-29 NOTE — PATIENT COMMUNICATION
STAT request for records sent to [facility] medical records at [phone], receipt confirmed. [employer] message sent to patient.

## 2022-04-30 ENCOUNTER — APPOINTMENT (OUTPATIENT)
Dept: ONCOLOGY | Facility: MEDICAL CENTER | Age: 62
End: 2022-04-30
Attending: INTERNAL MEDICINE
Payer: OTHER GOVERNMENT

## 2022-04-30 NOTE — PATIENT COMMUNICATION
FW: Hospital  visit  Received: Today  Maxine Tinsley R.N.  Shauna Lyons R.N.    Previous Messages     ----- Message -----   From: Abran Calvillo M.D.   Sent: 4/29/2022   4:07 PM PDT   To: Maxine Tinsley R.N.   Subject: FW: Hospital  visit                               A diuretic (like lasix 20 mg daily) would be helpful if she is not already taking one. It might be a good idea to do another right heart catheterization. If she goes on a diuretic she should measure a BNP and BMP in one week.     Thanks   BE   ----- Message -----   From: Shauna Lyons R.N.   Sent: 4/29/2022  10:50 AM PDT   To: Abran Calvillo M.D.   Subject: FW: Hospital  visit                               Hi! Patient has been in the ER 3 times in the last month (requested records from [facility]). She states she gained 20 lbs of fluid in 4 days due to being fluid overloaded. Patient is SOB and having a hard time moving with the excess weight. Since you know her history she wanted your recommendations. Thanks!   ___________________________________________________________________    Lab orders placed and put in mail for patient. RX sent to preferred pharmacy on file. [employer] message sent to patient with update.

## 2022-05-04 ENCOUNTER — APPOINTMENT (OUTPATIENT)
Dept: ONCOLOGY | Facility: MEDICAL CENTER | Age: 62
End: 2022-05-04
Attending: INTERNAL MEDICINE
Payer: OTHER GOVERNMENT

## 2022-05-05 ENCOUNTER — OFFICE VISIT (OUTPATIENT)
Dept: MEDICAL GROUP | Facility: PHYSICIAN GROUP | Age: 62
End: 2022-05-05
Payer: OTHER GOVERNMENT

## 2022-05-05 VITALS
BODY MASS INDEX: 14.75 KG/M2 | HEART RATE: 71 BPM | SYSTOLIC BLOOD PRESSURE: 90 MMHG | HEIGHT: 64 IN | OXYGEN SATURATION: 99 % | RESPIRATION RATE: 16 BRPM | TEMPERATURE: 98 F | DIASTOLIC BLOOD PRESSURE: 56 MMHG | WEIGHT: 86.38 LBS

## 2022-05-05 DIAGNOSIS — E03.9 ACQUIRED HYPOTHYROIDISM: Chronic | ICD-10-CM

## 2022-05-05 DIAGNOSIS — R19.04 LEFT LOWER QUADRANT ABDOMINAL MASS: ICD-10-CM

## 2022-05-05 PROCEDURE — 99214 OFFICE O/P EST MOD 30 MIN: CPT | Performed by: INTERNAL MEDICINE

## 2022-05-05 RX ORDER — LINACLOTIDE 290 UG/1
290 CAPSULE, GELATIN COATED ORAL DAILY
Qty: 30 CAPSULE | Refills: 2 | Status: SHIPPED | OUTPATIENT
Start: 2022-05-05

## 2022-05-05 RX ORDER — LEVOTHYROXINE SODIUM 0.05 MG/1
50 TABLET ORAL
Qty: 90 TABLET | Refills: 1 | Status: SHIPPED | OUTPATIENT
Start: 2022-05-05

## 2022-05-05 ASSESSMENT — FIBROSIS 4 INDEX: FIB4 SCORE: 1.89

## 2022-05-05 NOTE — ASSESSMENT & PLAN NOTE
This is a chronic condition.  The patient is presently taking levothyroxine.  The patient is requesting prescription refills.

## 2022-05-05 NOTE — ASSESSMENT & PLAN NOTE
"Patient present today for hospital follow-up.  She was admitted to [facility] recently due to intractable nausea and vomiting.      patient with a very complicated rectal prolapse surgery in [place] in 2013.  There was an anastomotic leak and subsequent abscess with sepsis and need for diverting colostomy.  Ever since then she has had issue with stooling and frequent diarrhea requiring emptying her backed up to 20 times per day.  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  The patient report Dr. Cruz is hesitant due to concern for worsening diarrhea.  The patient's major concern is obstruction leading to perforation.  Patient reported worsening symptoms associated with nausea.  She is very fearful to eat and she typically does not eat in the morning or noon but only eats at dinnertime.  Periods soon as she eats she would feel weak and with note multiple loose stools requiring her to empty her colostomy bag.    Patient with history of CVI D, pancreatic insufficiency, hypothyroidism, CHF, chronic nausea, protein calorie malnutrition, colostomy after perforation from rectal prolapse repair, high output ileostomy and prior history of C. difficile diarrhea.    Patient has had several hospital admission at [facility].Patient was treated for dehydration secondary to high output ileostomy and abnormal electrolytes/metabolic disturbance    Of note the patient had CT scan of the abdomen and pelvis done for 25 2022 showed    \"... 4.4 cm partially calcified mass in the left lower pelvis posteriorly.  A few associated bubbles of air is seen adjacent to this partially calcified mass.  Uterus is not visualized and may be surgically absent.  Urinary bladder is not overly distended...\"    Patient reported that during the hospitalization she was scheduled to have " "a biopsy done however the procedure was canceled.    Upon review of the discharge summary it was noted \"... The calcified or hypodense mass in the left low pelvis measuring 4.4 cm.  Represent an excluded portion of the colon containing contrast and/or hypodense stool, this does not represent a tumor.  Biopsy will not be performed...\"    Therefore this provider strongly recommended for the patient to repeat the CT scan for confirmation.  The patient however declined as she is preparing to relocate to [place] to be with her  permanently.    I have printed out the discharge summary along with previous CT scan completed here at [facility].  Advised the patient it is very important to follow-up with her new physician in [facility]  Patient voiced understanding.    Patient also requests refills for Linzess       "

## 2022-05-05 NOTE — PROGRESS NOTES
"PRIMARY CARE CLINIC VISIT  Chief Complaint   Patient presents with   • Follow-Up     Hospital follow-up    History of Present Illness     Left lower quadrant abdominal mass  Patient present today for hospital follow-up.  She was admitted to [facility] recently due to intractable nausea and vomiting.      Patient with a very complicated rectal prolapse surgery in [place] in 2013.  There was an anastomotic leak and subsequent abscess with sepsis and need for diverting colostomy.  Ever since then she has had issue with stooling and frequent diarrhea requiring emptying her backed up to 20 times per day.  The patient has been seen by multiple physicians here in [place]   According to chart review diagnosis of cathartic colon was considered with discussion about possible total colectomy.  The patient report Dr. Cruz is hesitant due to concern for worsening diarrhea.  The patient's major concern is obstruction leading to perforation.  Patient reported worsening symptoms associated with nausea.  She is very fearful to eat and she typically does not eat in the morning or noon but only eats at dinnertime.  Periods soon as she eats she would feel weak and with note multiple loose stools requiring her to empty her colostomy bag.    Patient with history of CVI D, pancreatic insufficiency, hypothyroidism, CHF, chronic nausea, protein calorie malnutrition, colostomy after perforation from rectal prolapse repair, high output ileostomy and prior history of C. difficile diarrhea.    Patient has had several hospital admission at [facility].Patient was treated for dehydration secondary to high output ileostomy and abnormal electrolytes/metabolic disturbance    the patient had CT scan of the abdomen and pelvis done April 25 2022 showed    \"... 4.4 cm partially calcified mass in the left lower pelvis posteriorly.  A few associated bubbles of air is seen adjacent to this partially calcified mass.  Uterus is " "not visualized and may be surgically absent.  Urinary bladder is not overly distended...\"    Patient reported that during the hospitalization she was scheduled to have a biopsy done however the procedure was canceled.    Upon review of the discharge summary it was noted \"... The calcified or hypodense mass in the left low pelvis measuring 4.4 cm.  Represent an excluded portion of the colon containing contrast and/or hypodense stool, this does not represent a tumor.  Biopsy will not be performed...\"    Therefore this provider strongly recommended for the patient to repeat the CT scan for confirmation.  The patient however declined as she is preparing to relocate to [place] in a couple of days   I have printed out the discharge summary along with previous CT scan completed here at [facility].  Advised the patient it is very important to follow-up with her new physician in [facility] [place] [facility]  Patient voiced understanding.    Patient also requests refills for Linzess   This time the patient denies fever or chills nausea vomiting or bleeding.      Hypothyroidism  This is a chronic condition.  The patient is presently taking levothyroxine.  The patient is requesting prescription refills.      Current Outpatient Medications on File Prior to Visit   Medication Sig Dispense Refill   • potassium chloride SA (KDUR) 20 MEQ Tab CR Take 1 Tablet by mouth every day. 10 Tablet 0   • ACETAMINOPHEN PO Take  by mouth.     • Sennosides-Docusate Sodium (SENNA-DOCUSATE SODIUM PO) Take  by mouth.     • furosemide (LASIX) 20 MG Tab Take 1 Tablet by mouth every day. 90 Tablet 3     Current Facility-Administered Medications on File Prior to Visit   Medication Dose Route Frequency Provider Last Rate Last Admin   • cyanocobalamin (VITAMIN B-12) injection 1,000 mcg  1,000 mcg Intramuscular Q30 DAYS Clifton Harvey M.D.   1,000 mcg at 03/22/22 1447        Allergies: Sulfa drugs and Tape    ROS  As per HPI above. All other systems reviewed and " "negative.      Past Medical, Social, and Family history reviewed and updated in EPIC     Objective     BP (!) 90/56 (BP Location: Right arm, Patient Position: Sitting, BP Cuff Size: Child)   Pulse 71   Temp 36.7 °C (98 °F) (Temporal)   Resp 16   Ht 1.626 m (5' 4\")   Wt 39.2 kg (86 lb 6 oz)   SpO2 99%    Body mass index is 14.83 kg/m².    General: alert and oriented  Cardiovascular: regular rate and rhythm  Pulmonary: lungs : no wheezing   Gastrointestinal: BS present.  Colostomy bag noted.  No obvious mass appreciated on exam        Assessment and Plan     1. Left lower quadrant abdominal mass  The results of CT scan discussed with the patient at length today.  As above the patient declined to repeat the CT scan as she is preparing to move to [place] in a couple of days.  By the patient to establish care with a physician there as soon as possible.  The patient stated that she already arranged an appointment to meet with a new provider  Copies of previous CT scan given to the patient to deliver to the new provider.    2. Acquired hypothyroidism  Continue levothyroxine.      Other orders  - linaCLOtide (LINZESS) 290 MCG Cap; Take 290 mcg by mouth every day.  Dispense: 30 Capsule; Refill: 2  - levothyroxine (SYNTHROID) 50 MCG Tab; Take 1 Tablet by mouth every morning on an empty stomach.  Dispense: 90 Tablet; Refill: 1                       Please note that this dictation was created using voice recognition software. I have made every reasonable attempt to correct obvious errors but there may be errors of grammar and content that I may have overlooked prior to finalization of this note.      Clifton Harvey MD  Internal Medicine  [facility]  "

## 2022-05-07 ENCOUNTER — APPOINTMENT (OUTPATIENT)
Dept: ONCOLOGY | Facility: MEDICAL CENTER | Age: 62
End: 2022-05-07
Attending: INTERNAL MEDICINE
Payer: OTHER GOVERNMENT

## 2022-05-09 ENCOUNTER — HOSPITAL ENCOUNTER (OUTPATIENT)
Dept: LAB | Facility: MEDICAL CENTER | Age: 62
End: 2022-05-09
Attending: INTERNAL MEDICINE
Payer: OTHER GOVERNMENT

## 2022-05-09 DIAGNOSIS — E87.6 HYPOKALEMIA: ICD-10-CM

## 2022-05-09 DIAGNOSIS — R06.09 DOE (DYSPNEA ON EXERTION): ICD-10-CM

## 2022-05-09 DIAGNOSIS — I50.32 CHRONIC HEART FAILURE WITH PRESERVED EJECTION FRACTION (HCC): ICD-10-CM

## 2022-05-09 LAB
ANION GAP SERPL CALC-SCNC: 14 MMOL/L (ref 7–16)
BUN SERPL-MCNC: 18 MG/DL (ref 8–22)
CALCIUM SERPL-MCNC: 8.9 MG/DL (ref 8.5–10.5)
CHLORIDE SERPL-SCNC: 102 MMOL/L (ref 96–112)
CO2 SERPL-SCNC: 21 MMOL/L (ref 20–33)
CREAT SERPL-MCNC: 0.54 MG/DL (ref 0.5–1.4)
GFR SERPLBLD CREATININE-BSD FMLA CKD-EPI: 105 ML/MIN/1.73 M 2
GLUCOSE SERPL-MCNC: 71 MG/DL (ref 65–99)
NT-PROBNP SERPL IA-MCNC: 85 PG/ML (ref 0–125)
POTASSIUM SERPL-SCNC: 3.5 MMOL/L (ref 3.6–5.5)
POTASSIUM SERPL-SCNC: 3.5 MMOL/L (ref 3.6–5.5)
SODIUM SERPL-SCNC: 137 MMOL/L (ref 135–145)

## 2022-05-09 PROCEDURE — 84132 ASSAY OF SERUM POTASSIUM: CPT

## 2022-05-09 PROCEDURE — 83880 ASSAY OF NATRIURETIC PEPTIDE: CPT

## 2022-05-09 PROCEDURE — 36415 COLL VENOUS BLD VENIPUNCTURE: CPT

## 2022-05-09 PROCEDURE — 80048 BASIC METABOLIC PNL TOTAL CA: CPT

## 2022-05-11 ENCOUNTER — APPOINTMENT (OUTPATIENT)
Dept: ONCOLOGY | Facility: MEDICAL CENTER | Age: 62
End: 2022-05-11
Attending: INTERNAL MEDICINE
Payer: OTHER GOVERNMENT

## 2022-05-15 ENCOUNTER — APPOINTMENT (OUTPATIENT)
Dept: ONCOLOGY | Facility: MEDICAL CENTER | Age: 62
End: 2022-05-15
Attending: INTERNAL MEDICINE
Payer: OTHER GOVERNMENT

## 2022-05-18 ENCOUNTER — APPOINTMENT (OUTPATIENT)
Dept: ONCOLOGY | Facility: MEDICAL CENTER | Age: 62
End: 2022-05-18
Attending: INTERNAL MEDICINE
Payer: OTHER GOVERNMENT

## 2024-08-20 ENCOUNTER — TELEPHONE (OUTPATIENT)
Dept: MEDICAL GROUP | Facility: PHYSICIAN GROUP | Age: 64
End: 2024-08-20
Payer: OTHER GOVERNMENT

## 2024-08-20 ENCOUNTER — PATIENT MESSAGE (OUTPATIENT)
Dept: MEDICAL GROUP | Facility: PHYSICIAN GROUP | Age: 64
End: 2024-08-20
Payer: OTHER GOVERNMENT

## 2024-08-20 NOTE — TELEPHONE ENCOUNTER
Name: Pita Bueno    Phone number: [phone] (home) [phone] (work)    Message: Patient called and left a voicemail. Patient stated that she is a former patient of yours. Patient stated that she moved to [place] and is now moving to [place]. Patient stated that she loved you as her provider and would like to see if you have any recommendations on providers in the [facility].

## (undated) DEVICE — FORCEP RADIAL JAW 4 STANDARD CAPACITY W/NEEDLE 240CM (40EA/BX)

## (undated) DEVICE — SET LEADWIRE 5 LEAD BEDSIDE DISPOSABLE ECG (1SET OF 5/EA)

## (undated) DEVICE — TUBE E-T HI-LO CUFF 6.5MM (10EA/BX)

## (undated) DEVICE — PAD PREP 24 X 48 CUFFED - (100/CA)

## (undated) DEVICE — KIT ANESTHESIA W/CIRCUIT & 3/LT BAG W/FILTER (20EA/CA)

## (undated) DEVICE — GOWN SURGEONS LARGE - (32/CA)

## (undated) DEVICE — SYRINGE SAFETY 3 ML 18 GA X 1 1/2 BLUNT LL (100/BX 8BX/CA)

## (undated) DEVICE — SET EXTENSION WITH 2 PORTS (48EA/CA) ***PART #2C8610 IS A SUBSTITUTE*****

## (undated) DEVICE — TUBE SUCTION YANKAUER  1/4 X 6FT (20EA/CA)"

## (undated) DEVICE — SPONGE GAUZE NON-STERILE 4X4 - (2000/CA 10PK/CA)

## (undated) DEVICE — ELECTRODE 850 FOAM ADHESIVE - HYDROGEL RADIOTRNSPRNT (50/PK)

## (undated) DEVICE — SYRINGE ALLIANCE INFLATION (5EA/BX)

## (undated) DEVICE — MASK WITH FACE SHIELD (25/BX 4BX/CA)

## (undated) DEVICE — CATHETER IV SAFETY 20 GA X 1-1/4 (50/BX)

## (undated) DEVICE — CANISTER SUCTION RIGID RED 1500CC (40EA/CA)

## (undated) DEVICE — KIT CUSTOM PROCEDURE SINGLE FOR ENDO  (15/CA)

## (undated) DEVICE — TOWEL STOP TIMEOUT SAFETY FLAG (40EA/CA)

## (undated) DEVICE — GLOVE, LITE (PAIR)

## (undated) DEVICE — NEPTUNE 4 PORT MANIFOLD - (20/PK)

## (undated) DEVICE — MASK ANESTHESIA ADULT  - (100/CA)

## (undated) DEVICE — BITE BLOCK ADULT 60FR (100EA/CA)

## (undated) DEVICE — KIT  I.V. START (100EA/CA)

## (undated) DEVICE — SYRINGE SAFETY 5 ML 18 GA X 1-1/2 BLUNT LL (100/BX 4BX/CA)

## (undated) DEVICE — CANNULA O2 COMFORT SOFT EAR ADULT 7 FT TUBING (50/CA)

## (undated) DEVICE — TUBE CONNECTING SUCTION - CLEAR PLASTIC STERILE 72 IN (50EA/CA)

## (undated) DEVICE — TUBING CLEARLINK DUO-VENT - C-FLO (48EA/CA)

## (undated) DEVICE — SENSOR SPO2 ADULT LNCS ADTX (20/BX) ORDER ITEM #19593

## (undated) DEVICE — SYRINGE DISP. 60 CC LL - (30/BX, 12BX/CA)**WHEN THESE ARE GONE ORDER #500206**

## (undated) DEVICE — CONTAINER, SPECIMEN, STERILE

## (undated) DEVICE — LACTATED RINGERS INJ 1000 ML - (14EA/CA 60CA/PF)

## (undated) DEVICE — WATER IRRIGATION STERILE 1000ML (12EA/CA)

## (undated) DEVICE — SYRINGE SAFETY 10 ML 18 GA X 1 1/2 BLUNT LL (100/BX 4BX/CA)

## (undated) DEVICE — FILM CASSETTE ENDO

## (undated) DEVICE — BALLOON CRE WG 18-20MM 240CM 5.5 F G

## (undated) DEVICE — TUBE E-T HI-LO CUFF 7.0MM (10EA/PK)